# Patient Record
Sex: FEMALE | Race: WHITE | Employment: OTHER | URBAN - NONMETROPOLITAN AREA
[De-identification: names, ages, dates, MRNs, and addresses within clinical notes are randomized per-mention and may not be internally consistent; named-entity substitution may affect disease eponyms.]

---

## 2017-01-01 ENCOUNTER — HOSPITAL ENCOUNTER (OUTPATIENT)
Dept: WOUND CARE | Age: 52
Discharge: HOME | End: 2017-01-31
Attending: INTERNAL MEDICINE | Admitting: SURGERY

## 2017-01-03 ENCOUNTER — HOSPITAL ENCOUNTER (OUTPATIENT)
Dept: WOUND CARE | Age: 52
Discharge: HOME | End: 2017-01-03
Attending: SURGERY | Admitting: INTERNAL MEDICINE

## 2017-01-03 ENCOUNTER — HOSPITAL ENCOUNTER (OUTPATIENT)
Dept: WOUND CARE | Age: 52
Discharge: HOME | End: 2017-01-03
Attending: INTERNAL MEDICINE | Admitting: INTERNAL MEDICINE

## 2017-01-03 VITALS
TEMPERATURE: 98 F | RESPIRATION RATE: 16 BRPM | HEART RATE: 79 BPM | DIASTOLIC BLOOD PRESSURE: 76 MMHG | SYSTOLIC BLOOD PRESSURE: 127 MMHG

## 2017-01-03 DIAGNOSIS — T66.XXXS LATE EFFECT OF RADIATION: Primary | ICD-10-CM

## 2017-01-04 ENCOUNTER — HOSPITAL ENCOUNTER (OUTPATIENT)
Dept: WOUND CARE | Age: 52
Discharge: HOME | End: 2017-01-04
Attending: INTERNAL MEDICINE | Admitting: SURGERY

## 2017-01-04 ENCOUNTER — HOSPITAL ENCOUNTER (OUTPATIENT)
Dept: WOUND CARE | Age: 52
Discharge: HOME | End: 2017-01-04
Attending: INTERNAL MEDICINE | Admitting: INTERNAL MEDICINE

## 2017-01-04 VITALS
RESPIRATION RATE: 16 BRPM | DIASTOLIC BLOOD PRESSURE: 82 MMHG | SYSTOLIC BLOOD PRESSURE: 127 MMHG | HEART RATE: 76 BPM | TEMPERATURE: 98.3 F

## 2017-01-04 DIAGNOSIS — T66.XXXS LATE EFFECT OF RADIATION: Primary | ICD-10-CM

## 2017-01-04 PROCEDURE — 99183 HYPERBARIC OXYGEN THERAPY: CPT | Performed by: INTERNAL MEDICINE

## 2017-01-06 ENCOUNTER — HOSPITAL ENCOUNTER (OUTPATIENT)
Dept: WOUND CARE | Age: 52
Discharge: HOME | End: 2017-01-06
Attending: INTERNAL MEDICINE | Admitting: INTERNAL MEDICINE

## 2017-01-06 ENCOUNTER — HOSPITAL ENCOUNTER (OUTPATIENT)
Dept: WOUND CARE | Age: 52
Discharge: HOME | End: 2017-01-06
Attending: INTERNAL MEDICINE | Admitting: INTERNAL MEDICINE

## 2017-01-06 VITALS
HEART RATE: 77 BPM | RESPIRATION RATE: 16 BRPM | SYSTOLIC BLOOD PRESSURE: 120 MMHG | TEMPERATURE: 98.6 F | DIASTOLIC BLOOD PRESSURE: 80 MMHG

## 2017-01-06 DIAGNOSIS — T66.XXXS LATE EFFECT OF RADIATION: ICD-10-CM

## 2017-01-06 DIAGNOSIS — S21.001A WOUND OF RIGHT BREAST, INITIAL ENCOUNTER: Primary | ICD-10-CM

## 2017-01-09 ENCOUNTER — HOSPITAL ENCOUNTER (OUTPATIENT)
Dept: ULTRASOUND IMAGING | Age: 52
Discharge: HOME | End: 2017-01-09
Attending: SPECIALIST | Admitting: SPECIALIST

## 2017-01-09 DIAGNOSIS — M61.459: ICD-10-CM

## 2017-01-10 ENCOUNTER — HOSPITAL ENCOUNTER (OUTPATIENT)
Dept: WOUND CARE | Age: 52
Discharge: HOME | End: 2017-01-10
Attending: SURGERY | Admitting: SURGERY

## 2017-01-10 ENCOUNTER — HOSPITAL ENCOUNTER (OUTPATIENT)
Dept: WOUND CARE | Age: 52
Discharge: HOME | End: 2017-01-10
Attending: SURGERY | Admitting: INTERNAL MEDICINE

## 2017-01-10 VITALS
SYSTOLIC BLOOD PRESSURE: 122 MMHG | RESPIRATION RATE: 16 BRPM | TEMPERATURE: 98.3 F | DIASTOLIC BLOOD PRESSURE: 79 MMHG | HEART RATE: 82 BPM

## 2017-01-10 DIAGNOSIS — S21.001A WOUND OF RIGHT BREAST, INITIAL ENCOUNTER: Primary | ICD-10-CM

## 2017-01-10 DIAGNOSIS — T66.XXXS LATE EFFECT OF RADIATION: ICD-10-CM

## 2017-01-11 ENCOUNTER — HOSPITAL ENCOUNTER (OUTPATIENT)
Dept: WOUND CARE | Age: 52
Discharge: HOME | End: 2017-01-11
Attending: INTERNAL MEDICINE | Admitting: INTERNAL MEDICINE

## 2017-01-11 ENCOUNTER — HOSPITAL ENCOUNTER (OUTPATIENT)
Dept: WOUND CARE | Age: 52
Discharge: HOME | End: 2017-01-11
Attending: INTERNAL MEDICINE | Admitting: INTERNAL MEDICINE

## 2017-01-11 VITALS
DIASTOLIC BLOOD PRESSURE: 74 MMHG | SYSTOLIC BLOOD PRESSURE: 114 MMHG | RESPIRATION RATE: 16 BRPM | TEMPERATURE: 99 F | HEART RATE: 80 BPM

## 2017-01-11 DIAGNOSIS — T66.XXXS LATE EFFECT OF RADIATION: Primary | ICD-10-CM

## 2017-01-11 PROCEDURE — 99183 HYPERBARIC OXYGEN THERAPY: CPT | Performed by: INTERNAL MEDICINE

## 2017-01-11 ASSESSMENT — PAIN DESCRIPTION - DESCRIPTORS: DESCRIPTORS: ACHING

## 2017-01-11 ASSESSMENT — PAIN DESCRIPTION - PAIN TYPE: TYPE: CHRONIC PAIN

## 2017-01-11 ASSESSMENT — PAIN SCALES - GENERAL: PAINLEVEL_OUTOF10: 5

## 2017-01-11 ASSESSMENT — PAIN DESCRIPTION - FREQUENCY: FREQUENCY: INTERMITTENT

## 2017-01-11 ASSESSMENT — PAIN DESCRIPTION - ORIENTATION: ORIENTATION: RIGHT

## 2017-01-11 ASSESSMENT — PAIN DESCRIPTION - ONSET: ONSET: ON-GOING

## 2017-01-11 ASSESSMENT — PAIN DESCRIPTION - PROGRESSION: CLINICAL_PROGRESSION: NOT CHANGED

## 2017-01-11 ASSESSMENT — PAIN DESCRIPTION - LOCATION: LOCATION: BREAST

## 2017-01-12 ENCOUNTER — HOSPITAL ENCOUNTER (OUTPATIENT)
Dept: WOUND CARE | Age: 52
Discharge: HOME | End: 2017-01-12
Attending: INTERNAL MEDICINE | Admitting: SURGERY

## 2017-01-12 ENCOUNTER — HOSPITAL ENCOUNTER (OUTPATIENT)
Dept: WOUND CARE | Age: 52
Discharge: HOME | End: 2017-01-12
Attending: INTERNAL MEDICINE | Admitting: INTERNAL MEDICINE

## 2017-01-12 VITALS
RESPIRATION RATE: 16 BRPM | TEMPERATURE: 98.5 F | HEART RATE: 86 BPM | SYSTOLIC BLOOD PRESSURE: 111 MMHG | DIASTOLIC BLOOD PRESSURE: 73 MMHG

## 2017-01-12 DIAGNOSIS — S21.001A WOUND OF RIGHT BREAST, INITIAL ENCOUNTER: ICD-10-CM

## 2017-01-12 DIAGNOSIS — T66.XXXS LATE EFFECT OF RADIATION: Primary | ICD-10-CM

## 2017-01-13 ENCOUNTER — HOSPITAL ENCOUNTER (OUTPATIENT)
Dept: WOUND CARE | Age: 52
Discharge: HOME | End: 2017-01-13
Attending: INTERNAL MEDICINE | Admitting: INTERNAL MEDICINE

## 2017-01-13 ENCOUNTER — HOSPITAL ENCOUNTER (OUTPATIENT)
Dept: WOUND CARE | Age: 52
Discharge: HOME | End: 2017-01-13
Attending: INTERNAL MEDICINE | Admitting: INTERNAL MEDICINE

## 2017-01-13 VITALS
DIASTOLIC BLOOD PRESSURE: 77 MMHG | HEART RATE: 80 BPM | SYSTOLIC BLOOD PRESSURE: 121 MMHG | RESPIRATION RATE: 16 BRPM | TEMPERATURE: 98.6 F

## 2017-01-13 DIAGNOSIS — T66.XXXS LATE EFFECT OF RADIATION: Primary | ICD-10-CM

## 2017-01-13 DIAGNOSIS — S21.001A WOUND OF RIGHT BREAST, INITIAL ENCOUNTER: ICD-10-CM

## 2017-01-16 ENCOUNTER — HOSPITAL ENCOUNTER (OUTPATIENT)
Dept: WOUND CARE | Age: 52
Discharge: HOME | End: 2017-01-16
Attending: INTERNAL MEDICINE | Admitting: INTERNAL MEDICINE

## 2017-01-16 ENCOUNTER — HOSPITAL ENCOUNTER (OUTPATIENT)
Dept: WOUND CARE | Age: 52
Discharge: HOME | End: 2017-01-16
Attending: INTERNAL MEDICINE | Admitting: INTERNAL MEDICINE

## 2017-01-16 VITALS
TEMPERATURE: 98.5 F | DIASTOLIC BLOOD PRESSURE: 80 MMHG | SYSTOLIC BLOOD PRESSURE: 123 MMHG | HEART RATE: 79 BPM | RESPIRATION RATE: 16 BRPM

## 2017-01-16 DIAGNOSIS — T66.XXXS LATE EFFECT OF RADIATION: Primary | ICD-10-CM

## 2017-01-16 PROCEDURE — 99183 HYPERBARIC OXYGEN THERAPY: CPT | Performed by: INTERNAL MEDICINE

## 2017-01-17 ENCOUNTER — HOSPITAL ENCOUNTER (OUTPATIENT)
Dept: WOUND CARE | Age: 52
Discharge: HOME | End: 2017-01-17
Attending: SURGERY | Admitting: INTERNAL MEDICINE

## 2017-01-17 ENCOUNTER — HOSPITAL ENCOUNTER (OUTPATIENT)
Dept: WOUND CARE | Age: 52
Discharge: HOME | End: 2017-01-17
Attending: SURGERY | Admitting: SURGERY

## 2017-01-17 VITALS
DIASTOLIC BLOOD PRESSURE: 75 MMHG | RESPIRATION RATE: 16 BRPM | HEART RATE: 84 BPM | TEMPERATURE: 98.9 F | SYSTOLIC BLOOD PRESSURE: 115 MMHG

## 2017-01-17 DIAGNOSIS — S21.001A WOUND OF RIGHT BREAST, INITIAL ENCOUNTER: ICD-10-CM

## 2017-01-17 DIAGNOSIS — T66.XXXS LATE EFFECT OF RADIATION: Primary | ICD-10-CM

## 2017-01-18 ENCOUNTER — HOSPITAL ENCOUNTER (OUTPATIENT)
Dept: WOUND CARE | Age: 52
Discharge: HOME | End: 2017-01-18
Attending: INTERNAL MEDICINE | Admitting: INTERNAL MEDICINE

## 2017-01-18 ENCOUNTER — HOSPITAL ENCOUNTER (OUTPATIENT)
Dept: WOUND CARE | Age: 52
Discharge: HOME | End: 2017-01-18
Attending: INTERNAL MEDICINE | Admitting: INTERNAL MEDICINE

## 2017-01-18 VITALS
TEMPERATURE: 99.2 F | HEART RATE: 78 BPM | SYSTOLIC BLOOD PRESSURE: 120 MMHG | RESPIRATION RATE: 16 BRPM | DIASTOLIC BLOOD PRESSURE: 76 MMHG

## 2017-01-18 DIAGNOSIS — T66.XXXS LATE EFFECT OF RADIATION: Primary | ICD-10-CM

## 2017-01-18 PROCEDURE — 99183 HYPERBARIC OXYGEN THERAPY: CPT | Performed by: INTERNAL MEDICINE

## 2017-01-19 ENCOUNTER — HOSPITAL ENCOUNTER (OUTPATIENT)
Dept: WOUND CARE | Age: 52
Discharge: HOME | End: 2017-01-19
Attending: INTERNAL MEDICINE | Admitting: INTERNAL MEDICINE

## 2017-01-19 ENCOUNTER — HOSPITAL ENCOUNTER (OUTPATIENT)
Dept: WOUND CARE | Age: 52
Discharge: HOME | End: 2017-01-19
Attending: INTERNAL MEDICINE | Admitting: SURGERY

## 2017-01-19 VITALS
RESPIRATION RATE: 16 BRPM | SYSTOLIC BLOOD PRESSURE: 125 MMHG | HEART RATE: 80 BPM | TEMPERATURE: 99.2 F | DIASTOLIC BLOOD PRESSURE: 76 MMHG

## 2017-01-19 DIAGNOSIS — T66.XXXS LATE EFFECT OF RADIATION: Primary | ICD-10-CM

## 2017-01-19 DIAGNOSIS — S21.001A WOUND OF RIGHT BREAST, INITIAL ENCOUNTER: ICD-10-CM

## 2017-01-20 ENCOUNTER — HOSPITAL ENCOUNTER (OUTPATIENT)
Dept: WOUND CARE | Age: 52
Discharge: HOME | End: 2017-01-20
Attending: INTERNAL MEDICINE | Admitting: INTERNAL MEDICINE

## 2017-01-20 ENCOUNTER — HOSPITAL ENCOUNTER (OUTPATIENT)
Dept: WOUND CARE | Age: 52
Discharge: HOME | End: 2017-01-20
Attending: INTERNAL MEDICINE | Admitting: INTERNAL MEDICINE

## 2017-01-20 VITALS
DIASTOLIC BLOOD PRESSURE: 77 MMHG | HEART RATE: 74 BPM | SYSTOLIC BLOOD PRESSURE: 129 MMHG | TEMPERATURE: 99 F | RESPIRATION RATE: 16 BRPM

## 2017-01-20 DIAGNOSIS — S21.001A WOUND OF RIGHT BREAST, INITIAL ENCOUNTER: ICD-10-CM

## 2017-01-20 DIAGNOSIS — T66.XXXS LATE EFFECT OF RADIATION: Primary | ICD-10-CM

## 2017-01-23 ENCOUNTER — HOSPITAL ENCOUNTER (OUTPATIENT)
Dept: WOUND CARE | Age: 52
Discharge: HOME | End: 2017-01-23
Attending: INTERNAL MEDICINE | Admitting: INTERNAL MEDICINE

## 2017-01-23 ENCOUNTER — HOSPITAL ENCOUNTER (OUTPATIENT)
Dept: WOUND CARE | Age: 52
Discharge: HOME | End: 2017-01-23
Attending: PODIATRIST | Admitting: PODIATRIST

## 2017-01-23 VITALS
RESPIRATION RATE: 16 BRPM | TEMPERATURE: 99 F | DIASTOLIC BLOOD PRESSURE: 77 MMHG | HEART RATE: 79 BPM | SYSTOLIC BLOOD PRESSURE: 122 MMHG

## 2017-01-23 DIAGNOSIS — T66.XXXS LATE EFFECT OF RADIATION: Primary | ICD-10-CM

## 2017-01-23 PROCEDURE — 99183 HYPERBARIC OXYGEN THERAPY: CPT | Performed by: INTERNAL MEDICINE

## 2017-01-24 ENCOUNTER — HOSPITAL ENCOUNTER (OUTPATIENT)
Dept: WOUND CARE | Age: 52
Discharge: HOME | End: 2017-01-24
Attending: SURGERY | Admitting: INTERNAL MEDICINE

## 2017-01-24 ENCOUNTER — HOSPITAL ENCOUNTER (OUTPATIENT)
Dept: WOUND CARE | Age: 52
Discharge: HOME | End: 2017-01-24
Attending: INTERNAL MEDICINE | Admitting: INTERNAL MEDICINE

## 2017-01-24 VITALS
TEMPERATURE: 98.6 F | RESPIRATION RATE: 16 BRPM | SYSTOLIC BLOOD PRESSURE: 112 MMHG | HEART RATE: 75 BPM | DIASTOLIC BLOOD PRESSURE: 76 MMHG

## 2017-01-24 DIAGNOSIS — T66.XXXS LATE EFFECT OF RADIATION: Primary | ICD-10-CM

## 2017-01-25 ENCOUNTER — HOSPITAL ENCOUNTER (OUTPATIENT)
Dept: WOUND CARE | Age: 52
Discharge: HOME | End: 2017-01-25
Attending: INTERNAL MEDICINE | Admitting: INTERNAL MEDICINE

## 2017-01-25 ENCOUNTER — HOSPITAL ENCOUNTER (OUTPATIENT)
Dept: WOUND CARE | Age: 52
Discharge: HOME | End: 2017-01-25
Attending: INTERNAL MEDICINE | Admitting: INTERNAL MEDICINE

## 2017-01-25 VITALS
SYSTOLIC BLOOD PRESSURE: 146 MMHG | HEART RATE: 88 BPM | TEMPERATURE: 98.7 F | RESPIRATION RATE: 16 BRPM | DIASTOLIC BLOOD PRESSURE: 88 MMHG

## 2017-01-25 VITALS
SYSTOLIC BLOOD PRESSURE: 146 MMHG | RESPIRATION RATE: 16 BRPM | HEART RATE: 88 BPM | DIASTOLIC BLOOD PRESSURE: 88 MMHG | TEMPERATURE: 98.7 F

## 2017-01-25 DIAGNOSIS — T66.XXXS LATE EFFECT OF RADIATION: Primary | ICD-10-CM

## 2017-01-25 PROCEDURE — 99183 HYPERBARIC OXYGEN THERAPY: CPT | Performed by: INTERNAL MEDICINE

## 2017-01-25 ASSESSMENT — PAIN DESCRIPTION - PROGRESSION: CLINICAL_PROGRESSION: NOT CHANGED

## 2017-01-26 ENCOUNTER — HOSPITAL ENCOUNTER (OUTPATIENT)
Dept: WOUND CARE | Age: 52
Discharge: HOME | End: 2017-01-26
Attending: INTERNAL MEDICINE | Admitting: SURGERY

## 2017-01-26 ENCOUNTER — HOSPITAL ENCOUNTER (OUTPATIENT)
Dept: WOUND CARE | Age: 52
Discharge: HOME | End: 2017-01-26
Attending: INTERNAL MEDICINE | Admitting: INTERNAL MEDICINE

## 2017-01-26 VITALS
DIASTOLIC BLOOD PRESSURE: 75 MMHG | RESPIRATION RATE: 16 BRPM | TEMPERATURE: 98.6 F | HEART RATE: 80 BPM | SYSTOLIC BLOOD PRESSURE: 115 MMHG

## 2017-01-26 DIAGNOSIS — T66.XXXS LATE EFFECT OF RADIATION: Primary | ICD-10-CM

## 2017-01-26 DIAGNOSIS — S21.001A WOUND OF RIGHT BREAST, INITIAL ENCOUNTER: ICD-10-CM

## 2017-01-27 ENCOUNTER — HOSPITAL ENCOUNTER (OUTPATIENT)
Dept: WOUND CARE | Age: 52
Discharge: HOME | End: 2017-01-27
Attending: INTERNAL MEDICINE | Admitting: INTERNAL MEDICINE

## 2017-01-27 ENCOUNTER — HOSPITAL ENCOUNTER (OUTPATIENT)
Dept: WOUND CARE | Age: 52
Discharge: HOME | End: 2017-01-27
Attending: INTERNAL MEDICINE | Admitting: INTERNAL MEDICINE

## 2017-01-27 VITALS
SYSTOLIC BLOOD PRESSURE: 109 MMHG | DIASTOLIC BLOOD PRESSURE: 72 MMHG | TEMPERATURE: 98.2 F | RESPIRATION RATE: 16 BRPM | HEART RATE: 73 BPM

## 2017-01-27 DIAGNOSIS — S21.001A WOUND OF RIGHT BREAST, INITIAL ENCOUNTER: ICD-10-CM

## 2017-01-27 DIAGNOSIS — T66.XXXS LATE EFFECT OF RADIATION: Primary | ICD-10-CM

## 2017-01-30 ENCOUNTER — HOSPITAL ENCOUNTER (OUTPATIENT)
Dept: WOUND CARE | Age: 52
Discharge: HOME | End: 2017-01-30
Attending: PODIATRIST | Admitting: PODIATRIST

## 2017-01-30 ENCOUNTER — HOSPITAL ENCOUNTER (OUTPATIENT)
Dept: WOUND CARE | Age: 52
Discharge: HOME | End: 2017-01-30
Attending: INTERNAL MEDICINE | Admitting: INTERNAL MEDICINE

## 2017-01-30 VITALS
HEART RATE: 73 BPM | RESPIRATION RATE: 16 BRPM | TEMPERATURE: 98.6 F | SYSTOLIC BLOOD PRESSURE: 113 MMHG | DIASTOLIC BLOOD PRESSURE: 77 MMHG

## 2017-01-30 DIAGNOSIS — T66.XXXS LATE EFFECT OF RADIATION: Primary | ICD-10-CM

## 2017-01-30 PROCEDURE — 99183 HYPERBARIC OXYGEN THERAPY: CPT | Performed by: INTERNAL MEDICINE

## 2017-02-01 ENCOUNTER — HOSPITAL ENCOUNTER (OUTPATIENT)
Dept: WOUND CARE | Age: 52
Discharge: HOME | End: 2017-02-01
Attending: INTERNAL MEDICINE | Admitting: INTERNAL MEDICINE

## 2017-02-01 ENCOUNTER — HOSPITAL ENCOUNTER (OUTPATIENT)
Dept: WOUND CARE | Age: 52
Discharge: HOME | End: 2017-02-28
Attending: SURGERY | Admitting: INTERNAL MEDICINE

## 2017-02-01 ENCOUNTER — HOSPITAL ENCOUNTER (OUTPATIENT)
Dept: WOUND CARE | Age: 52
Discharge: HOME | End: 2017-02-01
Attending: INTERNAL MEDICINE | Admitting: SURGERY

## 2017-02-01 VITALS
RESPIRATION RATE: 16 BRPM | TEMPERATURE: 99.1 F | HEART RATE: 79 BPM | SYSTOLIC BLOOD PRESSURE: 102 MMHG | DIASTOLIC BLOOD PRESSURE: 65 MMHG

## 2017-02-01 DIAGNOSIS — T66.XXXS LATE EFFECT OF RADIATION: Primary | ICD-10-CM

## 2017-02-01 PROCEDURE — 99183 HYPERBARIC OXYGEN THERAPY: CPT | Performed by: INTERNAL MEDICINE

## 2017-02-02 ENCOUNTER — TELEPHONE (OUTPATIENT)
Dept: GASTROENTEROLOGY | Age: 52
End: 2017-02-02

## 2017-02-03 ENCOUNTER — HOSPITAL ENCOUNTER (OUTPATIENT)
Dept: WOUND CARE | Age: 52
Discharge: HOME | End: 2017-02-03
Attending: INTERNAL MEDICINE | Admitting: INTERNAL MEDICINE

## 2017-02-03 ENCOUNTER — OFFICE VISIT (OUTPATIENT)
Dept: GASTROENTEROLOGY | Age: 52
End: 2017-02-03

## 2017-02-03 ENCOUNTER — TELEPHONE (OUTPATIENT)
Dept: GASTROENTEROLOGY | Age: 52
End: 2017-02-03

## 2017-02-03 ENCOUNTER — HOSPITAL ENCOUNTER (OUTPATIENT)
Dept: WOUND CARE | Age: 52
Discharge: HOME | End: 2017-02-03
Attending: INTERNAL MEDICINE | Admitting: INTERNAL MEDICINE

## 2017-02-03 VITALS
OXYGEN SATURATION: 98 % | HEIGHT: 67 IN | WEIGHT: 293 LBS | HEART RATE: 98 BPM | BODY MASS INDEX: 45.99 KG/M2 | DIASTOLIC BLOOD PRESSURE: 80 MMHG | SYSTOLIC BLOOD PRESSURE: 110 MMHG

## 2017-02-03 VITALS
DIASTOLIC BLOOD PRESSURE: 79 MMHG | TEMPERATURE: 98.8 F | RESPIRATION RATE: 16 BRPM | SYSTOLIC BLOOD PRESSURE: 139 MMHG | HEART RATE: 78 BPM

## 2017-02-03 DIAGNOSIS — S21.001A WOUND OF RIGHT BREAST, INITIAL ENCOUNTER: ICD-10-CM

## 2017-02-03 DIAGNOSIS — R19.7 DIARRHEA, UNSPECIFIED TYPE: Primary | ICD-10-CM

## 2017-02-03 DIAGNOSIS — T66.XXXS LATE EFFECT OF RADIATION: Primary | ICD-10-CM

## 2017-02-03 DIAGNOSIS — K21.9 GASTROESOPHAGEAL REFLUX DISEASE, ESOPHAGITIS PRESENCE NOT SPECIFIED: ICD-10-CM

## 2017-02-03 PROCEDURE — 99203 OFFICE O/P NEW LOW 30 MIN: CPT | Performed by: NURSE PRACTITIONER

## 2017-02-03 RX ORDER — CALCIUM CARBONATE 500(1250)
500 TABLET ORAL DAILY
COMMUNITY
Start: 2017-01-17 | End: 2018-05-15

## 2017-02-03 ASSESSMENT — ENCOUNTER SYMPTOMS
DOUBLE VISION: 0
SPUTUM PRODUCTION: 0
ABDOMINAL PAIN: 0
PHOTOPHOBIA: 0
EYE PAIN: 0
VOMITING: 0
BACK PAIN: 1
DIARRHEA: 1
BLURRED VISION: 1
NAUSEA: 0
BLOOD IN STOOL: 0
HEARTBURN: 1
ORTHOPNEA: 0
SHORTNESS OF BREATH: 0
HEMOPTYSIS: 0
CONSTIPATION: 0
COUGH: 0

## 2017-02-06 ENCOUNTER — HOSPITAL ENCOUNTER (OUTPATIENT)
Dept: WOUND CARE | Age: 52
Discharge: HOME | End: 2017-02-06
Attending: INTERNAL MEDICINE | Admitting: INTERNAL MEDICINE

## 2017-02-06 ENCOUNTER — HOSPITAL ENCOUNTER (OUTPATIENT)
Dept: WOUND CARE | Age: 52
Discharge: HOME | End: 2017-02-06
Attending: INTERNAL MEDICINE | Admitting: INTERNAL MEDICINE

## 2017-02-06 VITALS
DIASTOLIC BLOOD PRESSURE: 70 MMHG | SYSTOLIC BLOOD PRESSURE: 112 MMHG | TEMPERATURE: 98.3 F | RESPIRATION RATE: 16 BRPM | HEART RATE: 80 BPM

## 2017-02-06 DIAGNOSIS — T66.XXXS LATE EFFECT OF RADIATION: Primary | ICD-10-CM

## 2017-02-06 PROCEDURE — 99183 HYPERBARIC OXYGEN THERAPY: CPT | Performed by: INTERNAL MEDICINE

## 2017-02-07 ENCOUNTER — HOSPITAL ENCOUNTER (OUTPATIENT)
Dept: WOUND CARE | Age: 52
Discharge: HOME | End: 2017-02-07
Attending: SURGERY | Admitting: INTERNAL MEDICINE

## 2017-02-07 VITALS
SYSTOLIC BLOOD PRESSURE: 122 MMHG | TEMPERATURE: 98.6 F | DIASTOLIC BLOOD PRESSURE: 74 MMHG | HEART RATE: 80 BPM | RESPIRATION RATE: 16 BRPM

## 2017-02-07 DIAGNOSIS — T66.XXXS LATE EFFECT OF RADIATION: Primary | ICD-10-CM

## 2017-02-07 ASSESSMENT — PAIN SCALES - GENERAL: PAINLEVEL_OUTOF10: 0

## 2017-02-08 ENCOUNTER — HOSPITAL ENCOUNTER (OUTPATIENT)
Dept: WOUND CARE | Age: 52
Discharge: HOME | End: 2017-02-08
Attending: INTERNAL MEDICINE | Admitting: INTERNAL MEDICINE

## 2017-02-08 ENCOUNTER — HOSPITAL ENCOUNTER (OUTPATIENT)
Dept: WOUND CARE | Age: 52
Discharge: HOME | End: 2017-02-08
Attending: INTERNAL MEDICINE | Admitting: SURGERY

## 2017-02-08 VITALS
SYSTOLIC BLOOD PRESSURE: 120 MMHG | TEMPERATURE: 99.1 F | RESPIRATION RATE: 16 BRPM | DIASTOLIC BLOOD PRESSURE: 78 MMHG | HEART RATE: 68 BPM

## 2017-02-08 DIAGNOSIS — T66.XXXS LATE EFFECT OF RADIATION: Primary | ICD-10-CM

## 2017-02-08 PROCEDURE — 99183 HYPERBARIC OXYGEN THERAPY: CPT | Performed by: INTERNAL MEDICINE

## 2017-02-13 ENCOUNTER — HOSPITAL ENCOUNTER (OUTPATIENT)
Dept: WOUND CARE | Age: 52
Discharge: HOME | End: 2017-02-13
Attending: INTERNAL MEDICINE

## 2017-02-13 ENCOUNTER — HOSPITAL ENCOUNTER (OUTPATIENT)
Dept: WOUND CARE | Age: 52
Discharge: HOME | End: 2017-02-13
Attending: INTERNAL MEDICINE | Admitting: INTERNAL MEDICINE

## 2017-02-13 ENCOUNTER — HOSPITAL ENCOUNTER (OUTPATIENT)
Dept: WOUND CARE | Age: 52
Discharge: HOME | End: 2017-02-13
Attending: INTERNAL MEDICINE | Admitting: INTERNAL MEDICINE

## 2017-02-13 VITALS
SYSTOLIC BLOOD PRESSURE: 142 MMHG | TEMPERATURE: 98.7 F | RESPIRATION RATE: 16 BRPM | DIASTOLIC BLOOD PRESSURE: 83 MMHG | HEART RATE: 76 BPM

## 2017-02-13 DIAGNOSIS — T66.XXXS LATE EFFECT OF RADIATION: Primary | ICD-10-CM

## 2017-02-13 PROCEDURE — 99183 HYPERBARIC OXYGEN THERAPY: CPT | Performed by: INTERNAL MEDICINE

## 2017-02-14 ENCOUNTER — HOSPITAL ENCOUNTER (OUTPATIENT)
Dept: WOUND CARE | Age: 52
Discharge: HOME | End: 2017-02-14
Attending: SURGERY | Admitting: INTERNAL MEDICINE

## 2017-02-14 ENCOUNTER — HOSPITAL ENCOUNTER (OUTPATIENT)
Dept: WOUND CARE | Age: 52
Discharge: HOME | End: 2017-02-14
Attending: INTERNAL MEDICINE | Admitting: INTERNAL MEDICINE

## 2017-02-14 VITALS
RESPIRATION RATE: 16 BRPM | TEMPERATURE: 98.5 F | DIASTOLIC BLOOD PRESSURE: 79 MMHG | HEART RATE: 77 BPM | SYSTOLIC BLOOD PRESSURE: 121 MMHG

## 2017-02-14 DIAGNOSIS — T66.XXXS LATE EFFECT OF RADIATION: Primary | ICD-10-CM

## 2017-02-15 ENCOUNTER — HOSPITAL ENCOUNTER (OUTPATIENT)
Dept: WOUND CARE | Age: 52
Discharge: HOME | End: 2017-02-15
Attending: INTERNAL MEDICINE | Admitting: SURGERY

## 2017-02-15 ENCOUNTER — HOSPITAL ENCOUNTER (OUTPATIENT)
Dept: WOUND CARE | Age: 52
Discharge: HOME | End: 2017-02-15
Attending: INTERNAL MEDICINE | Admitting: INTERNAL MEDICINE

## 2017-02-15 VITALS
HEART RATE: 73 BPM | DIASTOLIC BLOOD PRESSURE: 77 MMHG | TEMPERATURE: 98.9 F | RESPIRATION RATE: 16 BRPM | SYSTOLIC BLOOD PRESSURE: 116 MMHG

## 2017-02-15 DIAGNOSIS — T66.XXXS LATE EFFECT OF RADIATION: Primary | ICD-10-CM

## 2017-02-15 PROCEDURE — 99183 HYPERBARIC OXYGEN THERAPY: CPT | Performed by: INTERNAL MEDICINE

## 2017-02-16 ENCOUNTER — HOSPITAL ENCOUNTER (OUTPATIENT)
Dept: WOUND CARE | Age: 52
Discharge: HOME | End: 2017-02-16
Attending: INTERNAL MEDICINE | Admitting: INTERNAL MEDICINE

## 2017-02-16 ENCOUNTER — HOSPITAL ENCOUNTER (OUTPATIENT)
Dept: WOUND CARE | Age: 52
Discharge: HOME | End: 2017-02-16
Attending: INTERNAL MEDICINE | Admitting: SURGERY

## 2017-02-16 VITALS
DIASTOLIC BLOOD PRESSURE: 79 MMHG | TEMPERATURE: 99 F | HEART RATE: 78 BPM | RESPIRATION RATE: 16 BRPM | SYSTOLIC BLOOD PRESSURE: 116 MMHG

## 2017-02-16 DIAGNOSIS — S21.001A WOUND OF RIGHT BREAST, INITIAL ENCOUNTER: ICD-10-CM

## 2017-02-16 DIAGNOSIS — T66.XXXS LATE EFFECT OF RADIATION: Primary | ICD-10-CM

## 2017-02-17 ENCOUNTER — TELEPHONE (OUTPATIENT)
Dept: GASTROENTEROLOGY | Age: 52
End: 2017-02-17

## 2017-02-20 ENCOUNTER — HOSPITAL ENCOUNTER (OUTPATIENT)
Dept: OTHER | Age: 52
Discharge: HOME | End: 2017-02-20
Attending: INTERNAL MEDICINE | Admitting: INTERNAL MEDICINE

## 2017-02-20 ENCOUNTER — HOSPITAL ENCOUNTER (OUTPATIENT)
Dept: WOUND CARE | Age: 52
Discharge: HOME | End: 2017-02-20
Attending: INTERNAL MEDICINE | Admitting: INTERNAL MEDICINE

## 2017-02-20 ENCOUNTER — OFFICE VISIT (OUTPATIENT)
Dept: GASTROENTEROLOGY | Age: 52
End: 2017-02-20

## 2017-02-20 ENCOUNTER — HOSPITAL ENCOUNTER (OUTPATIENT)
Dept: WOUND CARE | Age: 52
Discharge: HOME | End: 2017-02-20
Attending: INTERNAL MEDICINE | Admitting: INTERNAL MEDICINE

## 2017-02-20 VITALS
RESPIRATION RATE: 16 BRPM | HEART RATE: 73 BPM | TEMPERATURE: 98.6 F | SYSTOLIC BLOOD PRESSURE: 131 MMHG | DIASTOLIC BLOOD PRESSURE: 80 MMHG

## 2017-02-20 VITALS
WEIGHT: 293 LBS | HEART RATE: 92 BPM | SYSTOLIC BLOOD PRESSURE: 136 MMHG | HEIGHT: 67 IN | DIASTOLIC BLOOD PRESSURE: 86 MMHG | OXYGEN SATURATION: 96 % | BODY MASS INDEX: 45.99 KG/M2

## 2017-02-20 DIAGNOSIS — R13.14 PHARYNGOESOPHAGEAL DYSPHAGIA: ICD-10-CM

## 2017-02-20 DIAGNOSIS — Z86.010 HISTORY OF COLON POLYPS: ICD-10-CM

## 2017-02-20 DIAGNOSIS — K21.9 GASTROESOPHAGEAL REFLUX DISEASE, ESOPHAGITIS PRESENCE NOT SPECIFIED: ICD-10-CM

## 2017-02-20 DIAGNOSIS — R10.31 RIGHT LOWER QUADRANT ABDOMINAL PAIN: ICD-10-CM

## 2017-02-20 DIAGNOSIS — R11.0 NAUSEA: ICD-10-CM

## 2017-02-20 DIAGNOSIS — R19.7 DIARRHEA, UNSPECIFIED TYPE: Primary | ICD-10-CM

## 2017-02-20 DIAGNOSIS — T66.XXXS LATE EFFECT OF RADIATION: Primary | ICD-10-CM

## 2017-02-20 LAB
ALBUMIN SERPL-MCNC: 3.8 GM/DL (ref 3.4–5)
ALP BLD-CCNC: 96 IU/L (ref 40–128)
ALT SERPL-CCNC: 29 U/L (ref 10–40)
ANION GAP SERPL CALCULATED.3IONS-SCNC: 14 MMOL/L (ref 4–16)
AST SERPL-CCNC: 25 IU/L (ref 15–37)
BILIRUB SERPL-MCNC: 0.3 MG/DL (ref 0–1)
BUN BLDV-MCNC: 6 MG/DL (ref 6–23)
CALCIUM SERPL-MCNC: 9 MG/DL (ref 8.3–10.6)
CHLORIDE BLD-SCNC: 100 MMOL/L (ref 99–110)
CO2: 26 MMOL/L (ref 21–32)
CREAT SERPL-MCNC: 0.7 MG/DL (ref 0.6–1.1)
GFR AFRICAN AMERICAN: >60 ML/MIN/1.73M2
GFR NON-AFRICAN AMERICAN: >60 ML/MIN/1.73M2
GLUCOSE FASTING: 140 MG/DL (ref 70–99)
POTASSIUM SERPL-SCNC: 3.6 MMOL/L (ref 3.5–5.1)
SODIUM BLD-SCNC: 140 MMOL/L (ref 135–145)
TOTAL PROTEIN: 6.5 GM/DL (ref 6.4–8.2)

## 2017-02-20 PROCEDURE — 99214 OFFICE O/P EST MOD 30 MIN: CPT | Performed by: NURSE PRACTITIONER

## 2017-02-20 PROCEDURE — 99183 HYPERBARIC OXYGEN THERAPY: CPT | Performed by: INTERNAL MEDICINE

## 2017-02-20 RX ORDER — OCTISALATE, AVOBENZONE, HOMOSALATE, AND OCTOCRYLENE 29.4; 29.4; 49; 25.48 MG/ML; MG/ML; MG/ML; MG/ML
1 LOTION TOPICAL DAILY
Qty: 30 CAPSULE | Refills: 3 | Status: SHIPPED | OUTPATIENT
Start: 2017-02-20 | End: 2017-04-26 | Stop reason: SDUPTHER

## 2017-02-20 RX ORDER — OMEPRAZOLE 40 MG/1
40 CAPSULE, DELAYED RELEASE ORAL DAILY
Qty: 30 CAPSULE | Refills: 3 | Status: SHIPPED | OUTPATIENT
Start: 2017-02-20 | End: 2017-04-26 | Stop reason: SDUPTHER

## 2017-02-20 ASSESSMENT — ENCOUNTER SYMPTOMS
BLURRED VISION: 1
ORTHOPNEA: 0
BACK PAIN: 1
DIARRHEA: 1
COUGH: 0
SPUTUM PRODUCTION: 0
CONSTIPATION: 0
ABDOMINAL PAIN: 1
PHOTOPHOBIA: 0
SHORTNESS OF BREATH: 0
VOMITING: 0
HEARTBURN: 1
BLOOD IN STOOL: 0
DOUBLE VISION: 0
EYE PAIN: 0
NAUSEA: 1

## 2017-02-21 ENCOUNTER — HOSPITAL ENCOUNTER (OUTPATIENT)
Dept: WOUND CARE | Age: 52
Discharge: HOME | End: 2017-02-21
Attending: INTERNAL MEDICINE | Admitting: INTERNAL MEDICINE

## 2017-02-21 ENCOUNTER — HOSPITAL ENCOUNTER (OUTPATIENT)
Dept: WOUND CARE | Age: 52
Discharge: HOME | End: 2017-02-21
Attending: SURGERY | Admitting: SURGERY

## 2017-02-21 VITALS
SYSTOLIC BLOOD PRESSURE: 128 MMHG | RESPIRATION RATE: 16 BRPM | DIASTOLIC BLOOD PRESSURE: 83 MMHG | TEMPERATURE: 98.9 F | HEART RATE: 70 BPM

## 2017-02-21 DIAGNOSIS — T66.XXXS LATE EFFECT OF RADIATION: Primary | ICD-10-CM

## 2017-02-22 LAB — CA 27.29: 13.2 U/ML

## 2017-02-23 ENCOUNTER — HOSPITAL ENCOUNTER (OUTPATIENT)
Dept: WOUND CARE | Age: 52
Discharge: HOME | End: 2017-02-23
Attending: INTERNAL MEDICINE | Admitting: INTERNAL MEDICINE

## 2017-02-23 ENCOUNTER — HOSPITAL ENCOUNTER (OUTPATIENT)
Dept: WOUND CARE | Age: 52
Discharge: HOME | End: 2017-02-23
Attending: INTERNAL MEDICINE | Admitting: SURGERY

## 2017-02-23 VITALS
SYSTOLIC BLOOD PRESSURE: 122 MMHG | DIASTOLIC BLOOD PRESSURE: 79 MMHG | RESPIRATION RATE: 16 BRPM | HEART RATE: 73 BPM | TEMPERATURE: 98.6 F

## 2017-02-23 DIAGNOSIS — S21.001A WOUND OF RIGHT BREAST, INITIAL ENCOUNTER: ICD-10-CM

## 2017-02-23 DIAGNOSIS — T66.XXXS LATE EFFECT OF RADIATION: Primary | ICD-10-CM

## 2017-02-27 PROBLEM — T81.89XA: Status: ACTIVE | Noted: 2017-02-27

## 2017-02-27 LAB
CULTURE: NORMAL
ORGANISM: NORMAL
REPORT STATUS: NORMAL
REQUEST PROBLEM: NORMAL
SPECIMEN: NORMAL

## 2017-02-28 ENCOUNTER — HOSPITAL ENCOUNTER (OUTPATIENT)
Dept: WOUND CARE | Age: 52
Discharge: HOME | End: 2017-02-28
Attending: SURGERY | Admitting: SURGERY

## 2017-02-28 VITALS
SYSTOLIC BLOOD PRESSURE: 132 MMHG | RESPIRATION RATE: 16 BRPM | DIASTOLIC BLOOD PRESSURE: 76 MMHG | TEMPERATURE: 98.7 F | HEART RATE: 76 BPM

## 2017-03-01 ENCOUNTER — HOSPITAL ENCOUNTER (OUTPATIENT)
Dept: WOUND CARE | Age: 52
Discharge: HOME | End: 2017-03-31
Attending: INTERNAL MEDICINE | Admitting: INTERNAL MEDICINE

## 2017-03-02 ENCOUNTER — HOSPITAL ENCOUNTER (OUTPATIENT)
Dept: WOUND CARE | Age: 52
Discharge: HOME | End: 2017-03-02
Attending: INTERNAL MEDICINE | Admitting: INTERNAL MEDICINE

## 2017-03-02 VITALS
DIASTOLIC BLOOD PRESSURE: 76 MMHG | HEART RATE: 97 BPM | TEMPERATURE: 97.9 F | RESPIRATION RATE: 18 BRPM | SYSTOLIC BLOOD PRESSURE: 121 MMHG

## 2017-03-02 DIAGNOSIS — S21.001A WOUND OF RIGHT BREAST, INITIAL ENCOUNTER: ICD-10-CM

## 2017-03-02 DIAGNOSIS — T66.XXXS LATE EFFECT OF RADIATION: Primary | ICD-10-CM

## 2017-03-07 ENCOUNTER — HOSPITAL ENCOUNTER (OUTPATIENT)
Dept: WOUND CARE | Age: 52
Discharge: HOME | End: 2017-03-07
Attending: SURGERY | Admitting: SURGERY

## 2017-03-07 VITALS
RESPIRATION RATE: 18 BRPM | TEMPERATURE: 97.4 F | DIASTOLIC BLOOD PRESSURE: 92 MMHG | SYSTOLIC BLOOD PRESSURE: 140 MMHG | HEART RATE: 92 BPM

## 2017-03-09 ENCOUNTER — HOSPITAL ENCOUNTER (OUTPATIENT)
Dept: WOUND CARE | Age: 52
Discharge: HOME | End: 2017-03-09
Attending: INTERNAL MEDICINE | Admitting: INTERNAL MEDICINE

## 2017-03-09 VITALS
DIASTOLIC BLOOD PRESSURE: 80 MMHG | HEART RATE: 89 BPM | RESPIRATION RATE: 16 BRPM | SYSTOLIC BLOOD PRESSURE: 144 MMHG | TEMPERATURE: 97.9 F

## 2017-03-09 DIAGNOSIS — S21.001A WOUND OF RIGHT BREAST, INITIAL ENCOUNTER: ICD-10-CM

## 2017-03-09 DIAGNOSIS — T66.XXXS LATE EFFECT OF RADIATION: Primary | ICD-10-CM

## 2017-03-14 ENCOUNTER — HOSPITAL ENCOUNTER (OUTPATIENT)
Dept: WOUND CARE | Age: 52
Discharge: HOME | End: 2017-03-14
Attending: SURGERY | Admitting: SURGERY

## 2017-03-14 VITALS
DIASTOLIC BLOOD PRESSURE: 83 MMHG | SYSTOLIC BLOOD PRESSURE: 132 MMHG | RESPIRATION RATE: 18 BRPM | HEART RATE: 91 BPM | TEMPERATURE: 97.4 F

## 2017-03-17 ENCOUNTER — HOSPITAL ENCOUNTER (OUTPATIENT)
Dept: WOUND CARE | Age: 52
Discharge: HOME | End: 2017-03-17
Attending: INTERNAL MEDICINE | Admitting: INTERNAL MEDICINE

## 2017-03-17 VITALS
RESPIRATION RATE: 18 BRPM | SYSTOLIC BLOOD PRESSURE: 129 MMHG | TEMPERATURE: 96.9 F | DIASTOLIC BLOOD PRESSURE: 53 MMHG | HEART RATE: 87 BPM

## 2017-03-17 DIAGNOSIS — T66.XXXS LATE EFFECT OF RADIATION: ICD-10-CM

## 2017-03-17 DIAGNOSIS — S21.001A WOUND OF RIGHT BREAST, INITIAL ENCOUNTER: Primary | ICD-10-CM

## 2017-03-23 ENCOUNTER — HOSPITAL ENCOUNTER (OUTPATIENT)
Dept: WOUND CARE | Age: 52
Discharge: HOME | End: 2017-03-23
Attending: INTERNAL MEDICINE | Admitting: INTERNAL MEDICINE

## 2017-03-23 VITALS
DIASTOLIC BLOOD PRESSURE: 84 MMHG | RESPIRATION RATE: 18 BRPM | TEMPERATURE: 97.4 F | SYSTOLIC BLOOD PRESSURE: 134 MMHG | HEART RATE: 86 BPM

## 2017-03-23 DIAGNOSIS — S21.001A WOUND OF RIGHT BREAST, INITIAL ENCOUNTER: ICD-10-CM

## 2017-03-23 DIAGNOSIS — T66.XXXS LATE EFFECT OF RADIATION: Primary | ICD-10-CM

## 2017-03-28 ENCOUNTER — HOSPITAL ENCOUNTER (OUTPATIENT)
Dept: WOUND CARE | Age: 52
Discharge: HOME | End: 2017-03-28
Attending: INTERNAL MEDICINE | Admitting: INTERNAL MEDICINE

## 2017-03-30 ENCOUNTER — HOSPITAL ENCOUNTER (OUTPATIENT)
Dept: WOUND CARE | Age: 52
Discharge: HOME | End: 2017-03-30
Attending: INTERNAL MEDICINE | Admitting: INTERNAL MEDICINE

## 2017-03-30 VITALS
DIASTOLIC BLOOD PRESSURE: 79 MMHG | TEMPERATURE: 97.8 F | HEART RATE: 93 BPM | RESPIRATION RATE: 16 BRPM | SYSTOLIC BLOOD PRESSURE: 126 MMHG

## 2017-03-30 DIAGNOSIS — S21.001A WOUND OF RIGHT BREAST, INITIAL ENCOUNTER: ICD-10-CM

## 2017-03-30 DIAGNOSIS — T81.89XA: ICD-10-CM

## 2017-03-30 DIAGNOSIS — T66.XXXS LATE EFFECT OF RADIATION: Primary | ICD-10-CM

## 2017-04-06 ENCOUNTER — HOSPITAL ENCOUNTER (OUTPATIENT)
Dept: WOUND CARE | Age: 52
Discharge: HOME | End: 2017-04-06
Attending: INTERNAL MEDICINE | Admitting: INTERNAL MEDICINE

## 2017-04-06 VITALS
RESPIRATION RATE: 16 BRPM | DIASTOLIC BLOOD PRESSURE: 82 MMHG | TEMPERATURE: 96.6 F | HEART RATE: 90 BPM | SYSTOLIC BLOOD PRESSURE: 121 MMHG

## 2017-04-06 DIAGNOSIS — T66.XXXS LATE EFFECT OF RADIATION: Primary | ICD-10-CM

## 2017-04-06 DIAGNOSIS — S21.001A WOUND OF RIGHT BREAST, INITIAL ENCOUNTER: ICD-10-CM

## 2017-04-10 ENCOUNTER — TELEPHONE (OUTPATIENT)
Dept: GASTROENTEROLOGY | Age: 52
End: 2017-04-10

## 2017-04-11 ENCOUNTER — TELEPHONE (OUTPATIENT)
Dept: GASTROENTEROLOGY | Age: 52
End: 2017-04-11

## 2017-04-11 RX ORDER — POLYETHYLENE GLYCOL 3350 17 G/17G
POWDER, FOR SOLUTION ORAL
Qty: 119 G | Refills: 0 | Status: SHIPPED | OUTPATIENT
Start: 2017-04-11 | End: 2018-03-26 | Stop reason: ALTCHOICE

## 2017-04-12 ENCOUNTER — HOSPITAL ENCOUNTER (OUTPATIENT)
Dept: SURGERY | Age: 52
Discharge: HOME | End: 2017-04-12
Attending: INTERNAL MEDICINE | Admitting: INTERNAL MEDICINE

## 2017-04-12 ENCOUNTER — TELEPHONE (OUTPATIENT)
Dept: GASTROENTEROLOGY | Age: 52
End: 2017-04-12

## 2017-04-12 VITALS
WEIGHT: 288 LBS | DIASTOLIC BLOOD PRESSURE: 78 MMHG | OXYGEN SATURATION: 100 % | BODY MASS INDEX: 45.2 KG/M2 | SYSTOLIC BLOOD PRESSURE: 129 MMHG | HEART RATE: 81 BPM | RESPIRATION RATE: 16 BRPM | TEMPERATURE: 97.4 F | HEIGHT: 67 IN

## 2017-04-12 LAB — GLUCOSE BLD-MCNC: 109 MG/DL (ref 70–99)

## 2017-04-12 PROCEDURE — 45380 COLONOSCOPY AND BIOPSY: CPT | Performed by: INTERNAL MEDICINE

## 2017-04-12 PROCEDURE — 43249 ESOPH EGD DILATION <30 MM: CPT | Performed by: INTERNAL MEDICINE

## 2017-04-12 PROCEDURE — 43239 EGD BIOPSY SINGLE/MULTIPLE: CPT | Performed by: INTERNAL MEDICINE

## 2017-04-12 RX ORDER — SODIUM CHLORIDE, SODIUM LACTATE, POTASSIUM CHLORIDE, CALCIUM CHLORIDE 600; 310; 30; 20 MG/100ML; MG/100ML; MG/100ML; MG/100ML
INJECTION, SOLUTION INTRAVENOUS CONTINUOUS
Status: DISCONTINUED | OUTPATIENT
Start: 2017-04-12 | End: 2017-04-13 | Stop reason: HOSPADM

## 2017-04-12 RX ADMIN — SODIUM CHLORIDE, SODIUM LACTATE, POTASSIUM CHLORIDE, CALCIUM CHLORIDE: 600; 310; 30; 20 INJECTION, SOLUTION INTRAVENOUS at 13:15

## 2017-04-12 ASSESSMENT — PAIN - FUNCTIONAL ASSESSMENT: PAIN_FUNCTIONAL_ASSESSMENT: 0-10

## 2017-04-12 ASSESSMENT — PAIN SCALES - GENERAL
PAINLEVEL_OUTOF10: 0
PAINLEVEL_OUTOF10: 0

## 2017-04-13 ENCOUNTER — HOSPITAL ENCOUNTER (OUTPATIENT)
Dept: WOUND CARE | Age: 52
Discharge: HOME | End: 2017-04-13
Attending: INTERNAL MEDICINE | Admitting: INTERNAL MEDICINE

## 2017-04-13 VITALS
RESPIRATION RATE: 16 BRPM | SYSTOLIC BLOOD PRESSURE: 122 MMHG | HEART RATE: 93 BPM | TEMPERATURE: 96.5 F | DIASTOLIC BLOOD PRESSURE: 87 MMHG

## 2017-04-13 DIAGNOSIS — S21.001A WOUND OF RIGHT BREAST, INITIAL ENCOUNTER: ICD-10-CM

## 2017-04-13 DIAGNOSIS — T66.XXXS LATE EFFECT OF RADIATION: Primary | ICD-10-CM

## 2017-04-19 ENCOUNTER — HOSPITAL ENCOUNTER (OUTPATIENT)
Dept: MRI IMAGING | Age: 52
Discharge: HOME | End: 2017-04-19
Attending: SURGERY | Admitting: SURGERY

## 2017-04-19 DIAGNOSIS — T81.89XD: ICD-10-CM

## 2017-04-19 DIAGNOSIS — C50.911 MALIGNANT NEOPLASM OF RIGHT FEMALE BREAST, UNSPECIFIED SITE OF BREAST: ICD-10-CM

## 2017-04-19 LAB
GFR AFRICAN AMERICAN: >60 ML/MIN/1.73M2
GFR NON-AFRICAN AMERICAN: >60 ML/MIN/1.73M2
POC CREATININE: 0.8 MG/DL (ref 0.6–1.1)

## 2017-04-20 ENCOUNTER — HOSPITAL ENCOUNTER (OUTPATIENT)
Dept: WOUND CARE | Age: 52
Discharge: HOME | End: 2017-04-20
Attending: INTERNAL MEDICINE | Admitting: INTERNAL MEDICINE

## 2017-04-20 VITALS
DIASTOLIC BLOOD PRESSURE: 91 MMHG | HEART RATE: 90 BPM | TEMPERATURE: 97.4 F | SYSTOLIC BLOOD PRESSURE: 141 MMHG | RESPIRATION RATE: 16 BRPM

## 2017-04-20 DIAGNOSIS — S21.001A WOUND OF RIGHT BREAST, INITIAL ENCOUNTER: ICD-10-CM

## 2017-04-20 DIAGNOSIS — T66.XXXS LATE EFFECT OF RADIATION: Primary | ICD-10-CM

## 2017-04-26 ENCOUNTER — OFFICE VISIT (OUTPATIENT)
Dept: GASTROENTEROLOGY | Age: 52
End: 2017-04-26

## 2017-04-26 VITALS
BODY MASS INDEX: 45.99 KG/M2 | SYSTOLIC BLOOD PRESSURE: 128 MMHG | HEIGHT: 67 IN | OXYGEN SATURATION: 96 % | DIASTOLIC BLOOD PRESSURE: 84 MMHG | HEART RATE: 89 BPM | WEIGHT: 293 LBS

## 2017-04-26 DIAGNOSIS — Z98.890 STATUS POST DILATION OF ESOPHAGEAL NARROWING: ICD-10-CM

## 2017-04-26 DIAGNOSIS — Z87.19 STATUS POST DILATION OF ESOPHAGEAL NARROWING: ICD-10-CM

## 2017-04-26 DIAGNOSIS — K44.9 HIATAL HERNIA: ICD-10-CM

## 2017-04-26 DIAGNOSIS — K21.9 GASTROESOPHAGEAL REFLUX DISEASE WITHOUT ESOPHAGITIS: Primary | ICD-10-CM

## 2017-04-26 DIAGNOSIS — K57.30 DIVERTICULOSIS OF LARGE INTESTINE WITHOUT HEMORRHAGE: ICD-10-CM

## 2017-04-26 DIAGNOSIS — Z80.0 FAMILY HISTORY OF COLON CANCER: ICD-10-CM

## 2017-04-26 PROCEDURE — 99214 OFFICE O/P EST MOD 30 MIN: CPT | Performed by: NURSE PRACTITIONER

## 2017-04-26 RX ORDER — OCTISALATE, AVOBENZONE, HOMOSALATE, AND OCTOCRYLENE 29.4; 29.4; 49; 25.48 MG/ML; MG/ML; MG/ML; MG/ML
1 LOTION TOPICAL DAILY
Qty: 30 CAPSULE | Refills: 3 | Status: SHIPPED | OUTPATIENT
Start: 2017-04-26 | End: 2017-11-09 | Stop reason: SDUPTHER

## 2017-04-26 RX ORDER — OMEPRAZOLE 40 MG/1
40 CAPSULE, DELAYED RELEASE ORAL DAILY
Qty: 30 CAPSULE | Refills: 3 | Status: SHIPPED | OUTPATIENT
Start: 2017-04-26 | End: 2017-06-28 | Stop reason: ALTCHOICE

## 2017-04-26 RX ORDER — RANITIDINE 150 MG/1
300 TABLET ORAL NIGHTLY
Qty: 60 TABLET | Refills: 3 | Status: SHIPPED | OUTPATIENT
Start: 2017-04-26 | End: 2017-08-09 | Stop reason: SDUPTHER

## 2017-04-26 ASSESSMENT — ENCOUNTER SYMPTOMS
BLOOD IN STOOL: 0
SHORTNESS OF BREATH: 0
PHOTOPHOBIA: 0
BACK PAIN: 1
ABDOMINAL PAIN: 0
VOMITING: 0
NAUSEA: 0
CONSTIPATION: 0
BLURRED VISION: 1
DIARRHEA: 0
ORTHOPNEA: 0
SPUTUM PRODUCTION: 0
EYE PAIN: 0
DOUBLE VISION: 0
HEARTBURN: 1
COUGH: 0

## 2017-04-27 ENCOUNTER — HOSPITAL ENCOUNTER (OUTPATIENT)
Dept: WOUND CARE | Age: 52
Discharge: HOME | End: 2017-04-27
Attending: INTERNAL MEDICINE | Admitting: INTERNAL MEDICINE

## 2017-04-27 VITALS — SYSTOLIC BLOOD PRESSURE: 138 MMHG | DIASTOLIC BLOOD PRESSURE: 90 MMHG | HEART RATE: 88 BPM | TEMPERATURE: 97.9 F

## 2017-04-27 DIAGNOSIS — S21.001A WOUND OF RIGHT BREAST, INITIAL ENCOUNTER: ICD-10-CM

## 2017-04-27 DIAGNOSIS — T66.XXXS LATE EFFECT OF RADIATION: Primary | ICD-10-CM

## 2017-05-04 ENCOUNTER — HOSPITAL ENCOUNTER (OUTPATIENT)
Dept: WOUND CARE | Age: 52
Discharge: HOME | End: 2017-05-04
Attending: INTERNAL MEDICINE | Admitting: INTERNAL MEDICINE

## 2017-05-04 VITALS
HEART RATE: 82 BPM | SYSTOLIC BLOOD PRESSURE: 139 MMHG | DIASTOLIC BLOOD PRESSURE: 81 MMHG | TEMPERATURE: 98.1 F | RESPIRATION RATE: 20 BRPM

## 2017-05-04 DIAGNOSIS — T66.XXXS LATE EFFECT OF RADIATION: Primary | ICD-10-CM

## 2017-05-04 DIAGNOSIS — S21.001A WOUND OF RIGHT BREAST, INITIAL ENCOUNTER: ICD-10-CM

## 2017-05-04 ASSESSMENT — PAIN SCALES - GENERAL: PAINLEVEL_OUTOF10: 0

## 2017-05-11 ENCOUNTER — HOSPITAL ENCOUNTER (OUTPATIENT)
Dept: WOUND CARE | Age: 52
Discharge: HOME | End: 2017-05-11
Attending: INTERNAL MEDICINE | Admitting: INTERNAL MEDICINE

## 2017-05-11 VITALS — RESPIRATION RATE: 18 BRPM | HEART RATE: 83 BPM | SYSTOLIC BLOOD PRESSURE: 125 MMHG | DIASTOLIC BLOOD PRESSURE: 85 MMHG

## 2017-05-11 DIAGNOSIS — S21.001A WOUND OF RIGHT BREAST, INITIAL ENCOUNTER: ICD-10-CM

## 2017-05-11 DIAGNOSIS — T66.XXXS LATE EFFECT OF RADIATION: Primary | ICD-10-CM

## 2017-05-15 ENCOUNTER — HOSPITAL ENCOUNTER (OUTPATIENT)
Dept: OTHER | Age: 52
Discharge: HOME | End: 2017-05-15
Attending: INTERNAL MEDICINE | Admitting: INTERNAL MEDICINE

## 2017-05-15 LAB
ALBUMIN SERPL-MCNC: 3.8 GM/DL (ref 3.4–5)
ALP BLD-CCNC: 100 IU/L (ref 40–129)
ALT SERPL-CCNC: 37 U/L (ref 10–40)
ANION GAP SERPL CALCULATED.3IONS-SCNC: 18 MMOL/L (ref 4–16)
AST SERPL-CCNC: 29 IU/L (ref 15–37)
BILIRUB SERPL-MCNC: 0.2 MG/DL (ref 0–1)
BUN BLDV-MCNC: 8 MG/DL (ref 6–23)
CALCIUM SERPL-MCNC: 8.7 MG/DL (ref 8.3–10.6)
CHLORIDE BLD-SCNC: 100 MMOL/L (ref 99–110)
CO2: 23 MMOL/L (ref 21–32)
CREAT SERPL-MCNC: 0.6 MG/DL (ref 0.6–1.1)
GFR AFRICAN AMERICAN: >60 ML/MIN/1.73M2
GFR NON-AFRICAN AMERICAN: >60 ML/MIN/1.73M2
GLUCOSE BLD-MCNC: 158 MG/DL (ref 70–140)
POTASSIUM SERPL-SCNC: 4.2 MMOL/L (ref 3.5–5.1)
SODIUM BLD-SCNC: 141 MMOL/L (ref 135–145)
TOTAL PROTEIN: 6.3 GM/DL (ref 6.4–8.2)

## 2017-05-17 ENCOUNTER — TELEPHONE (OUTPATIENT)
Dept: GASTROENTEROLOGY | Age: 52
End: 2017-05-17

## 2017-05-17 LAB — CA 27.29: 10.5 U/ML

## 2017-05-17 RX ORDER — SUCRALFATE 1 G/1
1 TABLET ORAL 4 TIMES DAILY
Qty: 120 TABLET | Refills: 0 | Status: SHIPPED | OUTPATIENT
Start: 2017-05-17 | End: 2017-06-13 | Stop reason: SDUPTHER

## 2017-06-13 RX ORDER — SUCRALFATE 1 G/1
TABLET ORAL
Qty: 120 TABLET | Refills: 0 | Status: SHIPPED | OUTPATIENT
Start: 2017-06-13 | End: 2017-07-25 | Stop reason: SDUPTHER

## 2017-06-15 ENCOUNTER — TELEPHONE (OUTPATIENT)
Dept: GASTROENTEROLOGY | Age: 52
End: 2017-06-15

## 2017-06-27 ENCOUNTER — TELEPHONE (OUTPATIENT)
Dept: GASTROENTEROLOGY | Age: 52
End: 2017-06-27

## 2017-06-28 ENCOUNTER — OFFICE VISIT (OUTPATIENT)
Dept: GASTROENTEROLOGY | Age: 52
End: 2017-06-28

## 2017-06-28 VITALS
HEIGHT: 67 IN | SYSTOLIC BLOOD PRESSURE: 122 MMHG | BODY MASS INDEX: 45.99 KG/M2 | OXYGEN SATURATION: 96 % | HEART RATE: 87 BPM | DIASTOLIC BLOOD PRESSURE: 90 MMHG | WEIGHT: 293 LBS

## 2017-06-28 DIAGNOSIS — R19.7 DIARRHEA, UNSPECIFIED TYPE: ICD-10-CM

## 2017-06-28 DIAGNOSIS — R14.0 ABDOMINAL BLOATING: ICD-10-CM

## 2017-06-28 DIAGNOSIS — R10.33 PERIUMBILICAL ABDOMINAL PAIN: ICD-10-CM

## 2017-06-28 DIAGNOSIS — K21.9 GASTROESOPHAGEAL REFLUX DISEASE WITHOUT ESOPHAGITIS: ICD-10-CM

## 2017-06-28 DIAGNOSIS — R11.0 NAUSEA: Primary | ICD-10-CM

## 2017-06-28 PROCEDURE — 99214 OFFICE O/P EST MOD 30 MIN: CPT | Performed by: NURSE PRACTITIONER

## 2017-06-28 RX ORDER — ONDANSETRON 4 MG/1
4 TABLET, FILM COATED ORAL EVERY 8 HOURS PRN
Qty: 30 TABLET | Refills: 1 | Status: SHIPPED | OUTPATIENT
Start: 2017-06-28 | End: 2018-05-15

## 2017-06-28 RX ORDER — PANTOPRAZOLE SODIUM 40 MG/1
40 TABLET, DELAYED RELEASE ORAL DAILY
Qty: 30 TABLET | Refills: 3 | Status: SHIPPED | OUTPATIENT
Start: 2017-06-28 | End: 2017-09-25 | Stop reason: SDUPTHER

## 2017-06-28 ASSESSMENT — ENCOUNTER SYMPTOMS
DIARRHEA: 1
ABDOMINAL PAIN: 1
HEARTBURN: 1
EYE PAIN: 0
ORTHOPNEA: 0
VOMITING: 1
SPUTUM PRODUCTION: 0
BLOOD IN STOOL: 0
CONSTIPATION: 0
DOUBLE VISION: 0
SHORTNESS OF BREATH: 0
BACK PAIN: 1
BLURRED VISION: 1
NAUSEA: 1
PHOTOPHOBIA: 0
COUGH: 0

## 2017-07-05 ENCOUNTER — TELEPHONE (OUTPATIENT)
Dept: GASTROENTEROLOGY | Age: 52
End: 2017-07-05

## 2017-07-14 ENCOUNTER — HOSPITAL ENCOUNTER (OUTPATIENT)
Dept: NUCLEAR MEDICINE | Age: 52
Discharge: HOME | End: 2017-07-14
Attending: NURSE PRACTITIONER | Admitting: NURSE PRACTITIONER

## 2017-07-14 DIAGNOSIS — R11.0 NAUSEA: ICD-10-CM

## 2017-07-14 RX ADMIN — Medication 1 MILLICURIE: at 09:05

## 2017-07-25 RX ORDER — SUCRALFATE 1 G/1
TABLET ORAL
Qty: 120 TABLET | Refills: 0 | Status: SHIPPED | OUTPATIENT
Start: 2017-07-25 | End: 2017-08-22 | Stop reason: SDUPTHER

## 2017-08-09 ENCOUNTER — OFFICE VISIT (OUTPATIENT)
Dept: GASTROENTEROLOGY | Age: 52
End: 2017-08-09

## 2017-08-09 VITALS
HEART RATE: 82 BPM | SYSTOLIC BLOOD PRESSURE: 124 MMHG | HEIGHT: 67 IN | DIASTOLIC BLOOD PRESSURE: 86 MMHG | BODY MASS INDEX: 45.99 KG/M2 | OXYGEN SATURATION: 98 % | WEIGHT: 293 LBS

## 2017-08-09 DIAGNOSIS — R11.0 NAUSEA: ICD-10-CM

## 2017-08-09 DIAGNOSIS — R19.7 DIARRHEA, UNSPECIFIED TYPE: Primary | ICD-10-CM

## 2017-08-09 DIAGNOSIS — R14.0 ABDOMINAL BLOATING: ICD-10-CM

## 2017-08-09 DIAGNOSIS — K21.9 GASTROESOPHAGEAL REFLUX DISEASE WITHOUT ESOPHAGITIS: ICD-10-CM

## 2017-08-09 DIAGNOSIS — K31.84 GASTROPARESIS: ICD-10-CM

## 2017-08-09 DIAGNOSIS — R13.14 PHARYNGOESOPHAGEAL DYSPHAGIA: ICD-10-CM

## 2017-08-09 PROCEDURE — 99214 OFFICE O/P EST MOD 30 MIN: CPT | Performed by: NURSE PRACTITIONER

## 2017-08-09 RX ORDER — RANITIDINE 150 MG/1
300 TABLET ORAL NIGHTLY
Qty: 60 TABLET | Refills: 3 | Status: SHIPPED | OUTPATIENT
Start: 2017-08-09 | End: 2017-11-09 | Stop reason: SDUPTHER

## 2017-08-09 RX ORDER — CHOLESTYRAMINE 4 G/9G
1 POWDER, FOR SUSPENSION ORAL 2 TIMES DAILY
Qty: 90 PACKET | Refills: 0 | Status: SHIPPED | OUTPATIENT
Start: 2017-08-09 | End: 2018-12-03

## 2017-08-09 ASSESSMENT — ENCOUNTER SYMPTOMS
SPUTUM PRODUCTION: 0
SHORTNESS OF BREATH: 0
BACK PAIN: 1
CONSTIPATION: 0
COUGH: 0
ABDOMINAL PAIN: 0
ORTHOPNEA: 0
HEARTBURN: 1
EYE PAIN: 0
DIARRHEA: 1
VOMITING: 0
BLOOD IN STOOL: 0
DOUBLE VISION: 0
NAUSEA: 1
PHOTOPHOBIA: 0

## 2017-08-15 ENCOUNTER — HOSPITAL ENCOUNTER (OUTPATIENT)
Dept: ULTRASOUND IMAGING | Age: 52
Discharge: HOME | End: 2017-08-15
Attending: INTERNAL MEDICINE | Admitting: INTERNAL MEDICINE

## 2017-08-15 DIAGNOSIS — C50.111 MALIGNANT NEOPLASM OF CENTRAL PORTION OF RIGHT FEMALE BREAST (HCC): ICD-10-CM

## 2017-08-15 DIAGNOSIS — R93.89 ABNORMAL FINDINGS ON DIAGNOSTIC IMAGING OF OTHER SPECIFIED BODY STRUCTURES: ICD-10-CM

## 2017-08-15 DIAGNOSIS — R93.89 ABNORMAL MRI: ICD-10-CM

## 2017-08-21 ENCOUNTER — HOSPITAL ENCOUNTER (OUTPATIENT)
Dept: ULTRASOUND IMAGING | Age: 52
Discharge: HOME | End: 2017-08-21
Attending: INTERNAL MEDICINE | Admitting: INTERNAL MEDICINE

## 2017-08-21 DIAGNOSIS — R93.89 ABNORMAL FINDINGS ON DIAGNOSTIC IMAGING OF OTHER SPECIFIED BODY STRUCTURES: ICD-10-CM

## 2017-08-22 ENCOUNTER — HOSPITAL ENCOUNTER (OUTPATIENT)
Dept: OTHER | Age: 52
Discharge: HOME | End: 2017-08-22
Attending: INTERNAL MEDICINE | Admitting: INTERNAL MEDICINE

## 2017-08-22 LAB
ALBUMIN SERPL-MCNC: 3.8 GM/DL (ref 3.4–5)
ALP BLD-CCNC: 91 IU/L (ref 40–129)
ALT SERPL-CCNC: 38 U/L (ref 10–40)
ANION GAP SERPL CALCULATED.3IONS-SCNC: 14 MMOL/L (ref 4–16)
AST SERPL-CCNC: 37 IU/L (ref 15–37)
BILIRUB SERPL-MCNC: 0.2 MG/DL (ref 0–1)
BUN BLDV-MCNC: 9 MG/DL (ref 6–23)
CALCIUM SERPL-MCNC: 8.9 MG/DL (ref 8.3–10.6)
CHLORIDE BLD-SCNC: 98 MMOL/L (ref 99–110)
CO2: 27 MMOL/L (ref 21–32)
CREAT SERPL-MCNC: 0.6 MG/DL (ref 0.6–1.1)
GFR AFRICAN AMERICAN: >60 ML/MIN/1.73M2
GFR NON-AFRICAN AMERICAN: >60 ML/MIN/1.73M2
GLUCOSE BLD-MCNC: 187 MG/DL (ref 70–140)
POTASSIUM SERPL-SCNC: 3.8 MMOL/L (ref 3.5–5.1)
SODIUM BLD-SCNC: 139 MMOL/L (ref 135–145)
TOTAL PROTEIN: 6.8 GM/DL (ref 6.4–8.2)

## 2017-08-22 RX ORDER — SUCRALFATE 1 G/1
TABLET ORAL
Qty: 120 TABLET | Refills: 0 | Status: SHIPPED | OUTPATIENT
Start: 2017-08-22 | End: 2017-09-30 | Stop reason: SDUPTHER

## 2017-08-24 LAB — CA 27.29: 11.9 U/ML

## 2017-09-20 ENCOUNTER — HOSPITAL ENCOUNTER (OUTPATIENT)
Dept: GENERAL RADIOLOGY | Age: 52
Discharge: HOME | End: 2017-09-20
Attending: FAMILY MEDICINE | Admitting: FAMILY MEDICINE

## 2017-09-20 DIAGNOSIS — M25.562 ACUTE PAIN OF LEFT KNEE: ICD-10-CM

## 2017-09-25 ENCOUNTER — TELEPHONE (OUTPATIENT)
Dept: GASTROENTEROLOGY | Age: 52
End: 2017-09-25

## 2017-09-25 RX ORDER — PANTOPRAZOLE SODIUM 40 MG/1
40 TABLET, DELAYED RELEASE ORAL DAILY
Qty: 30 TABLET | Refills: 3 | Status: SHIPPED | OUTPATIENT
Start: 2017-09-25 | End: 2017-11-09 | Stop reason: SDUPTHER

## 2017-10-02 RX ORDER — SUCRALFATE 1 G/1
TABLET ORAL
Qty: 120 TABLET | Refills: 0 | Status: SHIPPED | OUTPATIENT
Start: 2017-10-02 | End: 2017-11-25 | Stop reason: SDUPTHER

## 2017-10-03 ENCOUNTER — TELEPHONE (OUTPATIENT)
Dept: GASTROENTEROLOGY | Age: 52
End: 2017-10-03

## 2017-10-03 NOTE — TELEPHONE ENCOUNTER
Patient advised of this information. Patient denies and fever or chills. She states she will try OTC Imodium. I advised her to call the office if the Imodium is not helping. Patient states that diarrhea does not get better or worse with her diet. Patient expressed understanding and had no further questions.

## 2017-10-03 NOTE — TELEPHONE ENCOUNTER
Patient called stating that she is still having constant diarrhea. She states it is not bloody, but it is brown, with a foul odor to it. Patient states she was up all night with diarrhea last night. Patient states she has been taking Benefiber but it does not seem to help. Patient would like to know what else she can do or take?  Please advise

## 2017-10-20 RX ORDER — OMEPRAZOLE 40 MG/1
CAPSULE, DELAYED RELEASE ORAL
Qty: 30 CAPSULE | Refills: 3 | Status: SHIPPED | OUTPATIENT
Start: 2017-10-20 | End: 2017-11-09 | Stop reason: ALTCHOICE

## 2017-11-09 ENCOUNTER — OFFICE VISIT (OUTPATIENT)
Dept: GASTROENTEROLOGY | Age: 52
End: 2017-11-09

## 2017-11-09 VITALS
HEART RATE: 105 BPM | DIASTOLIC BLOOD PRESSURE: 80 MMHG | HEIGHT: 67 IN | BODY MASS INDEX: 45.99 KG/M2 | WEIGHT: 293 LBS | SYSTOLIC BLOOD PRESSURE: 124 MMHG | OXYGEN SATURATION: 97 %

## 2017-11-09 DIAGNOSIS — R14.0 ABDOMINAL BLOATING: ICD-10-CM

## 2017-11-09 DIAGNOSIS — K21.9 GASTROESOPHAGEAL REFLUX DISEASE WITHOUT ESOPHAGITIS: ICD-10-CM

## 2017-11-09 DIAGNOSIS — K31.84 GASTROPARESIS: ICD-10-CM

## 2017-11-09 DIAGNOSIS — R19.7 DIARRHEA, UNSPECIFIED TYPE: Primary | ICD-10-CM

## 2017-11-09 PROCEDURE — 3014F SCREEN MAMMO DOC REV: CPT | Performed by: NURSE PRACTITIONER

## 2017-11-09 PROCEDURE — G8427 DOCREV CUR MEDS BY ELIG CLIN: HCPCS | Performed by: NURSE PRACTITIONER

## 2017-11-09 PROCEDURE — G8484 FLU IMMUNIZE NO ADMIN: HCPCS | Performed by: NURSE PRACTITIONER

## 2017-11-09 PROCEDURE — G8417 CALC BMI ABV UP PARAM F/U: HCPCS | Performed by: NURSE PRACTITIONER

## 2017-11-09 PROCEDURE — 99214 OFFICE O/P EST MOD 30 MIN: CPT | Performed by: NURSE PRACTITIONER

## 2017-11-09 PROCEDURE — 3017F COLORECTAL CA SCREEN DOC REV: CPT | Performed by: NURSE PRACTITIONER

## 2017-11-09 PROCEDURE — 1036F TOBACCO NON-USER: CPT | Performed by: NURSE PRACTITIONER

## 2017-11-09 RX ORDER — OCTISALATE, AVOBENZONE, HOMOSALATE, AND OCTOCRYLENE 29.4; 29.4; 49; 25.48 MG/ML; MG/ML; MG/ML; MG/ML
1 LOTION TOPICAL DAILY
Qty: 30 CAPSULE | Refills: 3 | Status: SHIPPED | OUTPATIENT
Start: 2017-11-09 | End: 2018-11-07 | Stop reason: SDUPTHER

## 2017-11-09 RX ORDER — RANITIDINE 150 MG/1
300 TABLET ORAL NIGHTLY
Qty: 60 TABLET | Refills: 3 | Status: SHIPPED | OUTPATIENT
Start: 2017-11-09 | End: 2018-12-03

## 2017-11-09 RX ORDER — PANTOPRAZOLE SODIUM 40 MG/1
40 TABLET, DELAYED RELEASE ORAL DAILY
Qty: 30 TABLET | Refills: 3 | Status: SHIPPED | OUTPATIENT
Start: 2017-11-09 | End: 2019-01-28 | Stop reason: ALTCHOICE

## 2017-11-09 NOTE — PROGRESS NOTES
Francisco Paz 46 y.o. female was seen by YOVANA Garcia on 11/10/17     Wt Readings from Last 3 Encounters:   11/09/17 299 lb (135.6 kg)   09/05/17 299 lb 9.6 oz (135.9 kg)   08/09/17 (!) 303 lb (137.4 kg)       HPI    46year old female for follow-up on diarrhea, gastroparesis and GERD. She used the Cholestyramine twice but did not like the thickness in her drink so quit using. Her diarrhea has improved although in last two days had two episodes. Loperamide helps. She reported at beginning of October stopping all her medications because overwhelmed with the amount of medications she was taking. Since that time she has restarted her medication and has a Home nurse come in once a week to fill her medication container. No constipation. In the last three weeks she has been having regular soft brown formed stools. No blood in her stools or black tarry stools. No abdominal pain. Intermittent bloating. Align helps. Her appetite is good and weight is stable. No nausea or vomiting. Her heartburn and acid reflux is controlled with Protonix and Zantac. No nocturnal awakenings with acid reflux. No worsening dysphagia. No choking or pain with swallowing. Her last colonoscopy/EGD were completed on 5/4/2017. 5/4/2017  1:22 PM - Shankar, Lab Incoming [facility]     Narrative     (NOTE)  Specimen #[id number]          Final Pathologic Diagnosis:  A.  Duodenum, biopsy:  No significant histopathologic  change. B.  Gastric antrum, biopsy:  Mild chronic gastritis.     [place] stain:  Negative.      Control tissue stained adequately. C.  Ascending colon, biopsy:  No significant histopathologic  change. ROS    Review of Systems   Constitutional: Negative for chills, fever, malaise/fatigue and weight loss. HENT: Negative for hearing loss and tinnitus. Eyes: Positive for blurred vision. Negative for double vision and photophobia.         Wears corrective lenses   Respiratory: Negative for cough, tablet 0    calcium elemental (OSCAL) 500 MG TABS tablet 500 mg daily       oxybutynin (DITROPAN) 5 MG tablet Take 5 mg by mouth daily       atorvastatin (LIPITOR) 10 MG tablet Take 10 mg by mouth daily      colesevelam (WELCHOL) 625 MG tablet Take 650 mg by mouth 2 times daily (with meals)       ibuprofen (ADVIL;MOTRIN) 600 MG tablet Take 1 tablet by mouth every 6 hours as needed for Pain 20 tablet 0    ARIPiprazole (ABILIFY) 15 MG tablet Take 15 mg by mouth daily       DULoxetine (CYMBALTA) 30 MG capsule Take 30 mg by mouth daily       vitamin D (ERGOCALCIFEROL) 28859 UNITS CAPS capsule Take 50,000 Units by mouth once a week       Multiple Vitamin (DAILY GARO) TABS       glipiZIDE (GLUCOTROL) 5 MG tablet Take 5 mg by mouth 2 times daily (before meals).  metformin (GLUCOPHAGE) 1000 MG tablet Take 1,000 mg by mouth 2 times daily.  busPIRone (BUSPAR) 15 MG tablet Take 15 mg by mouth daily       lisinopril-hydrochlorothiazide (PRINZIDE;ZESTORETIC) 10-12.5 MG per tablet Take 1 tablet by mouth every morning. Half Tablet       traZODone (DESYREL) 50 MG tablet Take 50 mg by mouth nightly. Half Tablet At Night       MELATONIN Take  by mouth nightly. Over The Counter        No current facility-administered medications for this visit. Past medical history:    has a past medical history of Acid reflux; Anesthesia; Anxiety; Back pain; Back problem; Bipolar disorder ([address] Cancer ([address] Curvature of spine; Depression; Diabetes mellitus ([address] Diverticulitis; Full dentures; Headache(784.0); Heart murmur; HX OTHER MEDICAL; Hyperlipidemia; Hypertension; Macular degeneration; MRSA (methicillin resistant Staphylococcus aureus); Nausea & vomiting; Panic attacks; PONV (postoperative nausea and vomiting); Sleep apnea;  Thyroid disease; Urinary incontinence; WD-Soft tissue radionecrosis; and WD-Wound of right breast.    Past surgical history:   has a past surgical history that includes Dental surgery; Tonsillectomy (1970's); Hysterectomy, total abdominal (2006); laparoscopy (2005); hernia repair (2007); Cholecystectomy, laparoscopic (2007); other surgical history (12 11 2012); lymph node dissection; Breast surgery (2015); Colonoscopy (2016); Colonoscopy (04/12/2017); and Endoscopy, colon, diagnostic (04/12/2017). Social History:   reports that she quit smoking about 23 years ago. Her smoking use included Cigarettes. She quit after 5.00 years of use. She has never used smokeless tobacco. She reports that she does not drink alcohol or use drugs. Family history:  family history includes Cancer in her father and sister; Diabetes in her mother and sister; Heart Disease in her father and mother; High Blood Pressure in her father and sister; High Cholesterol in her sister; [place] in her mother; Stroke in her mother; Vision Loss in her father, mother, and sister. Objective    Vitals:    11/09/17 1349   BP: 124/80   Pulse: 105   SpO2: 97%        Physical exam    Physical Exam   Constitutional: She is oriented to person, place, and time and well-developed, well-nourished, and in no distress. HENT:   Head: Normocephalic and atraumatic. Eyes: Conjunctivae and EOM are normal. Pupils are equal, round, and reactive to light. Neck: Normal range of motion. Neck supple. No JVD present. No tracheal deviation present. No thyromegaly present. Cardiovascular: Normal rate, regular rhythm, normal heart sounds and intact distal pulses. Exam reveals no gallop. No murmur heard. Pulmonary/Chest: Effort normal and breath sounds normal. No respiratory distress. She has no wheezes. She has no rales. She exhibits no tenderness. Abdominal: Soft. Bowel sounds are normal. She exhibits no distension and no mass. There is no tenderness. There is no rebound and no guarding. Obese; large right inguinal hernia; reducible. No pain   Musculoskeletal: Normal range of motion.    Neurological: She is alert and

## 2017-11-10 ASSESSMENT — ENCOUNTER SYMPTOMS
COUGH: 0
SPUTUM PRODUCTION: 0
BLURRED VISION: 1
DIARRHEA: 1
VOMITING: 0
CONSTIPATION: 0
NAUSEA: 0
BACK PAIN: 1
DOUBLE VISION: 0
PHOTOPHOBIA: 0
ABDOMINAL PAIN: 0
HEARTBURN: 1
SHORTNESS OF BREATH: 0
BLOOD IN STOOL: 0

## 2017-11-27 RX ORDER — SUCRALFATE 1 G/1
TABLET ORAL
Qty: 120 TABLET | Refills: 0 | Status: SHIPPED | OUTPATIENT
Start: 2017-11-27 | End: 2018-05-15

## 2017-11-28 ENCOUNTER — HOSPITAL ENCOUNTER (OUTPATIENT)
Dept: OTHER | Age: 52
Discharge: HOME | End: 2017-11-28
Attending: INTERNAL MEDICINE | Admitting: INTERNAL MEDICINE

## 2017-11-28 LAB
ALBUMIN SERPL-MCNC: 4 GM/DL (ref 3.4–5)
ALP BLD-CCNC: 90 IU/L (ref 40–128)
ALT SERPL-CCNC: 29 U/L (ref 10–40)
ANION GAP SERPL CALCULATED.3IONS-SCNC: 14 MMOL/L (ref 4–16)
AST SERPL-CCNC: 29 IU/L (ref 15–37)
BILIRUB SERPL-MCNC: 0.2 MG/DL (ref 0–1)
BUN BLDV-MCNC: 13 MG/DL (ref 6–23)
CALCIUM SERPL-MCNC: 9.1 MG/DL (ref 8.3–10.6)
CHLORIDE BLD-SCNC: 98 MMOL/L (ref 99–110)
CO2: 24 MMOL/L (ref 21–32)
CREAT SERPL-MCNC: 0.7 MG/DL (ref 0.6–1.1)
GFR AFRICAN AMERICAN: >60 ML/MIN/1.73M2
GFR NON-AFRICAN AMERICAN: >60 ML/MIN/1.73M2
GLUCOSE BLD-MCNC: 194 MG/DL (ref 70–99)
POTASSIUM SERPL-SCNC: 5.1 MMOL/L (ref 3.5–5.1)
SODIUM BLD-SCNC: 136 MMOL/L (ref 135–145)
TOTAL PROTEIN: 7.2 GM/DL (ref 6.4–8.2)

## 2017-12-01 LAB — CA 27.29: 18.5 U/ML

## 2018-02-06 ENCOUNTER — HOSPITAL ENCOUNTER (OUTPATIENT)
Dept: LAB | Age: 53
Discharge: HOME | End: 2018-02-06
Attending: FAMILY MEDICINE | Admitting: FAMILY MEDICINE

## 2018-02-06 DIAGNOSIS — R05.9 COUGH: ICD-10-CM

## 2018-02-23 ENCOUNTER — HOSPITAL ENCOUNTER (OUTPATIENT)
Dept: OTHER | Age: 53
Discharge: HOME | End: 2018-02-23
Attending: INTERNAL MEDICINE | Admitting: INTERNAL MEDICINE

## 2018-02-23 LAB
ALBUMIN SERPL-MCNC: 4 GM/DL (ref 3.4–5)
ALP BLD-CCNC: 73 IU/L (ref 40–128)
ALT SERPL-CCNC: 26 U/L (ref 10–40)
ANION GAP SERPL CALCULATED.3IONS-SCNC: 14 MMOL/L (ref 4–16)
AST SERPL-CCNC: 25 IU/L (ref 15–37)
BILIRUB SERPL-MCNC: 0.3 MG/DL (ref 0–1)
BUN BLDV-MCNC: 11 MG/DL (ref 6–23)
CALCIUM SERPL-MCNC: 9 MG/DL (ref 8.3–10.6)
CHLORIDE BLD-SCNC: 97 MMOL/L (ref 99–110)
CO2: 28 MMOL/L (ref 21–32)
CREAT SERPL-MCNC: 0.7 MG/DL (ref 0.6–1.1)
GFR AFRICAN AMERICAN: >60 ML/MIN/1.73M2
GFR NON-AFRICAN AMERICAN: >60 ML/MIN/1.73M2
GLUCOSE BLD-MCNC: 92 MG/DL (ref 70–99)
POTASSIUM SERPL-SCNC: 4.3 MMOL/L (ref 3.5–5.1)
SODIUM BLD-SCNC: 139 MMOL/L (ref 135–145)
TOTAL PROTEIN: 7.1 GM/DL (ref 6.4–8.2)

## 2018-02-26 LAB — CA 27.29: 20 U/ML

## 2018-03-26 ENCOUNTER — TELEPHONE (OUTPATIENT)
Dept: GASTROENTEROLOGY | Age: 53
End: 2018-03-26

## 2018-03-26 RX ORDER — POLYETHYLENE GLYCOL 3350 17 G/17G
17 POWDER, FOR SOLUTION ORAL 2 TIMES DAILY
Qty: 1020 G | Refills: 0 | Status: SHIPPED | OUTPATIENT
Start: 2018-03-26 | End: 2018-04-25

## 2018-03-26 NOTE — TELEPHONE ENCOUNTER
Have patient take Miralax one capful twice daily. Encourage increase in water intake and fiber. Avoid foods that are constipating and follow-up as needed.

## 2018-03-28 ENCOUNTER — TELEPHONE (OUTPATIENT)
Dept: GASTROENTEROLOGY | Age: 53
End: 2018-03-28

## 2018-04-03 ENCOUNTER — OFFICE VISIT (OUTPATIENT)
Dept: GASTROENTEROLOGY | Age: 53
End: 2018-04-03

## 2018-04-03 VITALS
HEIGHT: 67 IN | BODY MASS INDEX: 45.99 KG/M2 | HEART RATE: 81 BPM | WEIGHT: 293 LBS | SYSTOLIC BLOOD PRESSURE: 92 MMHG | DIASTOLIC BLOOD PRESSURE: 56 MMHG

## 2018-04-03 DIAGNOSIS — K59.01 SLOW TRANSIT CONSTIPATION: Primary | ICD-10-CM

## 2018-04-03 DIAGNOSIS — K31.84 GASTROPARESIS: ICD-10-CM

## 2018-04-03 DIAGNOSIS — K21.9 GASTROESOPHAGEAL REFLUX DISEASE WITHOUT ESOPHAGITIS: ICD-10-CM

## 2018-04-03 DIAGNOSIS — K45.0 INCARCERATED HERNIA OF ABDOMINAL CAVITY: ICD-10-CM

## 2018-04-03 DIAGNOSIS — E66.01 MORBID OBESITY WITH BMI OF 40.0-44.9, ADULT (HCC): ICD-10-CM

## 2018-04-03 PROCEDURE — 99214 OFFICE O/P EST MOD 30 MIN: CPT | Performed by: NURSE PRACTITIONER

## 2018-04-03 PROCEDURE — 3017F COLORECTAL CA SCREEN DOC REV: CPT | Performed by: NURSE PRACTITIONER

## 2018-04-03 PROCEDURE — G8427 DOCREV CUR MEDS BY ELIG CLIN: HCPCS | Performed by: NURSE PRACTITIONER

## 2018-04-03 PROCEDURE — 1036F TOBACCO NON-USER: CPT | Performed by: NURSE PRACTITIONER

## 2018-04-03 PROCEDURE — G8417 CALC BMI ABV UP PARAM F/U: HCPCS | Performed by: NURSE PRACTITIONER

## 2018-04-03 PROCEDURE — 3014F SCREEN MAMMO DOC REV: CPT | Performed by: NURSE PRACTITIONER

## 2018-04-03 RX ORDER — DULOXETIN HYDROCHLORIDE 60 MG/1
1 CAPSULE, DELAYED RELEASE ORAL DAILY
COMMUNITY
Start: 2018-03-09 | End: 2019-05-06

## 2018-04-03 RX ORDER — CETIRIZINE HYDROCHLORIDE 10 MG/1
1 TABLET ORAL EVERY MORNING
COMMUNITY
Start: 2018-03-28 | End: 2019-01-29

## 2018-04-03 RX ORDER — DOCUSATE SODIUM 100 MG/1
100 CAPSULE, LIQUID FILLED ORAL DAILY PRN
Qty: 30 CAPSULE | Refills: 3 | Status: SHIPPED | OUTPATIENT
Start: 2018-04-03 | End: 2019-01-29

## 2018-04-03 RX ORDER — FLUTICASONE PROPIONATE 50 MCG
SPRAY, SUSPENSION (ML) NASAL PRN
COMMUNITY
Start: 2018-03-30 | End: 2019-01-29

## 2018-04-03 ASSESSMENT — ENCOUNTER SYMPTOMS
BLOOD IN STOOL: 0
BLURRED VISION: 1
NAUSEA: 0
SPUTUM PRODUCTION: 0
VOMITING: 0
DIARRHEA: 0
DOUBLE VISION: 0
HEARTBURN: 1
ABDOMINAL PAIN: 0
CONSTIPATION: 1
BACK PAIN: 1
COUGH: 0
EYE PAIN: 0
SHORTNESS OF BREATH: 0

## 2018-04-13 ENCOUNTER — TELEPHONE (OUTPATIENT)
Dept: GASTROENTEROLOGY | Age: 53
End: 2018-04-13

## 2018-04-13 ENCOUNTER — OFFICE VISIT (OUTPATIENT)
Dept: BARIATRICS/WEIGHT MGMT | Age: 53
End: 2018-04-13

## 2018-04-13 VITALS
DIASTOLIC BLOOD PRESSURE: 64 MMHG | BODY MASS INDEX: 45.99 KG/M2 | WEIGHT: 293 LBS | HEART RATE: 82 BPM | HEIGHT: 67 IN | SYSTOLIC BLOOD PRESSURE: 124 MMHG | RESPIRATION RATE: 16 BRPM

## 2018-04-13 DIAGNOSIS — R10.13 EPIGASTRIC PAIN: ICD-10-CM

## 2018-04-13 DIAGNOSIS — R10.31 RLQ ABDOMINAL PAIN: ICD-10-CM

## 2018-04-13 DIAGNOSIS — K21.9 CHRONIC GERD: ICD-10-CM

## 2018-04-13 DIAGNOSIS — K40.91 UNILATERAL RECURRENT INGUINAL HERNIA WITHOUT OBSTRUCTION OR GANGRENE: Primary | ICD-10-CM

## 2018-04-13 PROCEDURE — 3014F SCREEN MAMMO DOC REV: CPT | Performed by: SURGERY

## 2018-04-13 PROCEDURE — 1036F TOBACCO NON-USER: CPT | Performed by: SURGERY

## 2018-04-13 PROCEDURE — 99204 OFFICE O/P NEW MOD 45 MIN: CPT | Performed by: SURGERY

## 2018-04-13 PROCEDURE — 3017F COLORECTAL CA SCREEN DOC REV: CPT | Performed by: SURGERY

## 2018-04-13 PROCEDURE — G8417 CALC BMI ABV UP PARAM F/U: HCPCS | Performed by: SURGERY

## 2018-04-13 PROCEDURE — G8427 DOCREV CUR MEDS BY ELIG CLIN: HCPCS | Performed by: SURGERY

## 2018-04-13 RX ORDER — LETROZOLE 2.5 MG/1
2.5 TABLET, FILM COATED ORAL EVERY MORNING
COMMUNITY
End: 2019-01-28 | Stop reason: ALTCHOICE

## 2018-04-13 ASSESSMENT — ENCOUNTER SYMPTOMS
ABDOMINAL DISTENTION: 1
DIARRHEA: 0
BLOOD IN STOOL: 0
ANAL BLEEDING: 0
COLOR CHANGE: 0
NAUSEA: 0
COUGH: 0
WHEEZING: 0
SHORTNESS OF BREATH: 1
VOMITING: 0
VOICE CHANGE: 0
PHOTOPHOBIA: 0
TROUBLE SWALLOWING: 0
CONSTIPATION: 0
BACK PAIN: 1
SORE THROAT: 0
ABDOMINAL PAIN: 1

## 2018-04-24 ENCOUNTER — HOSPITAL ENCOUNTER (OUTPATIENT)
Dept: CT IMAGING | Age: 53
Discharge: HOME | End: 2018-04-24
Attending: SURGERY | Admitting: SURGERY

## 2018-04-24 DIAGNOSIS — K40.91 UNILATERAL INGUINAL HERNIA WITHOUT OBSTRUCTION OR GANGRENE, RECURRENT: ICD-10-CM

## 2018-04-24 DIAGNOSIS — K40.91 UNILATERAL RECURRENT INGUINAL HERNIA WITHOUT OBSTRUCTION OR GANGRENE: ICD-10-CM

## 2018-05-02 ENCOUNTER — OFFICE VISIT (OUTPATIENT)
Dept: BARIATRICS/WEIGHT MGMT | Age: 53
End: 2018-05-02

## 2018-05-02 VITALS
BODY MASS INDEX: 45.99 KG/M2 | WEIGHT: 293 LBS | HEIGHT: 67 IN | SYSTOLIC BLOOD PRESSURE: 118 MMHG | DIASTOLIC BLOOD PRESSURE: 82 MMHG | HEART RATE: 80 BPM

## 2018-05-02 DIAGNOSIS — E66.01 MORBID OBESITY WITH BMI OF 45.0-49.9, ADULT (HCC): Primary | ICD-10-CM

## 2018-05-02 PROCEDURE — G8417 CALC BMI ABV UP PARAM F/U: HCPCS | Performed by: SURGERY

## 2018-05-02 PROCEDURE — G8427 DOCREV CUR MEDS BY ELIG CLIN: HCPCS | Performed by: SURGERY

## 2018-05-02 PROCEDURE — 3017F COLORECTAL CA SCREEN DOC REV: CPT | Performed by: SURGERY

## 2018-05-02 PROCEDURE — 1036F TOBACCO NON-USER: CPT | Performed by: SURGERY

## 2018-05-02 PROCEDURE — 99214 OFFICE O/P EST MOD 30 MIN: CPT | Performed by: SURGERY

## 2018-05-02 RX ORDER — HYDROXYZINE HYDROCHLORIDE 25 MG/1
25 TABLET, FILM COATED ORAL 2 TIMES DAILY
COMMUNITY
Start: 2018-04-18 | End: 2018-12-03

## 2018-05-02 RX ORDER — BLOOD-GLUCOSE METER
KIT MISCELLANEOUS
COMMUNITY
Start: 2018-04-23 | End: 2018-05-15

## 2018-05-02 RX ORDER — ISOPROPYL ALCOHOL 70 ML/100ML
SWAB TOPICAL
COMMUNITY
Start: 2018-04-30 | End: 2018-05-15

## 2018-05-02 ASSESSMENT — ENCOUNTER SYMPTOMS
TROUBLE SWALLOWING: 0
ABDOMINAL DISTENTION: 1
CONSTIPATION: 0
SORE THROAT: 0
ABDOMINAL PAIN: 1
VOICE CHANGE: 0
PHOTOPHOBIA: 0
ANAL BLEEDING: 0
BLOOD IN STOOL: 0
NAUSEA: 0
VOMITING: 0
WHEEZING: 0
SHORTNESS OF BREATH: 1
BACK PAIN: 1
COUGH: 0
DIARRHEA: 0
COLOR CHANGE: 0

## 2018-05-03 ENCOUNTER — TELEPHONE (OUTPATIENT)
Dept: BARIATRICS/WEIGHT MGMT | Age: 53
End: 2018-05-03

## 2018-05-03 DIAGNOSIS — E66.01 MORBID OBESITY WITH BMI OF 45.0-49.9, ADULT (HCC): Primary | ICD-10-CM

## 2018-05-03 RX ORDER — FEEDER CONTAINER WITH PUMP SET
1 EACH MISCELLANEOUS 4 TIMES DAILY
Qty: 120 CAN | Refills: 0 | Status: ON HOLD | OUTPATIENT
Start: 2018-05-03 | End: 2018-07-03 | Stop reason: ALTCHOICE

## 2018-05-09 ENCOUNTER — TELEPHONE (OUTPATIENT)
Dept: BARIATRICS/WEIGHT MGMT | Age: 53
End: 2018-05-09

## 2018-05-11 ENCOUNTER — HOSPITAL ENCOUNTER (OUTPATIENT)
Dept: OTHER | Age: 53
Discharge: HOME | End: 2018-05-11
Attending: INTERNAL MEDICINE | Admitting: INTERNAL MEDICINE

## 2018-05-11 LAB
ALBUMIN SERPL-MCNC: 4.4 GM/DL (ref 3.4–5)
ALP BLD-CCNC: 63 IU/L (ref 40–128)
ALT SERPL-CCNC: 35 U/L (ref 10–40)
ANION GAP SERPL CALCULATED.3IONS-SCNC: 16 MMOL/L (ref 4–16)
AST SERPL-CCNC: 32 IU/L (ref 15–37)
BILIRUB SERPL-MCNC: 0.3 MG/DL (ref 0–1)
BUN BLDV-MCNC: 12 MG/DL (ref 6–23)
CALCIUM SERPL-MCNC: 9.5 MG/DL (ref 8.3–10.6)
CHLORIDE BLD-SCNC: 97 MMOL/L (ref 99–110)
CO2: 23 MMOL/L (ref 21–32)
CREAT SERPL-MCNC: 0.6 MG/DL (ref 0.6–1.1)
GFR AFRICAN AMERICAN: >60 ML/MIN/1.73M2
GFR NON-AFRICAN AMERICAN: >60 ML/MIN/1.73M2
GLUCOSE BLD-MCNC: 83 MG/DL (ref 70–99)
POTASSIUM SERPL-SCNC: 4.6 MMOL/L (ref 3.5–5.1)
SODIUM BLD-SCNC: 136 MMOL/L (ref 135–145)
TOTAL PROTEIN: 6.9 GM/DL (ref 6.4–8.2)

## 2018-05-13 LAB — CA 27.29: 16.3 U/ML

## 2018-05-15 ENCOUNTER — HOSPITAL ENCOUNTER (OUTPATIENT)
Dept: PREADMISSION TESTING | Age: 53
Discharge: HOME | End: 2018-05-15
Attending: SURGERY | Admitting: SURGERY

## 2018-05-15 VITALS
DIASTOLIC BLOOD PRESSURE: 67 MMHG | RESPIRATION RATE: 16 BRPM | TEMPERATURE: 96.6 F | WEIGHT: 293 LBS | SYSTOLIC BLOOD PRESSURE: 122 MMHG | BODY MASS INDEX: 47.09 KG/M2 | HEIGHT: 66 IN | OXYGEN SATURATION: 95 % | HEART RATE: 82 BPM

## 2018-05-15 LAB
HCT VFR BLD CALC: 40.5 % (ref 37–47)
HEMOGLOBIN: 12.9 GM/DL (ref 12.5–16)
MCH RBC QN AUTO: 29.6 PG (ref 27–31)
MCHC RBC AUTO-ENTMCNC: 31.9 % (ref 32–36)
MCV RBC AUTO: 92.9 FL (ref 78–100)
PDW BLD-RTO: 15.3 % (ref 11.7–14.9)
PLATELET # BLD: 244 K/CU MM (ref 140–440)
PMV BLD AUTO: 10.4 FL (ref 7.5–11.1)
RBC # BLD: 4.36 M/CU MM (ref 4.2–5.4)
WBC # BLD: 5 K/CU MM (ref 4–10.5)

## 2018-05-15 ASSESSMENT — PAIN SCALES - GENERAL: PAINLEVEL_OUTOF10: 0

## 2018-05-18 ENCOUNTER — HOSPITAL ENCOUNTER (OUTPATIENT)
Dept: WOMENS IMAGING | Age: 53
Discharge: HOME | End: 2018-05-18
Attending: SURGERY | Admitting: SURGERY

## 2018-05-18 DIAGNOSIS — R92.8 ABNORMAL MAMMOGRAM: ICD-10-CM

## 2018-05-18 DIAGNOSIS — C50.911 MALIGNANT NEOPLASM OF RIGHT FEMALE BREAST, UNSPECIFIED ESTROGEN RECEPTOR STATUS, UNSPECIFIED SITE OF BREAST (HCC): ICD-10-CM

## 2018-05-21 ENCOUNTER — OFFICE VISIT (OUTPATIENT)
Dept: BARIATRICS/WEIGHT MGMT | Age: 53
End: 2018-05-21

## 2018-05-21 VITALS
RESPIRATION RATE: 16 BRPM | DIASTOLIC BLOOD PRESSURE: 72 MMHG | SYSTOLIC BLOOD PRESSURE: 125 MMHG | HEART RATE: 97 BPM | WEIGHT: 293 LBS | BODY MASS INDEX: 47.09 KG/M2 | HEIGHT: 66 IN

## 2018-05-21 DIAGNOSIS — K40.91 UNILATERAL RECURRENT INGUINAL HERNIA WITHOUT OBSTRUCTION OR GANGRENE: ICD-10-CM

## 2018-05-21 DIAGNOSIS — E66.01 MORBID OBESITY WITH BMI OF 45.0-49.9, ADULT (HCC): Primary | ICD-10-CM

## 2018-05-21 PROCEDURE — G8427 DOCREV CUR MEDS BY ELIG CLIN: HCPCS | Performed by: NURSE PRACTITIONER

## 2018-05-21 PROCEDURE — 1036F TOBACCO NON-USER: CPT | Performed by: NURSE PRACTITIONER

## 2018-05-21 PROCEDURE — G8417 CALC BMI ABV UP PARAM F/U: HCPCS | Performed by: NURSE PRACTITIONER

## 2018-05-21 PROCEDURE — 3017F COLORECTAL CA SCREEN DOC REV: CPT | Performed by: NURSE PRACTITIONER

## 2018-05-21 PROCEDURE — 99212 OFFICE O/P EST SF 10 MIN: CPT | Performed by: NURSE PRACTITIONER

## 2018-05-21 RX ORDER — FEEDER CONTAINER WITH PUMP SET
1 EACH MISCELLANEOUS 4 TIMES DAILY
Qty: 120 CAN | Refills: 0 | Status: SHIPPED | OUTPATIENT
Start: 2018-05-21 | End: 2018-12-03

## 2018-05-21 ASSESSMENT — ENCOUNTER SYMPTOMS
EYE PAIN: 0
VOMITING: 0
ABDOMINAL PAIN: 1
DIARRHEA: 0
SHORTNESS OF BREATH: 0
CHEST TIGHTNESS: 0
BACK PAIN: 1
ABDOMINAL DISTENTION: 0
NAUSEA: 0
CONSTIPATION: 0
RHINORRHEA: 0
WHEEZING: 0
RECTAL PAIN: 0
TROUBLE SWALLOWING: 0

## 2018-05-22 PROBLEM — K43.0 RECURRENT VENTRAL HERNIA WITH INCARCERATION: Status: ACTIVE | Noted: 2018-05-22

## 2018-06-01 ENCOUNTER — HOSPITAL ENCOUNTER (OUTPATIENT)
Dept: OTHER | Age: 53
Discharge: HOME | End: 2018-06-01
Attending: INTERNAL MEDICINE | Admitting: INTERNAL MEDICINE

## 2018-06-06 ENCOUNTER — OFFICE VISIT (OUTPATIENT)
Dept: BARIATRICS/WEIGHT MGMT | Age: 53
End: 2018-06-06

## 2018-06-06 VITALS
DIASTOLIC BLOOD PRESSURE: 86 MMHG | HEART RATE: 80 BPM | BODY MASS INDEX: 47.09 KG/M2 | SYSTOLIC BLOOD PRESSURE: 122 MMHG | WEIGHT: 293 LBS | HEIGHT: 66 IN

## 2018-06-06 DIAGNOSIS — Z87.19 H/O VENTRAL HERNIA: Primary | ICD-10-CM

## 2018-06-06 PROCEDURE — 99024 POSTOP FOLLOW-UP VISIT: CPT | Performed by: SURGERY

## 2018-06-06 ASSESSMENT — ENCOUNTER SYMPTOMS
BLOOD IN STOOL: 0
ANAL BLEEDING: 0
VOMITING: 0
ABDOMINAL PAIN: 1
PHOTOPHOBIA: 0
COLOR CHANGE: 0
SORE THROAT: 0
VOICE CHANGE: 0
CONSTIPATION: 0
ABDOMINAL DISTENTION: 1
DIARRHEA: 0
TROUBLE SWALLOWING: 0
SHORTNESS OF BREATH: 1
COUGH: 0
WHEEZING: 0
BACK PAIN: 1
NAUSEA: 0

## 2018-06-07 ENCOUNTER — OFFICE VISIT (OUTPATIENT)
Dept: BARIATRICS/WEIGHT MGMT | Age: 53
End: 2018-06-07

## 2018-06-07 VITALS
WEIGHT: 293 LBS | SYSTOLIC BLOOD PRESSURE: 114 MMHG | BODY MASS INDEX: 46.03 KG/M2 | DIASTOLIC BLOOD PRESSURE: 70 MMHG | HEART RATE: 90 BPM

## 2018-06-07 DIAGNOSIS — E66.01 MORBID OBESITY WITH BMI OF 45.0-49.9, ADULT (HCC): Primary | ICD-10-CM

## 2018-06-07 PROCEDURE — 99999 PR OFFICE/OUTPT VISIT,PROCEDURE ONLY: CPT

## 2018-06-10 PROBLEM — A41.9 SEPSIS (HCC): Status: ACTIVE | Noted: 2018-06-10

## 2018-06-22 LAB
ALBUMIN SERPL-MCNC: 2.9 GM/DL (ref 3.4–5)
ALP BLD-CCNC: 57 IU/L (ref 40–129)
ALT SERPL-CCNC: 15 U/L (ref 10–40)
ANION GAP SERPL CALCULATED.3IONS-SCNC: 16 MMOL/L (ref 4–16)
AST SERPL-CCNC: 11 IU/L (ref 15–37)
BILIRUB SERPL-MCNC: 0.3 MG/DL (ref 0–1)
BUN BLDV-MCNC: 7 MG/DL (ref 6–23)
CALCIUM SERPL-MCNC: 8.4 MG/DL (ref 8.3–10.6)
CHLORIDE BLD-SCNC: 101 MMOL/L (ref 99–110)
CO2: 25 MMOL/L (ref 21–32)
CREAT SERPL-MCNC: 0.5 MG/DL (ref 0.6–1.1)
GFR AFRICAN AMERICAN: >60 ML/MIN/1.73M2
GFR NON-AFRICAN AMERICAN: >60 ML/MIN/1.73M2
GLUCOSE BLD-MCNC: 59 MG/DL (ref 70–99)
HCT VFR BLD CALC: 27.2 % (ref 37–47)
HEMOGLOBIN: 8.3 GM/DL (ref 12.5–16)
MCH RBC QN AUTO: 28.2 PG (ref 27–31)
MCHC RBC AUTO-ENTMCNC: 30.5 % (ref 32–36)
MCV RBC AUTO: 92.5 FL (ref 78–100)
PDW BLD-RTO: 15.3 % (ref 11.7–14.9)
PLATELET # BLD: 270 K/CU MM (ref 140–440)
PMV BLD AUTO: 11.3 FL (ref 7.5–11.1)
POTASSIUM SERPL-SCNC: 3.7 MMOL/L (ref 3.5–5.1)
RBC # BLD: 2.94 M/CU MM (ref 4.2–5.4)
SODIUM BLD-SCNC: 142 MMOL/L (ref 135–145)
TOTAL PROTEIN: 6.1 GM/DL (ref 6.4–8.2)
WBC # BLD: 7.1 K/CU MM (ref 4–10.5)

## 2018-06-27 ENCOUNTER — OFFICE VISIT (OUTPATIENT)
Dept: BARIATRICS/WEIGHT MGMT | Age: 53
End: 2018-06-27

## 2018-06-27 VITALS
BODY MASS INDEX: 45.52 KG/M2 | WEIGHT: 290 LBS | SYSTOLIC BLOOD PRESSURE: 128 MMHG | DIASTOLIC BLOOD PRESSURE: 71 MMHG | RESPIRATION RATE: 20 BRPM | HEART RATE: 99 BPM

## 2018-06-27 DIAGNOSIS — Z87.19 S/P RECURRENT VENTRAL HERNIORRHAPHY: Primary | ICD-10-CM

## 2018-06-27 DIAGNOSIS — Z98.890 S/P RECURRENT VENTRAL HERNIORRHAPHY: Primary | ICD-10-CM

## 2018-06-27 PROCEDURE — 99024 POSTOP FOLLOW-UP VISIT: CPT | Performed by: SURGERY

## 2018-07-02 ENCOUNTER — CARE COORDINATION (OUTPATIENT)
Dept: CASE MANAGEMENT | Age: 53
End: 2018-07-02

## 2018-07-02 NOTE — CARE COORDINATION
Received a Patient Ping alert patient discharged from [facility] on 6/29/18. Patient no longer population health. Care Transition signing off.  CAROL BarakatN RN  Care Transition Coordinator  [phone]

## 2018-07-03 PROBLEM — N20.1 RIGHT URETERAL STONE: Status: ACTIVE | Noted: 2018-07-03

## 2018-07-13 ENCOUNTER — OFFICE VISIT (OUTPATIENT)
Dept: BARIATRICS/WEIGHT MGMT | Age: 53
End: 2018-07-13

## 2018-07-13 VITALS
SYSTOLIC BLOOD PRESSURE: 176 MMHG | DIASTOLIC BLOOD PRESSURE: 98 MMHG | HEART RATE: 86 BPM | HEIGHT: 67 IN | BODY MASS INDEX: 45.99 KG/M2 | RESPIRATION RATE: 18 BRPM | WEIGHT: 293 LBS

## 2018-07-13 DIAGNOSIS — Z98.890 S/P RECURRENT VENTRAL HERNIORRHAPHY: Primary | ICD-10-CM

## 2018-07-13 DIAGNOSIS — Z87.19 S/P RECURRENT VENTRAL HERNIORRHAPHY: Primary | ICD-10-CM

## 2018-07-13 PROCEDURE — 99024 POSTOP FOLLOW-UP VISIT: CPT | Performed by: SURGERY

## 2018-07-13 RX ORDER — ACETAMINOPHEN 160 MG
TABLET,DISINTEGRATING ORAL
Qty: 1 BOTTLE | Refills: 5 | Status: SHIPPED | OUTPATIENT
Start: 2018-07-13 | End: 2018-12-03

## 2018-07-13 RX ORDER — ACETAMINOPHEN 650 MG
TABLET, EXTENDED RELEASE ORAL
Qty: 1 BOTTLE | Refills: 5 | Status: SHIPPED | OUTPATIENT
Start: 2018-07-13 | End: 2018-07-20

## 2018-07-13 RX ORDER — PROPYLENE GLYCOL/PEG 400 0.3 %-0.4%
1 DROPS OPHTHALMIC (EYE) DAILY
Qty: 1 EACH | Refills: 3 | Status: SHIPPED | OUTPATIENT
Start: 2018-07-13 | End: 2018-08-03 | Stop reason: SDUPTHER

## 2018-08-03 ENCOUNTER — OFFICE VISIT (OUTPATIENT)
Dept: BARIATRICS/WEIGHT MGMT | Age: 53
End: 2018-08-03

## 2018-08-03 VITALS
WEIGHT: 288.1 LBS | HEART RATE: 83 BPM | BODY MASS INDEX: 45.12 KG/M2 | SYSTOLIC BLOOD PRESSURE: 126 MMHG | DIASTOLIC BLOOD PRESSURE: 61 MMHG | RESPIRATION RATE: 16 BRPM

## 2018-08-03 DIAGNOSIS — Z87.19 S/P VENTRAL HERNIORRHAPHY: Primary | ICD-10-CM

## 2018-08-03 DIAGNOSIS — Z98.890 S/P VENTRAL HERNIORRHAPHY: Primary | ICD-10-CM

## 2018-08-03 DIAGNOSIS — L92.9 SKIN GRANULOMA: ICD-10-CM

## 2018-08-03 PROCEDURE — 99024 POSTOP FOLLOW-UP VISIT: CPT | Performed by: SURGERY

## 2018-08-03 RX ORDER — PROPYLENE GLYCOL/PEG 400 0.3 %-0.4%
1 DROPS OPHTHALMIC (EYE) DAILY
Qty: 1 EACH | Refills: 3 | Status: SHIPPED | OUTPATIENT
Start: 2018-08-03 | End: 2019-01-28 | Stop reason: ALTCHOICE

## 2018-08-03 ASSESSMENT — ENCOUNTER SYMPTOMS
TROUBLE SWALLOWING: 0
ANAL BLEEDING: 0
BLOOD IN STOOL: 0
DIARRHEA: 0
WHEEZING: 0
NAUSEA: 0
CONSTIPATION: 0
COUGH: 0
PHOTOPHOBIA: 0
COLOR CHANGE: 0
SHORTNESS OF BREATH: 0
SORE THROAT: 0
VOICE CHANGE: 0
VOMITING: 0
ABDOMINAL PAIN: 0

## 2018-08-03 NOTE — PROGRESS NOTES
ENDOSCOPY, COLON, DIAGNOSTIC  04/12/2017    EGD: Dilatation, Hiatal hernia, mild gastritis    HERNIA REPAIR  2007    Abdominal Hernia Repair With Mesh    HERNIA REPAIR  2012    Ventral Hernia Repair With Mesh    HYSTERECTOMY, TOTAL ABDOMINAL  2006    LAPAROSCOPY  2005    Laparoscopy, D & C, Hysteroscopy    LYMPH NODE DISSECTION      2015    OTHER SURGICAL HISTORY  05/22/2018    Laparoscopic-converted to open ventral hernia repair with mesh/ XU    OTHER SURGICAL HISTORY      per old chart with admission 6/20180- in IR 6/11/2018- had right perc. nephrostomy with cath placement\"took the tube out before I left the hospital\"    TONSILLECTOMY  1970's     Current Outpatient Prescriptions   Medication Sig Dispense Refill    hydrogen peroxide 3 % external solution Apply topically as needed. 1 Bottle 5    Adhesive Tape (PAPER TAPE 1\"[id number]) TAPE 1 mL by Does not apply route daily 1 each 3    Gauze Bandages (STERIPAK GAUZE STERILE) MISC For daily wound care 10 each 5    ondansetron (ZOFRAN ODT) 4 MG disintegrating tablet Take 1 tablet by mouth every 4 hours as needed for Nausea or Vomiting 30 tablet 3    Nutritional Supplements (ENSURE HIGH PROTEIN) LIQD Take 1 Can by mouth 4 times daily Meal replacement.  120 Can 0    Multiple Vitamins-Minerals (MULTIVITAMIN PO) Take by mouth every morning      OYSTER SHELL CALCIUM PO Take 500 mg by mouth 2 times daily      hydrOXYzine (ATARAX) 25 MG tablet Take 25 mg by mouth 2 times daily \"I Take One Tablet In The Morning And Two Tablets At Night\"      letrozole (FEMARA) 2.5 MG tablet Take 2.5 mg by mouth every morning       cetirizine (ZYRTEC) 10 MG tablet Take 1 tablet by mouth every morning       fluticasone (FLONASE) 50 MCG/ACT nasal spray by Nasal route as needed       DULoxetine (CYMBALTA) 60 MG extended release capsule 1 capsule 2 times daily       docusate sodium (COLACE) 100 MG capsule Take 1 capsule by mouth daily as needed for Constipation 30 capsule 3    pantoprazole (PROTONIX) 40 MG tablet Take 1 tablet by mouth daily 30 tablet 3    ranitidine (ZANTAC) 150 MG tablet Take 2 tablets by mouth nightly (Patient taking differently: Take 300 mg by mouth 2 times daily \"I Take Half Tablet Twice A Day\") 60 tablet 3    Probiotic Product (ALIGN) 4 MG CAPS Take 1 capsule by mouth daily 30 capsule 3    cholestyramine (QUESTRAN) 4 g packet Take 1 packet by mouth 2 times daily 90 packet 0    oxybutynin (DITROPAN) 5 MG tablet Take 5 mg by mouth as needed       atorvastatin (LIPITOR) 10 MG tablet Take 10 mg by mouth every morning       ARIPiprazole (ABILIFY) 15 MG tablet Take 15 mg by mouth every morning       glipiZIDE (GLUCOTROL) 5 MG tablet Take 5 mg by mouth 2 times daily       metformin (GLUCOPHAGE) 1000 MG tablet Take 500 mg by mouth 2 times daily \"I Take Half Tablet Twice A Day\"      busPIRone (BUSPAR) 15 MG tablet Take 15 mg by mouth 2 times daily       traZODone (DESYREL) 50 MG tablet Take 50 mg by mouth nightly \"I Take Two Tablets At Night\"      MELATONIN Take 3 mg by mouth nightly        No current facility-administered medications for this visit. Allergies   Allergen Reactions    Latex      \"Redness\"    Codeine      \"I Don't Remember The Reaction , I Was A Child\"    Darvon [Propoxyphene]      \"I Don't Remember The Reaction, I Was A Child\"    Pcn [Penicillins]      \"I Don't Remember The Reaction, I Was A Child\"           Review of Systems   Constitutional: Negative for activity change, chills, diaphoresis and fever. HENT: Negative for sore throat, trouble swallowing and voice change. Eyes: Negative for photophobia and visual disturbance. Respiratory: Negative for cough, shortness of breath and wheezing. Cardiovascular: Negative for chest pain, palpitations and leg swelling. Gastrointestinal: Negative for abdominal pain, anal bleeding, blood in stool, constipation, diarrhea, nausea and vomiting.    Endocrine: Negative for cold intolerance, heat intolerance, polydipsia and polyuria. Genitourinary: Negative for dysuria, frequency and hematuria. Musculoskeletal: Negative for joint swelling, myalgias and neck stiffness. Skin: Negative for color change and rash. Neurological: Negative for seizures, speech difficulty, light-headedness and numbness. Hematological: Negative for adenopathy. Does not bruise/bleed easily. OBJECTIVE:    /61 (Site: Left Arm, Position: Sitting, Cuff Size: Large Adult)   Pulse 83   Resp 16   Wt 288 lb 1.6 oz (130.7 kg)   BMI 45.12 kg/m²      Physical Exam   Constitutional: She is oriented to person, place, and time. She appears well-developed and well-nourished. No distress. HENT:   Head: Normocephalic and atraumatic. Eyes: Conjunctivae and EOM are normal. Pupils are equal, round, and reactive to light. No scleral icterus. Neck: Normal range of motion. No JVD present. No tracheal deviation present. No thyromegaly present. Cardiovascular: Normal rate, regular rhythm, normal heart sounds and intact distal pulses. Exam reveals no gallop and no friction rub. No murmur heard. Pulmonary/Chest: Effort normal. No stridor. No respiratory distress. She has no wheezes. She has no rales. She exhibits no tenderness. Abdominal: Soft. Bowel sounds are normal. She exhibits mass. She exhibits no distension. There is no tenderness. There is no rebound and no guarding. Musculoskeletal: Normal range of motion. She exhibits no edema or tenderness. Lymphadenopathy:     She has no cervical adenopathy. Neurological: She is alert and oriented to person, place, and time. No cranial nerve deficit. Coordination normal.   Skin: Skin is warm and dry. No rash noted. She is not diaphoretic. No erythema. No pallor. Psychiatric: Her behavior is normal. Judgment and thought content normal.   Vitals reviewed. ASSESSMENT & PLAN:    1. S/P ventral herniorrhaphy    2.  Skin granuloma           1.5 cm x 1.5 cm

## 2018-08-10 ENCOUNTER — OFFICE VISIT (OUTPATIENT)
Dept: BARIATRICS/WEIGHT MGMT | Age: 53
End: 2018-08-10

## 2018-08-10 VITALS
SYSTOLIC BLOOD PRESSURE: 131 MMHG | BODY MASS INDEX: 44.98 KG/M2 | DIASTOLIC BLOOD PRESSURE: 73 MMHG | WEIGHT: 286.6 LBS | RESPIRATION RATE: 15 BRPM | HEIGHT: 67 IN | HEART RATE: 86 BPM

## 2018-08-10 DIAGNOSIS — L92.9 SKIN GRANULOMA: Primary | ICD-10-CM

## 2018-08-10 PROCEDURE — 99024 POSTOP FOLLOW-UP VISIT: CPT | Performed by: SURGERY

## 2018-08-10 RX ORDER — NYSTATIN 100000 [USP'U]/G
POWDER TOPICAL
Qty: 1 BOTTLE | Refills: 5 | Status: SHIPPED | OUTPATIENT
Start: 2018-08-10 | End: 2019-01-29

## 2018-08-10 NOTE — PROGRESS NOTES
GENERAL SURGERY OFFICE NOTE    SUBJECTIVE:    Patient presenting today referred from Ranjan Myers MD and No ref. provider found, for   Chief Complaint   Patient presents with    Follow-up     OR 5/22/18 1. Laparoscopic DaVinci robotic assisted converted to open ventral incisional herniorrhaphy       HPI: Paula Hugo is a 48 y.o. female presenting in fifth, follow up 11 weeks post op 5/22/18. Procedure:  1. Laparoscopic DaVinci robotic assisted converted to open ventral incisional herniorrhaphy  with primary repair followed by an underlay polypropylene mesh underlay 18 cm x 12 cm. 2. Open small bowel resection / anastomosis. Pathology:    Segment of small intestine:      Fibrous adhesions with synthetic mesh.      Defect in the wall with features of cautery effect. Separate fragment of fibroadipose tissue with synthetic mesh  attached:      Focal fibrosis with foreign body inflammatory reaction.         No histopathological diagnosis, lymph node. Separate fragment of synthetic mesh material.    \"1.5 cm x 1.5 cm granuloma, touched with silver nitrate, and then cleansed with betadine and peroxide, and will do this q wk, till it heals 100%\"    TODAY the granuloma is gone, the wound is very nicely healing, BUT fungal pannus, cleansed with peroxide and betadine, and Rx -ed her Nystatin powder. F/u 2 wks. Wounds very nicely healing, no erythema, no induration, no purulent discharge. No constipation, BMs back to normal.    Thoroughly reviewed the patient's medical history, family history, social history and review of systems with the patient today in the office. Please see medical record for pertinent positives.       Past Medical History:   Diagnosis Date    Acid reflux     Anxiety     Arthritis     \"Back, Legs, Knees And Feet\"    Back problem     \"Three Discs Smashed Together Lower Back\"    Bipolar disorder (HCC)     Chronic back pain     Curvature of spine     \"Wore A Brace For Three Years\"    Depression     Diabetes mellitus ([facility] Dx 2010    Diverticulitis     Esophageal dilatation 04/12/2017    Full dentures     Headache(784.0)     \"Occ\"    Heart murmur     No Cardiologist At This Time    [place] (hard of hearing)     Bilateral Hearing Aides    Hydronephrosis     dx with hydronephrosis with admission 6/2018- right ureteral stone/\"had kidney stone stone about 2-3 yrs ago and passed it\"    Hyperlipidemia     Hypertension     Macular degeneration     Right Eye    MRSA (methicillin resistant Staphylococcus aureus)     After PRICILLA In 2006 (Abdomen)    Panic attacks     PONV (postoperative nausea and vomiting)     denies any motion sickness    Right Breast Cancer Dx 8-15    Right Breast Lumpectomy, Had Chemotherapy And Radiation Treatments    Seizure ([address]     \"when I was a young kid had seizure and was on medication- not sure how old when last seizure but as a kid\"    Sepsis ([facility]     per old chart - admitted 6/2018 with sepsis secondary to wound infection    Sleep apnea     Has CPAP- last sleep study 2-3 yrs ago\"    Thyroid disease     \"Thyroid Problems As A Child\"    Urinary incontinence     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    WD-Wound of right breast 10/28/2016    Wears glasses       Past Surgical History:   Procedure Laterality Date    ABDOMEN SURGERY  06/2018    per old chart pt had exploratory abd wound with drainage of infected seroma with wound vac    BREAST BIOPSY Right 2015    Right Breast Cancer , \"I Have A Chip In The Breast\"    BREAST BIOPSY Right 2017    Benign    BREAST LUMPECTOMY Right 08/2015    Right Breast Cancer    CHOLECYSTECTOMY, LAPAROSCOPIC  2007    COLONOSCOPY  2016    dr green 1 polyp    COLONOSCOPY  04/12/2017    Internal hemorroids, diverticulosis    CYSTOSCOPY  2016    Kidney Stones    CYSTOSCOPY      per old chart with admission 6/2018 pt had cysto, right RGPG and attempted to insert right ureteral stent    CYSTOSCOPY  07/03/2018

## 2018-08-24 ENCOUNTER — OFFICE VISIT (OUTPATIENT)
Dept: BARIATRICS/WEIGHT MGMT | Age: 53
End: 2018-08-24

## 2018-08-24 VITALS
BODY MASS INDEX: 45.12 KG/M2 | RESPIRATION RATE: 16 BRPM | DIASTOLIC BLOOD PRESSURE: 78 MMHG | SYSTOLIC BLOOD PRESSURE: 132 MMHG | WEIGHT: 288.1 LBS | HEART RATE: 80 BPM

## 2018-08-24 DIAGNOSIS — Z87.19 S/P VENTRAL HERNIORRHAPHY: Primary | ICD-10-CM

## 2018-08-24 DIAGNOSIS — Z98.890 S/P VENTRAL HERNIORRHAPHY: Primary | ICD-10-CM

## 2018-08-24 PROCEDURE — G8417 CALC BMI ABV UP PARAM F/U: HCPCS | Performed by: SURGERY

## 2018-08-24 PROCEDURE — 1036F TOBACCO NON-USER: CPT | Performed by: SURGERY

## 2018-08-24 PROCEDURE — G8427 DOCREV CUR MEDS BY ELIG CLIN: HCPCS | Performed by: SURGERY

## 2018-08-24 PROCEDURE — 3017F COLORECTAL CA SCREEN DOC REV: CPT | Performed by: SURGERY

## 2018-08-24 PROCEDURE — 99213 OFFICE O/P EST LOW 20 MIN: CPT | Performed by: SURGERY

## 2018-08-24 ASSESSMENT — ENCOUNTER SYMPTOMS
PHOTOPHOBIA: 0
WHEEZING: 0
ABDOMINAL PAIN: 0
BLOOD IN STOOL: 0
VOICE CHANGE: 0
VOMITING: 0
TROUBLE SWALLOWING: 0
DIARRHEA: 0
SHORTNESS OF BREATH: 0
SORE THROAT: 0
CONSTIPATION: 0
COLOR CHANGE: 0
NAUSEA: 0
COUGH: 0
ANAL BLEEDING: 0

## 2018-08-24 NOTE — PROGRESS NOTES
(LIPITOR) 10 MG tablet Take 10 mg by mouth every morning       ARIPiprazole (ABILIFY) 15 MG tablet Take 15 mg by mouth every morning       glipiZIDE (GLUCOTROL) 5 MG tablet Take 5 mg by mouth 2 times daily       metformin (GLUCOPHAGE) 1000 MG tablet Take 500 mg by mouth 2 times daily \"I Take Half Tablet Twice A Day\"      busPIRone (BUSPAR) 15 MG tablet Take 15 mg by mouth 2 times daily       traZODone (DESYREL) 50 MG tablet Take 50 mg by mouth nightly \"I Take Two Tablets At Night\"      MELATONIN Take 3 mg by mouth nightly        No current facility-administered medications for this visit. Allergies   Allergen Reactions    Latex      \"Redness\"    Codeine      \"I Don't Remember The Reaction , I Was A Child\"    Darvon [Propoxyphene]      \"I Don't Remember The Reaction, I Was A Child\"    Pcn [Penicillins]      \"I Don't Remember The Reaction, I Was A Child\"           Review of Systems   Constitutional: Negative for activity change, chills, diaphoresis and fever. HENT: Negative for sore throat, trouble swallowing and voice change. Eyes: Negative for photophobia and visual disturbance. Respiratory: Negative for cough, shortness of breath and wheezing. Cardiovascular: Negative for chest pain, palpitations and leg swelling. Gastrointestinal: Negative for abdominal pain, anal bleeding, blood in stool, constipation, diarrhea, nausea and vomiting. Endocrine: Negative for cold intolerance, heat intolerance, polydipsia and polyuria. Genitourinary: Negative for dysuria, frequency and hematuria. Musculoskeletal: Negative for joint swelling, myalgias and neck stiffness. Skin: Negative for color change and rash. Neurological: Negative for seizures, speech difficulty, light-headedness and numbness. Hematological: Negative for adenopathy. Does not bruise/bleed easily.          OBJECTIVE:    /78 (Site: Left Arm, Position: Sitting, Cuff Size: Large Adult)   Pulse 80   Resp 16   Wt 288 lb 1.6 oz (130.7 kg)   BMI 45.12 kg/m²      Physical Exam   Constitutional: She is oriented to person, place, and time. She appears well-developed and well-nourished. No distress. HENT:   Head: Normocephalic and atraumatic. Eyes: Pupils are equal, round, and reactive to light. Conjunctivae and EOM are normal. No scleral icterus. Neck: Normal range of motion. No JVD present. No tracheal deviation present. No thyromegaly present. Cardiovascular: Normal rate, regular rhythm, normal heart sounds and intact distal pulses. Exam reveals no gallop and no friction rub. No murmur heard. Pulmonary/Chest: Effort normal. No stridor. No respiratory distress. She has no wheezes. She has no rales. She exhibits no tenderness. Abdominal: Soft. Bowel sounds are normal. She exhibits no distension and no mass. There is no tenderness. There is no rebound and no guarding. Musculoskeletal: Normal range of motion. She exhibits no edema or tenderness. Lymphadenopathy:     She has no cervical adenopathy. Neurological: She is alert and oriented to person, place, and time. No cranial nerve deficit. Coordination normal.   Skin: Skin is warm and dry. No rash noted. She is not diaphoretic. No erythema. No pallor. Psychiatric: Her behavior is normal. Judgment and thought content normal.   Vitals reviewed. ASSESSMENT & PLAN:    1. S/P ventral herniorrhaphy           Wound 100% healed! .  Needs to continue to use the antifungal powder. Patient counseled on the risks, benefits, and alternatives of treatment plan at length while in the office today. Patient states an understanding and willingness to proceed with the plan. Follow Up:  Return for PRN - As needed for any new symptom. Taylor Alford MD, FACS, FICS. 8/24/18       Patient was seen with total face to face time of 25 minutes. More than 50% of this visit was counseling and education as above in my assessment and plan.

## 2018-09-07 ENCOUNTER — HOSPITAL ENCOUNTER (OUTPATIENT)
Dept: OTHER | Age: 53
Discharge: HOME | End: 2018-09-07
Attending: INTERNAL MEDICINE | Admitting: INTERNAL MEDICINE

## 2018-09-07 LAB
ALBUMIN SERPL-MCNC: 3.9 GM/DL (ref 3.4–5)
ALP BLD-CCNC: 81 IU/L (ref 40–129)
ALT SERPL-CCNC: 24 U/L (ref 10–40)
ANION GAP SERPL CALCULATED.3IONS-SCNC: 13 MMOL/L (ref 4–16)
AST SERPL-CCNC: 24 IU/L (ref 15–37)
BILIRUB SERPL-MCNC: 0.3 MG/DL (ref 0–1)
BUN BLDV-MCNC: 13 MG/DL (ref 6–23)
CALCIUM SERPL-MCNC: 9.6 MG/DL (ref 8.3–10.6)
CHLORIDE BLD-SCNC: 99 MMOL/L (ref 99–110)
CO2: 27 MMOL/L (ref 21–32)
CREAT SERPL-MCNC: 0.9 MG/DL (ref 0.6–1.1)
GFR AFRICAN AMERICAN: >60 ML/MIN/1.73M2
GFR NON-AFRICAN AMERICAN: >60 ML/MIN/1.73M2
GLUCOSE BLD-MCNC: 100 MG/DL (ref 70–99)
POTASSIUM SERPL-SCNC: 3.4 MMOL/L (ref 3.5–5.1)
SODIUM BLD-SCNC: 139 MMOL/L (ref 135–145)
TOTAL PROTEIN: 6.9 GM/DL (ref 6.4–8.2)

## 2018-09-10 LAB — CA 27.29: 15.4 U/ML

## 2018-10-10 NOTE — PATIENT INSTRUCTIONS
Patient Education        Gastroesophageal Reflux Disease (GERD): Care Instructions  Your Care Instructions    Gastroesophageal reflux disease (GERD) is the backward flow of stomach acid into the esophagus. The esophagus is the tube that leads from your throat to your stomach. A one-way valve prevents the stomach acid from moving up into this tube. When you have GERD, this valve does not close tightly enough. If you have mild GERD symptoms including heartburn, you may be able to control the problem with antacids or over-the-counter medicine. Changing your diet, losing weight, and making other lifestyle changes can also help reduce symptoms. Follow-up care is a key part of your treatment and safety. Be sure to make and go to all appointments, and call your doctor if you are having problems. Its also a good idea to know your test results and keep a list of the medicines you take. How can you care for yourself at home? · Take your medicines exactly as prescribed. Call your doctor if you think you are having a problem with your medicine. · Your doctor may recommend over-the-counter medicine. For mild or occasional indigestion, antacids, such as Tums, Gaviscon, Mylanta, or Maalox, may help. Your doctor also may recommend over-the-counter acid reducers, such as Pepcid AC, Tagamet HB, Zantac 75, or Prilosec. Read and follow all instructions on the label. If you use these medicines often, talk with your doctor. · Change your eating habits. ¨ Its best to eat several small meals instead of two or three large meals. ¨ After you eat, wait 2 to 3 hours before you lie down. ¨ Chocolate, mint, and alcohol can make GERD worse. ¨ Spicy foods, foods that have a lot of acid (like tomatoes and oranges), and coffee can make GERD symptoms worse in some people. If your symptoms are worse after you eat a certain food, you may want to stop eating that food to see if your symptoms get better.   · Do not smoke or chew tobacco. foods. Almost everyone has diarrhea now and then. It usually isn't serious, and your stools will return to normal soon. The important thing to do is replace the fluids you have lost, so you can prevent dehydration. The doctor has checked you carefully, but problems can develop later. If you notice any problems or new symptoms, get medical treatment right away. Follow-up care is a key part of your treatment and safety. Be sure to make and go to all appointments, and call your doctor if you are having problems. It's also a good idea to know your test results and keep a list of the medicines you take. How can you care for yourself at home? · Watch for signs of dehydration, which means your body has lost too much water. Dehydration is a serious condition and should be treated right away. Signs of dehydration are:  ¨ Increasing thirst and dry eyes and mouth. ¨ Feeling faint or lightheaded. ¨ Darker urine, and a smaller amount of urine than normal.  · To prevent dehydration, drink plenty of fluids, enough so that your urine is light yellow or clear like water. Choose water and other caffeine-free clear liquids until you feel better. If you have kidney, heart, or liver disease and have to limit fluids, talk with your doctor before you increase the amount of fluids you drink. · Begin eating small amounts of mild foods the next day, if you feel like it. ¨ Try yogurt that has live cultures of Lactobacillus. (Check the label.)  ¨ Avoid spicy foods, fruits, alcohol, and caffeine until 48 hours after all symptoms are gone. ¨ Avoid chewing gum that contains sorbitol. ¨ Avoid dairy products (except for yogurt with Lactobacillus) while you have diarrhea and for 3 days after symptoms are gone. · The doctor may recommend that you take over-the-counter medicine, such as loperamide (Imodium), if you still have diarrhea after 6 hours. Read and follow all instructions on the label.  Do not use this medicine if you have bloody much saturated fat (such as in cheese and meats) and trans fat (a type of fat found in many packaged snack foods and other baked goods). You do not need to cut all fat from your diet. But you can make healthier choices about the types and amount of fat you eat. Even though it is a good idea to choose healthier fats, it is still important to be careful of how much fat you eat, because all fats are high in calories. What are the different types of fats? Unhealthy fats  · Saturated fat. Saturated fats are mostly in animal foods, such as meat and dairy foods. Tropical oils, such as coconut oil, palm oil, and cocoa butter, are also saturated fats. · Trans fat. Trans fats include shortening, partially hydrogenated vegetable oils, and hydrogenated vegetable oils. Trans fats are made when a liquid fat is turned into a solid fat (for example, when corn oil is made into stick margarine). They are in many processed foods, such as cookies, crackers, and snack foods. Healthy fats  · Monounsaturated fat. Monounsaturated fats are liquid at room temperature but get solid when refrigerated. Eating foods that are high in this fat may help lower your \"bad\" (LDL) cholesterol, keep your \"good\" (HDL) cholesterol level up, and lower your chances of getting heart disease. This fat is found in canola oil, olive oil, peanut oil, olives, avocados, nuts, and nut butters. · Polyunsaturated fat. Polyunsaturated fats are liquid at room temperature. They are in safflower, sunflower, and corn oils. They are also the main fat in seafood. Omega-3 fatty acids are types of polyunsaturated fat. Omega-3 fatty acids may lower your chances of getting heart disease. Fatty fish such as salmon and mackerel contain these healthy fatty acids. So do ground flaxseeds and flaxseed oil, soybeans, walnuts, and seeds. Why cut down on unhealthy fats? Eating foods that contain saturated fats can raise the LDL (\"bad\") cholesterol in your blood.  Having a high level of LDL cholesterol increases your chance of hardening of the arteries (atherosclerosis), which can lead to heart disease, heart attack, and stroke. Trans fat raises the level of \"bad\" LDL cholesterol in your blood and may lower the \"good\" HDL cholesterol in your blood. Trans fat can raise your risk of heart disease, heart attack, and stroke. In general:  · No more than 10% of your daily calories should come from saturated fat. This is about 20 grams in a 2,000-calorie diet. · No more than 10% of your daily calories should come from polyunsaturated fat. This is about 20 grams in a 2,000-calorie diet. · Monounsaturated fats can be up to 15% of your daily calories. This is about 25 to 30 grams in a 2,000-calorie diet. If you're not sure how much fat you should be eating or how many calories you need each day to stay at a healthy weight, talk to a registered dietitian. He or she can help you create a plan that's right for you. What can you do to cut down on fat? Foods like cheese, butter, sausage, and desserts can have a lot of unhealthy fats. Try these tips for healthier meals at home and when you eat out. At home  · Fill up on fruits, vegetables, and whole grains. · Think of meat as a side dish instead of as the main part of your meal.  · When you do eat meat, make it extra-lean ground beef (97% lean), ground turkey breast (without skin added), meats with fat trimmed off before cooking, or skinless chicken. · Try main dishes that use whole wheat pasta, brown rice, dried beans, or vegetables. · Use cooking methods that use little or no fat, such as broiling, steaming, or grilling. Use cooking spray instead of oil. If you use oil, use a monounsaturated oil, such as canola or olive oil. · Read food labels on canned, bottled, or packaged foods. Choose those with little saturated fat and no trans fat.   When eating out at a restaurant  · Order foods that are broiled or poached instead of fried or breaded. · Cut back on the amount of butter or margarine that you use on bread. Use small amounts of olive oil instead. · Order sauces, gravies, and salad dressings on the side, and use only a little. · When you order pasta, choose tomato sauce instead of cream sauce. · Ask for salsa with your baked potato instead of sour cream, butter, cheese, or cronin. Where can you learn more? Go to https://[employer]peSpazioDati.RunTitle. org and sign in to your [email or web address] account. Enter M821 in the [facility] box to learn more about \"Learning About Low-Fat Eating. \"     If you do not have an account, please click on the \"Sign Up Now\" link. Current as of: July 26, 2016  Content Version: 11.3  © 6927-4565 [employer]. Care instructions adapted under license by [facility] ([facility]). If you have questions about a medical condition or this instruction, always ask your healthcare professional. [place] [address] any warranty or liability for your use of this information. Patient Education          cholestyramine  Pronunciation:  koe le STYE ra meen  Brand:  Cholestyramine Light, Prevalite, Prevalite Packets, Questran, Questran Light, Questran Light Packets, Questran Packets  What is the most important information I should know about cholestyramine? You should not take this medication if you are allergic to cholestyramine, or if you have a blockage in your stomach or intestines. Before taking cholestyramine, tell your doctor if you have a thyroid disorder, diabetes, kidney or liver disease, or chronic constipation. Avoid taking other medications at the same time you take cholestyramine. Wait at least 4 to 6 hours after taking cholestyramine before you take any other medications. What is cholestyramine? Cholestyramine helps reduce cholesterol (fatty acids) in the blood.  High cholesterol is associated with an increased risk of heart disease and atherosclerosis (clogged arteries). Cholestyramine is used to lower high levels of cholesterol in the blood, especially low-density lipoprotein (LDL) (\"bad\" cholesterol). Cholestyramine powder is also used to treat itching caused by a blockage in the bile ducts of the gallbladder. Cholestyramine may also be used for purposes not listed in this medication guide. What should I discuss with my healthcare provider before taking cholestyramine? You should not take this medication if you are allergic to cholestyramine, or if you have a blockage in your stomach or intestines. To make sure you can safely take cholestyramine, tell your doctor if you have any of these other conditions:  · a thyroid disorder;  · diabetes,  · kidney disease,  · liver disease, or  · chronic constipation. FDA pregnancy category C. It is not known whether cholestyramine will harm an unborn baby. Tell your doctor if you are pregnant or plan to become pregnant while using this medication. Taking cholestyramine can make it harder for your body to absorb certain vitamins your body needs while you are nursing a baby. Do not take cholestyramine without telling your doctor if you are breast-feeding. This medicine may contain phenylalanine. Talk to your doctor before using cholestyramine if you have phenylketonuria (PKU). How should I take cholestyramine? Take exactly as prescribed by your doctor. Do not take in larger or smaller amounts or for longer than recommended. Follow the directions on your prescription label. Cholestyramine is sometimes taken up to 6 times per day. Follow your doctor's instructions. Take cholestyramine with meals unless your doctor tells you otherwise. Mix the cholestyramine powder with 2 to 6 ounces of water or other non-carbonated beverage. You may also mix the powder with a brothy soup, crushed pineapple, or applesauce. Measure the powder using the scoop provided with your medication.  Do not use any other scoop or measuring cup to [place] are appropriate, unless specifically indicated otherwise. [facility]'s drug information does not endorse drugs, diagnose patients or recommend therapy. [facility] drug information is an informational resource designed to assist licensed healthcare practitioners in caring for their patients and/or to serve consumers viewing this service as a supplement to, and not a substitute for, the expertise, skill, knowledge and judgment of healthcare practitioners. The absence of a warning for a given drug or drug combination in no way should be construed to indicate that the drug or drug combination is safe, effective or appropriate for any given patient. [facility] does not assume any responsibility for any aspect of healthcare administered with the aid of information [facility] provides. The information contained herein is not intended to cover all possible uses, directions, precautions, warnings, drug interactions, allergic reactions, or adverse effects. If you have questions about the drugs you are taking, check with your doctor, nurse or pharmacist.  Copyright 4929-6295 [address]: 4.. Revision date: 3/20/2012. Care instructions adapted under license by [facility] ([facility]). If you have questions about a medical condition or this instruction, always ask your healthcare professional. [place] [address] any warranty or liability for your use of this information. <<-----Click here for Discharge Medication Review

## 2018-11-08 RX ORDER — OCTISALATE, AVOBENZONE, HOMOSALATE, AND OCTOCRYLENE 29.4; 29.4; 49; 25.48 MG/ML; MG/ML; MG/ML; MG/ML
1 LOTION TOPICAL DAILY
Qty: 30 CAPSULE | Refills: 3 | Status: SHIPPED | OUTPATIENT
Start: 2018-11-08 | End: 2020-12-17

## 2018-11-28 ENCOUNTER — HOSPITAL ENCOUNTER (OUTPATIENT)
Dept: ULTRASOUND IMAGING | Age: 53
Discharge: HOME | End: 2018-11-28
Payer: COMMERCIAL

## 2018-11-28 DIAGNOSIS — Z09 FOLLOW-UP EXAM: ICD-10-CM

## 2018-11-28 DIAGNOSIS — N63.20 LEFT BREAST MASS: ICD-10-CM

## 2018-11-28 PROCEDURE — 76882 US LMTD JT/FCL EVL NVASC XTR: CPT

## 2018-11-30 ENCOUNTER — HOSPITAL ENCOUNTER (OUTPATIENT)
Dept: CT IMAGING | Age: 53
Discharge: HOME | End: 2018-11-30
Payer: COMMERCIAL

## 2018-11-30 DIAGNOSIS — R06.02 BREATH SHORTNESS: ICD-10-CM

## 2018-11-30 LAB — POC CREATININE: <0.3 MG/DL (ref 0.6–1.1)

## 2018-11-30 PROCEDURE — 71275 CT ANGIOGRAPHY CHEST: CPT

## 2018-11-30 PROCEDURE — 6360000004 HC RX CONTRAST MEDICATION: Performed by: PHYSICIAN ASSISTANT

## 2018-11-30 RX ADMIN — IOPAMIDOL 75 ML: 755 INJECTION, SOLUTION INTRAVENOUS at 17:07

## 2018-12-18 ENCOUNTER — HOSPITAL ENCOUNTER (OUTPATIENT)
Dept: MRI IMAGING | Age: 53
Discharge: HOME | End: 2018-12-18
Payer: COMMERCIAL

## 2018-12-18 DIAGNOSIS — C50.911 MALIGNANT NEOPLASM OF RIGHT BREAST IN FEMALE, ESTROGEN RECEPTOR NEGATIVE, UNSPECIFIED SITE OF BREAST (HCC): ICD-10-CM

## 2018-12-18 DIAGNOSIS — Z17.1 MALIGNANT NEOPLASM OF RIGHT BREAST IN FEMALE, ESTROGEN RECEPTOR NEGATIVE, UNSPECIFIED SITE OF BREAST (HCC): ICD-10-CM

## 2018-12-18 PROCEDURE — A9577 INJ MULTIHANCE: HCPCS | Performed by: SURGERY

## 2018-12-18 PROCEDURE — 6360000004 HC RX CONTRAST MEDICATION: Performed by: SURGERY

## 2018-12-18 PROCEDURE — C8908 MRI W/O FOL W/CONT, BREAST,: HCPCS

## 2018-12-18 RX ADMIN — GADOBENATE DIMEGLUMINE 20 ML: 529 INJECTION, SOLUTION INTRAVENOUS at 10:59

## 2019-01-03 RX ORDER — NYSTATIN 100000 U/G
CREAM TOPICAL
Qty: 15 G | Refills: 4 | Status: SHIPPED | OUTPATIENT
Start: 2019-01-03 | End: 2019-01-29

## 2019-01-11 ENCOUNTER — HOSPITAL ENCOUNTER (OUTPATIENT)
Age: 54
Setting detail: SPECIMEN
Discharge: HOME | End: 2019-01-11
Payer: COMMERCIAL

## 2019-01-11 LAB
ALBUMIN SERPL-MCNC: 4.4 GM/DL (ref 3.4–5)
ALP BLD-CCNC: 86 IU/L (ref 40–128)
ALT SERPL-CCNC: 41 U/L (ref 10–40)
ANION GAP SERPL CALCULATED.3IONS-SCNC: 14 MMOL/L (ref 4–16)
AST SERPL-CCNC: 33 IU/L (ref 15–37)
BILIRUB SERPL-MCNC: 0.5 MG/DL (ref 0–1)
BUN BLDV-MCNC: 9 MG/DL (ref 6–23)
CALCIUM SERPL-MCNC: 9.7 MG/DL (ref 8.3–10.6)
CHLORIDE BLD-SCNC: 91 MMOL/L (ref 99–110)
CO2: 27 MMOL/L (ref 21–32)
CREAT SERPL-MCNC: 0.7 MG/DL (ref 0.6–1.1)
GFR AFRICAN AMERICAN: >60 ML/MIN/1.73M2
GFR NON-AFRICAN AMERICAN: >60 ML/MIN/1.73M2
GLUCOSE BLD-MCNC: 119 MG/DL (ref 70–99)
POTASSIUM SERPL-SCNC: 4.4 MMOL/L (ref 3.5–5.1)
SODIUM BLD-SCNC: 132 MMOL/L (ref 135–145)
TOTAL PROTEIN: 7.2 GM/DL (ref 6.4–8.2)

## 2019-01-11 PROCEDURE — 80053 COMPREHEN METABOLIC PANEL: CPT

## 2019-01-11 PROCEDURE — 86300 IMMUNOASSAY TUMOR CA 15-3: CPT

## 2019-01-15 LAB — CA 27.29: 17.8 U/ML

## 2019-01-16 PROBLEM — Z85.3 HISTORY OF RIGHT BREAST CANCER: Status: ACTIVE | Noted: 2019-01-16

## 2019-01-16 PROBLEM — R92.8 ABNORMALITY OF RIGHT BREAST ON SCREENING MAMMOGRAM: Status: ACTIVE | Noted: 2019-01-16

## 2019-01-28 ENCOUNTER — APPOINTMENT (OUTPATIENT)
Dept: GENERAL RADIOLOGY | Age: 54
End: 2019-01-28
Payer: COMMERCIAL

## 2019-01-28 ENCOUNTER — HOSPITAL ENCOUNTER (EMERGENCY)
Age: 54
Discharge: HOME | End: 2019-01-28
Payer: COMMERCIAL

## 2019-01-28 VITALS
WEIGHT: 277 LBS | OXYGEN SATURATION: 97 % | BODY MASS INDEX: 43.47 KG/M2 | DIASTOLIC BLOOD PRESSURE: 81 MMHG | RESPIRATION RATE: 18 BRPM | HEIGHT: 67 IN | SYSTOLIC BLOOD PRESSURE: 141 MMHG | TEMPERATURE: 100.1 F | HEART RATE: 90 BPM

## 2019-01-28 DIAGNOSIS — E03.9 HYPOTHYROIDISM, UNSPECIFIED TYPE: ICD-10-CM

## 2019-01-28 DIAGNOSIS — R50.9 FEVER, UNSPECIFIED FEVER CAUSE: ICD-10-CM

## 2019-01-28 DIAGNOSIS — R55 NEAR SYNCOPE: Primary | ICD-10-CM

## 2019-01-28 LAB
ALBUMIN SERPL-MCNC: 4 GM/DL (ref 3.4–5)
ALP BLD-CCNC: 83 IU/L (ref 40–129)
ALT SERPL-CCNC: 17 U/L (ref 10–40)
ANION GAP SERPL CALCULATED.3IONS-SCNC: 20 MMOL/L (ref 4–16)
AST SERPL-CCNC: 16 IU/L (ref 15–37)
BACTERIA: NEGATIVE /HPF
BASOPHILS ABSOLUTE: 0 K/CU MM
BASOPHILS RELATIVE PERCENT: 0.3 % (ref 0–1)
BILIRUB SERPL-MCNC: 0.4 MG/DL (ref 0–1)
BILIRUBIN URINE: NEGATIVE MG/DL
BLOOD, URINE: NEGATIVE
BUN BLDV-MCNC: 5 MG/DL (ref 6–23)
CALCIUM SERPL-MCNC: 9.4 MG/DL (ref 8.3–10.6)
CAST TYPE: NORMAL /HPF
CHLORIDE BLD-SCNC: 95 MMOL/L (ref 99–110)
CLARITY: CLEAR
CO2: 20 MMOL/L (ref 21–32)
COLOR: YELLOW
CREAT SERPL-MCNC: 0.6 MG/DL (ref 0.6–1.1)
CRYSTAL TYPE: NORMAL /HPF
DIFFERENTIAL TYPE: ABNORMAL
EOSINOPHILS ABSOLUTE: 0.3 K/CU MM
EOSINOPHILS RELATIVE PERCENT: 3.5 % (ref 0–3)
EPITHELIAL CELLS, UA: NORMAL /HPF
GFR AFRICAN AMERICAN: >60 ML/MIN/1.73M2
GFR NON-AFRICAN AMERICAN: >60 ML/MIN/1.73M2
GLUCOSE BLD-MCNC: 121 MG/DL (ref 70–99)
GLUCOSE BLD-MCNC: 137 MG/DL (ref 70–99)
GLUCOSE, URINE: NEGATIVE MG/DL
HCT VFR BLD CALC: 31.1 % (ref 37–47)
HEMOGLOBIN: 9.9 GM/DL (ref 12.5–16)
IMMATURE NEUTROPHIL %: 0.5 % (ref 0–0.43)
KETONES, URINE: NEGATIVE MG/DL
LEUKOCYTE ESTERASE, URINE: NEGATIVE
LYMPHOCYTES ABSOLUTE: 1 K/CU MM
LYMPHOCYTES RELATIVE PERCENT: 10.8 % (ref 24–44)
MCH RBC QN AUTO: 27.3 PG (ref 27–31)
MCHC RBC AUTO-ENTMCNC: 31.8 % (ref 32–36)
MCV RBC AUTO: 85.7 FL (ref 78–100)
MONOCYTES ABSOLUTE: 0.7 K/CU MM
MONOCYTES RELATIVE PERCENT: 6.8 % (ref 0–4)
MUCUS: NEGATIVE HPF
NITRITE URINE, QUANTITATIVE: NEGATIVE
PDW BLD-RTO: 14.6 % (ref 11.7–14.9)
PH, URINE: 5.5 (ref 5–8)
PLATELET # BLD: 216 K/CU MM (ref 140–440)
PMV BLD AUTO: 10.6 FL (ref 7.5–11.1)
POTASSIUM SERPL-SCNC: 3.7 MMOL/L (ref 3.5–5.1)
PROTEIN UA: NEGATIVE MG/DL
RBC # BLD: 3.63 M/CU MM (ref 4.2–5.4)
RBC URINE: NORMAL /HPF (ref 0–6)
SEGMENTED NEUTROPHILS ABSOLUTE COUNT: 7.4 K/CU MM
SEGMENTED NEUTROPHILS RELATIVE PERCENT: 78.1 % (ref 36–66)
SODIUM BLD-SCNC: 135 MMOL/L (ref 135–145)
SPECIFIC GRAVITY UA: <1.005 (ref 1–1.03)
TOTAL IMMATURE NEUTOROPHIL: 0.05 K/CU MM
TOTAL PROTEIN: 6.4 GM/DL (ref 6.4–8.2)
TROPONIN T: <0.01 NG/ML
TSH HIGH SENSITIVITY: 4.91 UIU/ML (ref 0.27–4.2)
UROBILINOGEN, URINE: 0.2 MG/DL (ref 0.2–1)
VOLUME, (UVOL): 12 ML (ref 10–12)
WBC # BLD: 9.5 K/CU MM (ref 4–10.5)
WBC UA: NORMAL /HPF (ref 0–5)

## 2019-01-28 PROCEDURE — 80053 COMPREHEN METABOLIC PANEL: CPT

## 2019-01-28 PROCEDURE — 93010 ELECTROCARDIOGRAM REPORT: CPT | Performed by: INTERNAL MEDICINE

## 2019-01-28 PROCEDURE — 84484 ASSAY OF TROPONIN QUANT: CPT

## 2019-01-28 PROCEDURE — 82962 GLUCOSE BLOOD TEST: CPT

## 2019-01-28 PROCEDURE — 71045 X-RAY EXAM CHEST 1 VIEW: CPT

## 2019-01-28 PROCEDURE — 81001 URINALYSIS AUTO W/SCOPE: CPT

## 2019-01-28 PROCEDURE — 85025 COMPLETE CBC W/AUTO DIFF WBC: CPT

## 2019-01-28 PROCEDURE — 93005 ELECTROCARDIOGRAM TRACING: CPT | Performed by: PHYSICIAN ASSISTANT

## 2019-01-28 PROCEDURE — 84443 ASSAY THYROID STIM HORMONE: CPT

## 2019-01-28 PROCEDURE — 99285 EMERGENCY DEPT VISIT HI MDM: CPT

## 2019-01-28 RX ORDER — LISINOPRIL AND HYDROCHLOROTHIAZIDE 12.5; 1 MG/1; MG/1
TABLET ORAL
Refills: 2 | COMMUNITY
Start: 2019-01-18 | End: 2019-01-29

## 2019-01-28 RX ORDER — ERGOCALCIFEROL 1.25 MG/1
CAPSULE ORAL
Refills: 2 | COMMUNITY
Start: 2019-01-21 | End: 2019-09-30

## 2019-01-29 ENCOUNTER — NURSE ONLY (OUTPATIENT)
Dept: CARDIOLOGY CLINIC | Age: 54
End: 2019-01-29
Payer: COMMERCIAL

## 2019-01-29 ENCOUNTER — OFFICE VISIT (OUTPATIENT)
Dept: CARDIOLOGY CLINIC | Age: 54
End: 2019-01-29
Payer: COMMERCIAL

## 2019-01-29 VITALS
DIASTOLIC BLOOD PRESSURE: 72 MMHG | RESPIRATION RATE: 16 BRPM | BODY MASS INDEX: 44.1 KG/M2 | SYSTOLIC BLOOD PRESSURE: 104 MMHG | WEIGHT: 281 LBS | HEART RATE: 96 BPM | HEIGHT: 67 IN

## 2019-01-29 DIAGNOSIS — R55 NEAR SYNCOPE: Primary | ICD-10-CM

## 2019-01-29 DIAGNOSIS — E66.01 MORBID OBESITY WITH BMI OF 45.0-49.9, ADULT (HCC): ICD-10-CM

## 2019-01-29 DIAGNOSIS — R06.02 SOB (SHORTNESS OF BREATH) ON EXERTION: ICD-10-CM

## 2019-01-29 DIAGNOSIS — R94.31 ABNORMAL ECG: ICD-10-CM

## 2019-01-29 DIAGNOSIS — Z84.89 FAMILY HISTORY OF SUDDEN DEATH: ICD-10-CM

## 2019-01-29 DIAGNOSIS — I10 ESSENTIAL HYPERTENSION: Primary | ICD-10-CM

## 2019-01-29 DIAGNOSIS — R55 NEAR SYNCOPE: ICD-10-CM

## 2019-01-29 DIAGNOSIS — E78.2 MIXED HYPERLIPIDEMIA: ICD-10-CM

## 2019-01-29 LAB
EKG ATRIAL RATE: 89 BPM
EKG DIAGNOSIS: NORMAL
EKG P AXIS: 42 DEGREES
EKG P-R INTERVAL: 166 MS
EKG Q-T INTERVAL: 368 MS
EKG QRS DURATION: 102 MS
EKG QTC CALCULATION (BAZETT): 447 MS
EKG R AXIS: 34 DEGREES
EKG T AXIS: 40 DEGREES
EKG VENTRICULAR RATE: 89 BPM

## 2019-01-29 PROCEDURE — 93225 XTRNL ECG REC<48 HRS REC: CPT | Performed by: INTERNAL MEDICINE

## 2019-01-29 PROCEDURE — G8417 CALC BMI ABV UP PARAM F/U: HCPCS | Performed by: INTERNAL MEDICINE

## 2019-01-29 PROCEDURE — 3017F COLORECTAL CA SCREEN DOC REV: CPT | Performed by: INTERNAL MEDICINE

## 2019-01-29 PROCEDURE — G8427 DOCREV CUR MEDS BY ELIG CLIN: HCPCS | Performed by: INTERNAL MEDICINE

## 2019-01-29 PROCEDURE — 99243 OFF/OP CNSLTJ NEW/EST LOW 30: CPT | Performed by: INTERNAL MEDICINE

## 2019-01-29 PROCEDURE — G8484 FLU IMMUNIZE NO ADMIN: HCPCS | Performed by: INTERNAL MEDICINE

## 2019-01-29 RX ORDER — PANTOPRAZOLE SODIUM 40 MG/1
40 TABLET, DELAYED RELEASE ORAL DAILY
COMMUNITY
End: 2019-05-06

## 2019-01-29 RX ORDER — LETROZOLE 2.5 MG/1
2.5 TABLET, FILM COATED ORAL DAILY
COMMUNITY
End: 2019-11-19

## 2019-02-04 ENCOUNTER — TELEPHONE (OUTPATIENT)
Dept: CARDIOLOGY CLINIC | Age: 54
End: 2019-02-04

## 2019-02-04 PROCEDURE — 93227 XTRNL ECG REC<48 HR R&I: CPT | Performed by: INTERNAL MEDICINE

## 2019-02-12 ENCOUNTER — PROCEDURE VISIT (OUTPATIENT)
Dept: CARDIOLOGY CLINIC | Age: 54
End: 2019-02-12
Payer: COMMERCIAL

## 2019-02-12 DIAGNOSIS — R94.31 ABNORMAL ECG: ICD-10-CM

## 2019-02-12 DIAGNOSIS — R06.02 SOB (SHORTNESS OF BREATH) ON EXERTION: ICD-10-CM

## 2019-02-12 LAB
LV EF: 65 %
LVEF MODALITY: NORMAL

## 2019-02-12 PROCEDURE — 93015 CV STRESS TEST SUPVJ I&R: CPT | Performed by: INTERNAL MEDICINE

## 2019-02-12 PROCEDURE — 78452 HT MUSCLE IMAGE SPECT MULT: CPT | Performed by: INTERNAL MEDICINE

## 2019-02-12 PROCEDURE — A9500 TC99M SESTAMIBI: HCPCS | Performed by: INTERNAL MEDICINE

## 2019-02-14 ENCOUNTER — TELEPHONE (OUTPATIENT)
Dept: CARDIOLOGY CLINIC | Age: 54
End: 2019-02-14

## 2019-02-20 ENCOUNTER — HOSPITAL ENCOUNTER (OUTPATIENT)
Dept: DIABETES SERVICES | Age: 54
Setting detail: THERAPIES SERIES
Discharge: HOME | End: 2019-02-20
Payer: COMMERCIAL

## 2019-02-20 PROCEDURE — 97802 MEDICAL NUTRITION INDIV IN: CPT

## 2019-02-26 ENCOUNTER — OFFICE VISIT (OUTPATIENT)
Dept: CARDIOLOGY CLINIC | Age: 54
End: 2019-02-26
Payer: COMMERCIAL

## 2019-02-26 VITALS
WEIGHT: 272 LBS | RESPIRATION RATE: 16 BRPM | BODY MASS INDEX: 42.69 KG/M2 | SYSTOLIC BLOOD PRESSURE: 112 MMHG | DIASTOLIC BLOOD PRESSURE: 70 MMHG | HEIGHT: 67 IN | HEART RATE: 84 BPM

## 2019-02-26 DIAGNOSIS — R06.02 SOB (SHORTNESS OF BREATH) ON EXERTION: ICD-10-CM

## 2019-02-26 DIAGNOSIS — E78.2 MIXED HYPERLIPIDEMIA: ICD-10-CM

## 2019-02-26 DIAGNOSIS — R55 NEAR SYNCOPE: Primary | ICD-10-CM

## 2019-02-26 DIAGNOSIS — I10 ESSENTIAL HYPERTENSION: ICD-10-CM

## 2019-02-26 PROCEDURE — G8484 FLU IMMUNIZE NO ADMIN: HCPCS | Performed by: INTERNAL MEDICINE

## 2019-02-26 PROCEDURE — 1036F TOBACCO NON-USER: CPT | Performed by: INTERNAL MEDICINE

## 2019-02-26 PROCEDURE — G8417 CALC BMI ABV UP PARAM F/U: HCPCS | Performed by: INTERNAL MEDICINE

## 2019-02-26 PROCEDURE — 3017F COLORECTAL CA SCREEN DOC REV: CPT | Performed by: INTERNAL MEDICINE

## 2019-02-26 PROCEDURE — G8427 DOCREV CUR MEDS BY ELIG CLIN: HCPCS | Performed by: INTERNAL MEDICINE

## 2019-02-26 PROCEDURE — 99213 OFFICE O/P EST LOW 20 MIN: CPT | Performed by: INTERNAL MEDICINE

## 2019-02-26 RX ORDER — LANOLIN ALCOHOL/MO/W.PET/CERES
325 CREAM (GRAM) TOPICAL 2 TIMES DAILY
COMMUNITY
End: 2019-05-06

## 2019-05-07 ENCOUNTER — HOSPITAL ENCOUNTER (EMERGENCY)
Age: 54
Discharge: HOME | End: 2019-05-07
Attending: EMERGENCY MEDICINE
Payer: COMMERCIAL

## 2019-05-07 VITALS
HEIGHT: 67 IN | WEIGHT: 270 LBS | RESPIRATION RATE: 16 BRPM | HEART RATE: 77 BPM | OXYGEN SATURATION: 98 % | DIASTOLIC BLOOD PRESSURE: 88 MMHG | TEMPERATURE: 99 F | BODY MASS INDEX: 42.38 KG/M2 | SYSTOLIC BLOOD PRESSURE: 142 MMHG

## 2019-05-07 DIAGNOSIS — R19.7 DIARRHEA, UNSPECIFIED TYPE: Primary | ICD-10-CM

## 2019-05-07 LAB
ANION GAP SERPL CALCULATED.3IONS-SCNC: 9 MMOL/L (ref 4–16)
BUN BLDV-MCNC: 9 MG/DL (ref 6–23)
CALCIUM SERPL-MCNC: 9.1 MG/DL (ref 8.3–10.6)
CHLORIDE BLD-SCNC: 102 MMOL/L (ref 99–110)
CO2: 28 MMOL/L (ref 21–32)
CREAT SERPL-MCNC: 0.5 MG/DL (ref 0.6–1.1)
GFR AFRICAN AMERICAN: >60 ML/MIN/1.73M2
GFR NON-AFRICAN AMERICAN: >60 ML/MIN/1.73M2
GLUCOSE BLD-MCNC: 140 MG/DL (ref 70–99)
POTASSIUM SERPL-SCNC: 4 MMOL/L (ref 3.5–5.1)
SODIUM BLD-SCNC: 139 MMOL/L (ref 135–145)

## 2019-05-07 PROCEDURE — 6370000000 HC RX 637 (ALT 250 FOR IP): Performed by: EMERGENCY MEDICINE

## 2019-05-07 PROCEDURE — 80048 BASIC METABOLIC PNL TOTAL CA: CPT

## 2019-05-07 PROCEDURE — 99284 EMERGENCY DEPT VISIT MOD MDM: CPT

## 2019-05-07 RX ORDER — CHOLESTYRAMINE 4 G/9G
1 POWDER, FOR SUSPENSION ORAL
Qty: 21 PACKET | Refills: 3 | Status: SHIPPED | OUTPATIENT
Start: 2019-05-07 | End: 2019-09-30

## 2019-05-07 RX ORDER — CHOLESTYRAMINE LIGHT 4 G/5.7G
4 POWDER, FOR SUSPENSION ORAL ONCE
Status: COMPLETED | OUTPATIENT
Start: 2019-05-07 | End: 2019-05-07

## 2019-05-07 RX ADMIN — CHOLESTYRAMINE 4 G: 4 POWDER, FOR SUSPENSION ORAL at 10:12

## 2019-05-07 ASSESSMENT — ENCOUNTER SYMPTOMS
NAUSEA: 0
BLOOD IN STOOL: 0
RECTAL PAIN: 0
ABDOMINAL PAIN: 1
DIARRHEA: 1
ANAL BLEEDING: 0
VOMITING: 0

## 2019-05-07 NOTE — ED PROVIDER NOTES
Triage Chief Complaint:    Diarrhea (Pt states has had diarrhea for last 47 days since prep for colonoscopy, GI aware, PCP aware, has had stool samples done, but afraid electrolytes off, states has had no bloodwork since this has occurred)    [place]:  Valerie Crain is a 48 y.o. female that presents  To the ED complaining of diarrhea. States she's had it for 47 days. On March 20 she missed to have an colonoscopy she is having diarrhea 2-3 hours a day in the morning also in the afternoons and at night now for 2 weeks she started activity of Imodium without success. She has a reported history that a stool cultures are normal.  She did give a C. Diff toxin yesterday that has not yet resulted no blood no fever no mucus no history inflammatory bowel disease. The patient missed having reflux she's had some polyps is unsure the pathological tests. Past Medical History:   Diagnosis Date    Abnormal ECG 1/29/2019    Acid reflux     Anxiety     Arthritis     \"Back, Legs, Knees And Feet\"    Back problem     \"Three Discs Smashed Together Lower Back\"    Bipolar disorder (HCC)     Chronic back pain     Curvature of spine     \"Wore A Brace For Three Years\"    Depression     Diabetes mellitus ([facility] [address] Dx 2010    Diverticulitis     Esophageal dilatation 04/12/2017    Family history of sudden death 1/29/2019    Mother at age 58    Full dentures     H/O 24 hour EKG monitoring 01/29/2019    Conclusion: Normal study suggesting normal sinus rhythm with no clinically significant arrhythmias.  H/O cardiovascular stress test 02/12/2019    EF65% Normal    Headache(784.0)     \"Occ\"    Heart murmur     No Cardiologist At This Time    History of nuclear stress test 02/12/2019    EF 65%. Normal study.     [place] (hard of hearing)     Bilateral Hearing Aides    Hydronephrosis     dx with hydronephrosis with admission 6/2018- right ureteral stone/\"had kidney stone stone about 2-3 yrs ago and passed it\"    Hyperlipidemia     Hypertension     Macular degeneration     Right Eye    MRSA (methicillin resistant Staphylococcus aureus)     After PRICILLA In 2006 (Abdomen)    Near syncope 1/29/2019 1/28/19 in ED [place]    Panic attacks     PONV (postoperative nausea and vomiting)     denies any motion sickness    Right Breast Cancer Dx 8-15    Right Breast Lumpectomy, Had Chemotherapy And Radiation Treatments    Seizure ([facility] [address]     \"when I was a young kid had seizure and was on medication- not sure how old when last seizure but as a kid\"    Sepsis ([address]     per old chart - admitted 6/2018 with sepsis secondary to wound infection    Sleep apnea     Has CPAP- last sleep study 2-3 yrs ago\"    SOB (shortness of breath) on exertion 1/29/2019    Thyroid disease     \"Thyroid Problems As A Child\"    Urinary incontinence     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    WD-Wound of right breast 10/28/2016    Wears glasses      Past Surgical History:   Procedure Laterality Date    ABDOMEN SURGERY  06/2018    per old chart pt had exploratory abd wound with drainage of infected seroma with wound vac    BREAST BIOPSY Right 2015    Right Breast Cancer , \"I Have A Chip In The Breast\"    BREAST BIOPSY Right 2017    Benign    BREAST LUMPECTOMY Right 08/2015    Right Breast Cancer    CHOLECYSTECTOMY, LAPAROSCOPIC  2007    COLONOSCOPY  2016    dr green 1 polyp    COLONOSCOPY  04/12/2017    Internal hemorroids, diverticulosis    CYSTOSCOPY  2016    Kidney Stones    CYSTOSCOPY      per old chart with admission 6/2018 pt had cysto, right RGPG and attempted to insert right ureteral stent    CYSTOSCOPY  07/03/2018    cysto, right stent removal, right ureteroscopy, laser litho    DENTAL SURGERY      All Teeth Extracted In Past    ENDOSCOPY, COLON, DIAGNOSTIC  04/12/2017    EGD: Dilatation, Hiatal hernia, mild gastritis    HERNIA REPAIR  2007    Abdominal Hernia Repair With Mesh    HERNIA REPAIR  2012    Ventral Hernia Repair With Mesh    HYSTERECTOMY, TOTAL ABDOMINAL  2006    LAPAROSCOPY      Laparoscopy, D & C, Hysteroscopy    LYMPH NODE DISSECTION          OTHER SURGICAL HISTORY  2018    Laparoscopic-converted to open ventral hernia repair with mesh/ XU    OTHER SURGICAL HISTORY      per old chart with admission - in IR 2018- had right perc. nephrostomy with cath placement\"took the tube out before I left the hospital\"    TONSILLECTOMY  1970's     Family History   Problem Relation Age of Onset    Heart Disease Mother         \"Open Heart\"    Diabetes Mother    [de-identified] / Stillbirths Mother     Stroke Mother     Vision Loss Mother         \"Wore Glasses\"    High Blood Pressure Father     Vision Loss Father         \"Had Cataract Surgery\"    Cancer Father         \"Kidney Cancer\"    Heart Disease Father         \"Atrial Fib\"    Diabetes Sister     High Blood Pressure Sister     High Cholesterol Sister     Cancer Sister         \"Ovarian Cancer\"    Vision Loss Sister         \"Glaucoma\"     Social History     Socioeconomic History    Marital status: Single     Spouse name: Not on file    Number of children: Not on file    Years of education: Not on file    Highest education level: Not on file   Occupational History    Not on file   Social Needs    Financial resource strain: Not on file    Food insecurity:     Worry: Not on file     Inability: Not on file    Transportation needs:     Medical: Not on file     Non-medical: Not on file   Tobacco Use    Smoking status: Former Smoker     Packs/day: 0.25     Years: 5.00     Pack years: 1.25     Types: Cigarettes     Start date:      Last attempt to quit:      Years since quittin.3    Smokeless tobacco: Never Used    Tobacco comment: \"I Only Smoked Socially When I Quit Smoking\"   Substance and Sexual Activity    Alcohol use: No     Alcohol/week: 0.0 oz    Drug use: No    Sexual activity: Never   Lifestyle    Physical activity:     Days per week: Not on file     Minutes per session: Not on file    Stress: Not on file   Relationships    Social connections:     Talks on phone: Not on file     Gets together: Not on file     Attends Hindu service: Not on file     Active member of club or organization: Not on file     Attends meetings of clubs or organizations: Not on file     Relationship status: Not on file    Intimate partner violence:     Fear of current or ex partner: Not on file     Emotionally abused: Not on file     Physically abused: Not on file     Forced sexual activity: Not on file   Other Topics Concern    Not on file   Social History Narrative    Not on file       Allergies   Allergen Reactions    Latex      \"Redness\"    Codeine      \"I Don't Remember The Reaction , I Was A Child\"    Darvon [Propoxyphene]      \"I Don't Remember The Reaction, I Was A Child\"    Pcn [Penicillins]      \"I Don't Remember The Reaction, I Was A Child\"         ROS:    Review of Systems   Gastrointestinal: Positive for abdominal pain and diarrhea. Negative for anal bleeding, blood in stool, nausea, rectal pain and vomiting. All other systems reviewed and are negative. Nursing Notes Reviewed    Physical Exam:  ED Triage Vitals   Enc Vitals Group      BP       Pulse       Resp       Temp       Temp src       SpO2       Weight       Height       Head Circumference       Peak Flow       Pain Score       Pain Loc       Pain Edu? Excl. in [address]? Physical Exam   Constitutional: She is oriented to person, place, and time. She appears well-developed and well-nourished. HENT:   Head: Normocephalic and atraumatic. Eyes: Pupils are equal, round, and reactive to light. Neck: Normal range of motion. Neck supple. Abdominal: Soft. Bowel sounds are increased. There is no tenderness. There is no rebound, no guarding and no CVA tenderness. Musculoskeletal: Normal range of motion.    Neurological: She is alert and oriented to person, place, and time.   Skin: Skin is warm and dry. Psychiatric: She has a normal mood and affect. Nursing note and vitals reviewed. I have reviewed and interpreted all of the currently available lab results from this visit (ifapplicable):  Results for orders placed or performed during the hospital encounter of 18/83/41   Basic Metabolic Panel w/ Reflex to MG   Result Value Ref Range    Sodium 139 135 - 145 MMOL/L    Potassium 4.0 3.5 - 5.1 MMOL/L    Chloride 102 99 - 110 mMol/L    CO2 28 21 - 32 MMOL/L    Anion Gap 9 4 - 16    BUN 9 6 - 23 MG/DL    CREATININE 0.5 (L) 0.6 - 1.1 MG/DL    Glucose 140 (H) 70 - 99 MG/DL    Calcium 9.1 8.3 - 10.6 MG/DL    GFR Non-African American >60 >60 mL/min/1.73m2    GFR African American >60 >60 mL/min/1.73m2      Radiographs (if obtained):  [] The following radiograph wasinterpreted by myself in the absence of a radiologist:   [] Radiologist's Report Reviewed:  No orders to display         EKG (if obtained): (All EKG's are interpreted by myself in the absence of a cardiologist)    Chart review shows recent radiographs:  No results found. MDM:     patient presented with chronic diarrhea. She is well-appearing euvolemic [place] stable no renal impairment I will add cholestyramine 1 packet stat and then 3 times a day to 4 times a day I wrote her now for a week with 3 refills she did give a stool specimen through different laboratory system which I do not have access to hopefully she will check the results within 24 hours for further guidance in treatment. Please note that portions of this note may have been completed with a voice recognition/dictation program. Efforts were made to edit the dictations but occasionally words are mis-transcribed.      All pertinent Lab data and radiographic results reviewed with patient at bedside. The patient and/or the family were informed of the results of any tests/labs/imaging, the treatment plan, and time was allotted to answer questions.  See chart for details of medications given during the ED stay.     The likelihood of other entities in the differential is insufficient to justify any further testing for them. This was explained to the patient. The patient was advised that persistent or worsening symptoms would require further evaluation.           Clinical Impression:  1. Diarrhea, unspecified type      Disposition referral (if applicable):  Alek Larry, Howard Spangler [address] [facility]  [phone]    In 1 week  If symptoms worsen    Disposition medications (if applicable):     Discharge Medication List as of 5/7/2019  9:57 AM      START taking these medications    Details   cholestyramine (QUESTRAN) 4 g packet Take 1 packet by mouth 3 times daily (with meals), Disp-21 packet, R-3Print                 Surjit Singh DO, FACEP      Comment: Please note this report has been produced using speech recognition software and maycontain errors related to that system including errors in grammar, punctuation, and spelling, as well as words and phrases that may be inappropriate. If there are any questions or concerns please feel free to contact thedictating provider for clarification.         Qasim Christianson DO  05/13/19 [id number]

## 2019-05-10 ENCOUNTER — HOSPITAL ENCOUNTER (OUTPATIENT)
Age: 54
Discharge: HOME | End: 2019-05-10
Payer: COMMERCIAL

## 2019-05-10 LAB
BASOPHILS ABSOLUTE: 0 K/CU MM
BASOPHILS RELATIVE PERCENT: 0.5 % (ref 0–1)
DIFFERENTIAL TYPE: ABNORMAL
EOSINOPHILS ABSOLUTE: 0.2 K/CU MM
EOSINOPHILS RELATIVE PERCENT: 3.3 % (ref 0–3)
ESTIMATED AVERAGE GLUCOSE: 143 MG/DL
HBA1C MFR BLD: 6.6 % (ref 4.2–6.3)
HCT VFR BLD CALC: 38.8 % (ref 37–47)
HEMOGLOBIN: 12.2 GM/DL (ref 12.5–16)
IMMATURE NEUTROPHIL %: 0.5 % (ref 0–0.43)
LYMPHOCYTES ABSOLUTE: 1.2 K/CU MM
LYMPHOCYTES RELATIVE PERCENT: 21.5 % (ref 24–44)
MAGNESIUM: 1.9 MG/DL (ref 1.8–2.4)
MCH RBC QN AUTO: 29 PG (ref 27–31)
MCHC RBC AUTO-ENTMCNC: 31.4 % (ref 32–36)
MCV RBC AUTO: 92.4 FL (ref 78–100)
MONOCYTES ABSOLUTE: 0.4 K/CU MM
MONOCYTES RELATIVE PERCENT: 7.2 % (ref 0–4)
PDW BLD-RTO: 14.8 % (ref 11.7–14.9)
PLATELET # BLD: 215 K/CU MM (ref 140–440)
PMV BLD AUTO: 9.8 FL (ref 7.5–11.1)
RBC # BLD: 4.2 M/CU MM (ref 4.2–5.4)
SEGMENTED NEUTROPHILS ABSOLUTE COUNT: 3.8 K/CU MM
SEGMENTED NEUTROPHILS RELATIVE PERCENT: 67 % (ref 36–66)
TOTAL IMMATURE NEUTOROPHIL: 0.03 K/CU MM
TSH HIGH SENSITIVITY: 3.07 UIU/ML (ref 0.27–4.2)
WBC # BLD: 5.7 K/CU MM (ref 4–10.5)

## 2019-05-10 PROCEDURE — 83036 HEMOGLOBIN GLYCOSYLATED A1C: CPT

## 2019-05-10 PROCEDURE — 83735 ASSAY OF MAGNESIUM: CPT

## 2019-05-10 PROCEDURE — 36415 COLL VENOUS BLD VENIPUNCTURE: CPT

## 2019-05-10 PROCEDURE — 85025 COMPLETE CBC W/AUTO DIFF WBC: CPT

## 2019-05-10 PROCEDURE — 84443 ASSAY THYROID STIM HORMONE: CPT

## 2019-06-27 RX ORDER — NYSTATIN 100000 U/G
CREAM TOPICAL
Qty: 15 G | Refills: 11 | Status: SHIPPED | OUTPATIENT
Start: 2019-06-27 | End: 2019-07-12 | Stop reason: SDUPTHER

## 2019-07-12 RX ORDER — NYSTATIN 100000 U/G
CREAM TOPICAL
Qty: 15 G | Refills: 10 | Status: SHIPPED | OUTPATIENT
Start: 2019-07-12 | End: 2020-12-17

## 2019-08-21 ENCOUNTER — HOSPITAL ENCOUNTER (OUTPATIENT)
Dept: MAMMOGRAPHY | Age: 54
Discharge: HOME | End: 2019-08-21
Payer: COMMERCIAL

## 2019-08-21 ENCOUNTER — HOSPITAL ENCOUNTER (OUTPATIENT)
Age: 54
Discharge: HOME | End: 2019-08-21
Payer: COMMERCIAL

## 2019-08-21 DIAGNOSIS — Z78.0 MENOPAUSE: ICD-10-CM

## 2019-08-21 LAB
ALBUMIN SERPL-MCNC: 4.4 GM/DL (ref 3.4–5)
ALP BLD-CCNC: 80 IU/L (ref 40–129)
ALT SERPL-CCNC: 19 U/L (ref 10–40)
ANION GAP SERPL CALCULATED.3IONS-SCNC: 18 MMOL/L (ref 4–16)
AST SERPL-CCNC: 12 IU/L (ref 15–37)
BASOPHILS ABSOLUTE: 0 K/CU MM
BASOPHILS RELATIVE PERCENT: 0.5 % (ref 0–1)
BILIRUB SERPL-MCNC: 0.3 MG/DL (ref 0–1)
BUN BLDV-MCNC: 7 MG/DL (ref 6–23)
CALCIUM SERPL-MCNC: 9.6 MG/DL (ref 8.3–10.6)
CHLORIDE BLD-SCNC: 104 MMOL/L (ref 99–110)
CO2: 20 MMOL/L (ref 21–32)
CREAT SERPL-MCNC: 0.7 MG/DL (ref 0.6–1.1)
DIFFERENTIAL TYPE: ABNORMAL
EOSINOPHILS ABSOLUTE: 0.2 K/CU MM
EOSINOPHILS RELATIVE PERCENT: 2.8 % (ref 0–3)
GFR AFRICAN AMERICAN: >60 ML/MIN/1.73M2
GFR NON-AFRICAN AMERICAN: >60 ML/MIN/1.73M2
GLUCOSE BLD-MCNC: 112 MG/DL (ref 70–99)
HCT VFR BLD CALC: 41.6 % (ref 37–47)
HEMOGLOBIN: 13.3 GM/DL (ref 12.5–16)
IMMATURE NEUTROPHIL %: 0.3 % (ref 0–0.43)
LYMPHOCYTES ABSOLUTE: 1.4 K/CU MM
LYMPHOCYTES RELATIVE PERCENT: 21.9 % (ref 24–44)
MCH RBC QN AUTO: 29.3 PG (ref 27–31)
MCHC RBC AUTO-ENTMCNC: 32 % (ref 32–36)
MCV RBC AUTO: 91.6 FL (ref 78–100)
MONOCYTES ABSOLUTE: 0.4 K/CU MM
MONOCYTES RELATIVE PERCENT: 5.8 % (ref 0–4)
PDW BLD-RTO: 14.6 % (ref 11.7–14.9)
PLATELET # BLD: 241 K/CU MM (ref 140–440)
PMV BLD AUTO: 10.5 FL (ref 7.5–11.1)
POTASSIUM SERPL-SCNC: 4.1 MMOL/L (ref 3.5–5.1)
RBC # BLD: 4.54 M/CU MM (ref 4.2–5.4)
SEGMENTED NEUTROPHILS ABSOLUTE COUNT: 4.2 K/CU MM
SEGMENTED NEUTROPHILS RELATIVE PERCENT: 68.7 % (ref 36–66)
SODIUM BLD-SCNC: 142 MMOL/L (ref 135–145)
TOTAL IMMATURE NEUTOROPHIL: 0.02 K/CU MM
TOTAL PROTEIN: 7.8 GM/DL (ref 6.4–8.2)
WBC # BLD: 6.2 K/CU MM (ref 4–10.5)

## 2019-08-21 PROCEDURE — 86300 IMMUNOASSAY TUMOR CA 15-3: CPT

## 2019-08-21 PROCEDURE — 85025 COMPLETE CBC W/AUTO DIFF WBC: CPT

## 2019-08-21 PROCEDURE — 80053 COMPREHEN METABOLIC PANEL: CPT

## 2019-08-21 PROCEDURE — 77080 DXA BONE DENSITY AXIAL: CPT

## 2019-08-21 PROCEDURE — 36415 COLL VENOUS BLD VENIPUNCTURE: CPT

## 2019-08-23 LAB
CA 27.29: 20.9 U/ML (ref 0–40)
CA 27.29: NORMAL U/ML (ref 0–40)

## 2019-09-30 ENCOUNTER — OFFICE VISIT (OUTPATIENT)
Dept: CARDIOLOGY CLINIC | Age: 54
End: 2019-09-30
Payer: COMMERCIAL

## 2019-09-30 VITALS
SYSTOLIC BLOOD PRESSURE: 106 MMHG | HEART RATE: 79 BPM | BODY MASS INDEX: 42.85 KG/M2 | WEIGHT: 273 LBS | HEIGHT: 67 IN | DIASTOLIC BLOOD PRESSURE: 64 MMHG | RESPIRATION RATE: 16 BRPM

## 2019-09-30 DIAGNOSIS — R94.31 ABNORMAL ECG: ICD-10-CM

## 2019-09-30 DIAGNOSIS — Z01.810 PREOP CARDIOVASCULAR EXAM: Primary | ICD-10-CM

## 2019-09-30 DIAGNOSIS — Z84.89 FAMILY HISTORY OF SUDDEN DEATH: ICD-10-CM

## 2019-09-30 DIAGNOSIS — R06.02 SOB (SHORTNESS OF BREATH) ON EXERTION: ICD-10-CM

## 2019-09-30 DIAGNOSIS — Z01.818 PRE-OP EVALUATION: ICD-10-CM

## 2019-09-30 PROCEDURE — 99213 OFFICE O/P EST LOW 20 MIN: CPT | Performed by: INTERNAL MEDICINE

## 2019-09-30 PROCEDURE — 93000 ELECTROCARDIOGRAM COMPLETE: CPT | Performed by: INTERNAL MEDICINE

## 2019-09-30 PROCEDURE — G8417 CALC BMI ABV UP PARAM F/U: HCPCS | Performed by: INTERNAL MEDICINE

## 2019-09-30 PROCEDURE — 3017F COLORECTAL CA SCREEN DOC REV: CPT | Performed by: INTERNAL MEDICINE

## 2019-09-30 PROCEDURE — G8427 DOCREV CUR MEDS BY ELIG CLIN: HCPCS | Performed by: INTERNAL MEDICINE

## 2019-09-30 PROCEDURE — 1036F TOBACCO NON-USER: CPT | Performed by: INTERNAL MEDICINE

## 2019-09-30 RX ORDER — CETIRIZINE HYDROCHLORIDE 10 MG/1
10 TABLET ORAL DAILY
COMMUNITY
End: 2020-08-28

## 2019-09-30 RX ORDER — LISINOPRIL 5 MG/1
5 TABLET ORAL DAILY
COMMUNITY
End: 2020-12-17

## 2019-09-30 RX ORDER — BUSPIRONE HYDROCHLORIDE 7.5 MG/1
10 TABLET ORAL 3 TIMES DAILY
COMMUNITY
End: 2020-12-17

## 2019-09-30 NOTE — PATIENT INSTRUCTIONS
Considered low cardiac risk from knee procedure. Counseled to lose weight. Appropriate prescriptions if needed on this visit are addressed. After visit summery is provided. Questions answered and patient verbalizes understanding. Follow up with PCP and see me as if needed.

## 2019-09-30 NOTE — PROGRESS NOTES
Sanjana Rushing  1965  [place]    Chief complaint and HPI:  Sanjana Rushing  is a 80-year-old female seeing me for cardiac clearance as she is in need of arthroscopic knee surgery as an outpatient. She has seen Dr. Oralia Bain for this. Patient has no known cardiac history. She denies any chest pain or shortness of breath. She had noninvasive cardiac evaluation earlier this year which was negative for any ischemic heart disease. She doesn't smoke. She has chronic obesity. Apparently she had near syncope earlier this year and she saw a neurologist and the neuro workup was negative. Had not had any syncope since then. Rest of the Cardiovascular system review is otherwise unchanged from prior encounter. Past medical history:  has a past medical history of Abnormal ECG, Acid reflux, Anxiety, Arthritis, Back problem, Bipolar disorder (HCC), Chronic back pain, Curvature of spine, Depression, Diabetes mellitus ([address] Diverticulitis, Esophageal dilatation, Family history of sudden death, Full dentures, H/O 24 hour EKG monitoring, H/O cardiovascular stress test, Headache(784.0), Heart murmur, History of nuclear stress test, [place] (hard of hearing), Hydronephrosis, Hyperlipidemia, Hypertension, Macular degeneration, MRSA (methicillin resistant Staphylococcus aureus), Near syncope, Panic attacks, PONV (postoperative nausea and vomiting), Right Breast Cancer, Seizure ([address] Sepsis ([address] Sleep apnea, SOB (shortness of breath) on exertion, Thyroid disease, Urinary incontinence, WD-Soft tissue radionecrosis, WD-Wound of right breast, and Wears glasses. Past surgical history:  has a past surgical history that includes Dental surgery; Tonsillectomy (1970's); laparoscopy (2005); Cholecystectomy, laparoscopic (2007); lymph node dissection; hernia repair (2007); hernia repair (2012); Cystoscopy (2016); Breast lumpectomy (Right, 08/2015); Breast biopsy (Right, 2015); Breast biopsy (Right, 2017);  Hysterectomy,

## 2019-10-01 ENCOUNTER — TELEPHONE (OUTPATIENT)
Dept: CARDIOLOGY CLINIC | Age: 54
End: 2019-10-01

## 2019-10-30 PROBLEM — Z01.818 PRE-OP EVALUATION: Status: RESOLVED | Noted: 2019-09-30 | Resolved: 2019-10-30

## 2019-11-19 ENCOUNTER — APPOINTMENT (OUTPATIENT)
Dept: GENERAL RADIOLOGY | Age: 54
End: 2019-11-19
Payer: COMMERCIAL

## 2019-11-19 ENCOUNTER — HOSPITAL ENCOUNTER (EMERGENCY)
Age: 54
Discharge: HOME | End: 2019-11-19
Attending: EMERGENCY MEDICINE
Payer: COMMERCIAL

## 2019-11-19 VITALS
OXYGEN SATURATION: 98 % | WEIGHT: 279 LBS | DIASTOLIC BLOOD PRESSURE: 88 MMHG | HEART RATE: 97 BPM | RESPIRATION RATE: 18 BRPM | BODY MASS INDEX: 43.79 KG/M2 | SYSTOLIC BLOOD PRESSURE: 187 MMHG | TEMPERATURE: 98 F | HEIGHT: 67 IN

## 2019-11-19 DIAGNOSIS — J01.00 ACUTE NON-RECURRENT MAXILLARY SINUSITIS: Primary | ICD-10-CM

## 2019-11-19 PROCEDURE — 94640 AIRWAY INHALATION TREATMENT: CPT

## 2019-11-19 PROCEDURE — 71046 X-RAY EXAM CHEST 2 VIEWS: CPT

## 2019-11-19 PROCEDURE — 6360000002 HC RX W HCPCS: Performed by: EMERGENCY MEDICINE

## 2019-11-19 PROCEDURE — 99283 EMERGENCY DEPT VISIT LOW MDM: CPT

## 2019-11-19 RX ORDER — ALBUTEROL SULFATE 2.5 MG/3ML
2.5 SOLUTION RESPIRATORY (INHALATION) ONCE
Status: COMPLETED | OUTPATIENT
Start: 2019-11-19 | End: 2019-11-19

## 2019-11-19 RX ADMIN — ALBUTEROL SULFATE 2.5 MG: 2.5 SOLUTION RESPIRATORY (INHALATION) at 07:43

## 2019-11-19 ASSESSMENT — ENCOUNTER SYMPTOMS
SHORTNESS OF BREATH: 0
COUGH: 1
SINUS PAIN: 1
SORE THROAT: 0

## 2019-12-16 ENCOUNTER — HOSPITAL ENCOUNTER (OUTPATIENT)
Dept: CARDIAC REHAB | Age: 54
Discharge: HOME | End: 2019-12-16
Payer: COMMERCIAL

## 2019-12-16 PROCEDURE — 94060 EVALUATION OF WHEEZING: CPT

## 2019-12-16 PROCEDURE — 94640 AIRWAY INHALATION TREATMENT: CPT

## 2019-12-29 ENCOUNTER — HOSPITAL ENCOUNTER (EMERGENCY)
Age: 54
Discharge: HOME | End: 2019-12-29
Attending: EMERGENCY MEDICINE
Payer: COMMERCIAL

## 2019-12-29 ENCOUNTER — APPOINTMENT (OUTPATIENT)
Dept: GENERAL RADIOLOGY | Age: 54
End: 2019-12-29
Payer: COMMERCIAL

## 2019-12-29 VITALS
HEART RATE: 102 BPM | WEIGHT: 276 LBS | DIASTOLIC BLOOD PRESSURE: 99 MMHG | RESPIRATION RATE: 27 BRPM | SYSTOLIC BLOOD PRESSURE: 120 MMHG | BODY MASS INDEX: 43.32 KG/M2 | HEIGHT: 67 IN | TEMPERATURE: 98.9 F | OXYGEN SATURATION: 94 %

## 2019-12-29 DIAGNOSIS — J44.1 COPD EXACERBATION (HCC): Primary | ICD-10-CM

## 2019-12-29 DIAGNOSIS — R06.89 DYSPNEA AND RESPIRATORY ABNORMALITIES: ICD-10-CM

## 2019-12-29 DIAGNOSIS — R06.00 DYSPNEA AND RESPIRATORY ABNORMALITIES: ICD-10-CM

## 2019-12-29 PROCEDURE — 93005 ELECTROCARDIOGRAM TRACING: CPT | Performed by: EMERGENCY MEDICINE

## 2019-12-29 PROCEDURE — 93010 ELECTROCARDIOGRAM REPORT: CPT | Performed by: INTERNAL MEDICINE

## 2019-12-29 PROCEDURE — 6370000000 HC RX 637 (ALT 250 FOR IP): Performed by: EMERGENCY MEDICINE

## 2019-12-29 PROCEDURE — 94640 AIRWAY INHALATION TREATMENT: CPT

## 2019-12-29 PROCEDURE — 99285 EMERGENCY DEPT VISIT HI MDM: CPT

## 2019-12-29 PROCEDURE — 71046 X-RAY EXAM CHEST 2 VIEWS: CPT

## 2019-12-29 RX ORDER — PREDNISONE 20 MG/1
60 TABLET ORAL ONCE
Status: COMPLETED | OUTPATIENT
Start: 2019-12-29 | End: 2019-12-29

## 2019-12-29 RX ORDER — LETROZOLE 2.5 MG/1
2.5 TABLET, FILM COATED ORAL DAILY
COMMUNITY
End: 2020-08-27

## 2019-12-29 RX ORDER — IPRATROPIUM BROMIDE AND ALBUTEROL SULFATE 2.5; .5 MG/3ML; MG/3ML
1 SOLUTION RESPIRATORY (INHALATION) ONCE
Status: COMPLETED | OUTPATIENT
Start: 2019-12-29 | End: 2019-12-29

## 2019-12-29 RX ORDER — METHYLPREDNISOLONE 4 MG/1
TABLET ORAL
Qty: 1 KIT | Refills: 0 | Status: SHIPPED | OUTPATIENT
Start: 2019-12-29 | End: 2020-01-12

## 2019-12-29 RX ORDER — AZITHROMYCIN 250 MG/1
TABLET, FILM COATED ORAL
Qty: 1 PACKET | Refills: 0 | Status: SHIPPED | OUTPATIENT
Start: 2019-12-29 | End: 2020-01-08

## 2019-12-29 RX ADMIN — IPRATROPIUM BROMIDE AND ALBUTEROL SULFATE 1 AMPULE: .5; 3 SOLUTION RESPIRATORY (INHALATION) at 02:43

## 2019-12-29 RX ADMIN — IPRATROPIUM BROMIDE AND ALBUTEROL SULFATE 1 AMPULE: .5; 3 SOLUTION RESPIRATORY (INHALATION) at 02:44

## 2019-12-29 RX ADMIN — PREDNISONE 60 MG: 20 TABLET ORAL at 03:00

## 2019-12-29 ASSESSMENT — ENCOUNTER SYMPTOMS
SHORTNESS OF BREATH: 1
GASTROINTESTINAL NEGATIVE: 1
WHEEZING: 1
SPUTUM PRODUCTION: 1
HEMOPTYSIS: 0
SORE THROAT: 1
EYES NEGATIVE: 1
COUGH: 1

## 2020-01-03 LAB
EKG ATRIAL RATE: 95 BPM
EKG DIAGNOSIS: NORMAL
EKG P AXIS: 25 DEGREES
EKG P-R INTERVAL: 174 MS
EKG Q-T INTERVAL: 342 MS
EKG QRS DURATION: 90 MS
EKG QTC CALCULATION (BAZETT): 429 MS
EKG R AXIS: 25 DEGREES
EKG T AXIS: 36 DEGREES
EKG VENTRICULAR RATE: 95 BPM

## 2020-01-12 ENCOUNTER — APPOINTMENT (OUTPATIENT)
Dept: GENERAL RADIOLOGY | Age: 55
End: 2020-01-12
Payer: COMMERCIAL

## 2020-01-12 ENCOUNTER — HOSPITAL ENCOUNTER (EMERGENCY)
Age: 55
Discharge: HOME | End: 2020-01-12
Attending: EMERGENCY MEDICINE
Payer: COMMERCIAL

## 2020-01-12 VITALS
RESPIRATION RATE: 18 BRPM | BODY MASS INDEX: 44.2 KG/M2 | HEART RATE: 87 BPM | SYSTOLIC BLOOD PRESSURE: 143 MMHG | HEIGHT: 66 IN | WEIGHT: 275 LBS | DIASTOLIC BLOOD PRESSURE: 96 MMHG | OXYGEN SATURATION: 92 % | TEMPERATURE: 98.9 F

## 2020-01-12 LAB
ALBUMIN SERPL-MCNC: 3.7 GM/DL (ref 3.4–5)
ALP BLD-CCNC: 84 IU/L (ref 40–129)
ALT SERPL-CCNC: 12 U/L (ref 10–40)
ANION GAP SERPL CALCULATED.3IONS-SCNC: 8 MMOL/L (ref 4–16)
AST SERPL-CCNC: 11 IU/L (ref 15–37)
BASOPHILS ABSOLUTE: 0 K/CU MM
BASOPHILS RELATIVE PERCENT: 0.2 % (ref 0–1)
BILIRUB SERPL-MCNC: 0.3 MG/DL (ref 0–1)
BUN BLDV-MCNC: 4 MG/DL (ref 6–23)
CALCIUM SERPL-MCNC: 9.3 MG/DL (ref 8.3–10.6)
CHLORIDE BLD-SCNC: 102 MMOL/L (ref 99–110)
CO2: 31 MMOL/L (ref 21–32)
CREAT SERPL-MCNC: 0.6 MG/DL (ref 0.6–1.1)
DIFFERENTIAL TYPE: ABNORMAL
EOSINOPHILS ABSOLUTE: 0.2 K/CU MM
EOSINOPHILS RELATIVE PERCENT: 1.9 % (ref 0–3)
GFR AFRICAN AMERICAN: >60 ML/MIN/1.73M2
GFR NON-AFRICAN AMERICAN: >60 ML/MIN/1.73M2
GLUCOSE BLD-MCNC: 154 MG/DL (ref 70–99)
HCT VFR BLD CALC: 37.2 % (ref 37–47)
HEMOGLOBIN: 11.7 GM/DL (ref 12.5–16)
IMMATURE NEUTROPHIL %: 0.3 % (ref 0–0.43)
LACTATE: 1.9 MMOL/L (ref 0.4–2)
LYMPHOCYTES ABSOLUTE: 0.9 K/CU MM
LYMPHOCYTES RELATIVE PERCENT: 7.9 % (ref 24–44)
MCH RBC QN AUTO: 29.7 PG (ref 27–31)
MCHC RBC AUTO-ENTMCNC: 31.5 % (ref 32–36)
MCV RBC AUTO: 94.4 FL (ref 78–100)
MONOCYTES ABSOLUTE: 0.6 K/CU MM
MONOCYTES RELATIVE PERCENT: 5.4 % (ref 0–4)
PDW BLD-RTO: 13.8 % (ref 11.7–14.9)
PLATELET # BLD: 176 K/CU MM (ref 140–440)
PMV BLD AUTO: 11 FL (ref 7.5–11.1)
POTASSIUM SERPL-SCNC: 4.5 MMOL/L (ref 3.5–5.1)
RAPID INFLUENZA  B AGN: NORMAL
RAPID INFLUENZA A AGN: NORMAL
RBC # BLD: 3.94 M/CU MM (ref 4.2–5.4)
SEGMENTED NEUTROPHILS ABSOLUTE COUNT: 9.7 K/CU MM
SEGMENTED NEUTROPHILS RELATIVE PERCENT: 84.3 % (ref 36–66)
SODIUM BLD-SCNC: 141 MMOL/L (ref 135–145)
TOTAL IMMATURE NEUTOROPHIL: 0.04 K/CU MM
TOTAL PROTEIN: 6.9 GM/DL (ref 6.4–8.2)
TROPONIN T: <0.01 NG/ML
WBC # BLD: 11.5 K/CU MM (ref 4–10.5)

## 2020-01-12 PROCEDURE — 85025 COMPLETE CBC W/AUTO DIFF WBC: CPT

## 2020-01-12 PROCEDURE — 83605 ASSAY OF LACTIC ACID: CPT

## 2020-01-12 PROCEDURE — 87804 INFLUENZA ASSAY W/OPTIC: CPT

## 2020-01-12 PROCEDURE — 6360000002 HC RX W HCPCS: Performed by: EMERGENCY MEDICINE

## 2020-01-12 PROCEDURE — 71045 X-RAY EXAM CHEST 1 VIEW: CPT

## 2020-01-12 PROCEDURE — 96374 THER/PROPH/DIAG INJ IV PUSH: CPT

## 2020-01-12 PROCEDURE — 87040 BLOOD CULTURE FOR BACTERIA: CPT

## 2020-01-12 PROCEDURE — 99285 EMERGENCY DEPT VISIT HI MDM: CPT

## 2020-01-12 PROCEDURE — 84484 ASSAY OF TROPONIN QUANT: CPT

## 2020-01-12 PROCEDURE — 80053 COMPREHEN METABOLIC PANEL: CPT

## 2020-01-12 PROCEDURE — 93005 ELECTROCARDIOGRAM TRACING: CPT | Performed by: EMERGENCY MEDICINE

## 2020-01-12 PROCEDURE — 93010 ELECTROCARDIOGRAM REPORT: CPT | Performed by: INTERNAL MEDICINE

## 2020-01-12 PROCEDURE — 94640 AIRWAY INHALATION TREATMENT: CPT

## 2020-01-12 RX ORDER — FLUTICASONE PROPIONATE 110 UG/1
1 AEROSOL, METERED RESPIRATORY (INHALATION) 2 TIMES DAILY
COMMUNITY
End: 2020-06-29

## 2020-01-12 RX ORDER — ALBUTEROL SULFATE 2.5 MG/3ML
2.5 SOLUTION RESPIRATORY (INHALATION) ONCE
Status: COMPLETED | OUTPATIENT
Start: 2020-01-12 | End: 2020-01-12

## 2020-01-12 RX ORDER — METHYLPREDNISOLONE SODIUM SUCCINATE 125 MG/2ML
125 INJECTION, POWDER, LYOPHILIZED, FOR SOLUTION INTRAMUSCULAR; INTRAVENOUS ONCE
Status: COMPLETED | OUTPATIENT
Start: 2020-01-12 | End: 2020-01-12

## 2020-01-12 RX ADMIN — ALBUTEROL SULFATE 2.5 MG: 2.5 SOLUTION RESPIRATORY (INHALATION) at 06:08

## 2020-01-12 RX ADMIN — METHYLPREDNISOLONE SODIUM SUCCINATE 125 MG: 125 INJECTION, POWDER, FOR SOLUTION INTRAMUSCULAR; INTRAVENOUS at 06:19

## 2020-01-12 NOTE — ED PROVIDER NOTES
Triage Chief Complaint:   Shortness of Breath (Patient states that she became SOB after using the Flovent inhaler. Patient states everytime she uses it her SOB worsens. States her PCP knows this)    DUKE Blanton is a 47 y.o. female that presents with shortness of breath. She had an episode of bronchitis 3 weeks ago. She was treated and seemed to improve. She saw her family doctor and was told she had COPD. She is gone back to her family doctor and is again been told that she may have asthma. She has been put on Flovent. Tonight when she was using Flovent she became short of breath and developed coughing after using the medication. She thinks she has an adverse reaction to the medicine. No fever reported. No productive sputum. No hemoptysis. Is increasing cough and congestion. No flu shot this year. She does not currently smoke. ROS:  General:  No fevers, no chills, no weakness  Eyes:  No recent vison changes, no discharge  ENT:  No sore throat, no nasal congestion, no hearing changes  Cardiovascular:  No chest pain, no palpitations  Respiratory: Shortness of breath with cough and wheezing.   Gastrointestinal:  No pain, no nausea, no vomiting, no diarrhea  Musculoskeletal:  No muscle pain, no joint pain  Skin:  No rash, no pruritis, no easy bruising  Neurologic:  No speech problems, no headache, no extremity numbness, no extremity tingling, no extremity weakness  Psychiatric:  No anxiety  Genitourinary:  No dysuria, no hematuria  Endocrine:  No unexpected weight gain, no unexpected weight loss  Extremities:  no edema, no pain    Past Medical History:   Diagnosis Date    Abnormal ECG 1/29/2019    Acid reflux     Anxiety     Arthritis     \"Back, Legs, Knees And Feet\"    Back problem     \"Three Discs Smashed Together Lower Back\"    Bipolar disorder (HCC)     Chronic back pain     COPD (chronic obstructive pulmonary disease) (HCC)     Curvature of spine     \"Wore A Brace For Three Years\" Years of education: Not on file    Highest education level: Not on file   Occupational History    Not on file   Social Needs    Financial resource strain: Not on file    Food insecurity:     Worry: Not on file     Inability: Not on file    Transportation needs:     Medical: Not on file     Non-medical: Not on file   Tobacco Use    Smoking status: Former Smoker     Packs/day: 0.25     Years: 5.00     Pack years: 1.25     Types: Cigarettes     Start date:      Last attempt to quit:      Years since quittin.0    Smokeless tobacco: Never Used    Tobacco comment: \"I Only Smoked Socially When I Quit Smoking\"   Substance and Sexual Activity    Alcohol use: No     Alcohol/week: 0.0 standard drinks    Drug use: No    Sexual activity: Never   Lifestyle    Physical activity:     Days per week: Not on file     Minutes per session: Not on file    Stress: Not on file   Relationships    Social connections:     Talks on phone: Not on file     Gets together: Not on file     Attends Yarsanism service: Not on file     Active member of club or organization: Not on file     Attends meetings of clubs or organizations: Not on file     Relationship status: Not on file    Intimate partner violence:     Fear of current or ex partner: Not on file     Emotionally abused: Not on file     Physically abused: Not on file     Forced sexual activity: Not on file   Other Topics Concern    Not on file   Social History Narrative    Not on file     No current facility-administered medications for this encounter.       Current Outpatient Medications   Medication Sig Dispense Refill    fluticasone (FLOVENT HFA) 110 MCG/ACT inhaler Inhale 1 puff into the lungs 2 times daily      letrozole (FEMARA) 2.5 MG tablet Take 2.5 mg by mouth daily      ALBUTEROL SULFATE HFA IN Inhale into the lungs      Phenylephrine-DM-GG-APAP 10--650 MG PACK Take 1 tablet by mouth 4 times daily 20 each 0    cetirizine (ZYRTEC) 10 MG tablet results from this visit (if applicable):  Results for orders placed or performed during the hospital encounter of 01/12/20   Rapid Flu Swab   Result Value Ref Range    Rapid Influenza A Ag  NEGATIVE     NEGATIVE  (PERFORMANCE INDICATORS WERE SATISFACTORY)      Rapid Influenza B Ag  NEGATIVE     NEGATIVE  (PERFORMANCE INDICATORS WERE SATISFACTORY)     CBC Auto Differential   Result Value Ref Range    WBC 11.5 (H) 4.0 - 10.5 K/CU MM    RBC 3.94 (L) 4.2 - 5.4 M/CU MM    Hemoglobin 11.7 (L) 12.5 - 16.0 GM/DL    Hematocrit 37.2 37 - 47 %    MCV 94.4 78 - 100 FL    MCH 29.7 27 - 31 PG    MCHC 31.5 (L) 32.0 - 36.0 %    RDW 13.8 11.7 - 14.9 %    Platelets 845 209 - 133 K/CU MM    MPV 11.0 7.5 - 11.1 FL    Differential Type AUTOMATED DIFFERENTIAL     Segs Relative 84.3 (H) 36 - 66 %    Lymphocytes % 7.9 (L) 24 - 44 %    Monocytes % 5.4 (H) 0 - 4 %    Eosinophils % 1.9 0 - 3 %    Basophils % 0.2 0 - 1 %    Segs Absolute 9.7 K/CU MM    Lymphocytes Absolute 0.9 K/CU MM    Monocytes Absolute 0.6 K/CU MM    Eosinophils Absolute 0.2 K/CU MM    Basophils Absolute 0.0 K/CU MM    Immature Neutrophil % 0.3 0 - 0.43 %    Total Immature Neutrophil 0.04 K/CU MM   Comprehensive Metabolic Panel w/ Reflex to MG   Result Value Ref Range    Sodium 141 135 - 145 MMOL/L    Potassium 4.5 3.5 - 5.1 MMOL/L    Chloride 102 99 - 110 mMol/L    CO2 31 21 - 32 MMOL/L    BUN 4 (L) 6 - 23 MG/DL    CREATININE 0.6 0.6 - 1.1 MG/DL    Glucose 154 (H) 70 - 99 MG/DL    Calcium 9.3 8.3 - 10.6 MG/DL    Alb 3.7 3.4 - 5.0 GM/DL    Total Protein 6.9 6.4 - 8.2 GM/DL    Total Bilirubin 0.3 0.0 - 1.0 MG/DL    ALT 12 10 - 40 U/L    AST 11 (L) 15 - 37 IU/L    Alkaline Phosphatase 84 40 - 129 IU/L    GFR Non-African American >60 >60 mL/min/1.73m2    GFR African American >60 >60 mL/min/1.73m2    Anion Gap 8 4 - 16   Troponin   Result Value Ref Range    Troponin T <0.010 <0.01 NG/ML   Lactic Acid, Plasma   Result Value Ref Range    Lactate 1.9 0.4 - 2.0 mMOL/L   EKG 12

## 2020-01-12 NOTE — ED NOTES
Discharge instructions reviewed; patient verbalized understanding; patient transferred from ED via private vehicle.       Raciel Ram RN  01/12/20 0949

## 2020-01-14 LAB
EKG ATRIAL RATE: 93 BPM
EKG DIAGNOSIS: NORMAL
EKG P AXIS: 7 DEGREES
EKG P-R INTERVAL: 174 MS
EKG Q-T INTERVAL: 346 MS
EKG QRS DURATION: 88 MS
EKG QTC CALCULATION (BAZETT): 430 MS
EKG R AXIS: 17 DEGREES
EKG T AXIS: 28 DEGREES
EKG VENTRICULAR RATE: 93 BPM

## 2020-01-16 ENCOUNTER — HOSPITAL ENCOUNTER (OUTPATIENT)
Dept: SLEEP CENTER | Age: 55
Discharge: HOME | End: 2020-01-16
Payer: COMMERCIAL

## 2020-01-16 VITALS
WEIGHT: 275 LBS | BODY MASS INDEX: 43.16 KG/M2 | OXYGEN SATURATION: 98 % | SYSTOLIC BLOOD PRESSURE: 141 MMHG | HEIGHT: 67 IN | HEART RATE: 85 BPM | DIASTOLIC BLOOD PRESSURE: 88 MMHG

## 2020-01-16 PROBLEM — G47.19 EXCESSIVE DAYTIME SLEEPINESS: Status: ACTIVE | Noted: 2020-01-16

## 2020-01-16 PROBLEM — Z87.891 EX-SMOKER: Status: ACTIVE | Noted: 2020-01-16

## 2020-01-16 PROBLEM — G47.33 OSA (OBSTRUCTIVE SLEEP APNEA): Status: ACTIVE | Noted: 2020-01-16

## 2020-01-16 PROCEDURE — 99204 OFFICE O/P NEW MOD 45 MIN: CPT | Performed by: INTERNAL MEDICINE

## 2020-01-16 PROCEDURE — 99211 OFF/OP EST MAY X REQ PHY/QHP: CPT

## 2020-01-16 ASSESSMENT — SLEEP AND FATIGUE QUESTIONNAIRES
HOW LIKELY ARE YOU TO NOD OFF OR FALL ASLEEP WHILE SITTING INACTIVE IN A PUBLIC PLACE: 1
NECK CIRCUMFERENCE (INCHES): 20
HOW LIKELY ARE YOU TO NOD OFF OR FALL ASLEEP WHILE WATCHING TV: 3
HOW LIKELY ARE YOU TO NOD OFF OR FALL ASLEEP WHILE LYING DOWN TO REST IN THE AFTERNOON WHEN CIRCUMSTANCES PERMIT: 2
ESS TOTAL SCORE: 10
HOW LIKELY ARE YOU TO NOD OFF OR FALL ASLEEP WHILE SITTING AND TALKING TO SOMEONE: 1
HOW LIKELY ARE YOU TO NOD OFF OR FALL ASLEEP WHILE SITTING AND READING: 0
HOW LIKELY ARE YOU TO NOD OFF OR FALL ASLEEP WHEN YOU ARE A PASSENGER IN A CAR FOR AN HOUR WITHOUT A BREAK: 1
HOW LIKELY ARE YOU TO NOD OFF OR FALL ASLEEP IN A CAR, WHILE STOPPED FOR A FEW MINUTES IN TRAFFIC: 1
HOW LIKELY ARE YOU TO NOD OFF OR FALL ASLEEP WHILE SITTING QUIETLY AFTER LUNCH WITHOUT ALCOHOL: 1

## 2020-01-16 NOTE — CONSULTS
Smith Sanchez MD, Jordon Gonzalez MD, Jessica Deleon MD, Augusta Georges MD, [facility]      [address], [address] [facility]   PH: [phone]  F: [phone]     Subjective:     Patient ID: Doc Willis is a 47 y.o. female, referred to the sleep center for   Chief Complaint   Patient presents with    Sleep Apnea    Snoring   . Referring physician:  Ramone Atkins    History:   has been referred for DILLON. She has been diagnosed with DILLON about 4 years and she is not on the CPAP for the last 31/2 years. She has not gained weight in the last 4 years. She goes to bed at 9 PM and it takes about few mins to fall asleep. She is not sure if she snores or stops breathing. She wakes up many times in the night and 4 times to the bathroom. It takes about few mins to an hour to fall asleep. She never woke up choking or gasping for air woke up with dry mouth, no palpitations. She gets up at 6 am and she is tired. She has morning headaches. She is sleepy and tired during the day time. She has no RLS. She has no sleep paralysis, no cataplexy, no hypnogogic or hypnopompic hallucinations. She has been diagnosed with depression and anxiety. She is being treated for it. She has h/o smoking socially. She is not a shift worker.       Social History     Socioeconomic History    Marital status: Single     Spouse name: Not on file    Number of children: Not on file    Years of education: Not on file    Highest education level: Not on file   Occupational History    Not on file   Social Needs    Financial resource strain: Not on file    Food insecurity:     Worry: Not on file     Inability: Not on file    Transportation needs:     Medical: Not on file     Non-medical: Not on file   Tobacco Use    Smoking status: Former Smoker     Packs/day: 0.25     Years: 5.00     Pack years: 1.25     Types: Cigarettes     Start date:      Last attempt to quit:      Years since quittin.0    Smokeless tobacco: Never Used    Tobacco comment: \"I Only Smoked Socially When I Quit Smoking\"   Substance and Sexual Activity    Alcohol use: No     Alcohol/week: 0.0 standard drinks    Drug use: No    Sexual activity: Never   Lifestyle    Physical activity:     Days per week: Not on file     Minutes per session: Not on file    Stress: Not on file   Relationships    Social connections:     Talks on phone: Not on file     Gets together: Not on file     Attends [employer] service: Not on file     Active member of club or organization: Not on file     Attends meetings of clubs or organizations: Not on file     Relationship status: Not on file    Intimate partner violence:     Fear of current or ex partner: Not on file     Emotionally abused: Not on file     Physically abused: Not on file     Forced sexual activity: Not on file   Other Topics Concern    Not on file   Social History Narrative    Not on file       Prior to Admission medications    Medication Sig Start Date End Date Taking?  Authorizing Provider   fluticasone (FLOVENT HFA) 110 MCG/ACT inhaler Inhale 1 puff into the lungs 2 times daily    Historical Provider, MD   letrozole (FEMARA) 2.5 MG tablet Take 2.5 mg by mouth daily    Historical Provider, MD   ALBUTEROL SULFATE HFA IN Inhale into the lungs    Historical Provider, MD   Phenylephrine-DM-GG-APAP 10--650 MG PACK Take 1 tablet by mouth 4 times daily 19   Elizabeth Daly,    cetirizine (ZYRTEC) 10 MG tablet Take 10 mg by mouth daily    Historical Provider, MD   lisinopril (PRINIVIL;ZESTRIL) 5 MG tablet Take 5 mg by mouth daily    Historical Provider, MD   Fluticasone Propionate (FLONASE NA) by Nasal route    Historical Provider, MD   busPIRone (BUSPAR) 7.5 MG tablet Take 10 mg by mouth 3 times daily     Historical Provider, MD   nystatin (MYCOSTATIN) 341809 UNIT/GM cream APPLY AS DIRECTED THREE TIMES A DAY 19 Prudencio Jose MD   Probiotic Product (ALIGN) 4 MG CAPS Take 1 capsule by mouth daily 11/8/18   JULIA Cabello - CNP       Allergies as of 01/16/2020 - Review Complete 01/16/2020   Allergen Reaction Noted    Latex      Codeine      Darvon [propoxyphene]  10/28/2016    Pcn [penicillins]         Patient Active Problem List   Diagnosis    Incisional hernia, incarcerated    Depression    Full dentures    Curvature of spine    Acid reflux    Breast cancer ([address]    Breast cancer, female ([address]    Malignant neoplasm of right female breast ([address]    Malignant neoplasm of upper-outer quadrant of right female breast ([address]    Seroma    Mastitis, right, acute    Seroma, postoperative    Breast abscess    WD-Late effect of radiation    WD-Wound of right breast    Non-healing lumpectomy wound    RLQ abdominal pain    Diarrhea    Nausea    Gastroesophageal reflux disease without esophagitis    Pharyngoesophageal dysphagia    Unilateral recurrent inguinal hernia without obstruction or gangrene    Morbid obesity with BMI of 45.0-49.9, adult ([address]    Recurrent ventral hernia with incarceration    H/O ventral hernia    Sepsis ([address]    Septic shock ([address]    Infected hernioplasty mesh ([address]    Kidney stone    S/P recurrent ventral herniorrhaphy    Right ureteral stone    S/P ventral herniorrhaphy    Skin granuloma    Abnormality of right breast on screening mammogram    History of right breast cancer    Near syncope    Abnormal ECG    SOB (shortness of breath) on exertion    Family history of sudden death    Hyperlipidemia    DILLON (obstructive sleep apnea)    Excessive daytime sleepiness    Ex-smoker       Past Medical History:   Diagnosis Date    Abnormal ECG 1/29/2019    Acid reflux     Anxiety     Arthritis     \"Back, Legs, Knees And Feet\"    Back problem     \"Three Discs Smashed Together Lower Back\"    Bipolar disorder (HCC)     Chronic back pain     COPD (chronic obstructive pulmonary disease) (HCC)     Curvature of spine     \"Wore A Brace For Three Years\"    Depression     Diabetes mellitus ([address] Dx 2010    Diverticulitis     Esophageal dilatation 04/12/2017    Family history of sudden death 1/29/2019    Mother at age 58    Full dentures     H/O 24 hour EKG monitoring 01/29/2019    Conclusion: Normal study suggesting normal sinus rhythm with no clinically significant arrhythmias.  H/O cardiovascular stress test 02/12/2019    EF65% Normal    Headache(784.0)     \"Occ\"    Heart murmur     No Cardiologist At This Time    History of nuclear stress test 02/12/2019    EF 65%. Normal study.     [place] (hard of hearing)     Bilateral Hearing Aides    Hydronephrosis     dx with hydronephrosis with admission 6/2018- right ureteral stone/\"had kidney stone stone about 2-3 yrs ago and passed it\"    Hyperlipidemia     Hypertension     Macular degeneration     Right Eye    MRSA (methicillin resistant Staphylococcus aureus)     After PRICILLA In 2006 (Abdomen)    Near syncope 1/29/2019 1/28/19 in ED [place]    Panic attacks     PONV (postoperative nausea and vomiting)     denies any motion sickness    Right Breast Cancer Dx 8-15    Right Breast Lumpectomy, Had Chemotherapy And Radiation Treatments    Seizure ([address]     \"when I was a young kid had seizure and was on medication- not sure how old when last seizure but as a kid\"    Sepsis ([address]     per old chart - admitted 6/2018 with sepsis secondary to wound infection    Sleep apnea     Has CPAP- last sleep study 2-3 yrs ago\"    SOB (shortness of breath) on exertion 1/29/2019    Thyroid disease     \"Thyroid Problems As A Child\"    Urinary incontinence     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    WD-Wound of right breast 10/28/2016    Wears glasses        Past Surgical History:   Procedure Laterality Date    ABDOMEN SURGERY  06/2018    per old chart pt had exploratory abd wound with drainage of infected seroma with wound vac    BREAST BIOPSY Right 2015    Right Breast Cancer , \"I Have A Chip In The Breast\"    BREAST BIOPSY Right 2017    Benign    BREAST LUMPECTOMY Right 08/2015    Right Breast Cancer    CHOLECYSTECTOMY, LAPAROSCOPIC  2007    COLONOSCOPY  2016    dr green 1 polyp    COLONOSCOPY  3/2019; 04/12/2017    3/2019 WNL; 4/2017 Internal hemorroids, diverticulosis    CYSTOSCOPY  2016    Kidney Stones    CYSTOSCOPY      per old chart with admission 6/2018 pt had cysto, right RGPG and attempted to insert right ureteral stent    CYSTOSCOPY  07/03/2018    cysto, right stent removal, right ureteroscopy, laser litho    DENTAL SURGERY      All Teeth Extracted In Past    ENDOSCOPY, COLON, DIAGNOSTIC  04/12/2017    EGD: Dilatation, Hiatal hernia, mild gastritis    HERNIA REPAIR  2007    Abdominal Hernia Repair With Mesh    HERNIA REPAIR  2012    Ventral Hernia Repair With Mesh    HYSTERECTOMY, TOTAL ABDOMINAL  2006    LAPAROSCOPY  2005    Laparoscopy, D & C, Hysteroscopy    LYMPH NODE DISSECTION      2015    OTHER SURGICAL HISTORY  05/22/2018    Laparoscopic-converted to open ventral hernia repair with mesh/ XU    OTHER SURGICAL HISTORY      per old chart with admission 6/20180- in IR 6/11/2018- had right perc. nephrostomy with cath placement\"took the tube out before I left the hospital\"    TONSILLECTOMY  1970's       Family History   Problem Relation Age of Onset    Heart Disease Mother         \"Open Heart\"    Diabetes Mother    Jacquelyn Orozco / [place] Mother     Stroke Mother     Vision Loss Mother         \"Wore Glasses\"    High Blood Pressure Father     Vision Loss Father         \"Had Cataract Surgery\"    Cancer Father         \"Kidney Cancer\"    Heart Disease Father         \"Atrial Fib\"    Diabetes Sister     High Blood Pressure Sister     High Cholesterol Sister     Cancer Sister         \"Ovarian Cancer\"    Vision Loss Sister         \"Glaucoma\"         Objective:   BP (!) 141/88   Pulse

## 2020-01-17 LAB
CULTURE: NORMAL
CULTURE: NORMAL
Lab: NORMAL
Lab: NORMAL
SPECIMEN: NORMAL
SPECIMEN: NORMAL

## 2020-01-20 ENCOUNTER — HOSPITAL ENCOUNTER (OUTPATIENT)
Dept: SLEEP CENTER | Age: 55
Discharge: HOME | End: 2020-01-20
Payer: COMMERCIAL

## 2020-01-20 PROCEDURE — 95810 POLYSOM 6/> YRS 4/> PARAM: CPT | Performed by: INTERNAL MEDICINE

## 2020-01-22 LAB — STATUS: NORMAL

## 2020-01-23 ENCOUNTER — TELEPHONE (OUTPATIENT)
Dept: PULMONOLOGY | Age: 55
End: 2020-01-23

## 2020-01-28 ENCOUNTER — HOSPITAL ENCOUNTER (OUTPATIENT)
Dept: SLEEP CENTER | Age: 55
Discharge: HOME | End: 2020-01-28
Payer: COMMERCIAL

## 2020-01-28 PROCEDURE — 95811 POLYSOM 6/>YRS CPAP 4/> PARM: CPT | Performed by: INTERNAL MEDICINE

## 2020-01-28 PROCEDURE — 95811 POLYSOM 6/>YRS CPAP 4/> PARM: CPT

## 2020-01-29 LAB — STATUS: NORMAL

## 2020-01-31 ENCOUNTER — INITIAL CONSULT (OUTPATIENT)
Dept: PULMONOLOGY | Age: 55
End: 2020-01-31
Payer: COMMERCIAL

## 2020-01-31 VITALS
HEIGHT: 67 IN | WEIGHT: 272.4 LBS | BODY MASS INDEX: 42.75 KG/M2 | OXYGEN SATURATION: 96 % | DIASTOLIC BLOOD PRESSURE: 70 MMHG | SYSTOLIC BLOOD PRESSURE: 118 MMHG | HEART RATE: 96 BPM

## 2020-01-31 PROBLEM — J45.30 MILD PERSISTENT ASTHMA WITHOUT COMPLICATION: Status: ACTIVE | Noted: 2020-01-31

## 2020-01-31 PROBLEM — R05.3 CHRONIC COUGH: Status: ACTIVE | Noted: 2020-01-31

## 2020-01-31 PROBLEM — J44.9 COPD, MODERATE (HCC): Status: ACTIVE | Noted: 2020-01-31

## 2020-01-31 PROBLEM — R06.09 DYSPNEA ON EXERTION: Status: ACTIVE | Noted: 2019-01-29

## 2020-01-31 LAB
EXPIRATORY TIME-POST: NORMAL
EXPIRATORY TIME: NORMAL
FEF 25-75% %CHNG: NORMAL
FEF 25-75% %PRED-POST: NORMAL
FEF 25-75% %PRED-PRE: NORMAL
FEF 25-75% PRED: NORMAL
FEF 25-75%-POST: NORMAL
FEF 25-75%-PRE: NORMAL
FEV1 %PRED-POST: 55.4 %
FEV1 %PRED-PRE: 64.8 %
FEV1 PRED: 3 L
FEV1-POST: 1.66 L
FEV1-PRE: 1.94 L
FEV1/FVC %PRED-POST: 48.3 %
FEV1/FVC %PRED-PRE: 75.2 %
FEV1/FVC PRED: 79.3 %
FEV1/FVC-POST: 38.3 %
FEV1/FVC-PRE: 59.6 %
FVC %PRED-POST: 113.3 L
FVC %PRED-PRE: 85.1 %
FVC PRED: 3.83 L
FVC-POST: 4.34 L
FVC-PRE: 3.26 L
PEF %PRED-POST: NORMAL
PEF %PRED-PRE: NORMAL
PEF PRED: NORMAL
PEF%CHNG: NORMAL
PEF-POST: NORMAL
PEF-PRE: NORMAL

## 2020-01-31 PROCEDURE — G8417 CALC BMI ABV UP PARAM F/U: HCPCS | Performed by: INTERNAL MEDICINE

## 2020-01-31 PROCEDURE — 3017F COLORECTAL CA SCREEN DOC REV: CPT | Performed by: INTERNAL MEDICINE

## 2020-01-31 PROCEDURE — 99204 OFFICE O/P NEW MOD 45 MIN: CPT | Performed by: INTERNAL MEDICINE

## 2020-01-31 PROCEDURE — 3023F SPIROM DOC REV: CPT | Performed by: INTERNAL MEDICINE

## 2020-01-31 PROCEDURE — G9899 SCRN MAM PERF RSLTS DOC: HCPCS | Performed by: INTERNAL MEDICINE

## 2020-01-31 PROCEDURE — G8427 DOCREV CUR MEDS BY ELIG CLIN: HCPCS | Performed by: INTERNAL MEDICINE

## 2020-01-31 PROCEDURE — G8484 FLU IMMUNIZE NO ADMIN: HCPCS | Performed by: INTERNAL MEDICINE

## 2020-01-31 PROCEDURE — G8926 SPIRO NO PERF OR DOC: HCPCS | Performed by: INTERNAL MEDICINE

## 2020-01-31 PROCEDURE — 1036F TOBACCO NON-USER: CPT | Performed by: INTERNAL MEDICINE

## 2020-01-31 RX ORDER — LOSARTAN POTASSIUM 100 MG/1
75 TABLET ORAL DAILY
COMMUNITY
Start: 2020-01-22

## 2020-01-31 ASSESSMENT — PULMONARY FUNCTION TESTS
FEV1_PERCENT_PREDICTED_POST: 55.4
FVC_PERCENT_PREDICTED_POST: 113.3
FEV1_PERCENT_PREDICTED_PRE: 64.8
FEV1_POST: 1.66
FEV1/FVC_PERCENT_PREDICTED_POST: 48.3
FEV1/FVC_PRE: 59.6
FVC_PERCENT_PREDICTED_PRE: 85.1
FEV1/FVC_PREDICTED: 79.3
FEV1/FVC_PERCENT_PREDICTED_PRE: 75.2
FVC_PRE: 3.26
FEV1/FVC_POST: 38.3
FEV1_PRE: 1.94
FVC_PREDICTED: 3.83
FVC_POST: 4.34
FEV1_PREDICTED: 3.00

## 2020-01-31 NOTE — PROGRESS NOTES
partner: None     Emotionally abused: None     Physically abused: None     Forced sexual activity: None   Other Topics Concern    None   Social History Narrative    None       Family History:    Family History   Problem Relation Age of Onset    Heart Disease Mother         \"Open Heart\"    Diabetes Mother    Ellender Held / [place] Mother     Stroke Mother     Vision Loss Mother         \"Wore Glasses\"    High Blood Pressure Father     Vision Loss Father         \"Had Cataract Surgery\"    Cancer Father         \"Kidney Cancer\"    Heart Disease Father         \"Atrial Fib\"    Diabetes Sister     High Blood Pressure Sister     High Cholesterol Sister     Cancer Sister         \"Ovarian Cancer\"   Victory Clapper Vision Loss Sister         \"Glaucoma\"         REVIEW OF SYSTEMS:    CONSTITUTIONAL:  negative for fevers, chills, diaphoresis,  appetite change, night sweats and unexpected weight change.    HEENT:  negative for hearing loss,  sinus pressure, nasal congestion, epistaxis   RESPIRATORY:  See HPI  CARDIOVASCULAR:  negative for chest pain, palpitations, exertional chest discomfort, edema, syncope  GASTROINTESTINAL: negative for nausea, vomiting, diarrhea, constipation, blood in stool and abdominal pain, positive for ventral hernia repair and small bowel resection  GENITOURINARY:  negative for frequency, dysuria and hematuria, positive for kidney stone  HEMATOLOGIC/LYMPHATIC:  negative for easy bruising, bleeding and lymphadenopathy  ALLERGIC/IMMUNOLOGIC:  negative for recurrent infections, angioedema, anaphylaxis and drug reaction  MUSCULOSKELETAL:  negative for  pain, joint swelling, decreased range of motion and muscle weakness  NEURO: negative for chronic headaches,seizures,TIA,CVA, history of bipolar disease and depression  SKIN: negative for rash,pruritis    Objective:   PHYSICAL EXAM:      VITALS:    Vitals:    01/31/20 0948   BP: 118/70   Site: Left Upper Arm   Position: Sitting   Cuff Size: Large Adult Pulse: 96   SpO2: 96%   Weight: 272 lb 6.4 oz (123.6 kg)   Height: 5' 7\" (1.702 m)         CONSTITUTIONAL:  awake, alert, cooperative, no apparent distress, and appears stated age, moderately obese  HEENT:  supple and nontender,  trachea midline, no adenopathy, thyroid nl, no JVD, no wheezing or stridor over neck, increased neck girth  CHEST: chest expansion equal and symmetrical, no intercostal retraction, no increased work of breathing  LUNGS: Mildly diminished but otherwise clear breath sounds throughout all areas without wheezing or rhonchi  CARDIOVASCULAR: normal S1 and S2 , no murmurs or gallops ,no pericardial rubs  ABDOMEN: Examined and no palpable tenderness. Obese and hard to feel for organomegaly. LYMPHADENOPATHY:  no axillary or supraclavicular adenopathy. No cervical adenopathy  EXTREMITIES: no edema, no inflammation, no tenderness. NEURO: oriented X 3, No focal deficits  SKIN: no rashes, No lesions    RADIOLOGY: Chest x-ray on 1/12/2020 showed no acute cardiopulmonary disease  Chest x-ray on 12/29/2019 showed no acute cardiopulmonary disease  CTA chest 11/30/2018 for shortness of breath  Impression   1. No evidence for pulmonary embolus within the right or left main pulmonary   artery or segmental branches.  Evaluation of the subsegmental branches is   limited due to bolus timing. 2. No acute pulmonary process identified. PFT: Spirometry at [place] on 12/16/2019 demonstrated an FEV1 of 1.43 L with an FVC of 2.63 L consistent with a moderate obstructive defect and a significant response to bronchodilators  Office spirometry 1/31/2020 demonstrates an FEV1 of 1.94 L with an FVC of 3.26 L suggesting a moderate obstructive defect. Following bronchodilator she had a significant response suggesting an asthmatic tendency  There has been some improvement in her lung function over the past 2 months since being on bronchodilators  Assessment:     1.  Mild persistent asthma without

## 2020-02-03 PROCEDURE — 94060 EVALUATION OF WHEEZING: CPT | Performed by: INTERNAL MEDICINE

## 2020-02-04 ENCOUNTER — TELEPHONE (OUTPATIENT)
Dept: PULMONOLOGY | Age: 55
End: 2020-02-04

## 2020-02-04 ENCOUNTER — OFFICE VISIT (OUTPATIENT)
Dept: PULMONOLOGY | Age: 55
End: 2020-02-04
Payer: COMMERCIAL

## 2020-02-04 VITALS
WEIGHT: 272 LBS | OXYGEN SATURATION: 94 % | BODY MASS INDEX: 42.69 KG/M2 | HEIGHT: 67 IN | HEART RATE: 87 BPM | SYSTOLIC BLOOD PRESSURE: 142 MMHG | DIASTOLIC BLOOD PRESSURE: 80 MMHG

## 2020-02-04 PROBLEM — R06.02 SHORTNESS OF BREATH: Status: ACTIVE | Noted: 2020-02-04

## 2020-02-04 PROCEDURE — 3017F COLORECTAL CA SCREEN DOC REV: CPT | Performed by: INTERNAL MEDICINE

## 2020-02-04 PROCEDURE — G8484 FLU IMMUNIZE NO ADMIN: HCPCS | Performed by: INTERNAL MEDICINE

## 2020-02-04 PROCEDURE — G8926 SPIRO NO PERF OR DOC: HCPCS | Performed by: INTERNAL MEDICINE

## 2020-02-04 PROCEDURE — 1036F TOBACCO NON-USER: CPT | Performed by: INTERNAL MEDICINE

## 2020-02-04 PROCEDURE — G8427 DOCREV CUR MEDS BY ELIG CLIN: HCPCS | Performed by: INTERNAL MEDICINE

## 2020-02-04 PROCEDURE — G9899 SCRN MAM PERF RSLTS DOC: HCPCS | Performed by: INTERNAL MEDICINE

## 2020-02-04 PROCEDURE — 3023F SPIROM DOC REV: CPT | Performed by: INTERNAL MEDICINE

## 2020-02-04 PROCEDURE — 99213 OFFICE O/P EST LOW 20 MIN: CPT | Performed by: INTERNAL MEDICINE

## 2020-02-04 PROCEDURE — G8417 CALC BMI ABV UP PARAM F/U: HCPCS | Performed by: INTERNAL MEDICINE

## 2020-02-04 NOTE — PROGRESS NOTES
checked for errors. It is possible that there are still dictated errors within this office note. Any errors should be brought immediately to my attention for correction. All efforts were made to ensure that this office note is accurate.

## 2020-02-04 NOTE — TELEPHONE ENCOUNTER
Patient called stating that this morning between 630 and 7am she was having difficulty breathing and states that the right side of her nose was clogged up and she had to breathe through her mouth. States that when she is blowing her nose it is bloody. Also states she is having a non-productive cough that causes her to almost vomit. States that she took her morning meds and her rescue inhaler as well as her daily inhaler to be able to breathe. States she finally was able to breathe. Says it has never been that bad and was asking what she should do to help prevent this? Please advise.

## 2020-02-18 ENCOUNTER — TELEPHONE (OUTPATIENT)
Dept: PULMONOLOGY | Age: 55
End: 2020-02-18

## 2020-02-24 ENCOUNTER — TELEPHONE (OUTPATIENT)
Dept: PULMONOLOGY | Age: 55
End: 2020-02-24

## 2020-03-25 PROBLEM — K21.9 ACID REFLUX: Status: RESOLVED | Noted: 2020-03-25 | Resolved: 2020-03-24

## 2020-03-27 ENCOUNTER — TELEPHONE (OUTPATIENT)
Dept: PULMONOLOGY | Age: 55
End: 2020-03-27

## 2020-03-27 NOTE — TELEPHONE ENCOUNTER
Could you please send in some kind of cough medicine for this pt. She is almost out of the robitussin and her vehicle is broke. She called her PCP and they just send her to us. Her pharmacy will deliver it to her. Thank you.

## 2020-03-27 NOTE — TELEPHONE ENCOUNTER
Called pts PCP office and relayed the message, the nurse stated she would put a message in and try to get something called in.

## 2020-04-02 PROBLEM — R53.83 OTHER FATIGUE: Status: ACTIVE | Noted: 2020-04-02

## 2020-05-08 ENCOUNTER — HOSPITAL ENCOUNTER (OUTPATIENT)
Dept: INFUSION THERAPY | Age: 55
Discharge: HOME | End: 2020-05-08
Payer: COMMERCIAL

## 2020-05-08 LAB
ALBUMIN SERPL-MCNC: 4.2 GM/DL (ref 3.4–5)
ALP BLD-CCNC: 90 IU/L (ref 40–129)
ALT SERPL-CCNC: 20 U/L (ref 10–40)
ANION GAP SERPL CALCULATED.3IONS-SCNC: 11 MMOL/L (ref 4–16)
AST SERPL-CCNC: 15 IU/L (ref 15–37)
BASOPHILS ABSOLUTE: 0 K/CU MM
BASOPHILS RELATIVE PERCENT: 0.3 % (ref 0–1)
BILIRUB SERPL-MCNC: 0.3 MG/DL (ref 0–1)
BUN BLDV-MCNC: 15 MG/DL (ref 6–23)
CALCIUM SERPL-MCNC: 9.2 MG/DL (ref 8.3–10.6)
CHLORIDE BLD-SCNC: 99 MMOL/L (ref 99–110)
CO2: 26 MMOL/L (ref 21–32)
CREAT SERPL-MCNC: 0.7 MG/DL (ref 0.6–1.1)
DIFFERENTIAL TYPE: ABNORMAL
EOSINOPHILS ABSOLUTE: 0.1 K/CU MM
EOSINOPHILS RELATIVE PERCENT: 2.2 % (ref 0–3)
GFR AFRICAN AMERICAN: >60 ML/MIN/1.73M2
GFR NON-AFRICAN AMERICAN: >60 ML/MIN/1.73M2
GLUCOSE BLD-MCNC: 155 MG/DL (ref 70–99)
HCT VFR BLD CALC: 41.6 % (ref 37–47)
HEMOGLOBIN: 13.7 GM/DL (ref 12.5–16)
LYMPHOCYTES ABSOLUTE: 1.3 K/CU MM
LYMPHOCYTES RELATIVE PERCENT: 22.6 % (ref 24–44)
MCH RBC QN AUTO: 29.9 PG (ref 27–31)
MCHC RBC AUTO-ENTMCNC: 32.9 % (ref 32–36)
MCV RBC AUTO: 90.8 FL (ref 78–100)
MONOCYTES ABSOLUTE: 0.4 K/CU MM
MONOCYTES RELATIVE PERCENT: 7.5 % (ref 0–4)
PDW BLD-RTO: 13.4 % (ref 11.7–14.9)
PLATELET # BLD: 250 K/CU MM (ref 140–440)
PMV BLD AUTO: 10.2 FL (ref 7.5–11.1)
POTASSIUM SERPL-SCNC: 4.5 MMOL/L (ref 3.5–5.1)
RBC # BLD: 4.58 M/CU MM (ref 4.2–5.4)
SEGMENTED NEUTROPHILS ABSOLUTE COUNT: 3.9 K/CU MM
SEGMENTED NEUTROPHILS RELATIVE PERCENT: 67.4 % (ref 36–66)
SODIUM BLD-SCNC: 136 MMOL/L (ref 135–145)
TOTAL PROTEIN: 6.8 GM/DL (ref 6.4–8.2)
WBC # BLD: 5.8 K/CU MM (ref 4–10.5)

## 2020-05-08 PROCEDURE — 36415 COLL VENOUS BLD VENIPUNCTURE: CPT

## 2020-05-08 PROCEDURE — 85025 COMPLETE CBC W/AUTO DIFF WBC: CPT

## 2020-05-08 PROCEDURE — 86300 IMMUNOASSAY TUMOR CA 15-3: CPT

## 2020-05-08 PROCEDURE — 99211 OFF/OP EST MAY X REQ PHY/QHP: CPT

## 2020-05-08 PROCEDURE — 80053 COMPREHEN METABOLIC PANEL: CPT

## 2020-05-09 LAB — CA 27.29: 18 U/ML (ref 0–40)

## 2020-05-20 ENCOUNTER — TELEPHONE (OUTPATIENT)
Dept: PULMONOLOGY | Age: 55
End: 2020-05-20

## 2020-05-28 ENCOUNTER — OFFICE VISIT (OUTPATIENT)
Dept: PULMONOLOGY | Age: 55
End: 2020-05-28
Payer: COMMERCIAL

## 2020-05-28 VITALS
SYSTOLIC BLOOD PRESSURE: 104 MMHG | HEART RATE: 87 BPM | DIASTOLIC BLOOD PRESSURE: 70 MMHG | HEIGHT: 67 IN | TEMPERATURE: 98.4 F | WEIGHT: 275 LBS | OXYGEN SATURATION: 97 % | BODY MASS INDEX: 43.16 KG/M2

## 2020-05-28 PROCEDURE — 1036F TOBACCO NON-USER: CPT | Performed by: INTERNAL MEDICINE

## 2020-05-28 PROCEDURE — G8417 CALC BMI ABV UP PARAM F/U: HCPCS | Performed by: INTERNAL MEDICINE

## 2020-05-28 PROCEDURE — 99213 OFFICE O/P EST LOW 20 MIN: CPT | Performed by: INTERNAL MEDICINE

## 2020-05-28 PROCEDURE — G9899 SCRN MAM PERF RSLTS DOC: HCPCS | Performed by: INTERNAL MEDICINE

## 2020-05-28 PROCEDURE — 3017F COLORECTAL CA SCREEN DOC REV: CPT | Performed by: INTERNAL MEDICINE

## 2020-05-28 PROCEDURE — G8427 DOCREV CUR MEDS BY ELIG CLIN: HCPCS | Performed by: INTERNAL MEDICINE

## 2020-05-28 RX ORDER — HYDROXYZINE PAMOATE 25 MG/1
50 CAPSULE ORAL 3 TIMES DAILY PRN
COMMUNITY
Start: 2020-04-23

## 2020-05-28 RX ORDER — FAMOTIDINE 20 MG/1
1 TABLET, FILM COATED ORAL DAILY
COMMUNITY
Start: 2020-05-04 | End: 2020-12-17

## 2020-05-28 RX ORDER — FLUOXETINE 10 MG/1
1 CAPSULE ORAL DAILY
COMMUNITY
Start: 2020-04-22

## 2020-05-28 RX ORDER — MONTELUKAST SODIUM 10 MG/1
1 TABLET ORAL DAILY
COMMUNITY
Start: 2020-05-01 | End: 2020-12-17

## 2020-05-28 RX ORDER — HYDROCHLOROTHIAZIDE 12.5 MG/1
1 CAPSULE, GELATIN COATED ORAL DAILY
COMMUNITY
Start: 2020-05-01 | End: 2022-03-04

## 2020-05-28 NOTE — PROGRESS NOTES
SUBJECTIVE:  Chief Complaint: Shortness of breath, mild persistent asthma, chronic laryngospasm, obstructive sleep apnea  Mrs. Sheran Osgood states that she is very short of breath even at rest.  She has fullness in her neck and states that her allergist told her that she probably needs to see ENT. She has not been seen by ENT at this time. She mentions that she is wheezing of her neck and chest all the time. She stopped using [place] because it made her symptoms worse and she is on no long-acting inhaled steroid or inhaled bronchodilator besides her rescue. She continues to wear CPAP at nighttime. ROS:  Constitution:  HEENT: Negative for ear, throat pain  Cardiovascular: Negative for chest pain, syncope, edema  Pulmonary: See HPI  Musculoskeletal: Negative for DVT, myalgias, arthralgias    OBJECTIVE:  /70 (Site: Left Upper Arm, Position: Sitting, Cuff Size: Large Adult)   Pulse 87   Temp 98.4 °F (36.9 °C) (Oral)   Ht 5' 7\" (1.702 m)   Wt 275 lb (124.7 kg)   SpO2 97%   BMI 43.07 kg/m²      Physical Exam:  Constitutional:  She appears well developed and well-nourished. Morbidly obese  Neck:  Supple, No palpable lymphadenopathy, No JVD, increased neck girth but I do not hear any wheezing over the neck  Cardiovascular:  S1, S2 Normal, Regular rhythm, no murmurs or gallops, No pericardial  rubs. Pulmonary: Breath sounds are completely clear over the entire posterior and anterior chest area  Abdomen: Not examined  Extremities: no edema, No DVT  Neurologic: Oriented x3, No focal deficits    Radiology: Portable chest x-ray 1/12/2020 showed no acute cardiopulmonary process  PFT: Office spirometry 1/31/2020 demonstrated a questionable moderate obstructive defect with a significant and asthmatic-like response to bronchodilators          ASSESSMENT:    1. Shortness of breath    2. Mild persistent asthma without complication    3. DILLON (obstructive sleep apnea)    4.  Chronic cough          PLAN:   I do think she needs ENT consultation to make sure that she does not have any obstructive phenomena in her upper airways. I will speak to MsSuzanne Macias [facility] concerning that. I did give her a sample of Anoro Ellipta 1 inhalation daily. If she can tolerate that then we can continue as an outpatient. I also will make sure she gets back to see Dr. Leopoldo Haste who originally was her consulting pulmonary physician and was involved in her sleep apnea work-up. We have discussed the need to maintain yearly flu immunization, pneumococcal vaccination. We have discussed Coronavirus precaution including handwashing practice, wiping items touched in public such as gas pumps, door handles, shopping carts, etc. Self monitoring for infection - fever, chills, cough, SOB. Should they develop symptoms they should call office for further instructions. Return in about 3 months (around 8/28/2020) for Recheck for Asthma, Recheck for Shortness of Breath, Recheck for Obstructive Sleep Apnea. This dictation was performed with a verbal recognition program and it was checked for errors. It is possible that there are still dictated errors within this office note. Any errors should be brought immediately to my attention for correction. All efforts were made to ensure that this office note is accurate.

## 2020-05-29 RX ORDER — UMECLIDINIUM BROMIDE AND VILANTEROL TRIFENATATE 62.5; 25 UG/1; UG/1
1 POWDER RESPIRATORY (INHALATION) DAILY
Qty: 1 EACH | Refills: 0 | COMMUNITY
Start: 2020-05-29 | End: 2020-06-03 | Stop reason: SDUPTHER

## 2020-06-03 RX ORDER — UMECLIDINIUM BROMIDE AND VILANTEROL TRIFENATATE 62.5; 25 UG/1; UG/1
1 POWDER RESPIRATORY (INHALATION) DAILY
Qty: 1 EACH | Refills: 5 | Status: SHIPPED | OUTPATIENT
Start: 2020-06-03 | End: 2020-08-28

## 2020-06-04 RX ORDER — UMECLIDINIUM BROMIDE AND VILANTEROL TRIFENATATE 62.5; 25 UG/1; UG/1
1 POWDER RESPIRATORY (INHALATION) DAILY
Qty: 2 EACH | Refills: 0 | COMMUNITY
Start: 2020-06-04 | End: 2020-08-28

## 2020-06-04 NOTE — TELEPHONE ENCOUNTER
I spoke with patient she will be going to [place] at 10 am. She will stop by giving her two samples of ANORO until insurance gives us a decision about PA.  Lot [id number] Expires 5/2021

## 2020-06-22 ENCOUNTER — HOSPITAL ENCOUNTER (OUTPATIENT)
Dept: CT IMAGING | Age: 55
Discharge: HOME | End: 2020-06-22
Payer: COMMERCIAL

## 2020-06-22 LAB
GFR AFRICAN AMERICAN: >60 ML/MIN/1.73M2
GFR NON-AFRICAN AMERICAN: >60 ML/MIN/1.73M2
POC CREATININE: 0.7 MG/DL (ref 0.6–1.1)

## 2020-06-22 PROCEDURE — 70491 CT SOFT TISSUE NECK W/DYE: CPT

## 2020-06-22 PROCEDURE — 6360000004 HC RX CONTRAST MEDICATION: Performed by: OTOLARYNGOLOGY

## 2020-06-22 RX ADMIN — IOPAMIDOL 75 ML: 755 INJECTION, SOLUTION INTRAVENOUS at 09:32

## 2020-06-23 ENCOUNTER — HOSPITAL ENCOUNTER (OUTPATIENT)
Dept: GENERAL RADIOLOGY | Age: 55
Discharge: HOME | End: 2020-06-23
Payer: COMMERCIAL

## 2020-06-23 PROCEDURE — 74246 X-RAY XM UPR GI TRC 2CNTRST: CPT

## 2020-07-28 ENCOUNTER — TELEPHONE (OUTPATIENT)
Dept: PULMONOLOGY | Age: 55
End: 2020-07-28

## 2020-07-28 NOTE — TELEPHONE ENCOUNTER
Pt is having increased dyspnea. Anoro works great, however insurance won't pay for it. Currently on Bevespi, lack of efficacy. Please advise.

## 2020-07-30 RX ORDER — GLYCOPYRROLATE AND FORMOTEROL FUMARATE 9; 4.8 UG/1; UG/1
AEROSOL, METERED RESPIRATORY (INHALATION)
Qty: 10.7 G | Refills: 1 | Status: SHIPPED | OUTPATIENT
Start: 2020-07-30 | End: 2020-08-28

## 2020-07-30 RX ORDER — TIOTROPIUM BROMIDE AND OLODATEROL 3.124; 2.736 UG/1; UG/1
2 SPRAY, METERED RESPIRATORY (INHALATION) 2 TIMES DAILY
Qty: 1 INHALER | Refills: 11 | Status: SHIPPED | OUTPATIENT
Start: 2020-07-30 | End: 2020-08-29

## 2020-07-30 NOTE — TELEPHONE ENCOUNTER
Spoke with patient and she will let us know if the [place]un[employer] New is covered by her insurance in place of the Kidd[employer]il[employer].

## 2020-07-30 NOTE — TELEPHONE ENCOUNTER
Per Morales Choi does cover the [place], it just needs a PA. Please send Stiolto script to the [facility] in [facility].

## 2020-08-06 ENCOUNTER — TELEPHONE (OUTPATIENT)
Dept: ONCOLOGY | Age: 55
End: 2020-08-06

## 2020-08-06 RX ORDER — DIPHENOXYLATE HYDROCHLORIDE AND ATROPINE SULFATE 2.5; .025 MG/1; MG/1
TABLET ORAL
Qty: 120 TABLET | Refills: 2 | Status: SHIPPED | OUTPATIENT
Start: 2020-08-06 | End: 2020-09-05

## 2020-08-17 ENCOUNTER — OFFICE VISIT (OUTPATIENT)
Dept: ONCOLOGY | Age: 55
End: 2020-08-17
Payer: COMMERCIAL

## 2020-08-17 ENCOUNTER — HOSPITAL ENCOUNTER (OUTPATIENT)
Dept: INFUSION THERAPY | Age: 55
Discharge: HOME | End: 2020-08-17
Payer: COMMERCIAL

## 2020-08-17 VITALS
WEIGHT: 272 LBS | HEART RATE: 86 BPM | RESPIRATION RATE: 18 BRPM | HEIGHT: 67 IN | SYSTOLIC BLOOD PRESSURE: 137 MMHG | BODY MASS INDEX: 42.69 KG/M2 | OXYGEN SATURATION: 97 % | DIASTOLIC BLOOD PRESSURE: 85 MMHG | TEMPERATURE: 97.4 F

## 2020-08-17 PROCEDURE — G0463 HOSPITAL OUTPT CLINIC VISIT: HCPCS

## 2020-08-17 PROCEDURE — G8427 DOCREV CUR MEDS BY ELIG CLIN: HCPCS | Performed by: INTERNAL MEDICINE

## 2020-08-17 PROCEDURE — G8417 CALC BMI ABV UP PARAM F/U: HCPCS | Performed by: INTERNAL MEDICINE

## 2020-08-17 PROCEDURE — G9899 SCRN MAM PERF RSLTS DOC: HCPCS | Performed by: INTERNAL MEDICINE

## 2020-08-17 PROCEDURE — 3017F COLORECTAL CA SCREEN DOC REV: CPT | Performed by: INTERNAL MEDICINE

## 2020-08-17 PROCEDURE — 99211 OFF/OP EST MAY X REQ PHY/QHP: CPT

## 2020-08-17 PROCEDURE — 99214 OFFICE O/P EST MOD 30 MIN: CPT | Performed by: INTERNAL MEDICINE

## 2020-08-17 PROCEDURE — 1036F TOBACCO NON-USER: CPT | Performed by: INTERNAL MEDICINE

## 2020-08-17 ASSESSMENT — PATIENT HEALTH QUESTIONNAIRE - PHQ9
1. LITTLE INTEREST OR PLEASURE IN DOING THINGS: 0
2. FEELING DOWN, DEPRESSED OR HOPELESS: 0
SUM OF ALL RESPONSES TO PHQ9 QUESTIONS 1 & 2: 0
SUM OF ALL RESPONSES TO PHQ QUESTIONS 1-9: 0
SUM OF ALL RESPONSES TO PHQ QUESTIONS 1-9: 0

## 2020-08-17 NOTE — PROGRESS NOTES
MA Rooming Questions  Patient: Kris Deleon  MRN: [id number]    Date: 8/17/2020        1. Do you have any new issues? yes - right breast area         2. Do you need any refills on medications?    no    3. Have you had any imaging done since your last visit? yes    4. Have you been hospitalized or seen in the emergency room since your last visit here?   no    5. Did the patient have a depression screening completed today?  Yes    PHQ-9 Total Score: 0 (8/17/2020 11:49 AM)       PHQ-9 Given to (if applicable):               PHQ-9 Score (if applicable):                     [] Positive     [x]  Negative              Does question #9 need addressed (if applicable)                     [] Yes    [x]  No               Olvin Hernández MA

## 2020-08-17 NOTE — PROGRESS NOTES
Patient Name: Ryan Petersen  Patient : 1965  Patient MRN: [id number]     Primary Oncologist: Deni Bonilla MD  Referring Provider: REGINALD Dobson     Date of Service: 2020      Chief Complaint:   Chief Complaint   Patient presents with    Other     having some issues w/ right breast area     Patient Active Problem List:     Malignant neoplasm of upper-outer quadrant of right female breast       HPI:   Ms. Marry Bansal is a very pleasant 27-year-old patient with medical history significant for hypertension, diabetes mellitus, initially referred to me on 2015, for evaluation of right breast infiltrating ductal carcinoma. She stated that she felt a knot in her right breast and she went to the primary care physician for that. She had mammogram followed by sonogram on 2015, and it showed spiculated mass in the anterior 12 o'clock position in the right breast, most likely representing malignancy. She underwent sonoguided biopsy of the lesion on 2015, and the final pathology was consistent with infiltrating ductal carcinoma. Estrogen receptors positive (95 percent strong), progesterone receptor positive (75 percent), and HER-2/jossy by immunohistochemical stain is equivocal and HER-2/jossy by FISH study was not amplified. She was evaluated by Dr. Isha Rebollar and he requested core biopsy of the right breast axillary lymph node and it was done on 2015. Final pathology showed metastatic carcinoma consistent with breast primary involving all cores (10 mm in the largest dimension). Since she wanted to preserve her breast if that is possible, Dr. Isha Rebollar discussed her case in our Tumor Board. Consensus was to give neoadjuvant chemotherapy followed by surgery. Neoadjuvant chemotherapy with doxorubicin, cyclophosphamide followed by Paclitaxel was started on 2015 and she has completed it on 2015. Ms. Paz underwent right breast lumpectomy on from the prior surgery and the radiation treatment. We will recommend to continue with close observation. MRI of the breast done on 12/18/18 showed 1.7 cm ill-defined area of abnormal enhancement along the ventral margin of the patient's scar upper-outer quadrant posterior third right breast. Targeted right breast ultrasound and diagnostic right mammogram is warranted for complete assessment. She had incisional biopsy of the right breast suspicious site on 1/25/19 and final pathology only revealed changes related to surgery and radiation. She had EGD on 3/14 and colonoscopy on 3/20/19 by Dr. Juliane Perez. Colonoscopy showed 8 mm polyp in cecum, 4 mm polyp in descending colon, and internal hemorrhoids. DEXA scan done on August 21, 2019 showed normal bone mineral density by WHO criteria. On August 17, 2020, she presented to me since she felt her right breast surgical site looks different after shower on Saturday. After that, it has back to normal base line according to her. On exam, I don't feel any palpable changes. I recommend her to continue with observation. I have been following Ms. Paz for right breast infiltrating ductal carcinoma and she is status post neoadjuvant chemotherapy followed by right breast lumpectomy and sentinel lymph node biopsy. She is also status post adjuvant radiation therapy and she is currently on adjuvant endocrine therapy with letrozole. She is tolerating letrozole very well and she does not encounter any major side effects from it. She did have hot flashes due to letrozole. However it is getting better lately and that she does not get hot flashes that often. She is not on any medication for hot flashes at this moment. I recommend her to continue with letrozole on a regular basis. I will continue to follow her closely during adjuvant endocrine therapy with letrozole.     She does not have any signs or symptoms suggestive of recurrent or metastatic breast Exam:  CONSTITUTIONAL: awake, alert, cooperative, no apparent distress   EYES: pupils equal, round and reactive to light, sclera clear and conjunctiva normal  ENT: Normocephalic, without obvious abnormality, atraumatic  NECK: supple, symmetrical, no jugular venous distension and no carotid bruits   HEMATOLOGIC/LYMPHATIC: no cervical, supraclavicular or axillary lymphadenopathy   LUNGS: no increased work of breathing and clear to auscultation   CARDIOVASCULAR: regular rate and rhythm, normal S1 and S2, no murmur noted  ABDOMEN: normal bowel sounds x 4, soft, non-distended, non-tender, no masses palpated, no hepatosplenomegaly   MUSCULOSKELETAL: full range of motion noted, tone is normal  NEUROLOGIC: awake, alert, oriented to name, place and time. Motor skills grossly intact. SKIN: Normal skin color, texture, turgor and no jaundice.  appears intact   EXTREMITIES: no LE edema     Labs:  Hematology:  Lab Results   Component Value Date    WBC 5.8 05/08/2020    RBC 4.58 05/08/2020    HGB 13.7 05/08/2020    HCT 41.6 05/08/2020    MCV 90.8 05/08/2020    MCH 29.9 05/08/2020    MCHC 32.9 05/08/2020    RDW 13.4 05/08/2020     05/08/2020    MPV 10.2 05/08/2020    BANDSPCT 8 06/17/2018    SEGSPCT 67.4 (H) 05/08/2020    EOSRELPCT 2.2 05/08/2020    BASOPCT 0.3 05/08/2020    LYMPHOPCT 22.6 (L) 05/08/2020    MONOPCT 7.5 (H) 05/08/2020    BANDABS 0.66 06/17/2018    SEGSABS 3.9 05/08/2020    EOSABS 0.1 05/08/2020    BASOSABS 0.0 05/08/2020    LYMPHSABS 1.3 05/08/2020    MONOSABS 0.4 05/08/2020    DIFFTYPE AUTOMATED DIFFERENTIAL 05/08/2020    POLYCHROM 1+ 06/17/2018    PLTM SEVERAL LARGE PLATELETS 21/76/7452     No results found for: ESR  Chemistry:  Lab Results   Component Value Date     05/08/2020    K 4.5 05/08/2020    CL 99 05/08/2020    CO2 26 05/08/2020    BUN 15 05/08/2020    CREATININE 0.7 06/22/2020    GLUCOSE 155 (H) 05/08/2020    CALCIUM 9.2 05/08/2020    PROT 6.8 05/08/2020    LABALBU 4.2 05/08/2020    BILITOT is not on any medication for hot flashes at this moment. I recommend her to continue with letrozole on a regular basis. I will continue to follow her closely during adjuvant endocrine therapy with letrozole. She does not have any signs or symptoms suggestive of recurrent or metastatic breast cancer at today's visit. I believe she is in complete clinical remission. She is more than 4 years on letrozole and we will continue with that for now. Since she has stage IIB disease, we will consider 5 to 7 years of adjuvant endocrine therapy with letrozole. She stated that repeat mammogram on 1/2020 were within normal range. Will get the report from [facility]. I have reviewed all these plans with Ms. Paz and she is in agreement with the plans. I answered all her questions and concerns for today. I recommend that she contact me if she has any medical issues before I see her again next time. I encouraged her to stay active, do regular exercise, and eat healthy balanced diet. I asked her to followup with her primary care physician, Dr. Gael Wasserman and Dr. Xochitl Luis on regular basis. Thank you for allowing me to participate in the care of this very pleasant patient. Recent imaging and labs were reviewed and discussed with the patient.       Electronically signed by Melba Ruiz MD on 8/17/20 at 8:34 AM EDT

## 2020-08-27 RX ORDER — LETROZOLE 2.5 MG/1
TABLET, FILM COATED ORAL
Qty: 30 TABLET | Refills: 6 | Status: SHIPPED | OUTPATIENT
Start: 2020-08-27 | End: 2021-03-18

## 2020-08-28 ENCOUNTER — OFFICE VISIT (OUTPATIENT)
Dept: PULMONOLOGY | Age: 55
End: 2020-08-28
Payer: COMMERCIAL

## 2020-08-28 VITALS
BODY MASS INDEX: 42.69 KG/M2 | HEART RATE: 81 BPM | SYSTOLIC BLOOD PRESSURE: 120 MMHG | DIASTOLIC BLOOD PRESSURE: 80 MMHG | TEMPERATURE: 98.4 F | WEIGHT: 272 LBS | HEIGHT: 67 IN | OXYGEN SATURATION: 98 %

## 2020-08-28 PROCEDURE — 99214 OFFICE O/P EST MOD 30 MIN: CPT | Performed by: INTERNAL MEDICINE

## 2020-08-28 PROCEDURE — G8417 CALC BMI ABV UP PARAM F/U: HCPCS | Performed by: INTERNAL MEDICINE

## 2020-08-28 PROCEDURE — G9899 SCRN MAM PERF RSLTS DOC: HCPCS | Performed by: INTERNAL MEDICINE

## 2020-08-28 PROCEDURE — G8427 DOCREV CUR MEDS BY ELIG CLIN: HCPCS | Performed by: INTERNAL MEDICINE

## 2020-08-28 RX ORDER — LORATADINE 10 MG/1
10 TABLET ORAL DAILY
COMMUNITY
End: 2020-12-17

## 2020-08-28 ASSESSMENT — ENCOUNTER SYMPTOMS
ABDOMINAL DISTENTION: 0
EYE DISCHARGE: 0
BACK PAIN: 0
ABDOMINAL PAIN: 0
COUGH: 0
SHORTNESS OF BREATH: 0
EYE ITCHING: 0

## 2020-08-28 NOTE — ASSESSMENT & PLAN NOTE
Advised to be compliant with the CPAP  Loose weight  Mask fitting trial  I need a 2 week download data

## 2020-08-28 NOTE — PROGRESS NOTES
Tayla Fleming  1965  Referring Provider: Berkley Foster    Subjective:     Chief Complaint   Patient presents with    Asthma    COPD    Sleep Apnea    Cough       HPI  Austen Cheng is a 54 y.o. female has come back as a follow up. She had a PSG done on 01/20/2020 which showed that she has an AHI of 21.6 and desat to 78%. She required a CPAP of 11 cm h20. She has been using it about 2 hours per night. She has nasal mask. She is not able to tolerate it. She has 6 lb loss of weight. Her 2 week download data showed that her residual AHI is 6.3. She is still sleepy during the day time.      Current Outpatient Medications   Medication Sig Dispense Refill    loratadine (CLARITIN) 10 MG tablet Take 10 mg by mouth daily      PANTOPRAZOLE SODIUM PO Take 1 tablet by mouth daily      letrozole (FEMARA) 2.5 MG tablet TAKE ONE TABLET BY MOUTH EVERY DAY 30 tablet 6    diphenoxylate-atropine (LOMOTIL) 2.5-0.025 MG per tablet TAKE ONE TABLET FOUR TIMES DAILY AS NEEDED 120 tablet 2    Tiotropium Bromide-Olodaterol (STIOLTO RESPIMAT) 2.5-2.5 MCG/ACT AERS Inhale 2 Inhalers into the lungs 2 times daily for 60 doses 1 Inhaler 11    famotidine (PEPCID) 20 MG tablet Take 1 tablet by mouth daily      FLUoxetine (PROZAC) 10 MG capsule Take 1 capsule by mouth daily      hydroCHLOROthiazide (MICROZIDE) 12.5 MG capsule Take 1 capsule by mouth daily      hydrOXYzine (VISTARIL) 25 MG capsule Take 1 capsule by mouth daily      montelukast (SINGULAIR) 10 MG tablet Take 1 tablet by mouth daily      losartan (COZAAR) 100 MG tablet Take 1 tablet by mouth daily      ALBUTEROL SULFATE HFA IN Inhale into the lungs      Phenylephrine-DM-GG-APAP 10--650 MG PACK Take 1 tablet by mouth 4 times daily 20 each 0    lisinopril (PRINIVIL;ZESTRIL) 5 MG tablet Take 5 mg by mouth daily      Fluticasone Propionate (FLONASE NA) by Nasal route      busPIRone (BUSPAR) 7.5 MG tablet Take 10 mg by mouth 3 times daily       nystatin (MYCOSTATIN) 420539  Panic attacks     PONV (postoperative nausea and vomiting)     denies any motion sickness    Right Breast Cancer Dx 8-15    Right Breast Lumpectomy, Had Chemotherapy And Radiation Treatments    Seizure ([facility] [address]     \"when I was a young kid had seizure and was on medication- not sure how old when last seizure but as a kid\"    Sepsis ([facility] [address]     per old chart - admitted 6/2018 with sepsis secondary to wound infection    Shortness of breath 2/4/2020    Sleep apnea     Has CPAP- last sleep study 2-3 yrs ago\"    SOB (shortness of breath) on exertion 1/29/2019    Thyroid disease     \"Thyroid Problems As A Child\"    Urinary incontinence     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    WD-Wound of right breast 10/28/2016    Wears glasses        Past Surgical History:   Procedure Laterality Date    ABDOMEN SURGERY  06/2018    per old chart pt had exploratory abd wound with drainage of infected seroma with wound vac    BREAST BIOPSY Right 2015    Right Breast Cancer , \"I Have A Chip In The Breast\"    BREAST BIOPSY Right 2017    Benign    BREAST LUMPECTOMY Right 08/2015    Right Breast Cancer    CHOLECYSTECTOMY, LAPAROSCOPIC  2007    COLONOSCOPY  2016    dr green 1 polyp    COLONOSCOPY  3/2019; 04/12/2017    3/2019 WNL; 4/2017 Internal hemorroids, diverticulosis    CYSTOSCOPY  2016    Kidney Stones    CYSTOSCOPY      per old chart with admission 6/2018 pt had cysto, right RGPG and attempted to insert right ureteral stent    CYSTOSCOPY  07/03/2018    cysto, right stent removal, right ureteroscopy, laser litho    DENTAL SURGERY      All Teeth Extracted In Past    ENDOSCOPY, COLON, DIAGNOSTIC  04/12/2017    EGD: Dilatation, Hiatal hernia, mild gastritis    HERNIA REPAIR  2007    Abdominal Hernia Repair With Mesh    HERNIA REPAIR  2012    Ventral Hernia Repair With Mesh    HYSTERECTOMY, TOTAL ABDOMINAL  2006    LAPAROSCOPY  2005    Laparoscopy, D & C, Hysteroscopy    LYMPH NODE DISSECTION      2015    OTHER SURGICAL HISTORY  2018    Laparoscopic-converted to open ventral hernia repair with mesh/ XU    OTHER SURGICAL HISTORY      per old chart with admission - in IR 2018- had right perc. nephrostomy with cath placement\"took the tube out before I left the hospital\"    TONSILLECTOMY         Social History     Socioeconomic History    Marital status: Single     Spouse name: None    Number of children: None    Years of education: None    Highest education level: None   Occupational History    None   Social Needs    Financial resource strain: None    Food insecurity     Worry: None     Inability: None    Transportation needs     Medical: None     Non-medical: None   Tobacco Use    Smoking status: Former Smoker     Packs/day: 0.25     Years: 5.00     Pack years: 1.25     Types: Cigarettes     Start date:      Last attempt to quit:      Years since quittin.6    Smokeless tobacco: Never Used    Tobacco comment: \"I Only Smoked Socially When I Quit Smoking\"   Substance and Sexual Activity    Alcohol use: No     Alcohol/week: 0.0 standard drinks    Drug use: No    Sexual activity: Never   Lifestyle    Physical activity     Days per week: None     Minutes per session: None    Stress: None   Relationships    Social connections     Talks on phone: None     Gets together: None     Attends [employer] service: None     Active member of club or organization: None     Attends meetings of clubs or organizations: None     Relationship status: None    Intimate partner violence     Fear of current or ex partner: None     Emotionally abused: None     Physically abused: None     Forced sexual activity: None   Other Topics Concern    None   Social History Narrative    None       Review of Systems   Constitutional: Positive for fatigue. HENT: Negative for congestion and postnasal drip. Eyes: Negative for discharge and itching.    Respiratory: Negative for cough and shortness of General: Abdomen is flat. Palpations: Abdomen is soft. Musculoskeletal: Normal range of motion. Skin:     General: Skin is warm and dry. Neurological:      General: No focal deficit present. Mental Status: She is alert and oriented to person, place, and time. Psychiatric:         Mood and Affect: Mood normal.         Behavior: Behavior normal.         Radiology: None    Assessment and Plan     Problem List        Pulmonary Problems    DILLON (obstructive sleep apnea)     Advised to be compliant with the CPAP  I'll send her for a mask fitting trial  Loose weight            Other    Morbid obesity with BMI of 45.0-49.9, adult (HCC)     Advised to loose weight with diet and exercise           Excessive daytime sleepiness     Advised to be compliant with the CPAP  Loose weight  Mask fitting trial  I need a 2 week download data           Ex-smoker     Advised to c/w quitting smoking                    Return in about 3 months (around 11/28/2020) for 2 week download data.      Progress notes sent to the referring Provider    Brent Jewell MD  8/28/2020  10:09 AM

## 2020-09-15 ENCOUNTER — TELEPHONE (OUTPATIENT)
Dept: PULMONOLOGY | Age: 55
End: 2020-09-15

## 2020-09-15 NOTE — TELEPHONE ENCOUNTER
Pt called to let me know that [facility] had taken her cpap but not all the supplies. I let her know that she owns the supplies but not the cpap yet. She would have to complete another sleep study in order to qualify for another cpap. She stated she would try to get along for now but if she needed to move up her appt up shes more than welcome.

## 2020-10-18 ENCOUNTER — APPOINTMENT (OUTPATIENT)
Dept: CT IMAGING | Age: 55
DRG: 230 | End: 2020-10-18
Payer: COMMERCIAL

## 2020-10-18 ENCOUNTER — ANESTHESIA EVENT (OUTPATIENT)
Dept: OPERATING ROOM | Age: 55
DRG: 230 | End: 2020-10-18
Payer: COMMERCIAL

## 2020-10-18 ENCOUNTER — ANESTHESIA (OUTPATIENT)
Dept: OPERATING ROOM | Age: 55
DRG: 230 | End: 2020-10-18
Payer: COMMERCIAL

## 2020-10-18 ENCOUNTER — APPOINTMENT (OUTPATIENT)
Dept: GENERAL RADIOLOGY | Age: 55
End: 2020-10-18
Payer: COMMERCIAL

## 2020-10-18 ENCOUNTER — HOSPITAL ENCOUNTER (EMERGENCY)
Age: 55
Discharge: ANOTHER HOSPITAL | End: 2020-10-18
Attending: EMERGENCY MEDICINE
Payer: COMMERCIAL

## 2020-10-18 ENCOUNTER — HOSPITAL ENCOUNTER (INPATIENT)
Age: 55
LOS: 9 days | Discharge: HOME | DRG: 230 | End: 2020-10-27
Attending: EMERGENCY MEDICINE | Admitting: SURGERY
Payer: COMMERCIAL

## 2020-10-18 VITALS
OXYGEN SATURATION: 100 % | BODY MASS INDEX: 42.69 KG/M2 | RESPIRATION RATE: 17 BRPM | HEART RATE: 73 BPM | WEIGHT: 272 LBS | SYSTOLIC BLOOD PRESSURE: 130 MMHG | HEIGHT: 67 IN | TEMPERATURE: 98.7 F | DIASTOLIC BLOOD PRESSURE: 95 MMHG

## 2020-10-18 VITALS
DIASTOLIC BLOOD PRESSURE: 73 MMHG | SYSTOLIC BLOOD PRESSURE: 135 MMHG | TEMPERATURE: 97.3 F | OXYGEN SATURATION: 99 % | RESPIRATION RATE: 9 BRPM

## 2020-10-18 PROBLEM — K63.1 PERFORATED BOWEL (HCC): Status: ACTIVE | Noted: 2020-10-18

## 2020-10-18 LAB
ALBUMIN SERPL-MCNC: 4.3 GM/DL (ref 3.4–5)
ALP BLD-CCNC: 75 IU/L (ref 40–129)
ALT SERPL-CCNC: 26 U/L (ref 10–40)
ANION GAP SERPL CALCULATED.3IONS-SCNC: 13 MMOL/L (ref 4–16)
ANION GAP SERPL CALCULATED.3IONS-SCNC: 15 MMOL/L (ref 4–16)
AST SERPL-CCNC: 21 IU/L (ref 15–37)
BACTERIA: NEGATIVE /HPF
BASE EXCESS MIXED: 2.3 (ref 0–2.3)
BASOPHILS ABSOLUTE: 0 K/CU MM
BASOPHILS RELATIVE PERCENT: 0.1 % (ref 0–1)
BILIRUB SERPL-MCNC: 0.7 MG/DL (ref 0–1)
BILIRUBIN URINE: NEGATIVE MG/DL
BLOOD, URINE: NEGATIVE
BUN BLDV-MCNC: 10 MG/DL (ref 6–23)
BUN BLDV-MCNC: 8 MG/DL (ref 6–23)
CALCIUM SERPL-MCNC: 8.6 MG/DL (ref 8.3–10.6)
CALCIUM SERPL-MCNC: 9.5 MG/DL (ref 8.3–10.6)
CHLORIDE BLD-SCNC: 100 MMOL/L (ref 99–110)
CHLORIDE BLD-SCNC: 99 MMOL/L (ref 99–110)
CLARITY: CLEAR
CO2: 22 MMOL/L (ref 21–32)
CO2: 27 MMOL/L (ref 21–32)
COLOR: YELLOW
COMMENT: ABNORMAL
CREAT SERPL-MCNC: 0.6 MG/DL (ref 0.6–1.1)
CREAT SERPL-MCNC: 0.7 MG/DL (ref 0.6–1.1)
DIFFERENTIAL TYPE: ABNORMAL
EOSINOPHILS ABSOLUTE: 0 K/CU MM
EOSINOPHILS RELATIVE PERCENT: 0.1 % (ref 0–3)
GFR AFRICAN AMERICAN: >60 ML/MIN/1.73M2
GFR AFRICAN AMERICAN: >60 ML/MIN/1.73M2
GFR NON-AFRICAN AMERICAN: >60 ML/MIN/1.73M2
GFR NON-AFRICAN AMERICAN: >60 ML/MIN/1.73M2
GLUCOSE BLD-MCNC: 126 MG/DL (ref 70–99)
GLUCOSE BLD-MCNC: 150 MG/DL (ref 70–99)
GLUCOSE BLD-MCNC: 151 MG/DL (ref 70–99)
GLUCOSE BLD-MCNC: 185 MG/DL (ref 70–99)
GLUCOSE, URINE: NEGATIVE MG/DL
HCO3 VENOUS: 23.9 MMOL/L (ref 19–25)
HCT VFR BLD CALC: 40.1 % (ref 37–47)
HEMOGLOBIN: 13.1 GM/DL (ref 12.5–16)
IMMATURE NEUTROPHIL %: 0.4 % (ref 0–0.43)
KETONES, URINE: ABNORMAL MG/DL
LACTATE: 1.1 MMOL/L (ref 0.4–2)
LACTATE: 2.8 MMOL/L (ref 0.4–2)
LACTIC ACID, SEPSIS: 1.3 MMOL/L (ref 0.5–1.9)
LACTIC ACID, SEPSIS: 1.5 MMOL/L (ref 0.5–1.9)
LEUKOCYTE ESTERASE, URINE: NEGATIVE
LIPASE: 21 IU/L (ref 13–60)
LYMPHOCYTES ABSOLUTE: 1.2 K/CU MM
LYMPHOCYTES RELATIVE PERCENT: 10.1 % (ref 24–44)
MAGNESIUM: 1.9 MG/DL (ref 1.8–2.4)
MCH RBC QN AUTO: 29.3 PG (ref 27–31)
MCHC RBC AUTO-ENTMCNC: 32.7 % (ref 32–36)
MCV RBC AUTO: 89.7 FL (ref 78–100)
MONOCYTES ABSOLUTE: 0.6 K/CU MM
MONOCYTES RELATIVE PERCENT: 5 % (ref 0–4)
MUCUS: ABNORMAL HPF
NITRITE URINE, QUANTITATIVE: NEGATIVE
O2 SAT, VEN: 95.5 % (ref 50–70)
PCO2, VEN: 28 MMHG (ref 38–52)
PDW BLD-RTO: 14 % (ref 11.7–14.9)
PH VENOUS: 7.54 (ref 7.32–7.42)
PH, URINE: 7 (ref 5–8)
PLATELET # BLD: 256 K/CU MM (ref 140–440)
PMV BLD AUTO: 10.9 FL (ref 7.5–11.1)
PO2, VEN: 224 MMHG (ref 28–48)
POTASSIUM SERPL-SCNC: 3.5 MMOL/L (ref 3.5–5.1)
POTASSIUM SERPL-SCNC: 3.6 MMOL/L (ref 3.5–5.1)
PROTEIN UA: NEGATIVE MG/DL
RBC # BLD: 4.47 M/CU MM (ref 4.2–5.4)
RBC URINE: 1 /HPF (ref 0–6)
SEGMENTED NEUTROPHILS ABSOLUTE COUNT: 9.6 K/CU MM
SEGMENTED NEUTROPHILS RELATIVE PERCENT: 84.3 % (ref 36–66)
SODIUM BLD-SCNC: 137 MMOL/L (ref 135–145)
SODIUM BLD-SCNC: 139 MMOL/L (ref 135–145)
SPECIFIC GRAVITY UA: >1.06 (ref 1–1.03)
SQUAMOUS EPITHELIAL: 1 /HPF
TOTAL IMMATURE NEUTOROPHIL: 0.04 K/CU MM
TOTAL PROTEIN: 7.2 GM/DL (ref 6.4–8.2)
TRICHOMONAS: ABNORMAL /HPF
TROPONIN T: <0.01 NG/ML
UROBILINOGEN, URINE: NORMAL MG/DL (ref 0.2–1)
WBC # BLD: 11.4 K/CU MM (ref 4–10.5)
WBC UA: 1 /HPF (ref 0–5)

## 2020-10-18 PROCEDURE — 96368 THER/DIAG CONCURRENT INF: CPT

## 2020-10-18 PROCEDURE — 7100000000 HC PACU RECOVERY - FIRST 15 MIN

## 2020-10-18 PROCEDURE — 80053 COMPREHEN METABOLIC PANEL: CPT

## 2020-10-18 PROCEDURE — 2000000000 HC ICU R&B

## 2020-10-18 PROCEDURE — 0DT80ZZ RESECTION OF SMALL INTESTINE, OPEN APPROACH: ICD-10-PCS | Performed by: SURGERY

## 2020-10-18 PROCEDURE — 6360000002 HC RX W HCPCS: Performed by: SURGERY

## 2020-10-18 PROCEDURE — 82805 BLOOD GASES W/O2 SATURATION: CPT

## 2020-10-18 PROCEDURE — 81001 URINALYSIS AUTO W/SCOPE: CPT

## 2020-10-18 PROCEDURE — 83605 ASSAY OF LACTIC ACID: CPT

## 2020-10-18 PROCEDURE — 2709999900 HC NON-CHARGEABLE SUPPLY: Performed by: SURGERY

## 2020-10-18 PROCEDURE — 83735 ASSAY OF MAGNESIUM: CPT

## 2020-10-18 PROCEDURE — 6370000000 HC RX 637 (ALT 250 FOR IP): Performed by: PHYSICIAN ASSISTANT

## 2020-10-18 PROCEDURE — 82962 GLUCOSE BLOOD TEST: CPT

## 2020-10-18 PROCEDURE — 96375 TX/PRO/DX INJ NEW DRUG ADDON: CPT

## 2020-10-18 PROCEDURE — 96365 THER/PROPH/DIAG IV INF INIT: CPT

## 2020-10-18 PROCEDURE — 3700000000 HC ANESTHESIA ATTENDED CARE: Performed by: SURGERY

## 2020-10-18 PROCEDURE — 80048 BASIC METABOLIC PNL TOTAL CA: CPT

## 2020-10-18 PROCEDURE — 2500000003 HC RX 250 WO HCPCS: Performed by: NURSE ANESTHETIST, CERTIFIED REGISTERED

## 2020-10-18 PROCEDURE — 87086 URINE CULTURE/COLONY COUNT: CPT

## 2020-10-18 PROCEDURE — 6360000002 HC RX W HCPCS: Performed by: NURSE ANESTHETIST, CERTIFIED REGISTERED

## 2020-10-18 PROCEDURE — 88307 TISSUE EXAM BY PATHOLOGIST: CPT | Performed by: PATHOLOGY

## 2020-10-18 PROCEDURE — 74177 CT ABD & PELVIS W/CONTRAST: CPT

## 2020-10-18 PROCEDURE — 2580000003 HC RX 258: Performed by: NURSE ANESTHETIST, CERTIFIED REGISTERED

## 2020-10-18 PROCEDURE — 02HV33Z INSERTION OF INFUSION DEVICE INTO SUPERIOR VENA CAVA, PERCUTANEOUS APPROACH: ICD-10-PCS | Performed by: SURGERY

## 2020-10-18 PROCEDURE — C1781 MESH (IMPLANTABLE): HCPCS | Performed by: SURGERY

## 2020-10-18 PROCEDURE — 94761 N-INVAS EAR/PLS OXIMETRY MLT: CPT

## 2020-10-18 PROCEDURE — 2500000003 HC RX 250 WO HCPCS: Performed by: EMERGENCY MEDICINE

## 2020-10-18 PROCEDURE — 0DN80ZZ RELEASE SMALL INTESTINE, OPEN APPROACH: ICD-10-PCS | Performed by: SURGERY

## 2020-10-18 PROCEDURE — 99285 EMERGENCY DEPT VISIT HI MDM: CPT

## 2020-10-18 PROCEDURE — 96367 TX/PROPH/DG ADDL SEQ IV INF: CPT

## 2020-10-18 PROCEDURE — 6360000004 HC RX CONTRAST MEDICATION: Performed by: EMERGENCY MEDICINE

## 2020-10-18 PROCEDURE — 3600000014 HC SURGERY LEVEL 4 ADDTL 15MIN: Performed by: SURGERY

## 2020-10-18 PROCEDURE — 93005 ELECTROCARDIOGRAM TRACING: CPT | Performed by: EMERGENCY MEDICINE

## 2020-10-18 PROCEDURE — 87186 SC STD MICRODIL/AGAR DIL: CPT

## 2020-10-18 PROCEDURE — 2500000003 HC RX 250 WO HCPCS: Performed by: SURGERY

## 2020-10-18 PROCEDURE — 3600000004 HC SURGERY LEVEL 4 BASE: Performed by: SURGERY

## 2020-10-18 PROCEDURE — 2580000003 HC RX 258: Performed by: SURGERY

## 2020-10-18 PROCEDURE — 2580000003 HC RX 258: Performed by: EMERGENCY MEDICINE

## 2020-10-18 PROCEDURE — 87077 CULTURE AEROBIC IDENTIFY: CPT

## 2020-10-18 PROCEDURE — 74022 RADEX COMPL AQT ABD SERIES: CPT

## 2020-10-18 PROCEDURE — 83690 ASSAY OF LIPASE: CPT

## 2020-10-18 PROCEDURE — 94640 AIRWAY INHALATION TREATMENT: CPT

## 2020-10-18 PROCEDURE — 6360000002 HC RX W HCPCS: Performed by: EMERGENCY MEDICINE

## 2020-10-18 PROCEDURE — C9113 INJ PANTOPRAZOLE SODIUM, VIA: HCPCS | Performed by: SURGERY

## 2020-10-18 PROCEDURE — 3700000001 HC ADD 15 MINUTES (ANESTHESIA): Performed by: SURGERY

## 2020-10-18 PROCEDURE — 6370000000 HC RX 637 (ALT 250 FOR IP): Performed by: SURGERY

## 2020-10-18 PROCEDURE — 2720000010 HC SURG SUPPLY STERILE: Performed by: SURGERY

## 2020-10-18 PROCEDURE — 93010 ELECTROCARDIOGRAM REPORT: CPT | Performed by: INTERNAL MEDICINE

## 2020-10-18 PROCEDURE — 96366 THER/PROPH/DIAG IV INF ADDON: CPT

## 2020-10-18 PROCEDURE — 85025 COMPLETE CBC W/AUTO DIFF WBC: CPT

## 2020-10-18 PROCEDURE — 84484 ASSAY OF TROPONIN QUANT: CPT

## 2020-10-18 PROCEDURE — 0WPF0JZ REMOVAL OF SYNTHETIC SUBSTITUTE FROM ABDOMINAL WALL, OPEN APPROACH: ICD-10-PCS | Performed by: SURGERY

## 2020-10-18 DEVICE — MESH HERN W30XL30CM POLYGLACTIN 910 WVN KNIT VCRL: Type: IMPLANTABLE DEVICE | Site: ABDOMEN | Status: FUNCTIONAL

## 2020-10-18 RX ORDER — DEXTROSE MONOHYDRATE 25 G/50ML
12.5 INJECTION, SOLUTION INTRAVENOUS PRN
Status: DISCONTINUED | OUTPATIENT
Start: 2020-10-18 | End: 2020-10-27 | Stop reason: HOSPADM

## 2020-10-18 RX ORDER — PANTOPRAZOLE SODIUM 40 MG/10ML
40 INJECTION, POWDER, LYOPHILIZED, FOR SOLUTION INTRAVENOUS DAILY
Status: DISCONTINUED | OUTPATIENT
Start: 2020-10-18 | End: 2020-10-22

## 2020-10-18 RX ORDER — 0.9 % SODIUM CHLORIDE 0.9 %
1000 INTRAVENOUS SOLUTION INTRAVENOUS ONCE
Status: DISCONTINUED | OUTPATIENT
Start: 2020-10-18 | End: 2020-10-18 | Stop reason: HOSPADM

## 2020-10-18 RX ORDER — MONTELUKAST SODIUM 10 MG/1
10 TABLET ORAL DAILY
Status: DISCONTINUED | OUTPATIENT
Start: 2020-10-18 | End: 2020-10-27 | Stop reason: HOSPADM

## 2020-10-18 RX ORDER — ONDANSETRON 2 MG/ML
4 INJECTION INTRAMUSCULAR; INTRAVENOUS EVERY 6 HOURS PRN
Status: DISCONTINUED | OUTPATIENT
Start: 2020-10-18 | End: 2020-10-27 | Stop reason: HOSPADM

## 2020-10-18 RX ORDER — ROCURONIUM BROMIDE 10 MG/ML
INJECTION, SOLUTION INTRAVENOUS PRN
Status: DISCONTINUED | OUTPATIENT
Start: 2020-10-18 | End: 2020-10-18 | Stop reason: SDUPTHER

## 2020-10-18 RX ORDER — SODIUM CHLORIDE, SODIUM LACTATE, POTASSIUM CHLORIDE, CALCIUM CHLORIDE 600; 310; 30; 20 MG/100ML; MG/100ML; MG/100ML; MG/100ML
INJECTION, SOLUTION INTRAVENOUS CONTINUOUS PRN
Status: DISCONTINUED | OUTPATIENT
Start: 2020-10-18 | End: 2020-10-18 | Stop reason: SDUPTHER

## 2020-10-18 RX ORDER — LORAZEPAM 2 MG/ML
0.5 INJECTION INTRAMUSCULAR EVERY 6 HOURS PRN
Status: DISCONTINUED | OUTPATIENT
Start: 2020-10-18 | End: 2020-10-27 | Stop reason: HOSPADM

## 2020-10-18 RX ORDER — HYDROMORPHONE HCL 110MG/55ML
PATIENT CONTROLLED ANALGESIA SYRINGE INTRAVENOUS PRN
Status: DISCONTINUED | OUTPATIENT
Start: 2020-10-18 | End: 2020-10-18 | Stop reason: SDUPTHER

## 2020-10-18 RX ORDER — FENTANYL CITRATE 50 UG/ML
INJECTION, SOLUTION INTRAMUSCULAR; INTRAVENOUS PRN
Status: DISCONTINUED | OUTPATIENT
Start: 2020-10-18 | End: 2020-10-18 | Stop reason: SDUPTHER

## 2020-10-18 RX ORDER — ALBUTEROL SULFATE 90 UG/1
2 AEROSOL, METERED RESPIRATORY (INHALATION) EVERY 6 HOURS PRN
Status: DISCONTINUED | OUTPATIENT
Start: 2020-10-18 | End: 2020-10-27 | Stop reason: HOSPADM

## 2020-10-18 RX ORDER — LIDOCAINE HYDROCHLORIDE 20 MG/ML
INJECTION, SOLUTION INTRAVENOUS PRN
Status: DISCONTINUED | OUTPATIENT
Start: 2020-10-18 | End: 2020-10-18 | Stop reason: SDUPTHER

## 2020-10-18 RX ORDER — LOSARTAN POTASSIUM 50 MG/1
100 TABLET ORAL DAILY
Status: DISCONTINUED | OUTPATIENT
Start: 2020-10-18 | End: 2020-10-18

## 2020-10-18 RX ORDER — ONDANSETRON 2 MG/ML
INJECTION INTRAMUSCULAR; INTRAVENOUS PRN
Status: DISCONTINUED | OUTPATIENT
Start: 2020-10-18 | End: 2020-10-18 | Stop reason: SDUPTHER

## 2020-10-18 RX ORDER — LISINOPRIL 10 MG/1
5 TABLET ORAL DAILY
Status: DISCONTINUED | OUTPATIENT
Start: 2020-10-18 | End: 2020-10-18

## 2020-10-18 RX ORDER — PROPOFOL 10 MG/ML
INJECTION, EMULSION INTRAVENOUS PRN
Status: DISCONTINUED | OUTPATIENT
Start: 2020-10-18 | End: 2020-10-18 | Stop reason: SDUPTHER

## 2020-10-18 RX ORDER — HYDROMORPHONE HCL 110MG/55ML
1 PATIENT CONTROLLED ANALGESIA SYRINGE INTRAVENOUS
Status: DISCONTINUED | OUTPATIENT
Start: 2020-10-18 | End: 2020-10-18

## 2020-10-18 RX ORDER — DEXTROSE MONOHYDRATE 50 MG/ML
100 INJECTION, SOLUTION INTRAVENOUS PRN
Status: DISCONTINUED | OUTPATIENT
Start: 2020-10-18 | End: 2020-10-27 | Stop reason: HOSPADM

## 2020-10-18 RX ORDER — ONDANSETRON 2 MG/ML
4 INJECTION INTRAMUSCULAR; INTRAVENOUS EVERY 30 MIN PRN
Status: DISCONTINUED | OUTPATIENT
Start: 2020-10-18 | End: 2020-10-18 | Stop reason: HOSPADM

## 2020-10-18 RX ORDER — 0.9 % SODIUM CHLORIDE 0.9 %
500 INTRAVENOUS SOLUTION INTRAVENOUS ONCE
Status: COMPLETED | OUTPATIENT
Start: 2020-10-18 | End: 2020-10-18

## 2020-10-18 RX ORDER — SODIUM CHLORIDE, SODIUM LACTATE, POTASSIUM CHLORIDE, CALCIUM CHLORIDE 600; 310; 30; 20 MG/100ML; MG/100ML; MG/100ML; MG/100ML
INJECTION, SOLUTION INTRAVENOUS CONTINUOUS
Status: DISCONTINUED | OUTPATIENT
Start: 2020-10-18 | End: 2020-10-21

## 2020-10-18 RX ORDER — KETOROLAC TROMETHAMINE 30 MG/ML
30 INJECTION, SOLUTION INTRAMUSCULAR; INTRAVENOUS EVERY 6 HOURS PRN
Status: DISPENSED | OUTPATIENT
Start: 2020-10-18 | End: 2020-10-23

## 2020-10-18 RX ORDER — SODIUM CHLORIDE 0.9 % (FLUSH) 0.9 %
10 SYRINGE (ML) INJECTION 2 TIMES DAILY
Status: DISCONTINUED | OUTPATIENT
Start: 2020-10-18 | End: 2020-10-18

## 2020-10-18 RX ORDER — NICOTINE POLACRILEX 4 MG
15 LOZENGE BUCCAL PRN
Status: DISCONTINUED | OUTPATIENT
Start: 2020-10-18 | End: 2020-10-27 | Stop reason: HOSPADM

## 2020-10-18 RX ORDER — DIAPER,BRIEF,INFANT-TODD,DISP
EACH MISCELLANEOUS 2 TIMES DAILY
Status: DISCONTINUED | OUTPATIENT
Start: 2020-10-19 | End: 2020-10-27 | Stop reason: HOSPADM

## 2020-10-18 RX ORDER — FEXOFENADINE HYDROCHLORIDE 60 MG/1
60 TABLET, FILM COATED ORAL EVERY 12 HOURS PRN
COMMUNITY
End: 2022-03-04

## 2020-10-18 RX ADMIN — INSULIN LISPRO 1 UNITS: 100 INJECTION, SOLUTION INTRAVENOUS; SUBCUTANEOUS at 20:34

## 2020-10-18 RX ADMIN — SODIUM CHLORIDE 500 ML: 9 INJECTION, SOLUTION INTRAVENOUS at 05:53

## 2020-10-18 RX ADMIN — LORAZEPAM 0.5 MG: 2 INJECTION INTRAMUSCULAR; INTRAVENOUS at 20:21

## 2020-10-18 RX ADMIN — HYDROMORPHONE HYDROCHLORIDE 1 MG: 1 INJECTION, SOLUTION INTRAMUSCULAR; INTRAVENOUS; SUBCUTANEOUS at 20:21

## 2020-10-18 RX ADMIN — METRONIDAZOLE 500 MG: 500 INJECTION, SOLUTION INTRAVENOUS at 23:19

## 2020-10-18 RX ADMIN — LIDOCAINE HYDROCHLORIDE 40 MG: 20 INJECTION, SOLUTION INTRAVENOUS at 15:15

## 2020-10-18 RX ADMIN — SODIUM CHLORIDE, POTASSIUM CHLORIDE, SODIUM LACTATE AND CALCIUM CHLORIDE: 600; 310; 30; 20 INJECTION, SOLUTION INTRAVENOUS at 15:07

## 2020-10-18 RX ADMIN — SODIUM CHLORIDE, PRESERVATIVE FREE 10 ML: 5 INJECTION INTRAVENOUS at 05:48

## 2020-10-18 RX ADMIN — LIDOCAINE HYDROCHLORIDE 60 MG: 20 INJECTION, SOLUTION INTRAVENOUS at 15:10

## 2020-10-18 RX ADMIN — HYDROMORPHONE HYDROCHLORIDE 0.5 MG: 2 INJECTION INTRAMUSCULAR; INTRAVENOUS; SUBCUTANEOUS at 17:33

## 2020-10-18 RX ADMIN — PROPOFOL 20 MG: 10 INJECTION, EMULSION INTRAVENOUS at 15:10

## 2020-10-18 RX ADMIN — METRONIDAZOLE 500 MG: 500 INJECTION, SOLUTION INTRAVENOUS at 05:53

## 2020-10-18 RX ADMIN — ONDANSETRON 4 MG: 2 INJECTION INTRAMUSCULAR; INTRAVENOUS at 03:05

## 2020-10-18 RX ADMIN — ROCURONIUM BROMIDE 100 MG: 10 INJECTION INTRAVENOUS at 15:15

## 2020-10-18 RX ADMIN — METRONIDAZOLE 500 MG: 500 INJECTION, SOLUTION INTRAVENOUS at 15:20

## 2020-10-18 RX ADMIN — HYDROMORPHONE HYDROCHLORIDE 1 MG: 1 INJECTION, SOLUTION INTRAMUSCULAR; INTRAVENOUS; SUBCUTANEOUS at 23:19

## 2020-10-18 RX ADMIN — ONDANSETRON 4 MG: 2 INJECTION INTRAMUSCULAR; INTRAVENOUS at 02:17

## 2020-10-18 RX ADMIN — HYDROMORPHONE HYDROCHLORIDE 0.5 MG: 2 INJECTION INTRAMUSCULAR; INTRAVENOUS; SUBCUTANEOUS at 18:06

## 2020-10-18 RX ADMIN — TIOTROPIUM BROMIDE INHALATION SPRAY 2 PUFF: 3.12 SPRAY, METERED RESPIRATORY (INHALATION) at 21:55

## 2020-10-18 RX ADMIN — CEFOXITIN SODIUM 1 G: 1 POWDER, FOR SOLUTION INTRAVENOUS at 03:05

## 2020-10-18 RX ADMIN — ONDANSETRON 4 MG: 2 INJECTION INTRAMUSCULAR; INTRAVENOUS at 04:34

## 2020-10-18 RX ADMIN — FENTANYL CITRATE 100 MCG: 50 INJECTION INTRAMUSCULAR; INTRAVENOUS at 15:34

## 2020-10-18 RX ADMIN — HYDROMORPHONE HYDROCHLORIDE 1 MG: 2 INJECTION, SOLUTION INTRAMUSCULAR; INTRAVENOUS; SUBCUTANEOUS at 07:54

## 2020-10-18 RX ADMIN — SODIUM CHLORIDE, POTASSIUM CHLORIDE, SODIUM LACTATE AND CALCIUM CHLORIDE: 600; 310; 30; 20 INJECTION, SOLUTION INTRAVENOUS at 16:46

## 2020-10-18 RX ADMIN — ONDANSETRON 4 MG: 2 INJECTION INTRAMUSCULAR; INTRAVENOUS at 17:38

## 2020-10-18 RX ADMIN — CEFEPIME 2 G: 2 INJECTION, POWDER, FOR SOLUTION INTRAVENOUS at 20:21

## 2020-10-18 RX ADMIN — PROPOFOL 150 MG: 10 INJECTION, EMULSION INTRAVENOUS at 15:15

## 2020-10-18 RX ADMIN — ALBUTEROL SULFATE 2 PUFF: 90 AEROSOL, METERED RESPIRATORY (INHALATION) at 21:55

## 2020-10-18 RX ADMIN — ROCURONIUM BROMIDE 10 MG: 10 INJECTION INTRAVENOUS at 16:34

## 2020-10-18 RX ADMIN — PANTOPRAZOLE SODIUM 40 MG: 40 INJECTION, POWDER, FOR SOLUTION INTRAVENOUS at 10:56

## 2020-10-18 RX ADMIN — CEFEPIME 2 G: 2 INJECTION, POWDER, FOR SOLUTION INTRAVENOUS at 07:54

## 2020-10-18 RX ADMIN — SUGAMMADEX 200 MG: 100 INJECTION, SOLUTION INTRAVENOUS at 18:06

## 2020-10-18 RX ADMIN — SODIUM CHLORIDE, POTASSIUM CHLORIDE, SODIUM LACTATE AND CALCIUM CHLORIDE: 600; 310; 30; 20 INJECTION, SOLUTION INTRAVENOUS at 07:54

## 2020-10-18 RX ADMIN — IOPAMIDOL 80 ML: 755 INJECTION, SOLUTION INTRAVENOUS at 05:47

## 2020-10-18 ASSESSMENT — PULMONARY FUNCTION TESTS
PIF_VALUE: 20
PIF_VALUE: 1
PIF_VALUE: 20
PIF_VALUE: 20
PIF_VALUE: 21
PIF_VALUE: 20
PIF_VALUE: 20
PIF_VALUE: 22
PIF_VALUE: 0
PIF_VALUE: 23
PIF_VALUE: 1
PIF_VALUE: 22
PIF_VALUE: 21
PIF_VALUE: 22
PIF_VALUE: 4
PIF_VALUE: 22
PIF_VALUE: 22
PIF_VALUE: 20
PIF_VALUE: 20
PIF_VALUE: 21
PIF_VALUE: 23
PIF_VALUE: 20
PIF_VALUE: 20
PIF_VALUE: 21
PIF_VALUE: 20
PIF_VALUE: 20
PIF_VALUE: 22
PIF_VALUE: 21
PIF_VALUE: 20
PIF_VALUE: 18
PIF_VALUE: 22
PIF_VALUE: 20
PIF_VALUE: 20
PIF_VALUE: 22
PIF_VALUE: 20
PIF_VALUE: 1
PIF_VALUE: 19
PIF_VALUE: 20
PIF_VALUE: 20
PIF_VALUE: 21
PIF_VALUE: 20
PIF_VALUE: 21
PIF_VALUE: 20
PIF_VALUE: 22
PIF_VALUE: 21
PIF_VALUE: 20
PIF_VALUE: 1
PIF_VALUE: 20
PIF_VALUE: 22
PIF_VALUE: 20
PIF_VALUE: 21
PIF_VALUE: 20
PIF_VALUE: 22
PIF_VALUE: 22
PIF_VALUE: 20
PIF_VALUE: 22
PIF_VALUE: 21
PIF_VALUE: 20
PIF_VALUE: 21
PIF_VALUE: 21
PIF_VALUE: 1
PIF_VALUE: 22
PIF_VALUE: 0
PIF_VALUE: 22
PIF_VALUE: 21
PIF_VALUE: 19
PIF_VALUE: 1
PIF_VALUE: 20
PIF_VALUE: 21
PIF_VALUE: 21
PIF_VALUE: 20
PIF_VALUE: 20
PIF_VALUE: 22
PIF_VALUE: 20
PIF_VALUE: 20
PIF_VALUE: 21
PIF_VALUE: 20
PIF_VALUE: 0
PIF_VALUE: 21
PIF_VALUE: 20
PIF_VALUE: 20
PIF_VALUE: 21
PIF_VALUE: 22
PIF_VALUE: 21
PIF_VALUE: 21
PIF_VALUE: 22
PIF_VALUE: 20
PIF_VALUE: 20
PIF_VALUE: 21
PIF_VALUE: 20
PIF_VALUE: 21
PIF_VALUE: 20
PIF_VALUE: 20
PIF_VALUE: 23
PIF_VALUE: 20
PIF_VALUE: 21
PIF_VALUE: 22
PIF_VALUE: 23
PIF_VALUE: 18
PIF_VALUE: 0
PIF_VALUE: 22
PIF_VALUE: 20
PIF_VALUE: 20
PIF_VALUE: 22
PIF_VALUE: 21
PIF_VALUE: 21
PIF_VALUE: 20
PIF_VALUE: 20
PIF_VALUE: 23
PIF_VALUE: 22
PIF_VALUE: 21
PIF_VALUE: 20
PIF_VALUE: 22
PIF_VALUE: 20
PIF_VALUE: 22
PIF_VALUE: 21
PIF_VALUE: 20
PIF_VALUE: 24
PIF_VALUE: 20
PIF_VALUE: 22
PIF_VALUE: 21
PIF_VALUE: 20
PIF_VALUE: 21
PIF_VALUE: 17
PIF_VALUE: 22
PIF_VALUE: 21
PIF_VALUE: 20
PIF_VALUE: 20
PIF_VALUE: 21
PIF_VALUE: 20
PIF_VALUE: 22
PIF_VALUE: 20
PIF_VALUE: 20
PIF_VALUE: 22
PIF_VALUE: 21
PIF_VALUE: 20
PIF_VALUE: 1
PIF_VALUE: 20
PIF_VALUE: 20
PIF_VALUE: 19
PIF_VALUE: 20
PIF_VALUE: 20
PIF_VALUE: 21
PIF_VALUE: 20
PIF_VALUE: 21
PIF_VALUE: 1
PIF_VALUE: 20
PIF_VALUE: 20
PIF_VALUE: 22
PIF_VALUE: 20
PIF_VALUE: 21
PIF_VALUE: 22

## 2020-10-18 ASSESSMENT — PAIN SCALES - GENERAL
PAINLEVEL_OUTOF10: 8
PAINLEVEL_OUTOF10: 8
PAINLEVEL_OUTOF10: 7
PAINLEVEL_OUTOF10: 7
PAINLEVEL_OUTOF10: 8

## 2020-10-18 ASSESSMENT — ENCOUNTER SYMPTOMS
SHORTNESS OF BREATH: 0
NAUSEA: 1
CHEST TIGHTNESS: 0
BELCHING: 1
EYE ITCHING: 0
APNEA: 0
CONSTIPATION: 1
EYES NEGATIVE: 1
RECTAL PAIN: 0
FACIAL SWELLING: 0
EYE PAIN: 0
DIARRHEA: 0
EYE DISCHARGE: 0
HEMATEMESIS: 0
CHOKING: 0
HEMATOCHEZIA: 0
SINUS PAIN: 0
BACK PAIN: 0
EYE REDNESS: 0
RESPIRATORY NEGATIVE: 1
WHEEZING: 0
ABDOMINAL PAIN: 1
ABDOMINAL DISTENTION: 1
SINUS PRESSURE: 0
TROUBLE SWALLOWING: 0
PHOTOPHOBIA: 0
STRIDOR: 0
COUGH: 0
VOMITING: 1
BLOOD IN STOOL: 0
RHINORRHEA: 0
VOICE CHANGE: 0
COLOR CHANGE: 0
SHORTNESS OF BREATH: 1

## 2020-10-18 ASSESSMENT — PAIN DESCRIPTION - ONSET
ONSET: ON-GOING
ONSET: ON-GOING
ONSET: PROGRESSIVE
ONSET: ON-GOING

## 2020-10-18 ASSESSMENT — PAIN DESCRIPTION - PROGRESSION
CLINICAL_PROGRESSION: NOT CHANGED
CLINICAL_PROGRESSION: GRADUALLY WORSENING
CLINICAL_PROGRESSION: NOT CHANGED
CLINICAL_PROGRESSION: NOT CHANGED

## 2020-10-18 ASSESSMENT — PAIN DESCRIPTION - PAIN TYPE
TYPE: ACUTE PAIN
TYPE: SURGICAL PAIN;ACUTE PAIN
TYPE: SURGICAL PAIN;ACUTE PAIN
TYPE: ACUTE PAIN

## 2020-10-18 ASSESSMENT — PAIN DESCRIPTION - ORIENTATION
ORIENTATION: MID
ORIENTATION: MID
ORIENTATION: UPPER
ORIENTATION: MID

## 2020-10-18 ASSESSMENT — PAIN DESCRIPTION - DESCRIPTORS
DESCRIPTORS: ACHING
DESCRIPTORS: CONSTANT;SORE
DESCRIPTORS: CONSTANT;SORE
DESCRIPTORS: SHARP

## 2020-10-18 ASSESSMENT — PAIN DESCRIPTION - FREQUENCY
FREQUENCY: CONTINUOUS

## 2020-10-18 ASSESSMENT — PAIN - FUNCTIONAL ASSESSMENT
PAIN_FUNCTIONAL_ASSESSMENT: PREVENTS OR INTERFERES SOME ACTIVE ACTIVITIES AND ADLS
PAIN_FUNCTIONAL_ASSESSMENT: PREVENTS OR INTERFERES SOME ACTIVE ACTIVITIES AND ADLS

## 2020-10-18 ASSESSMENT — PAIN DESCRIPTION - LOCATION
LOCATION: ABDOMEN

## 2020-10-18 NOTE — ED NOTES
Patient in bed. No signs of distress.       Nando Garsia, RN  10/18/20 [address], RN  10/18/20 0051

## 2020-10-18 NOTE — ANESTHESIA PROCEDURE NOTES
Central Venous Line:    A central venous line was placed using ultrasound guidance, in the OR for the following indication(s): central venous access and Requested by Dr. Sylvester Spurling. 10/18/2020 5:45 PM10/18/2020 6:00 PM    Sterility preparation included the following: hand hygiene performed prior to procedure, maximum sterile barriers used and sterile technique used to drape from head to toe. The patient was placed in Trendelenburg position. The right internal jugular vein was prepped. The site was prepped with Chloraprep.20 (length), triple lumen was placed. During the procedure, the following specific steps were taken: target vein identified, needle advanced into vein and blood aspirated and guidewire advanced into vein. Intravenous verification was obtained by ultrasound, venous blood return and CXR ordered in ICU. Post insertion care included: all ports aspirated, all ports flushed easily, guidewire removed intact, Biopatch applied, line sutured in place and dressing applied. During the procedure the patient experienced: patient tolerated procedure well with no complications and EBL < 5mL.       Insertion site scrubbed per usage guidelines?: Yes  Skin prep agent dried for 3 minutes prior to procedure?:yes  Anesthesia type: general..No  Staffing  Anesthesiologist: Rhonda Kent MD  Resident/CRNA: JULIA Holden - CRNA  Performed: Resident/CRNA   Preanesthetic Checklist  Completed: patient identified, site marked, surgical consent, pre-op evaluation, timeout performed, IV checked, risks and benefits discussed, monitors and equipment checked, anesthesia consent given, oxygen available and patient being monitored

## 2020-10-18 NOTE — ANESTHESIA PRE PROCEDURE
LORazepam (ATIVAN) injection 0.5 mg  0.5 mg Intravenous Q6H PRN Matthew Beckham MD        insulin lispro (HUMALOG) injection vial 0-6 Units  0-6 Units Subcutaneous TID WC Matthew Beckham MD        insulin lispro (HUMALOG) injection vial 0-3 Units  0-3 Units Subcutaneous Nightly Matthew Beckham MD           Allergies: Allergies   Allergen Reactions    Latex      \"Redness\"    Codeine      \"I Don't Remember The Reaction , I Was A Child\"    Darvon [Propoxyphene]      \"I Don't Remember The Reaction, I Was A Child\"    Pcn [Penicillins]      \"I Don't Remember The Reaction, I Was A Child\"       Problem List:    Patient Active Problem List   Diagnosis Code    Incisional hernia, incarcerated K43.0    Depression F32.9    Full dentures Z97.2, K08.109    Curvature of spine M43.9    Malignant neoplasm of upper-outer quadrant of right female breast ([facility] [address] C50.411    Seroma, postoperative [id number]    WD-Late effect of radiation T66. XXXS    RLQ abdominal pain R10.31    Diarrhea R19.7    Nausea R11.0    Gastroesophageal reflux disease without esophagitis K21.9    Pharyngoesophageal dysphagia R13.14    Unilateral recurrent inguinal hernia without obstruction or gangrene K40.91    Morbid obesity with BMI of 45.0-49.9, adult (HCC) E66.01, Z68.42    Recurrent ventral hernia with incarceration K43.0    H/O ventral hernia Z87.19    Sepsis ([facility]) A41.9    Septic shock ([facility]) A41.9, R65.21    Infected hernioplasty mesh ([facility] [address] T85.79XA    Kidney stone N20.0    S/P recurrent ventral herniorrhaphy Z98.890, Z87.19    Right ureteral stone N20.1    S/P ventral herniorrhaphy Z98.890, Z87.19    Skin granuloma L92.9    Abnormality of right breast on screening mammogram R92.8    Near syncope R55    Abnormal ECG R94.31    Dyspnea on exertion R06.00    Family history of sudden death Z80.80    Hyperlipidemia E78.5    DILLON (obstructive sleep apnea) G47.33    Excessive daytime sleepiness G47.19    Ex-smoker Z87.891    when last seizure but as a kid\"    Sepsis [facility])     per old chart - admitted 6/2018 with sepsis secondary to wound infection    Shortness of breath 2/4/2020    Sleep apnea     Has CPAP- last sleep study 2-3 yrs ago\"    SOB (shortness of breath) on exertion 1/29/2019    Thyroid disease     \"Thyroid Problems As A Child\"    Urinary incontinence     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    WD-Wound of right breast 10/28/2016    Wears glasses        Past Surgical History:        Procedure Laterality Date    ABDOMEN SURGERY  06/2018    per old chart pt had exploratory abd wound with drainage of infected seroma with wound vac    BREAST BIOPSY Right 2015    Right Breast Cancer , \"I Have A Chip In The Breast\"    BREAST BIOPSY Right 2017    Benign    BREAST LUMPECTOMY Right 08/2015    Right Breast Cancer    CHOLECYSTECTOMY, LAPAROSCOPIC  2007    COLONOSCOPY  2016    dr green 1 polyp    COLONOSCOPY  3/2019; 04/12/2017    3/2019 WNL; 4/2017 Internal hemorroids, diverticulosis    CYSTOSCOPY  2016    Kidney Stones    CYSTOSCOPY      per old chart with admission 6/2018 pt had cysto, right RGPG and attempted to insert right ureteral stent    CYSTOSCOPY  07/03/2018    cysto, right stent removal, right ureteroscopy, laser litho    DENTAL SURGERY      All Teeth Extracted In Past    ENDOSCOPY, COLON, DIAGNOSTIC  04/12/2017    EGD: Dilatation, Hiatal hernia, mild gastritis    HERNIA REPAIR  2007    Abdominal Hernia Repair With Mesh    HERNIA REPAIR  2012    Ventral Hernia Repair With Mesh    HYSTERECTOMY, TOTAL ABDOMINAL  2006    LAPAROSCOPY  2005    Laparoscopy, D & C, Hysteroscopy    LYMPH NODE DISSECTION      2015    OTHER SURGICAL HISTORY  05/22/2018    Laparoscopic-converted to open ventral hernia repair with mesh/ XU    OTHER SURGICAL HISTORY      per old chart with admission 6/20180- in IR 6/11/2018- had right perc. nephrostomy with cath placement\"took the tube out before I left the hospital\"    TONSILLECTOMY  's       Social History:    Social History     Tobacco Use    Smoking status: Former Smoker     Packs/day: 0.25     Years: 5.00     Pack years: 1.25     Types: Cigarettes     Start date:      Last attempt to quit:      Years since quittin.8    Smokeless tobacco: Never Used    Tobacco comment: \"I Only Smoked Socially When I Quit Smoking\"   Substance Use Topics    Alcohol use: No     Alcohol/week: 0.0 standard drinks                                Counseling given: Not Answered  Comment: \"I Only Smoked Socially When I Quit Smoking\"      Vital Signs (Current):   Vitals:    10/18/20 1000 10/18/20 1100 10/18/20 1200 10/18/20 1300   BP: 132/75 127/60 137/73 133/72   Pulse: 82 79 65 73   Resp: 17 17 17 16   Temp:       TempSrc:       SpO2: 93% 95% 94% 96%   Weight:                                                  BP Readings from Last 3 Encounters:   10/18/20 133/72   10/18/20 (!) 130/95   20 120/80       NPO Status:                                                                                 BMI:   Wt Readings from Last 3 Encounters:   10/18/20 257 lb 11.5 oz (116.9 kg)   10/18/20 272 lb (123.4 kg)   20 272 lb (123.4 kg)     Body mass index is 40.36 kg/m².     CBC:   Lab Results   Component Value Date    WBC 11.4 10/18/2020    RBC 4.47 10/18/2020    RBC 4.08 2017    HGB 13.1 10/18/2020    HCT 40.1 10/18/2020    MCV 89.7 10/18/2020    RDW 14.0 10/18/2020     10/18/2020       CMP:   Lab Results   Component Value Date     10/18/2020    K 3.6 10/18/2020     10/18/2020    CO2 22 10/18/2020    BUN 8 10/18/2020    CREATININE 0.6 10/18/2020    GFRAA >60 10/18/2020    LABGLOM >60 10/18/2020    GLUCOSE 150 10/18/2020    PROT 7.2 10/18/2020    PROT 7.2 10/01/2012    CALCIUM 8.6 10/18/2020    BILITOT 0.7 10/18/2020    ALKPHOS 75 10/18/2020    AST 21 10/18/2020    ALT 26 10/18/2020       POC Tests:   Recent Labs     10/18/20  1230   POCGLU 126*       Coags: Lab Results   Component Value Date    PROTIME 14.5 06/11/2018    INR 1.27 06/11/2018    APTT 31.1 06/11/2018       HCG (If Applicable): No results found for: PREGTESTUR, PREGSERUM, HCG, HCGQUANT     ABGs: No results found for: PHART, PO2ART, NKD9TOQ, HJT0WYW, BEART, U4KOLYRE     Type & Screen (If Applicable):  No results found for: LABABO, LABRH    Drug/Infectious Status (If Applicable):  No results found for: HIV, HEPCAB    COVID-19 Screening (If Applicable): No results found for: COVID19      Anesthesia Evaluation    Airway: Mallampati: II  TM distance: >3 FB   Neck ROM: full  Mouth opening: > = 3 FB Dental:          Pulmonary:   (+) COPD:  shortness of breath:  sleep apnea:  decreased breath sounds,  wheezes,  asthma:                            Cardiovascular:    (+) hypertension:, PALAFOX:,         Rhythm: regular  Rate: normal                    Neuro/Psych:   (+) headaches:, psychiatric history:depression/anxiety             GI/Hepatic/Renal:   (+) GERD:,           Endo/Other:    (+) DiabetesType II DM, using insulin, malignancy/cancer. Abdominal:           Vascular:                                        Anesthesia Plan      general     ASA 3     (Central line placement requested by surgeon. Possible postop vent.)  Induction: rapid sequence and intravenous. central line  MIPS: Postoperative opioids intended and Prophylactic antiemetics administered. Anesthetic plan and risks discussed with patient. Plan discussed with CRNA.                   Amanda Lam MD   10/18/2020

## 2020-10-18 NOTE — ED NOTES
Pt's family notified of pt being transferred to [facility].      Pam Beckett RN  10/18/20 0656

## 2020-10-18 NOTE — ED TRIAGE NOTES
Patient to the ED via [facility] from [facility] for CT scan for possible bowel obstruction. Patient complaining of abdominal pain, 8/10. Patient has 20g IV to right AC with NS bolus running on arrival. Denies any N/V/D.

## 2020-10-18 NOTE — ANESTHESIA POSTPROCEDURE EVALUATION
Department of Anesthesiology  Postprocedure Note    Patient: Raman Santa  MRN: [id number]  YOB: 1965  Date of evaluation: 10/18/2020  Time:  6:29 PM     Procedure Summary     Date:  10/18/20 Room / Location:  [place] [address] / [place]    Anesthesia Start:  1507 Anesthesia Stop:  1822    Procedure:  LAPAROTOMY EXPLORATORY WITH SMALL BOWEL RESECTION, REMOVAL OF PROSTHETIC MESH, AND REPAIR OF ABDOMINAL WALL HERNIA WITH VICRYL MESH (N/A Abdomen) Diagnosis:  (perforated bowel)    Surgeon:  David Quintana MD Responsible Provider:  JULIA Mon CRNA    Anesthesia Type:  general ASA Status:  3          Anesthesia Type: general    Maxwell Phase I:      Maxwell Phase II:      Last vitals: Reviewed and per EMR flowsheets.        Anesthesia Post Evaluation    Patient location during evaluation: ICU  Patient participation: complete - patient participated  Level of consciousness: awake and alert  Airway patency: patent  Nausea & Vomiting: no vomiting and no nausea  Complications: no  Cardiovascular status: blood pressure returned to baseline and hemodynamically stable  Respiratory status: acceptable, nonlabored ventilation, spontaneous ventilation and nasal cannula  Hydration status: stable

## 2020-10-18 NOTE — ED NOTES
Spoke with [facility] center, requested to speak with surgeon at [facility] for Dr Wan Garces.       Sai Villeda, RN  10/18/20 2517

## 2020-10-18 NOTE — ED TRIAGE NOTES
Started with Abd pain and vomiting about 2AM Saturday morning. About an hour ago started vomiting again and having dry heaves.

## 2020-10-18 NOTE — ED NOTES
Dr Cris Jara talks with surgery consult at [facility].      Juliocesar Real RN  10/18/20 5065

## 2020-10-18 NOTE — ED NOTES
Report given to Yennifer Miguel RN in ICU. Will transfer patient momentarily.       Heloise Peabody, RN  10/18/20 4425

## 2020-10-18 NOTE — ED NOTES
Report given to [facility] at [address] [place] [address].      Allison Flowers RN  10/18/20 9225

## 2020-10-18 NOTE — ED NOTES
[facility] notified of need to transport pt to [facility].      Armando Gregorio RN  10/18/20 9804

## 2020-10-18 NOTE — BRIEF OP NOTE
Brief Postoperative Note      Patient: Laura Mathew  YOB: 1965  MRN: [id number]    Date of Procedure: 10/18/2020    Pre-Op Diagnosis: perforated bowel    Post-Op Diagnosis: incarcerated hernia bpwel obstruction recurrent hernia       Procedure(s):  LAPAROTOMY EXPLORATORY    Surgeon(s):  Ramone Lew MD    Assistant:  First Assistant: Ac Willis RN    Anesthesia: General    Estimated Blood Loss (mL): 700     Complications: None    Specimens:   ID Type Source Tests Collected by Time Destination   A : SMALL BOWEL AND MESH Tissue Tissue SURGICAL PATHOLOGY Ramone Lew MD 10/18/2020 1616        Implants:  Implant Name Type Inv. Item Serial No.  Lot No. LRB No. Used Action   MESH SURG MINA KNT SHT 12.0X12. 0IN Mesh MESH SURG MINA KNT SHT 12.0X12. 0IN  [employer]: [employer] [id number] N/A 1 Implanted         Drains:   Closed/Suction Drain Left LLQ Bulb 15 [place] (Active)       NG/OG/NJ/NE Tube Nasogastric 18 fr Left nostril (Active)   Surrounding Skin Dry; Intact 10/18/20 1100   Securement device Yes 10/18/20 1100   Status Suction-low intermittent 10/18/20 1100   Placement Verified by Respiratory Status;by External Catheter Length;by Gastric Contents 10/18/20 1100   NG/OG/NJ/NE External Measurement (cm) 63 cm 10/18/20 1100   Drainage Appearance Ray Burger; Thick 10/18/20 1100   Output (mL) 100 ml 10/18/20 1100       Urethral Catheter Non-latex 16 fr (Active)       Findings: see dictated note    Electronically signed by Ramone Lew MD on 10/18/2020 at 5:48 PM

## 2020-10-18 NOTE — ED PROVIDER NOTES
drooling, facial swelling, nosebleeds, postnasal drip, rhinorrhea, sinus pressure, sinus pain, tinnitus, trouble swallowing and voice change. Eyes: Negative. Negative for photophobia, pain, discharge, redness, itching and visual disturbance. Respiratory: Negative. Negative for apnea, cough, choking, chest tightness, shortness of breath, wheezing and stridor. Cardiovascular: Negative. Negative for chest pain, palpitations and leg swelling. Gastrointestinal: Positive for abdominal distention, abdominal pain, anorexia, constipation, nausea and vomiting. Negative for blood in stool, diarrhea, hematemesis, hematochezia, melena and rectal pain. Endocrine: Negative for polyuria. Genitourinary: Negative. Negative for dysuria, flank pain, frequency, hematuria and urgency. Musculoskeletal: Negative. Negative for arthralgias, back pain, gait problem, myalgias, neck pain and neck stiffness. Skin: Negative. Negative for color change, pallor, rash and wound. Neurological: Negative. Negative for dizziness, speech difficulty, light-headedness, numbness and headaches. Psychiatric/Behavioral: Negative. Negative for agitation, confusion, self-injury, sleep disturbance and suicidal ideas. The patient is not nervous/anxious. All other systems reviewed and are negative. Except as noted above the remainder of the review of systems was reviewed and negative.        PAST MEDICAL HISTORY     Past Medical History:   Diagnosis Date    Abnormal ECG 1/29/2019    Acid reflux     Anxiety     Arthritis     \"Back, Legs, Knees And Feet\"    Back problem     \"Three Discs Smashed Together Lower Back\"    Bipolar disorder ([address]     Chronic back pain     Chronic cough 1/31/2020    [facility] disease[facility] ([facility]     COPD, moderate ([address] 1/31/2020    Curvature of spine     \"Wore A Brace For Three Years\"    Depression     Diabetes mellitus ([address] Dx 2010    Diverticulitis     Esophageal dilatation 04/12/2017    Family history of sudden death 1/29/2019    Mother at age 58    Full dentures     H/O 24 hour EKG monitoring 01/29/2019    Conclusion: Normal study suggesting normal sinus rhythm with no clinically significant arrhythmias.  H/O cardiovascular stress test 02/12/2019    EF65% Normal    Headache(784.0)     \"Occ\"    Heart murmur     No Cardiologist At This Time    History of nuclear stress test 02/12/2019    EF 65%. Normal study.  [place] (hard of hearing)     Bilateral Hearing Aides    Hydronephrosis     dx with hydronephrosis with admission 6/2018- right ureteral stone/\"had kidney stone stone about 2-3 yrs ago and passed it\"    Hyperlipidemia     Hypertension     Macular degeneration     Right Eye    Mild persistent asthma without complication 1/05/7078    MRSA (methicillin resistant Staphylococcus aureus)     After PRICILLA In 2006 (Abdomen)    Near syncope 1/29/2019 1/28/19 in ED [place]    Panic attacks     PONV (postoperative nausea and vomiting)     denies any motion sickness    Right Breast Cancer Dx 8-15    Right Breast Lumpectomy, Had Chemotherapy And Radiation Treatments    Seizure ([address]     \"when I was a young kid had seizure and was on medication- not sure how old when last seizure but as a kid\"    Sepsis ([address]     per old chart - admitted 6/2018 with sepsis secondary to wound infection    Shortness of breath 2/4/2020    Sleep apnea     Has CPAP- last sleep study 2-3 yrs ago\"    SOB (shortness of breath) on exertion 1/29/2019    Thyroid disease     \"Thyroid Problems As A Child\"    Urinary incontinence     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    WD-Wound of right breast 10/28/2016    Wears glasses        Prior to Admission medications    Medication Sig Start Date End Date Taking?  Authorizing Provider   fexofenadine (ALLEGRA ALLERGY) 60 MG tablet Take 60 mg by mouth daily   Yes Historical Provider, MD   PANTOPRAZOLE SODIUM PO Take 1 tablet by mouth daily   Yes Historical Provider, MD   letrozole ([address] [facility]) 2.5 MG tablet TAKE ONE TABLET BY MOUTH EVERY DAY  Patient taking differently: 2.5 mg  8/27/20  Yes Johanne Bowling MD   FLUoxetine (PROZAC) 10 MG capsule Take 1 capsule by mouth daily 4/22/20  Yes Historical Provider, MD   hydroCHLOROthiazide (MICROZIDE) 12.5 MG capsule Take 1 capsule by mouth daily 5/1/20  Yes Historical Provider, MD   hydrOXYzine (VISTARIL) 25 MG capsule Take 1 capsule by mouth daily 4/23/20  Yes Historical Provider, MD   montelukast (SINGULAIR) 10 MG tablet Take 1 tablet by mouth daily 5/1/20  Yes Historical Provider, MD   losartan (COZAAR) 100 MG tablet Take 1 tablet by mouth daily 1/22/20  Yes Historical Provider, MD   ALBUTEROL SULFATE HFA IN Inhale into the lungs   Yes Historical Provider, MD   Phenylephrine-DM-GG-APAP 10--650 MG PACK Take 1 tablet by mouth 4 times daily 11/19/19  Yes Jose F Gonzalez DO   busPIRone (BUSPAR) 7.5 MG tablet Take 10 mg by mouth 3 times daily    Yes Historical Provider, MD   Probiotic Product (ALIGN) 4 MG CAPS Take 1 capsule by mouth daily 11/8/18  Yes JULIA Samuel - CNP   loratadine (CLARITIN) 10 MG tablet Take 10 mg by mouth daily    Historical Provider, MD   famotidine (PEPCID) 20 MG tablet Take 1 tablet by mouth daily 5/4/20   Historical Provider, MD   lisinopril (PRINIVIL;ZESTRIL) 5 MG tablet Take 5 mg by mouth daily    Historical Provider, MD   Fluticasone Propionate (FLONASE NA) by Nasal route    Historical Provider, MD   nystatin (MYCOSTATIN) 647583 UNIT/GM cream APPLY AS DIRECTED THREE TIMES A DAY 7/12/19   Kevin Yoon MD        Patient Active Problem List   Diagnosis    Incisional hernia, incarcerated    Depression    Full dentures    Curvature of spine    Malignant neoplasm of upper-outer quadrant of right female breast ([address]    Seroma, postoperative    WD-Late effect of radiation    RLQ abdominal pain    Diarrhea    Nausea    Gastroesophageal reflux disease without esophagitis  Pharyngoesophageal dysphagia    Unilateral recurrent inguinal hernia without obstruction or gangrene    Morbid obesity with BMI of 45.0-49.9, adult (HCC)    Recurrent ventral hernia with incarceration    H/O ventral hernia    Sepsis ([address]    Septic shock ([address]    Infected hernioplasty mesh ([address]    Kidney stone    S/P recurrent ventral herniorrhaphy    Right ureteral stone    S/P ventral herniorrhaphy    Skin granuloma    Abnormality of right breast on screening mammogram    Near syncope    Abnormal ECG    Dyspnea on exertion    Family history of sudden death    Hyperlipidemia    DILLON (obstructive sleep apnea)    Excessive daytime sleepiness    Ex-smoker    Mild persistent asthma without complication    Chronic cough    COPD, moderate (HCC)    Shortness of breath    Fatigue         SURGICAL HISTORY       Past Surgical History:   Procedure Laterality Date    ABDOMEN SURGERY  06/2018    per old chart pt had exploratory abd wound with drainage of infected seroma with wound vac    BREAST BIOPSY Right 2015    Right Breast Cancer , \"I Have A Chip In The Breast\"    BREAST BIOPSY Right 2017    Benign    BREAST LUMPECTOMY Right 08/2015    Right Breast Cancer    CHOLECYSTECTOMY, LAPAROSCOPIC  2007    COLONOSCOPY  2016    dr green 1 polyp    COLONOSCOPY  3/2019; 04/12/2017    3/2019 WNL; 4/2017 Internal hemorroids, diverticulosis    CYSTOSCOPY  2016    Kidney Stones    CYSTOSCOPY      per old chart with admission 6/2018 pt had cysto, right RGPG and attempted to insert right ureteral stent    CYSTOSCOPY  07/03/2018    cysto, right stent removal, right ureteroscopy, laser litho    DENTAL SURGERY      All Teeth Extracted In Past    ENDOSCOPY, COLON, DIAGNOSTIC  04/12/2017    EGD: Dilatation, Hiatal hernia, mild gastritis    HERNIA REPAIR  2007    Abdominal Hernia Repair With Mesh    HERNIA REPAIR  2012    Ventral Hernia Repair With Mesh    HYSTERECTOMY, TOTAL ABDOMINAL  2006    LAPAROSCOPY  2005    Laparoscopy, D & C, Hysteroscopy    LYMPH NODE DISSECTION      2015    OTHER SURGICAL HISTORY  05/22/2018    Laparoscopic-converted to open ventral hernia repair with mesh/ XU    OTHER SURGICAL HISTORY      per old chart with admission 6/20180- in IR 6/11/2018- had right perc. nephrostomy with cath placement\"took the tube out before I left the hospital\"    TONSILLECTOMY  1970's         CURRENT MEDICATIONS       Discharge Medication List as of 10/18/2020  3:15 AM      CONTINUE these medications which have NOT CHANGED    Details   fexofenadine (ALLEGRA ALLERGY) 60 MG tablet Take 60 mg by mouth dailyHistorical Med      PANTOPRAZOLE SODIUM PO Take 1 tablet by mouth dailyHistorical Med      letrozole (FEMARA) 2.5 MG tablet TAKE ONE TABLET BY MOUTH EVERY DAY, Disp-30 tablet,R-6Normal      FLUoxetine (PROZAC) 10 MG capsule Take 1 capsule by mouth dailyHistorical Med      hydroCHLOROthiazide (MICROZIDE) 12.5 MG capsule Take 1 capsule by mouth dailyHistorical Med      hydrOXYzine (VISTARIL) 25 MG capsule Take 1 capsule by mouth dailyHistorical Med      montelukast (SINGULAIR) 10 MG tablet Take 1 tablet by mouth dailyHistorical Med      losartan (COZAAR) 100 MG tablet Take 1 tablet by mouth dailyHistorical Med      ALBUTEROL SULFATE HFA IN Inhale into the lungsHistorical Med      Phenylephrine-DM-GG-APAP 10--650 MG PACK Take 1 tablet by mouth 4 times daily, Disp-20 each, R-0Print      busPIRone (BUSPAR) 7.5 MG tablet Take 10 mg by mouth 3 times daily Historical Med      Probiotic Product (ALIGN) 4 MG CAPS Take 1 capsule by mouth daily, Disp-30 capsule, R-3Normal      loratadine (CLARITIN) 10 MG tablet Take 10 mg by mouth dailyHistorical Med      famotidine (PEPCID) 20 MG tablet Take 1 tablet by mouth dailyHistorical Med      lisinopril (PRINIVIL;ZESTRIL) 5 MG tablet Take 5 mg by mouth dailyHistorical Med      Fluticasone Propionate (FLONASE NA) by Nasal routeHistorical Med      nystatin (MYCOSTATIN) 607287 UNIT/GM cream APPLY AS DIRECTED THREE TIMES A DAY, Disp-15 g, R-10, Normal             ALLERGIES     Latex;  Codeine; Darvon [propoxyphene]; and Pcn [penicillins]    FAMILY HISTORY       Family History   Problem Relation Age of Onset    Heart Disease Mother         \"Open Heart\"    Diabetes Mother    [de-identified] / Lianna Shankar Mother     Stroke Mother     Vision Loss Mother         \"Wore Glasses\"    High Blood Pressure Father     Vision Loss Father         \"Had Cataract Surgery\"    Cancer Father         \"Kidney Cancer\"    Heart Disease Father         \"Atrial Fib\"    Diabetes Sister     High Blood Pressure Sister     High Cholesterol Sister     Cancer Sister         \"Ovarian Cancer\"   Bertell Halim Vision Loss Sister         \"Glaucoma\"          SOCIAL HISTORY       Social History     Socioeconomic History    Marital status: Single     Spouse name: None    Number of children: None    Years of education: None    Highest education level: None   Occupational History    None   Social Needs    Financial resource strain: None    Food insecurity     Worry: None     Inability: None    Transportation needs     Medical: None     Non-medical: None   Tobacco Use    Smoking status: Former Smoker     Packs/day: 0.25     Years: 5.00     Pack years: 1.25     Types: Cigarettes     Start date:      Last attempt to quit:      Years since quittin.8    Smokeless tobacco: Never Used    Tobacco comment: \"I Only Smoked Socially When I Quit Smoking\"   Substance and Sexual Activity    Alcohol use: No     Alcohol/week: 0.0 standard drinks    Drug use: No    Sexual activity: Never   Lifestyle    Physical activity     Days per week: None     Minutes per session: None    Stress: None   Relationships    Social connections     Talks on phone: None     Gets together: None     Attends Buddhist service: None     Active member of club or organization: None     Attends meetings of clubs or organizations: None     Relationship status: None    Intimate partner violence     Fear of current or ex partner: None     Emotionally abused: None     Physically abused: None     Forced sexual activity: None   Other Topics Concern    None   Social History Narrative    None       SCREENINGS    [place] Coma Scale  Eye Opening: Spontaneous  Best Verbal Response: Oriented  Best Motor Response: Obeys commands  Adams Coma Scale Score: 15          PHYSICAL EXAM    (up to 7 for level 4, 8 or more for level 5)     ED Triage Vitals   BP Temp Temp Source Pulse Resp SpO2 Height Weight   10/18/20 0115 10/18/20 0109 10/18/20 0109 10/18/20 0109 10/18/20 0109 10/18/20 0109 10/18/20 0109 10/18/20 0109   (!) 123/97 98.7 °F (37.1 °C) Oral 66 24 98 % 5' 7\" (1.702 m) 272 lb (123.4 kg)       Physical Exam  Vitals signs and nursing note reviewed. Constitutional:       General: She is in acute distress. Appearance: She is well-developed. She is ill-appearing. She is not toxic-appearing or diaphoretic. HENT:      Head: Normocephalic and atraumatic. Right Ear: External ear normal.      Left Ear: External ear normal.      Nose: Nose normal.      Mouth/Throat:      Pharynx: No oropharyngeal exudate. Eyes:      General: No scleral icterus. Right eye: No discharge. Left eye: No discharge. Pupils: Pupils are equal, round, and reactive to light. Neck:      Musculoskeletal: Normal range of motion. Thyroid: No thyromegaly. Vascular: No JVD. Trachea: No tracheal deviation. Cardiovascular:      Rate and Rhythm: Normal rate and regular rhythm. Heart sounds: Normal heart sounds. No murmur. No friction rub. No gallop. Pulmonary:      Effort: Pulmonary effort is normal. No respiratory distress. Breath sounds: Normal breath sounds. No stridor. No wheezing or rales. Chest:      Chest wall: No tenderness. Abdominal:      General: Bowel sounds are normal. There is distension.       Palpations: Abdomen is soft. There is no mass. Tenderness: There is abdominal tenderness. There is guarding and rebound. Musculoskeletal: Normal range of motion. General: No tenderness or deformity. Lymphadenopathy:      Cervical: No cervical adenopathy. Skin:     General: Skin is warm. Capillary Refill: Capillary refill takes less than 2 seconds. Coloration: Skin is not pale. Findings: No erythema or rash. Neurological:      Mental Status: She is alert and oriented to person, place, and time. Cranial Nerves: No cranial nerve deficit. Sensory: No sensory deficit. Motor: No weakness or abnormal muscle tone. Coordination: Coordination normal.      Gait: Gait normal.   Psychiatric:         Behavior: Behavior normal.         Thought Content:  Thought content normal.         Judgment: Judgment normal.         DIAGNOSTIC RESULTS     Labs Reviewed   CBC WITH AUTO DIFFERENTIAL - Abnormal; Notable for the following components:       Result Value    WBC 11.4 (*)     Segs Relative 84.3 (*)     Lymphocytes % 10.1 (*)     Monocytes % 5.0 (*)     All other components within normal limits   COMPREHENSIVE METABOLIC PANEL W/ REFLEX TO MG FOR LOW K - Abnormal; Notable for the following components:    Glucose 185 (*)     All other components within normal limits   LACTIC ACID, PLASMA - Abnormal; Notable for the following components:    Lactate 2.8 (*)     All other components within normal limits   CULTURE, URINE   LIPASE   TROPONIN   MAGNESIUM   URINALYSIS          EKG: All EKG's are interpreted by the Emergency Department Physician who either signs or Co-signs this chart in the absence of a cardiologist.       EKG Interpretation    Interpreted by emergency department physician    Rhythm: normal sinus   Rate: normal  Axis: normal  Ectopy: none  Conduction: normal  ST Segments: no acute change  T Waves: no acute change  Q Waves: none    Clinical Impression: no acute changes    Hawa Bills RADIOLOGY:     Non-plain film images such as CT, Ultrasound and MRI are read by the radiologist. Plain radiographic images are visualized and preliminarily interpreted by the emergency physician. Interpretation per the Radiologist below, if available at the time of this note:    XR ACUTE ABD SERIES CHEST 1 VW   Preliminary Result   Free intraperitoneal air is noted. Findings are concerning for perforated   bowel. Dilated loops of small bowel are noted with air-fluid levels. Findings are   concerning for small bowel obstruction. No acute findings within the chest.               ED BEDSIDE ULTRASOUND:   Performed by ED Physician Dorie Rivera DO       LABS:  Labs Reviewed   CBC WITH AUTO DIFFERENTIAL - Abnormal; Notable for the following components:       Result Value    WBC 11.4 (*)     Segs Relative 84.3 (*)     Lymphocytes % 10.1 (*)     Monocytes % 5.0 (*)     All other components within normal limits   COMPREHENSIVE METABOLIC PANEL W/ REFLEX TO MG FOR LOW K - Abnormal; Notable for the following components:    Glucose 185 (*)     All other components within normal limits   LACTIC ACID, PLASMA - Abnormal; Notable for the following components:    Lactate 2.8 (*)     All other components within normal limits   CULTURE, URINE   LIPASE   TROPONIN   MAGNESIUM   URINALYSIS       All other labs were within normal range or not returned as of this dictation.     EMERGENCY DEPARTMENT COURSE and DIFFERENTIAL DIAGNOSIS/MDM:   Vitals:    Vitals:    10/18/20 0109 10/18/20 0115 10/18/20 0230 10/18/20 0245   BP:  (!) 123/97 (!) 144/64 (!) 130/95   Pulse: 66  67 73   Resp: 24  19 17   Temp: 98.7 °F (37.1 °C)      TempSrc: Oral      SpO2: 98% 93% 100% 100%   Weight: 272 lb (123.4 kg)      Height: 5' 7\" (1.702 m)              MDM  Number of Diagnoses or Management Options  Constipation, unspecified constipation type:   Generalized abdominal pain:   Nausea and vomiting, intractability of vomiting not specified, unspecified vomiting type:   Diagnosis management comments: 75-year-old female presents emergency department with chief complaint of nausea, vomiting, abdominal pain that started approximately 10 hours ago. Patient reports last meal occurred approximately at that time. On examination, patient has mild abdominal distention but is morbidly obese. CT scanner is down and nonfunctional at this time. For this reason, acute abdominal series was obtained and revealed free air under the diaphragm with small bowel obstruction. Possible volvulus. Patient was placed on cefotetan and gentamicin due to penicillin allergy. It was also Zofran. Discussed the case with Dr. Kelley with general surgery. Due to inability to obtain CT scan, patient was transferred to the emergency department at [facility]. Patient's vital signs are normal and stable at this time. Protecting her airway. Amount and/or Complexity of Data Reviewed  Clinical lab tests: ordered and reviewed  Tests in the radiology section of CPT®: ordered and reviewed  Tests in the medicine section of CPT®: reviewed and ordered    Risk of Complications, Morbidity, and/or Mortality  Presenting problems: high  Diagnostic procedures: high  Management options: high    Critical Care  Total time providing critical care: 30-74 minutes    Patient Progress  Patient progress: stable        REASSESSMENT          CRITICAL CARE TIME     This excludes seperately billable procedures and family discussion time. Critical care time provided for obtaining history, conducting a physical exam, performing and monitoring interventions, ordering, collecting and interpreting tests, and establishing medical decision-making. There was a potential for life/limb threatening pathology requiring close evaluation and intervention with concern for patient decompensation.     CONSULTS:  None    PROCEDURES:  None performed unless otherwise noted below Procedures        FINAL IMPRESSION      1. Nausea and vomiting, intractability of vomiting not specified, unspecified vomiting type    2. Generalized abdominal pain    3. Constipation, unspecified constipation type          DISPOSITION/PLAN   DISPOSITION Decision To Transfer 10/18/2020 02:08:48 AM      PATIENT REFERRED TO:  No follow-up provider specified. DISCHARGE MEDICATIONS:  Discharge Medication List as of 10/18/2020  3:15 AM          ED Provider Disposition Time  DISPOSITION Decision To Transfer 10/18/2020 02:08:48 AM      Appropriate personal protective equipment was worn during the patient's evaluation. These included surgical, eye protection, surgical mask or in 95 respirator and gloves. The patient was also placed in a surgical mask for the prevention of possible spread of respiratory viral illnesses. The Patient was instructed to read the package inserts with any medication that was prescribed. Major potential reactions and medication interactions were discussed. The Patient understands that there are numerous possible adverse reactions not covered. The patient was also instructed to arrange follow-up with his or her primary care provider for review of any pending labwork or incidental findings on any radiology results that were obtained. All efforts were made to discuss any incidental findings that require further monitoring. Controlled Substances Monitoring:     No flowsheet data found.     (Please note that portions of this note were completed with a voice recognition program.  Efforts were made to edit the dictations but occasionally words are mis-transcribed.)    Jennifer Jones DO (electronically signed)  Attending Emergency Physician            Jennifer Jones DO  10/18/20 [id number]

## 2020-10-18 NOTE — CONSULTS
History and Physical      Name:  Onesimo Stephenson /Age/Sex: 1965  (54 y.o. female)   MRN & CSN:  [id number] & [id number] Admission Date/Time: 10/18/2020  3:41 AM   Location:  -A PCP: Dillon Macario, [address] Day: 1    Assessment and Plan:   Onesimo Stephenson is a 54 y.o.  female presented to the hospital with a complaint of abdominal pain. CT abdomen pelvis showed free air as a result of a perforated small bowel on small bowel obstruction. Perforated small bowel/small bowel obstruction--- NG tube in place. General surgery on board.  -Continue NG tube  -N.p.o.  -Continue cefepime and Flagyl  - OR around 2 PM today  -Pain control with morphine    Hypertension--- BP controlled. She takes hydrochlorothiazide 12.5 and losartan 100 mg daily.  -Hold antihypertensives since she is n.p.o.  -Hydralazine PRN    Depression/anxiety-- on Prozac and BuSpar as needed  -Resume Prozac and BuSpar when diet is advanced  -Ativan PRN    Right breast cancer---diagnosed about 5 years ago. S/p lumpectomy with lymph node dissection. S/p radiation and chemotherapy. She is currently on letrozole 2.5 mg daily  -Continue letrozole 2.5 mg daily    Morbid obesity---weight 257 pounds, BMI 40.36      MEDICAL DECISION MAKING:  Labs reviewed  Imaging reviewed  Personally reviewed EKG---NSR, no ST segment elevation  Level of risk high  Diet  n.p.o. for now   DVT Prophylaxis [x] Lovenox, []  Heparin, [] SCDs, [] Ambulation   GI Prophylaxis [x] PPI,  [] H2 Blocker,   [] Diet/Tube Feeds   Code Status  full code   Disposition  Home   MDM [] Low, [] Moderate,[x]  High     History of Present Illness:     Chief Complaint: Abdominal pain    Onesimo Stephenson is a 54 y.o.  female with past medical history of right-sided breast cancer, hypertension, hyperlipidemia, abdominal hernia, s/p abdominal hernia repair x3, hysterectomy who presented to the hospital with a complaint of abdominal pain that started on Friday.   She described as 8-10 out of 10 in severity. She started having diarrhea. She also reported nausea and vomiting. CT abdomen pelvis done in the ED showed free air under diaphragm as well as a small bowel obstruction. Patient subsequently admitted for perforated viscus repair. Hospitalist team consulted for medical management. She currently has an NG tube in place. She is nontoxic-appearing. Her abdominal pain is much better. Surgery is planned at around 2:00 p.m. today    Ten point ROS reviewed negative, unless as noted above    Past medical history  Hypertension  Morbid obesity  Right-sided breast cancer  Hyperlipidemia  GERD    Past surgical history  Hysterectomy  Right breast lumpectomy and lymph node dissection  Hernia repair x3    Social history   denies tobacco use  Denies alcohol abuse    Family history  Reports a paternal aunt with breast cancer    Objective:     Vitals:   Vitals:    10/18/20 0915   BP: 129/77   Pulse: 82   Resp: 15   Temp: 99 °F (37.2 °C)   SpO2: 98%     Physical Exam:   GEN: Awake female, sitting upright in bed in no apparent distress. Appears stated age  EYES: Pupils are equally round. No scleral erythema, discharge, or conjunctivitis. HENT: Mucous membranes are moist. Oral pharynx without exudates, no evidence of thrush. External ear appears normal anatomy   NECK: Supple, no neck mass  RESP: Clear to auscultation, no wheezes, rales or rhonchi. Symmetric chest movement. CV: Regular rate without appreciable murmurs. No JVD. Peripheral pulses equal bilaterally and palpable. No peripheral edema. GI: Abdomen is soft, non-tender, non-distended, obese abdomen, normo-active bowel sounds. Rectal exam deferred   : No costovertebral angle tenderness. No supra-pubic tenderness. Armenta catheter is not present. HEME/LYMPH: No palpable cervical lymphadenopathy. No petechiae or ecchymoses. MSK: No gross deformities. No clubbing. No cyanosis.  No large/small joint effusion  SKIN: warm and dry to touch. No rashes. No pressure ulcers  NEURO: Cranial nerves appear grossly intact, normal speech, no lateralizing weakness. Sensation to touch intact on upper/lower EXT bilaterally  PSYCH: Awake, alert, oriented x 4. Affect appropriate. Past Medical History:      Past Medical History:   Diagnosis Date    Abnormal ECG 1/29/2019    Acid reflux     Anxiety     Arthritis     \"Back, Legs, Knees And Feet\"    Back problem     \"Three Discs Smashed Together Lower Back\"    Bipolar disorder ([address]     Chronic back pain     Chronic cough 1/31/2020    COPD (chronic obstructive pulmonary disease) ([address]     COPD, moderate ([address] 1/31/2020    Curvature of spine     \"Wore A Brace For Three Years\"    Depression     Diabetes mellitus ([address] Dx 2010    Diverticulitis     Esophageal dilatation 04/12/2017    Family history of sudden death 1/29/2019    Mother at age 58    Full dentures     H/O 24 hour EKG monitoring 01/29/2019    Conclusion: Normal study suggesting normal sinus rhythm with no clinically significant arrhythmias.  H/O cardiovascular stress test 02/12/2019    EF65% Normal    Headache(784.0)     \"Occ\"    Heart murmur     No Cardiologist At This Time    History of nuclear stress test 02/12/2019    EF 65%. Normal study.     [place] (hard of hearing)     Bilateral Hearing Aides    Hydronephrosis     dx with hydronephrosis with admission 6/2018- right ureteral stone/\"had kidney stone stone about 2-3 yrs ago and passed it\"    Hyperlipidemia     Hypertension     Macular degeneration     Right Eye    Mild persistent asthma without complication 4/17/2390    MRSA (methicillin resistant Staphylococcus aureus)     After PRICILLA In 2006 (Abdomen)    Near syncope 1/29/2019 1/28/19 in ED [place]    Panic attacks     PONV (postoperative nausea and vomiting)     denies any motion sickness    Right Breast Cancer Dx 8-15    Right Breast Lumpectomy, Had Chemotherapy And Radiation Treatments    Seizure ([address] \"when I was a young kid had seizure and was on medication- not sure how old when last seizure but as a kid\"    Sepsis ([address]     per old chart - admitted 6/2018 with sepsis secondary to wound infection    Shortness of breath 2/4/2020    Sleep apnea     Has CPAP- last sleep study 2-3 yrs ago\"    SOB (shortness of breath) on exertion 1/29/2019    Thyroid disease     \"Thyroid Problems As A Child\"    Urinary incontinence     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    WD-Wound of right breast 10/28/2016    Wears glasses      PSHX:  has a past surgical history that includes Dental surgery; Tonsillectomy (1970's); laparoscopy (2005); Cholecystectomy, laparoscopic (2007); lymph node dissection; hernia repair (2007); hernia repair (2012); Cystoscopy (2016); Breast lumpectomy (Right, 08/2015); Breast biopsy (Right, 2015); Breast biopsy (Right, 2017); Hysterectomy, total abdominal (2006); Colonoscopy (2016); Colonoscopy (3/2019; 04/12/2017); Endoscopy, colon, diagnostic (04/12/2017); other surgical history (05/22/2018); Cystoscopy; Abdomen surgery (06/2018); other surgical history; and Cystocopy (07/03/2018). Allergies: Allergies   Allergen Reactions    Latex      \"Redness\"    Codeine      \"I Don't Remember The Reaction , I Was A Child\"    Darvon [Propoxyphene]      \"I Don't Remember The Reaction, I Was A Child\"    Pcn [Penicillins]      \"I Don't Remember The Reaction, I Was A Child\"       FAM HX: family history includes Cancer in her father and sister; Diabetes in her mother and sister; Heart Disease in her father and mother; High Blood Pressure in her father and sister; High Cholesterol in her sister; Zaira Lapine / [place] in her mother; Stroke in her mother; Vision Loss in her father, mother, and sister.   Soc HX:   Social History     Socioeconomic History    Marital status: Single     Spouse name: None    Number of children: None    Years of education: None    Highest education level: None

## 2020-10-18 NOTE — ED NOTES
Talked with pts brother [place] at [phone] about xray results and plan to consult surgery.      Molly Baca RN  10/18/20 0509

## 2020-10-18 NOTE — ED NOTES
[facility] assumed at this time. Dr. Moncho Alexander at bedside inserting NG tube. Patient tolerating well. Updated on plan of care. Denies any further needs, questions, concerns or changes. Resps. Even and unlabored. Will medicate per orders.       Latonya Hawkins RN  10/18/20 6783

## 2020-10-19 ENCOUNTER — APPOINTMENT (OUTPATIENT)
Dept: GENERAL RADIOLOGY | Age: 55
DRG: 230 | End: 2020-10-19
Payer: COMMERCIAL

## 2020-10-19 LAB
ANION GAP SERPL CALCULATED.3IONS-SCNC: 8 MMOL/L (ref 4–16)
BUN BLDV-MCNC: 7 MG/DL (ref 6–23)
CALCIUM SERPL-MCNC: 8 MG/DL (ref 8.3–10.6)
CHLORIDE BLD-SCNC: 104 MMOL/L (ref 99–110)
CO2: 28 MMOL/L (ref 21–32)
CREAT SERPL-MCNC: 0.6 MG/DL (ref 0.6–1.1)
GFR AFRICAN AMERICAN: >60 ML/MIN/1.73M2
GFR NON-AFRICAN AMERICAN: >60 ML/MIN/1.73M2
GLUCOSE BLD-MCNC: 105 MG/DL (ref 70–99)
GLUCOSE BLD-MCNC: 124 MG/DL (ref 70–99)
GLUCOSE BLD-MCNC: 134 MG/DL (ref 70–99)
GLUCOSE BLD-MCNC: 85 MG/DL (ref 70–99)
GLUCOSE BLD-MCNC: 92 MG/DL (ref 70–99)
HCT VFR BLD CALC: 37.7 % (ref 37–47)
HEMOGLOBIN: 11.6 GM/DL (ref 12.5–16)
MCH RBC QN AUTO: 29.1 PG (ref 27–31)
MCHC RBC AUTO-ENTMCNC: 30.8 % (ref 32–36)
MCV RBC AUTO: 94.5 FL (ref 78–100)
PDW BLD-RTO: 14.1 % (ref 11.7–14.9)
PLATELET # BLD: 191 K/CU MM (ref 140–440)
PMV BLD AUTO: 10.7 FL (ref 7.5–11.1)
POTASSIUM SERPL-SCNC: 3.9 MMOL/L (ref 3.5–5.1)
RBC # BLD: 3.99 M/CU MM (ref 4.2–5.4)
SODIUM BLD-SCNC: 140 MMOL/L (ref 135–145)
WBC # BLD: 7.7 K/CU MM (ref 4–10.5)

## 2020-10-19 PROCEDURE — 6360000002 HC RX W HCPCS: Performed by: SURGERY

## 2020-10-19 PROCEDURE — 94761 N-INVAS EAR/PLS OXIMETRY MLT: CPT

## 2020-10-19 PROCEDURE — 1200000000 HC SEMI PRIVATE

## 2020-10-19 PROCEDURE — 94640 AIRWAY INHALATION TREATMENT: CPT

## 2020-10-19 PROCEDURE — 85027 COMPLETE CBC AUTOMATED: CPT

## 2020-10-19 PROCEDURE — 74018 RADEX ABDOMEN 1 VIEW: CPT

## 2020-10-19 PROCEDURE — 80048 BASIC METABOLIC PNL TOTAL CA: CPT

## 2020-10-19 PROCEDURE — 2700000000 HC OXYGEN THERAPY PER DAY

## 2020-10-19 PROCEDURE — 6370000000 HC RX 637 (ALT 250 FOR IP): Performed by: SURGERY

## 2020-10-19 PROCEDURE — C9113 INJ PANTOPRAZOLE SODIUM, VIA: HCPCS | Performed by: SURGERY

## 2020-10-19 PROCEDURE — 2500000003 HC RX 250 WO HCPCS: Performed by: SURGERY

## 2020-10-19 PROCEDURE — 2580000003 HC RX 258: Performed by: SURGERY

## 2020-10-19 PROCEDURE — 36592 COLLECT BLOOD FROM PICC: CPT

## 2020-10-19 PROCEDURE — 71045 X-RAY EXAM CHEST 1 VIEW: CPT

## 2020-10-19 PROCEDURE — 6370000000 HC RX 637 (ALT 250 FOR IP): Performed by: PHYSICIAN ASSISTANT

## 2020-10-19 PROCEDURE — 82962 GLUCOSE BLOOD TEST: CPT

## 2020-10-19 RX ADMIN — MONTELUKAST 10 MG: 10 TABLET, FILM COATED ORAL at 08:01

## 2020-10-19 RX ADMIN — SODIUM CHLORIDE, POTASSIUM CHLORIDE, SODIUM LACTATE AND CALCIUM CHLORIDE: 600; 310; 30; 20 INJECTION, SOLUTION INTRAVENOUS at 17:37

## 2020-10-19 RX ADMIN — HYDROMORPHONE HYDROCHLORIDE 1 MG: 1 INJECTION, SOLUTION INTRAMUSCULAR; INTRAVENOUS; SUBCUTANEOUS at 12:50

## 2020-10-19 RX ADMIN — HYDROMORPHONE HYDROCHLORIDE 1 MG: 1 INJECTION, SOLUTION INTRAMUSCULAR; INTRAVENOUS; SUBCUTANEOUS at 22:36

## 2020-10-19 RX ADMIN — LORAZEPAM 0.5 MG: 2 INJECTION INTRAMUSCULAR; INTRAVENOUS at 02:35

## 2020-10-19 RX ADMIN — TIOTROPIUM BROMIDE INHALATION SPRAY 2 PUFF: 3.12 SPRAY, METERED RESPIRATORY (INHALATION) at 11:23

## 2020-10-19 RX ADMIN — KETOROLAC TROMETHAMINE 30 MG: 30 INJECTION, SOLUTION INTRAMUSCULAR at 09:01

## 2020-10-19 RX ADMIN — METRONIDAZOLE 500 MG: 500 INJECTION, SOLUTION INTRAVENOUS at 23:36

## 2020-10-19 RX ADMIN — METRONIDAZOLE 500 MG: 500 INJECTION, SOLUTION INTRAVENOUS at 07:53

## 2020-10-19 RX ADMIN — SODIUM CHLORIDE, POTASSIUM CHLORIDE, SODIUM LACTATE AND CALCIUM CHLORIDE: 600; 310; 30; 20 INJECTION, SOLUTION INTRAVENOUS at 17:42

## 2020-10-19 RX ADMIN — CEFEPIME 2 G: 2 INJECTION, POWDER, FOR SOLUTION INTRAVENOUS at 18:55

## 2020-10-19 RX ADMIN — CEFEPIME 2 G: 2 INJECTION, POWDER, FOR SOLUTION INTRAVENOUS at 07:56

## 2020-10-19 RX ADMIN — METRONIDAZOLE 500 MG: 500 INJECTION, SOLUTION INTRAVENOUS at 17:44

## 2020-10-19 RX ADMIN — HYDROMORPHONE HYDROCHLORIDE 1 MG: 1 INJECTION, SOLUTION INTRAMUSCULAR; INTRAVENOUS; SUBCUTANEOUS at 17:38

## 2020-10-19 RX ADMIN — HYDROMORPHONE HYDROCHLORIDE 1 MG: 1 INJECTION, SOLUTION INTRAMUSCULAR; INTRAVENOUS; SUBCUTANEOUS at 02:32

## 2020-10-19 RX ADMIN — SODIUM CHLORIDE, POTASSIUM CHLORIDE, SODIUM LACTATE AND CALCIUM CHLORIDE: 600; 310; 30; 20 INJECTION, SOLUTION INTRAVENOUS at 02:32

## 2020-10-19 RX ADMIN — HYDROCORTISONE: 1 CREAM TOPICAL at 20:18

## 2020-10-19 RX ADMIN — HYDROCORTISONE: 1 CREAM TOPICAL at 08:05

## 2020-10-19 RX ADMIN — ENOXAPARIN SODIUM 40 MG: 40 INJECTION SUBCUTANEOUS at 08:05

## 2020-10-19 RX ADMIN — ONDANSETRON 4 MG: 2 INJECTION INTRAMUSCULAR; INTRAVENOUS at 02:35

## 2020-10-19 RX ADMIN — HYDROCORTISONE: 1 CREAM TOPICAL at 02:28

## 2020-10-19 RX ADMIN — PANTOPRAZOLE SODIUM 40 MG: 40 INJECTION, POWDER, FOR SOLUTION INTRAVENOUS at 08:01

## 2020-10-19 ASSESSMENT — PAIN DESCRIPTION - DESCRIPTORS
DESCRIPTORS: CONSTANT;SORE
DESCRIPTORS: CONSTANT;SORE

## 2020-10-19 ASSESSMENT — PAIN DESCRIPTION - ORIENTATION
ORIENTATION: MID
ORIENTATION: MID

## 2020-10-19 ASSESSMENT — PAIN SCALES - GENERAL
PAINLEVEL_OUTOF10: 10
PAINLEVEL_OUTOF10: 3
PAINLEVEL_OUTOF10: 7
PAINLEVEL_OUTOF10: 3
PAINLEVEL_OUTOF10: 7

## 2020-10-19 ASSESSMENT — PAIN DESCRIPTION - PROGRESSION

## 2020-10-19 ASSESSMENT — PAIN DESCRIPTION - ONSET
ONSET: ON-GOING
ONSET: ON-GOING

## 2020-10-19 ASSESSMENT — PAIN DESCRIPTION - FREQUENCY
FREQUENCY: CONTINUOUS
FREQUENCY: CONTINUOUS

## 2020-10-19 ASSESSMENT — PAIN DESCRIPTION - LOCATION
LOCATION: ABDOMEN
LOCATION: ABDOMEN

## 2020-10-19 ASSESSMENT — PAIN DESCRIPTION - PAIN TYPE
TYPE: SURGICAL PAIN;ACUTE PAIN
TYPE: SURGICAL PAIN

## 2020-10-19 NOTE — CARE COORDINATION
Cm met with pt to initiate discharge planning. Pt is POD # 1 Exp Lap, XU, sm bowel resect, remove previous mesh, close wound with mesh. Pt lives alone in a 1 floor apt. Pt states that her sister and brother in law have offered her to come stay with them while recuperating. Pt has a walker, cane, BSC and shower chair. Pt has PCP and insurance to assist with cost of Rxs. Pt is on SSI but states that she is not getting any food assistance. Pt anticipates a discharge home with her sister at discharge.

## 2020-10-19 NOTE — PROGRESS NOTES
Hospitalist Progress Note      Name:  Kimberli Garcia /Age/Sex: 1965  (54 y.o. female)   MRN & CSN:  [id number] & [id number] Admission Date/Time: 10/18/2020  3:41 AM   Location:  -A PCP: Marisel Mccarthy, [address] [place] [address] Day: 2  DISPOSITION: Home  REASON FOR CONTINUED HOSPITALIZATION: Surgical clearance for discharge    ASSESSMENT/PLAN:  Kimberli Garcia is a 54 y.o.  female presented to the hospital with a complaint of abdominal pain. CT abdomen pelvis showed free air as a result of a perforated small bowel on small bowel obstruction.     Perforated small bowel/small bowel obstruction---s/p exploratory laparotomy and lysis of multiple adhesions, small bowel resection with stapled side-to-side anastomosis, removal of previously placed prosthetic mesh material, closure of abdominal wound with Vicryl mesh. -Continue NG tube  -Remain n.p.o.  -Pain control with Dilaudid  -Continue cefepime and Flagyl    Hypertension--- BP controlled. She takes hydrochlorothiazide 12.5 and losartan 100 mg daily.  -Hold antihypertensives since she is n.p.o.  -Hydralazine PRN     Depression/anxiety-- on Prozac and BuSpar as needed  -Resume Prozac and BuSpar when diet is advanced  -Ativan PRN     Right breast cancer---diagnosed about 5 years ago. S/p lumpectomy with lymph node dissection. S/p radiation and chemotherapy.   She is currently on letrozole 2.5 mg daily  -Continue letrozole 2.5 mg daily     Morbid obesity---weight 257 pounds, BMI 40.36      MEDICAL DECISION MAKING:  -Labs reviewed  -Imaging reviewed  -Level of risk moderate  Diet Diet NPO Effective Now Exceptions are: Ice Chips, Sips with Meds, Popsicles   DVT Prophylaxis [x] Lovenox, []  Heparin, [] SCDs, [] Ambulation   GI Prophylaxis [] PPI,  [] H2 Blocker,  [] Carafate,  [] Diet/Tube Feeds   Code Status Full Code   Disposition    MDM [] Low, [] Moderate,[]  High     Chief complaint/Interval History/ROS     Chief Complaint:   Abdominal pain    INTERVAL HISTORY: Noted to have a perforated small bowel and small bowel obstruction. Underwent the surgery on 10/18. Currently doing well post surgery. ROS:  Some abdominal pain. No chest pain. No fevers. Objective: Intake/Output Summary (Last 24 hours) at 10/19/2020 1513  Last data filed at 10/19/2020 0552  Gross per 24 hour   Intake 3560 ml   Output 1445 ml   Net 2115 ml      Vitals:   Vitals:    10/19/20 1200   BP: 122/67   Pulse: 83   Resp: 15   Temp: 98.8 °F (37.1 °C)   SpO2: 99%     Physical Exam:   GEN: Awake female, laying in bed. NG tube in place. Answers questions appropriately. Nontoxic-appearing. EYES: No eye discharge. Ocular muscles intact. HENT: Membranes moist.  No nasal discharge. NECK: Supple,  RESP: Clear to auscultation bilaterally. No wheezing. No crackles. CV: RRR. No murmur heard. No pitting lower extremity edema. GI: Abdomen soft. Tender to palpation. : Armenta in place. .  MSK: No bony fractures. No gross deformities. SKIN: warm, dry, no rashes, no pressure ulcer  NEURO: Cranial nerves appear grossly intact, normal speech, no lateralizing weakness.   PSYCH: Awake, alert, oriented     Medications:   Medications:    cefepime  2 g Intravenous Once    cefepime  2 g Intravenous Q12H    metroNIDAZOLE  500 mg Intravenous Q8H    pantoprazole  40 mg Intravenous Daily    enoxaparin  40 mg Subcutaneous Daily    montelukast  10 mg Oral Daily    insulin lispro  0-6 Units Subcutaneous TID WC    insulin lispro  0-3 Units Subcutaneous Nightly    tiotropium  2 puff Inhalation Daily    hydrocortisone   Topical BID      Infusions:    lactated ringers 150 mL/hr at 10/19/20 0232    dextrose       PRN Meds: ondansetron, 4 mg, Q6H PRN  albuterol sulfate HFA, 2 puff, Q6H PRN  hydrALAZINE (APRESOLINE) ivpb, 10 mg, Q4H PRN  glucose, 15 g, PRN  dextrose, 12.5 g, PRN  glucagon (rDNA), 1 mg, PRN  dextrose, 100 mL/hr, PRN  LORazepam, 0.5 mg, Q6H PRN  HYDROmorphone, 1 mg, Q2H PRN  ketorolac, 30 mg, Q6H PRN      Electronically signed by Darlen Harada, MD on 10/19/2020 at 3:13 PM

## 2020-10-19 NOTE — OP NOTE
[address] [place] [address], [address] [place] [address]                                OPERATIVE REPORT    PATIENT NAME: TAL MOSES                     :        1965  MED REC NO:   [id number]                          ROOM:       2122  ACCOUNT NO:   [de-identified]                           ADMIT DATE: 10/18/2020  PROVIDER:     Al Painting MD    DATE OF PROCEDURE:  10/18/2020    PREOPERATIVE DIAGNOSES:  Perforated viscus and recurrent hernia. POSTOPERATIVE DIAGNOSES:  Pneumatosis and free peritoneal air of the  small bowel with partial bowel obstruction, a recurrent ventral hernia,  and internal bowel hernia. OPERATING SURGEON:  Al Painting MD    PROCEDURE PERFORMED:  1. Exploratory laparotomy and lysis of multiple adhesions. 2.  Small bowel resection with stapled side-to-side anastomosis. 3.  Removal of previously placed prosthetic mesh material.  4.  Closure of abdominal wound with Vicryl mesh. ANESTHESIA:  General anesthesia. ESTIMATED BLOOD LOSS:  200 mL. DRAINS:  15-[place] Gene drain. COMPLICATIONS:  None. SUMMARY OF OPERATIVE FINDINGS:  The patient had bowel obstruction and  pneumatosis intestinalis of a segment of small bowel without evidence of  overt bowel perforation, multiple adhesions and recurrent hernia from  previously placed prosthetic mesh and also an internal hernia with the  small bowel herniating through the mesentery. DESCRIPTION OF PROCEDURE:  The patient was brought to the operating  room. Preoperative antibiotics were administered. General anesthetic  was administered. The abdomen was prepped and draped in a normal  sterile fashion. A midline incision was made through skin and  subcutaneous tissue and extended down through an old scar below the  umbilicus to the suprapubic area. I dissected down to the fascial  level.   I incised the fascia in the upper abdomen to enter the

## 2020-10-19 NOTE — PROGRESS NOTES
Progress Note    Subjective:      Leyla L Paz   For 1 following exploratory laparotomy small bowel resection excision of previously placed prosthetic mesh and abdominal wall closure with Vicryl mesh patient is awake and alert and has done quite well after extensive surgery. Nasogastric tube remains in place. An abdominal binder was ordered postoperatively which is not in place. I spoke to the nurse about the placement of this abdominal binder. Objective:     /70   Pulse 79   Temp 98.6 °F (37 °C) (Oral)   Resp 12   Wt 269 lb 6.4 oz (122.2 kg)   SpO2 100%   BMI 42.19 kg/m²     In: 3560 [I.V.:3560]  Out: 1445 [Urine:750; Drains:240]         General: Awake and alert and pain controlled  Abdomen: Obese soft dressing dry Boris-Diaz drain serous sanguinous  Lungs: Clear  Other: N/A    Labs:   CBC:   Lab Results   Component Value Date    WBC 7.7 10/19/2020    RBC 3.99 10/19/2020    RBC 4.08 08/22/2017    HGB 11.6 10/19/2020    HCT 37.7 10/19/2020    MCV 94.5 10/19/2020    MCH 29.1 10/19/2020    MCHC 30.8 10/19/2020    RDW 14.1 10/19/2020     10/19/2020    MPV 10.7 10/19/2020        Assessment:     Status post repair incarcerated recurrent ventral hernia with small bowel resection and mesh removal  Morbid obesity  COPD  Psychiatric history of schizophrenia     Plan:   Patient is doing quite well following extensive surgery  I am going to keep the patient at bedrest today with an abdominal binder. She will be transferred from the intensive care unit to the medical surgical floor. Would leave nasogastric tube until she is passing gas and postoperative ileus has resolved.   Dr. Tereza Montaño will be covering for me until Thursday

## 2020-10-20 LAB
CULTURE: ABNORMAL
CULTURE: ABNORMAL
EKG ATRIAL RATE: 69 BPM
EKG DIAGNOSIS: NORMAL
EKG P AXIS: 26 DEGREES
EKG P-R INTERVAL: 144 MS
EKG Q-T INTERVAL: 428 MS
EKG QRS DURATION: 102 MS
EKG QTC CALCULATION (BAZETT): 458 MS
EKG R AXIS: 16 DEGREES
EKG T AXIS: 23 DEGREES
EKG VENTRICULAR RATE: 69 BPM
GLUCOSE BLD-MCNC: 71 MG/DL (ref 70–99)
GLUCOSE BLD-MCNC: 91 MG/DL (ref 70–99)
GLUCOSE BLD-MCNC: 92 MG/DL (ref 70–99)
GLUCOSE BLD-MCNC: 93 MG/DL (ref 70–99)
Lab: ABNORMAL
SPECIMEN: ABNORMAL

## 2020-10-20 PROCEDURE — 2580000003 HC RX 258: Performed by: SURGERY

## 2020-10-20 PROCEDURE — 2500000003 HC RX 250 WO HCPCS: Performed by: SURGERY

## 2020-10-20 PROCEDURE — 1200000000 HC SEMI PRIVATE

## 2020-10-20 PROCEDURE — 6370000000 HC RX 637 (ALT 250 FOR IP): Performed by: SURGERY

## 2020-10-20 PROCEDURE — 82962 GLUCOSE BLOOD TEST: CPT

## 2020-10-20 PROCEDURE — 2700000000 HC OXYGEN THERAPY PER DAY

## 2020-10-20 PROCEDURE — 94640 AIRWAY INHALATION TREATMENT: CPT

## 2020-10-20 PROCEDURE — 94761 N-INVAS EAR/PLS OXIMETRY MLT: CPT

## 2020-10-20 PROCEDURE — 6360000002 HC RX W HCPCS: Performed by: SURGERY

## 2020-10-20 PROCEDURE — C9113 INJ PANTOPRAZOLE SODIUM, VIA: HCPCS | Performed by: SURGERY

## 2020-10-20 RX ADMIN — TIOTROPIUM BROMIDE INHALATION SPRAY 2 PUFF: 3.12 SPRAY, METERED RESPIRATORY (INHALATION) at 09:10

## 2020-10-20 RX ADMIN — METRONIDAZOLE 500 MG: 500 INJECTION, SOLUTION INTRAVENOUS at 23:59

## 2020-10-20 RX ADMIN — HYDROCORTISONE: 1 CREAM TOPICAL at 08:34

## 2020-10-20 RX ADMIN — CEFEPIME 2 G: 2 INJECTION, POWDER, FOR SOLUTION INTRAVENOUS at 19:51

## 2020-10-20 RX ADMIN — PANTOPRAZOLE SODIUM 40 MG: 40 INJECTION, POWDER, FOR SOLUTION INTRAVENOUS at 08:33

## 2020-10-20 RX ADMIN — METRONIDAZOLE 500 MG: 500 INJECTION, SOLUTION INTRAVENOUS at 08:34

## 2020-10-20 RX ADMIN — MONTELUKAST 10 MG: 10 TABLET, FILM COATED ORAL at 08:33

## 2020-10-20 RX ADMIN — HYDROMORPHONE HYDROCHLORIDE 1 MG: 1 INJECTION, SOLUTION INTRAMUSCULAR; INTRAVENOUS; SUBCUTANEOUS at 21:33

## 2020-10-20 RX ADMIN — HYDROMORPHONE HYDROCHLORIDE 1 MG: 1 INJECTION, SOLUTION INTRAMUSCULAR; INTRAVENOUS; SUBCUTANEOUS at 08:51

## 2020-10-20 RX ADMIN — HYDROCORTISONE: 1 CREAM TOPICAL at 19:51

## 2020-10-20 RX ADMIN — CEFEPIME 2 G: 2 INJECTION, POWDER, FOR SOLUTION INTRAVENOUS at 06:41

## 2020-10-20 RX ADMIN — ENOXAPARIN SODIUM 40 MG: 40 INJECTION SUBCUTANEOUS at 08:33

## 2020-10-20 RX ADMIN — METRONIDAZOLE 500 MG: 500 INJECTION, SOLUTION INTRAVENOUS at 15:57

## 2020-10-20 ASSESSMENT — PAIN SCALES - GENERAL
PAINLEVEL_OUTOF10: 0
PAINLEVEL_OUTOF10: 7
PAINLEVEL_OUTOF10: 0
PAINLEVEL_OUTOF10: 6
PAINLEVEL_OUTOF10: 0
PAINLEVEL_OUTOF10: 7

## 2020-10-20 ASSESSMENT — PAIN DESCRIPTION - PROGRESSION

## 2020-10-20 ASSESSMENT — PAIN DESCRIPTION - ORIENTATION: ORIENTATION: MID

## 2020-10-20 ASSESSMENT — PAIN DESCRIPTION - ONSET: ONSET: ON-GOING

## 2020-10-20 ASSESSMENT — PAIN DESCRIPTION - LOCATION: LOCATION: ABDOMEN

## 2020-10-20 ASSESSMENT — PAIN DESCRIPTION - PAIN TYPE: TYPE: SURGICAL PAIN

## 2020-10-20 ASSESSMENT — PAIN DESCRIPTION - FREQUENCY: FREQUENCY: CONTINUOUS

## 2020-10-20 ASSESSMENT — PAIN - FUNCTIONAL ASSESSMENT: PAIN_FUNCTIONAL_ASSESSMENT: PREVENTS OR INTERFERES SOME ACTIVE ACTIVITIES AND ADLS

## 2020-10-20 ASSESSMENT — PAIN DESCRIPTION - DESCRIPTORS: DESCRIPTORS: DISCOMFORT

## 2020-10-20 NOTE — PROGRESS NOTES
[facility]    Hospitalist Progress Note      Name:  Jericho Burton /Age/Sex: 1965  (54 y.o. female)   MRN & CSN:  [id number] & [id number] Admission Date/Time: 10/18/2020  3:41 AM   Location:  -A PCP: Joel Anderson [address] [place] [address] Day: 3    Assessment and Plan:   Jreicho Burton is a 54 y.o.  female  who presents with a complaint of abdominal pain - due to perforated intstine -hospitalist group consulted for medical management     Perforated small bowel/small bowel obstruction      S/p Exp Lap and lysis of multiple adhesions, small bowel resection and anastomosis, removal of previously placed prosthetic mesh material 10/18    Continue NG tube, remains n.p.o. per Surgery except ice chipps   Pain control with Dilaudid  Continue cefepime and Flagyl  Encourage ambulation, incentive spirometry     Hypertension      BP controlled currently   Hold antihypertensives due to n.p.o. status   Hydralazine PRN with parameters      Depression/anxiety    Resume Prozac and BuSpar when diet is advanced  Ativan PRN     Hx of Right breast cancer    Diagnosed about 5 years ago. S/p lumpectomy with lymph node dissection, radiation and chemotherapy.     Continue letrozole 2.5 mg daily     Morbid obesity - BMI 40.36 -counseling on diet and exercise to lose weight    Diet Diet NPO Effective Now Exceptions are: Ice Chips, Sips with Meds, Popsicles   DVT Prophylaxis [] Lovenox, []  Heparin, [] SCDs, []No VTE prophylaxis, patient ambulating   GI Prophylaxis [] PPI, [] H2 Blocker, [] No GI prophylaxis, patient is receiving diet/Tube Feeds   Code Status Full Code   Disposition Patient requires continued admission due to perforated viscus   MDM [] Low, [] Moderate,[x]  High     History of Present Illness: Subjective     Patient Seen & Examined at the bedside      Patient is resting in bed with no distress well on nasal cannula  Tolerating ice chips and wanting to try something liquid- -no BM or gas yet  She is willing to ambulate    Ten point ROS reviewed negative, unless as noted above    Objective: Intake/Output Summary (Last 24 hours) at 10/20/2020 1118  Last data filed at 10/19/2020 1834  Gross per 24 hour   Intake 2350 ml   Output 505 ml   Net 1845 ml      Vitals:   Vitals:    10/20/20 0900   BP: (!) 140/79   Pulse: 83   Resp: 15   Temp:    SpO2: 97%     Physical Exam:    GEN Awake female, resting in bed in no apparent distress. Appears given age. HENT Mucous membranes are moist.   RESP Clear to auscultation, no wheezes, rales or rhonchi. CARDIO/VASC -S1/S2 auscultated. Regular rate without appreciable murmurs, rubs, or gallops. Peripheral pulses equal bilaterally and palpable. No peripheral edema. GI abdominal binder on -surgical scar covered with dressing and abdominal binder , abdomen is soft without significant tenderness, masses, or guarding.      Medications:   Medications:    cefepime  2 g Intravenous Once    cefepime  2 g Intravenous Q12H    metroNIDAZOLE  500 mg Intravenous Q8H    pantoprazole  40 mg Intravenous Daily    enoxaparin  40 mg Subcutaneous Daily    montelukast  10 mg Oral Daily    insulin lispro  0-6 Units Subcutaneous TID WC    insulin lispro  0-3 Units Subcutaneous Nightly    tiotropium  2 puff Inhalation Daily    hydrocortisone   Topical BID      Infusions:    lactated ringers 150 mL/hr at 10/19/20 1742    dextrose       PRN Meds: ondansetron, 4 mg, Q6H PRN  albuterol sulfate HFA, 2 puff, Q6H PRN  hydrALAZINE (APRESOLINE) ivpb, 10 mg, Q4H PRN  glucose, 15 g, PRN  dextrose, 12.5 g, PRN  glucagon (rDNA), 1 mg, PRN  dextrose, 100 mL/hr, PRN  LORazepam, 0.5 mg, Q6H PRN  HYDROmorphone, 1 mg, Q2H PRN  ketorolac, 30 mg, Q6H PRN          Electronically signed by Gila Kwon MD on 10/20/2020 at 11:18 AM

## 2020-10-21 LAB
ANION GAP SERPL CALCULATED.3IONS-SCNC: 11 MMOL/L (ref 4–16)
BUN BLDV-MCNC: 7 MG/DL (ref 6–23)
CALCIUM SERPL-MCNC: 7.7 MG/DL (ref 8.3–10.6)
CHLORIDE BLD-SCNC: 102 MMOL/L (ref 99–110)
CO2: 26 MMOL/L (ref 21–32)
CREAT SERPL-MCNC: 0.5 MG/DL (ref 0.6–1.1)
GFR AFRICAN AMERICAN: >60 ML/MIN/1.73M2
GFR NON-AFRICAN AMERICAN: >60 ML/MIN/1.73M2
GLUCOSE BLD-MCNC: 134 MG/DL (ref 70–99)
GLUCOSE BLD-MCNC: 66 MG/DL (ref 70–99)
GLUCOSE BLD-MCNC: 69 MG/DL (ref 70–99)
GLUCOSE BLD-MCNC: 69 MG/DL (ref 70–99)
GLUCOSE BLD-MCNC: 73 MG/DL (ref 70–99)
GLUCOSE BLD-MCNC: 78 MG/DL (ref 70–99)
HCT VFR BLD CALC: 30.5 % (ref 37–47)
HEMOGLOBIN: 9.5 GM/DL (ref 12.5–16)
MAGNESIUM: 1.9 MG/DL (ref 1.8–2.4)
MCH RBC QN AUTO: 29.4 PG (ref 27–31)
MCHC RBC AUTO-ENTMCNC: 31.1 % (ref 32–36)
MCV RBC AUTO: 94.4 FL (ref 78–100)
PDW BLD-RTO: 13.5 % (ref 11.7–14.9)
PHOSPHORUS: 2.4 MG/DL (ref 2.5–4.9)
PLATELET # BLD: 164 K/CU MM (ref 140–440)
PMV BLD AUTO: 10.1 FL (ref 7.5–11.1)
POTASSIUM SERPL-SCNC: 3.6 MMOL/L (ref 3.5–5.1)
RBC # BLD: 3.23 M/CU MM (ref 4.2–5.4)
SODIUM BLD-SCNC: 139 MMOL/L (ref 135–145)
WBC # BLD: 7.2 K/CU MM (ref 4–10.5)

## 2020-10-21 PROCEDURE — 6360000002 HC RX W HCPCS: Performed by: SURGERY

## 2020-10-21 PROCEDURE — 1200000000 HC SEMI PRIVATE

## 2020-10-21 PROCEDURE — 6370000000 HC RX 637 (ALT 250 FOR IP): Performed by: SURGERY

## 2020-10-21 PROCEDURE — 2500000003 HC RX 250 WO HCPCS: Performed by: SURGERY

## 2020-10-21 PROCEDURE — 2580000003 HC RX 258: Performed by: SURGERY

## 2020-10-21 PROCEDURE — C9113 INJ PANTOPRAZOLE SODIUM, VIA: HCPCS | Performed by: SURGERY

## 2020-10-21 PROCEDURE — 97530 THERAPEUTIC ACTIVITIES: CPT

## 2020-10-21 PROCEDURE — 82962 GLUCOSE BLOOD TEST: CPT

## 2020-10-21 PROCEDURE — 97166 OT EVAL MOD COMPLEX 45 MIN: CPT

## 2020-10-21 PROCEDURE — 83735 ASSAY OF MAGNESIUM: CPT

## 2020-10-21 PROCEDURE — 80048 BASIC METABOLIC PNL TOTAL CA: CPT

## 2020-10-21 PROCEDURE — 85027 COMPLETE CBC AUTOMATED: CPT

## 2020-10-21 PROCEDURE — 97162 PT EVAL MOD COMPLEX 30 MIN: CPT

## 2020-10-21 PROCEDURE — 84100 ASSAY OF PHOSPHORUS: CPT

## 2020-10-21 RX ORDER — HYDROCHLOROTHIAZIDE 25 MG/1
25 TABLET ORAL DAILY
Status: DISCONTINUED | OUTPATIENT
Start: 2020-10-21 | End: 2020-10-27 | Stop reason: HOSPADM

## 2020-10-21 RX ORDER — LOSARTAN POTASSIUM 25 MG/1
25 TABLET ORAL DAILY
Status: DISCONTINUED | OUTPATIENT
Start: 2020-10-21 | End: 2020-10-27 | Stop reason: HOSPADM

## 2020-10-21 RX ORDER — SODIUM CHLORIDE AND POTASSIUM CHLORIDE .9; .15 G/100ML; G/100ML
SOLUTION INTRAVENOUS CONTINUOUS
Status: DISCONTINUED | OUTPATIENT
Start: 2020-10-21 | End: 2020-10-25

## 2020-10-21 RX ADMIN — CEFEPIME 2 G: 2 INJECTION, POWDER, FOR SOLUTION INTRAVENOUS at 23:16

## 2020-10-21 RX ADMIN — HYDROMORPHONE HYDROCHLORIDE 1 MG: 1 INJECTION, SOLUTION INTRAMUSCULAR; INTRAVENOUS; SUBCUTANEOUS at 05:56

## 2020-10-21 RX ADMIN — POTASSIUM CHLORIDE AND SODIUM CHLORIDE: 900; 150 INJECTION, SOLUTION INTRAVENOUS at 21:17

## 2020-10-21 RX ADMIN — HYDROCORTISONE: 1 CREAM TOPICAL at 08:08

## 2020-10-21 RX ADMIN — METRONIDAZOLE 500 MG: 500 INJECTION, SOLUTION INTRAVENOUS at 08:07

## 2020-10-21 RX ADMIN — POTASSIUM CHLORIDE AND SODIUM CHLORIDE: 900; 150 INJECTION, SOLUTION INTRAVENOUS at 11:55

## 2020-10-21 RX ADMIN — METRONIDAZOLE 500 MG: 500 INJECTION, SOLUTION INTRAVENOUS at 15:27

## 2020-10-21 RX ADMIN — DEXTROSE 15 G: 15 GEL ORAL at 12:00

## 2020-10-21 RX ADMIN — ENOXAPARIN SODIUM 40 MG: 40 INJECTION SUBCUTANEOUS at 08:07

## 2020-10-21 RX ADMIN — POTASSIUM PHOSPHATE, MONOBASIC AND POTASSIUM PHOSPHATE, DIBASIC 15 MMOL: 224; 236 INJECTION, SOLUTION, CONCENTRATE INTRAVENOUS at 11:56

## 2020-10-21 RX ADMIN — CEFEPIME 2 G: 2 INJECTION, POWDER, FOR SOLUTION INTRAVENOUS at 06:25

## 2020-10-21 RX ADMIN — PANTOPRAZOLE SODIUM 40 MG: 40 INJECTION, POWDER, FOR SOLUTION INTRAVENOUS at 08:07

## 2020-10-21 RX ADMIN — MONTELUKAST 10 MG: 10 TABLET, FILM COATED ORAL at 08:07

## 2020-10-21 ASSESSMENT — PAIN DESCRIPTION - PROGRESSION
CLINICAL_PROGRESSION: NOT CHANGED

## 2020-10-21 ASSESSMENT — PAIN SCALES - GENERAL
PAINLEVEL_OUTOF10: 7
PAINLEVEL_OUTOF10: 0

## 2020-10-21 NOTE — CARE COORDINATION
CM notes that PT/OT ordered 10/20/20. White board message left for PT/OT requesting evals be completed to assist with appropriate discharge planning for pt. CM also spoke with pt's nurse, [place] and Charge RN to see if staff might be able to assist pt to chair today. Pt states that she spoke with her sister and her sister advised pt that she cannot lift her if she comes to stay at sister's house. Cm discussed with pt making a potential back up plan in case she has difficulty when she gets up in chair and tries to walk. CM will provide pt with list of SNFs in [facility] that are in contract with pt's insurance should she need short term rehab prior to return home. Cm to follow.

## 2020-10-21 NOTE — CONSULTS
Patient:   JOSE FRAGOSO            MRN: [id number]            FIN: [id number]              Age:   68 years     Sex:  FEMALE     :  52   Associated Diagnoses:   None   Author:   SURINDER ASHTON     Due to COVID19 action plan, physical exam was deferred  Hx was taken from the nurse and the chart   Case Dw nurse, pharmacy  and primary team   Chart reviewed , Dw ID resident  Please refer to his note for further details  Intubated sedated   FIO2/PEEP  100/11  On Vaso and levo   No Fevers  Resp cx from  with serratia and MSSA   Leukocytosis same   Sp Toci 5/3  Sp Convalescent plasma on  and    Suspect vascular lung ,on therapeutic lovenox AC   On meropenem   Sp steroids   CXR looks better   Change meropenem to cefepime , organisms likely colonizers   prognosis grim   [address] [facility], 1965, 2122/2122-A, 10/21/2020    History  [place]:  The primary encounter diagnosis was Bowel perforation ([address] A diagnosis of SBO (small bowel obstruction) ([facility]) was also pertinent to this visit. Patient  has a past medical history of Abnormal ECG, Acid reflux, Anxiety, Arthritis, Back problem, Bipolar disorder (HCC), Chronic back pain, Chronic cough, COPD (chronic obstructive pulmonary disease) (HCC), COPD, moderate (HCC), Curvature of spine, Depression, Diabetes mellitus ([address] Diverticulitis, Esophageal dilatation, Family history of sudden death, Full dentures, H/O 24 hour EKG monitoring, H/O cardiovascular stress test, Headache(784.0), Heart murmur, History of nuclear stress test, [place] (hard of hearing), Hydronephrosis, Hyperlipidemia, Hypertension, Macular degeneration, Mild persistent asthma without complication, MRSA (methicillin resistant Staphylococcus aureus), Near syncope, Panic attacks, PONV (postoperative nausea and vomiting), Right Breast Cancer, S/P exploratory laparotomy, Seizure ([address] Sepsis ([address] Shortness of breath, Sleep apnea, SOB (shortness of breath) on exertion, Thyroid disease, Urinary incontinence, WD-Soft tissue radionecrosis, WD-Wound of right breast, and Wears glasses. Patient  has a past surgical history that includes Dental surgery; Tonsillectomy (1970's); laparoscopy (2005); Cholecystectomy, laparoscopic (2007); lymph node dissection; hernia repair (2007); hernia repair (2012); Cystoscopy (2016); Breast lumpectomy (Right, 08/2015); Breast biopsy (Right, 2015); Breast biopsy (Right, 2017); Hysterectomy, total abdominal (2006); Colonoscopy (2016); Colonoscopy (3/2019; 04/12/2017); Endoscopy, colon, diagnostic (04/12/2017); other surgical history (05/22/2018); Cystoscopy; Abdomen surgery (06/2018); other surgical history; Cystocopy (07/03/2018); Small intestine surgery (10/18/2020);  Abdominal hernia repair (10/18/2020); and laparotomy (N/A, 10/18/2020). Subjective:  Patient states: \"It hurts when I cough\". Pain:  7/10 R side, aching. Communication with other providers:  Handoff to RN, OT  Restrictions: general precautions, abdominal precautions, abdominal binder    Home Setup/Prior level of function  Social/Functional History  Lives With: Alone  Type of Home: Apartment(Elderly/Disabled Apartment Complex)  Home Layout: One level  Entrance Stairs - Number of Steps: 1 step  Bathroom Shower/Tub: Tub/Shower unit, Shower chair without back  Bathroom Toilet: Standard  Bathroom Equipment: Grab bars in shower, Grab bars around toilet, Commode(Call Button in [email or web address])  Bathroom Accessibility: Accessible  Home Equipment: 4 wheeled walker, Standard walker, Cane(Adjustable Bed)  Receives Help From: Family  ADL Assistance: Independent  Homemaking Assistance: Independent  Homemaking Responsibilities: Yes  Ambulation Assistance: Independent  Transfer Assistance: Independent  Active : Yes  Mode of Transportation: Car  Occupation: On disability    Examination of body systems (includes body structures/functions, activity/participation limitations):  · Observation:  Pt supine in bed upon arrival and agreeable to therapy  · Vision:  Wears glasses at all times  · Hearing:  [employer]  · Cardiopulmonary:  O2  · Cognition: see OT/SLP note for further evaluation. Musculoskeletal  · ROM R/L:  WFL. · Strength R/L:  4/5    · Neuro:  Pt reports numbness and tingling in bilateral hands and feet      Mobility:  · Rolling L/R:  Mod A with cues for sequencing, pt educated on log roll  · Supine to sit: Max A with assist at trunk and LEs, increased time and effort to complete with cues for seuqencing  · Transfers: pt performed stand pivot to chair with RW and cues for sequencing. Increased time and effort to complete task. · Sitting balance:  good. · Standing balance:  Fair+.     · Gait: Did not test as pt still connected to various machines and fatigue. [facility] 6 Clicks Inpatient Mobility:  AM-PAC Inpatient Mobility Raw Score : 16    Safety: increased time and effort to complete all tasks and rest bresks in between all tasks require. patient left in chair with alarm on, call light within reach, RN notified, gait belt used. Education: Pt educated on precautions, log roll, walker management, progression with therapy, and general safety. Pt's sister on phone requesting to talk to therapy and sister updated on pt's functional mobility status with pt's permission. Assessment:  Pt is a 54 y.o. female admitted to the hospital for a perforated bowel and underwent abdominal sx. Pt is typically performing all ambulation and functional transfers independently without an AD. Pt is presenting this session with increased pain, impaired bed mobility, impaired transfer status, impaired gait, and decreased endurance at this time. Pt currently requires CGA to perform stand pivot transfers this session. Due to multiple lines and fatigue, further mobilization was not attempted. Pt would benefit from continued acute care PT and further assessment to determine proper discharge disposition as pt progresses. Complexity: Moderate  Prognosis: Good, no significant barriers to participation at this time. Plan Times per week: 4+/week     Equipment: Pt owns necessary equipment    Goals:  Short term goals  Time Frame for Short term goals: 1 week  Short term goal 1: Pt to complete all bed mobility mod I  Short term goal 2: Pt to complete all STS transfers to/from bed, commode, and chair mod I  Short term goal 3: Pt to complete stand pivot transfers with LRAD mod I  Short term goal 4: Pt to ambulate 48' with LRAD mod I       Treatment plan:  Bed mobility, transfers, balance, gait, TA, TX.     Recommendations for NURSING mobility: stand pivot with RW and gait belt    Time:   Time in: 1158  Time out: 1230  Timed treatment minutes: 23  Total time:

## 2020-10-21 NOTE — PROGRESS NOTES
JOSAFAT    Hospitalist Progress Note      Name:  Mary Chew /Age/Sex: 1965  (54 y.o. female)   MRN & CSN:  [id number] & [id number] Admission Date/Time: 10/18/2020  3:41 AM   Location:  -A PCP: Hao Moreland, [address] [place] [address] Day: 4    Assessment and Plan:   Mary Chew is a 54 y.o.  female  who presents with a complaint of abdominal pain - due to perforated intstine -hospitalist group consulted for medical management     Pneumatosis and partial small bowel obstruction    S/p Exp Lap and lysis of multiple adhesions, small bowel resection and anastomosis, removal of previously placed prosthetic mesh material 10/18    Continue NG tube, remains n.p.o. per Surgery except ice chipps   Pain control with Dilaudid  Continue cefepime and Flagyl   Encourage ambulation, incentive spirometry  NGT to be clamped per Surgery      Hypertension      Hydralazine PRN with parameters   Resume home BP medications      Depression/anxiety    Resume Prozac and BuSpar when diet is advanced  Ativan PRN     Hx of Right breast cancer    Diagnosed about 5 years ago. S/p lumpectomy with lymph node dissection, radiation and chemotherapy.     Continue letrozole 2.5 mg daily     Morbid obesity - BMI 40.36 -counseling on diet and exercise to lose weight    Diet Diet NPO Effective Now Exceptions are: Ice Chips, Sips with Meds, Popsicles   DVT Prophylaxis [] Lovenox, []  Heparin, [] SCDs, []No VTE prophylaxis, patient ambulating   GI Prophylaxis [] PPI, [] H2 Blocker, [] No GI prophylaxis, patient is receiving diet/Tube Feeds   Code Status Full Code   Disposition Patient requires continued admission due to perforated viscus   MDM [] Low, [] Moderate,[x]  High     History of Present Illness: Subjective     Patient Seen & Examined at the bedside      Patient is resting in bed with no distress well on nasal cannula  Tolerating ice chips and wanting to try something liquid- -no BM or gas yet  No abdominal pain     Ten point ROS reviewed negative, unless as noted above    Objective: Intake/Output Summary (Last 24 hours) at 10/21/2020 1411  Last data filed at 10/21/2020 0607  Gross per 24 hour   Intake 2200 ml   Output 1190 ml   Net 1010 ml      Vitals:   Vitals:    10/21/20 1200   BP: (!) 156/66   Pulse: 79   Resp:    Temp: 98.9 °F (37.2 °C)   SpO2: 100%     Physical Exam:    GEN Awake female, resting in bed in no apparent distress. Appears given age. HENT Mucous membranes are moist.   RESP Clear to auscultation, no wheezes, rales or rhonchi. CARDIO/VASC -S1/S2 auscultated. Regular rate without appreciable murmurs, rubs, or gallops. Peripheral pulses equal bilaterally and palpable. No peripheral edema. GI J tube in place with serosanguineous fluid in bulb, abdominal binder on -surgical scar covered with dressing and abdominal binder , abdomen is soft without significant tenderness, masses, or guarding.      Medications:   Medications:    potassium phosphate IVPB  15 mmol Intravenous Once    cefepime  2 g Intravenous Once    cefepime  2 g Intravenous Q12H    metroNIDAZOLE  500 mg Intravenous Q8H    pantoprazole  40 mg Intravenous Daily    enoxaparin  40 mg Subcutaneous Daily    montelukast  10 mg Oral Daily    insulin lispro  0-6 Units Subcutaneous TID WC    insulin lispro  0-3 Units Subcutaneous Nightly    tiotropium  2 puff Inhalation Daily    hydrocortisone   Topical BID      Infusions:    0.9% NaCl with KCl 20 mEq 125 mL/hr at 10/21/20 1155    dextrose       PRN Meds: ondansetron, 4 mg, Q6H PRN  albuterol sulfate HFA, 2 puff, Q6H PRN  hydrALAZINE (APRESOLINE) ivpb, 10 mg, Q4H PRN  glucose, 15 g, PRN  dextrose, 12.5 g, PRN  glucagon (rDNA), 1 mg, PRN  dextrose, 100 mL/hr, PRN  LORazepam, 0.5 mg, Q6H PRN  HYDROmorphone, 1 mg, Q2H PRN  ketorolac, 30 mg, Q6H PRN          Electronically signed by Lilli Montalvo MD on 10/21/2020 at 2:11 PM

## 2020-10-21 NOTE — CONSULTS
[address], 1965, 2122/2122-A, 10/21/2020    Discharge Recommendation: Continue to assess pending progress (limited eval d/t lines). History:  [place]:  The primary encounter diagnosis was Bowel perforation ([address] A diagnosis of SBO (small bowel obstruction) (HCC) was also pertinent to this visit. Subjective:  Patient states: Agreeable to therapy. Pain: 7/10 R side  Communication with other providers: PT, RN, LSW  Restrictions: General Precautions, Fall Risk, Abdominal Binder, Log Rolling    Home Setup/Prior level of function:  Social/Functional History  Lives With: Alone  Type of Home: Apartment(Elderly/Disabled Apartment Complex)  Home Layout: One level  Entrance Stairs - Number of Steps: 1 step  Bathroom Shower/Tub: Tub/Shower unit, Shower chair without back  Bathroom Toilet: Standard  Bathroom Equipment: Grab bars in shower, Grab bars around toilet, Commode(Call Button in [place])  Bathroom Accessibility: Accessible  Home Equipment: 4 wheeled walker, Standard walker, Cane(Adjustable Bed)  Receives Help From: Family  ADL Assistance: Independent  Homemaking Assistance: Independent  Homemaking Responsibilities: Yes  Ambulation Assistance: Independent  Transfer Assistance: Independent  Active : Yes  Mode of Transportation: Car  Occupation: On disability    Examination:  · Observation: Supine in bed upon arrival  · Vision: [employer] [place]  · Hearing: WFL  · Vitals: Stable vitals throughout session    Body Systems and functions:  · ROM: WFL   · Strength: Unable to formally test, appears [employer] [place]. · Sensation: WFL  · Tone: Normal  · Coordination: WFL  · Perception: WNL    Activities of Daily Living (ADLs):  · Feeding: Tommie (setup and increased time). · Grooming: Supervision (anticipate pt could complete in seated with increased time and VC). · UB bathing: Karol (anticipate increased assist d/t pain at sx site).    · LB bathing: MaxA (recommend pt complete in seated. Anticipate increased assist d/t pain with forward flexing during distal reaching). · UB dressing: Karol (anticipate increased assist d/t pain in abdominal region). · LB dressing: MaxA (pt required assist to rita socks this date d/t pain in forward flexion). · Toileting: Karol (anticipate assist for toilet tranfers, LB clothing manipulation and jyotsna care while in standing). Cognitive and Psychosocial Functioning:  · Overall cognitive status: WFL (self, time, location). · Affect: Normal     Balance:   · Sitting: Independent (pt sat EOB demo good dynamic sitting balance). · Standing: CGA (stood with RW demo fair+ standing balance). Functional Mobility:  · Bed Mobility: MaxA (supine to seated bed mobility with assist for trunk support, BLE positioning, and increased time). · Transfers: CGA (STS from EOB with RW and VC. Stand to sit to chair with VC for sequencing). · Ambulation: NT d/t lines limiting ability to ambulate away from wall). AM-PAC 6 click short form for inpatient daily activity:   How much help from another person does the patient currently need. .. Unable  Dep A Lot  Max A A Lot   Mod A A Little  Min A A Little   CGA  SBA None   Mod I  Indep  Sup   1. Putting on and taking off regular lower body clothing? [] 1    [x] 2   [] 2   [] 3   [] 3   [] 4      2. Bathing (including washing, rinsing, drying)? [] 1   [] 2   [x] 2 [] 3 [] 3 [] 4   3. Toileting, which includes using toilet, bedpan, or urinal? [] 1    [] 2   [] 2   [x] 3   [] 3   [] 4     4. Putting on and taking off regular upper body clothing? [] 1   [] 2   [] 2   [x] 3   [] 3    [] 4      5. Taking care of personal grooming such as brushing teeth? [] 1   [] 2    [] 2 [] 3    [] 3   [x] 4      6. Eating meals?    [] 1   [] 2   [] 2   [] 3   [] 3   [x] 4      Raw Score:  18     [24=0% impaired(CH), 23=1-19%(CI), 20-22=20-39%(CJ), 15-19=40-59%(CK), 10-14=60-79%(CL), 7-9=80-99%(CM), 6=100%(CN)] Treatment:  Therapeutic Activity Training:   Therapeutic activity training was instructed today. Cues were given for safety, sequence, UE/LE placement, awareness, and balance. Activities performed today included bed mobility training, sup-sit, sit-stand, SPT. Safety Measures: Gait belt used, Left in chair, Alarm in place    Assessment:  Assessment  Performance deficits / Impairments: Decreased functional mobility , Decreased ADL status, Decreased high-level IADLs, Decreased strength, Decreased balance  Treatment Diagnosis: Perforated Bowel  Prognosis: Good  Decision Making: Medium Complexity  REQUIRES OT FOLLOW UP: Yes  Discharge Recommendations: Continue to assess pending progress    Pt is a 54year old F admitted for perforated bowel, resulting in abdominal surgery. Pt reports that prior to admission she was independent in ADLs, IADLs, and functional mobility. This date, pt demo decreased impaired mobility and increased pain impacting ADL status. Pt was also limited by lines connected to wall impacting ability to thoroughly assess ADLs and activity tolerance. Pt is currently functioning below baseline and would benefit from acute OT services. Further assessment necessary to determine d/c planning. Plan:  Plan  Times per week: 2+  Times per day: Daily      Goals:  1. Pt will complete all aspects of bed mobility for EOB/OOB ADLs with Karol. 2. Pt will complete UB/LB bathing with CGA and AE as needed. 3. Pt will complete all aspects of LB dressing with Karol and AE as needed. 4. Pt will complete all functional transfers to and from bed, chair, toilet, shower chair with SBA and RW.  5. Pt will ambulate HH distance to bathroom for toileting with SBA and RW. 6. Pt will complete all aspects of toileting task with SBA. 7. Pt will complete oral hygiene/grooming routine in standing at sink with SBA demo good dynamic standing balance for approx 8 minutes.    8. Pt will complete ther ex/ther act with focus on UB strengthening. Time:   Time in: 1158  Time out: 1230  Timed treatment minutes: 23  Total time: 32      Electronically signed by:       ARIANA Bryant/L, [facility], [id number]

## 2020-10-21 NOTE — PROGRESS NOTES
Attempted to reach pts brother Duc Resides per pt request of transfer. No answer and unable to leave voicemail.

## 2020-10-21 NOTE — PROGRESS NOTES
Physician Progress Note      Franc Lopez  [facility] #:                  [id number]  :                       1965  ADMIT DATE:       10/18/2020 3:41 AM  DISCH DATE:  RESPONDING  PROVIDER #:        Nico Dubois MD          QUERY TEXT:    Patient admitted with pneumatosis and free peritoneal air of sbo with partial   obstruction and recurrent hernia, s/p bowel resection with mesh removal. Noted   documentation of perforated bowel per hospitalist PNs on 10/19-20, op note on   10/18 reflects no evidence of  perforation. If possible, please document in   progress notes and discharge summary:    The medical record reflects the following:  Risk Factors: sbo,  Clinical Indicators: 10/18 op note--The pt had bowel obstruction and   pneumatosis intestinalis of a segment of small bowel without evidence of overt   bowel perforation. Cleopatra Money Cleopatra Money There was a small amount of free air in the peritoneal   cavity and pneumatosis of the bowel with a segment of 8 inches of bowel with   significant pneumatosis, but no evidence of  perforation or ischemia, and there was no contaminated peritoneal fluid. 10/19-20 Hospitalist PNs-Perforated small bowel/small bowel obstruction  Treatment: repair of incarcerated recurrent ventral hernia with small bowel   resection and mesh removal, imaging    Thank you, Loly Bird RN BSN [facility]  [phone]  Options provided:  -- Perforated bowel present as evidenced by, Please document evidence. -- Perforated bowel was ruled out  -- Defer to surgery documentation regarding no evidence of perforation  -- Other - I will add my own diagnosis  -- Disagree - Not applicable / Not valid  -- Disagree - Clinically unable to determine / Unknown  -- Refer to Clinical Documentation Reviewer    PROVIDER RESPONSE TEXT:    I defer to surgery regarding documentation of no evidence of perforation.     Query created by: Brissa Anderson on 10/21/2020 1:25 PM      Electronically signed by:  Nico Dubois MD 10/21/2020 2:10 PM

## 2020-10-21 NOTE — PROGRESS NOTES
Patient awake, alert  Has not gotten up from bed, patient wants to but waiting on PT/OT  Patient is hungry, no flatus or BM yet    PE:    Vitals:    10/21/20 0200 10/21/20 0800 10/21/20 0900 10/21/20 1000   BP: 130/63 129/80 139/80 (!) 146/70   Pulse: 79 73 80 80   Resp: 18 16 18 16   Temp: 98.9 °F (37.2 °C) 98.9 °F (37.2 °C)     TempSrc: Oral Oral     SpO2: 97% 97% 100% 96%   Weight:       Height:         Abd: soft, ANGELICA serous, few BS, dressings c/d/i    A/P:  -trial NG clamping and hope to remove this pm  -out of bed today  -change IVF and decrease infusion, replace phos  -d/c planning for rehab/SNF

## 2020-10-21 NOTE — PROGRESS NOTES
Pt had 50mL of aspirate from NG tube upon arrival to unit at 1555. As per orders, tube will be clamped and re-checked in 4 hours. Will pass along instructions to night shift nurse.

## 2020-10-22 LAB
ANION GAP SERPL CALCULATED.3IONS-SCNC: 11 MMOL/L (ref 4–16)
BASOPHILS ABSOLUTE: 0 K/CU MM
BASOPHILS RELATIVE PERCENT: 0.3 % (ref 0–1)
BUN BLDV-MCNC: 7 MG/DL (ref 6–23)
CALCIUM SERPL-MCNC: 7.6 MG/DL (ref 8.3–10.6)
CHLORIDE BLD-SCNC: 102 MMOL/L (ref 99–110)
CO2: 25 MMOL/L (ref 21–32)
CREAT SERPL-MCNC: 0.5 MG/DL (ref 0.6–1.1)
DIFFERENTIAL TYPE: ABNORMAL
EOSINOPHILS ABSOLUTE: 0.6 K/CU MM
EOSINOPHILS RELATIVE PERCENT: 9.5 % (ref 0–3)
GFR AFRICAN AMERICAN: >60 ML/MIN/1.73M2
GFR NON-AFRICAN AMERICAN: >60 ML/MIN/1.73M2
GLUCOSE BLD-MCNC: 100 MG/DL (ref 70–99)
GLUCOSE BLD-MCNC: 101 MG/DL (ref 70–99)
GLUCOSE BLD-MCNC: 102 MG/DL (ref 70–99)
GLUCOSE BLD-MCNC: 79 MG/DL (ref 70–99)
GLUCOSE BLD-MCNC: 85 MG/DL (ref 70–99)
GLUCOSE BLD-MCNC: 93 MG/DL (ref 70–99)
GLUCOSE BLD-MCNC: 94 MG/DL (ref 70–99)
GLUCOSE BLD-MCNC: 96 MG/DL (ref 70–99)
GLUCOSE BLD-MCNC: 97 MG/DL (ref 70–99)
HCT VFR BLD CALC: 32 % (ref 37–47)
HEMOGLOBIN: 10.1 GM/DL (ref 12.5–16)
IMMATURE NEUTROPHIL %: 0.9 % (ref 0–0.43)
LYMPHOCYTES ABSOLUTE: 0.6 K/CU MM
LYMPHOCYTES RELATIVE PERCENT: 8.3 % (ref 24–44)
MCH RBC QN AUTO: 29.4 PG (ref 27–31)
MCHC RBC AUTO-ENTMCNC: 31.6 % (ref 32–36)
MCV RBC AUTO: 93.3 FL (ref 78–100)
MONOCYTES ABSOLUTE: 0.4 K/CU MM
MONOCYTES RELATIVE PERCENT: 6.3 % (ref 0–4)
NUCLEATED RBC %: 0 %
PDW BLD-RTO: 13.5 % (ref 11.7–14.9)
PLATELET # BLD: 206 K/CU MM (ref 140–440)
PMV BLD AUTO: 10.5 FL (ref 7.5–11.1)
POTASSIUM SERPL-SCNC: 3.8 MMOL/L (ref 3.5–5.1)
RBC # BLD: 3.43 M/CU MM (ref 4.2–5.4)
SEGMENTED NEUTROPHILS ABSOLUTE COUNT: 5 K/CU MM
SEGMENTED NEUTROPHILS RELATIVE PERCENT: 74.7 % (ref 36–66)
SODIUM BLD-SCNC: 138 MMOL/L (ref 135–145)
TOTAL IMMATURE NEUTOROPHIL: 0.06 K/CU MM
TOTAL NUCLEATED RBC: 0 K/CU MM
WBC # BLD: 6.7 K/CU MM (ref 4–10.5)

## 2020-10-22 PROCEDURE — C9113 INJ PANTOPRAZOLE SODIUM, VIA: HCPCS | Performed by: SURGERY

## 2020-10-22 PROCEDURE — 6370000000 HC RX 637 (ALT 250 FOR IP): Performed by: INTERNAL MEDICINE

## 2020-10-22 PROCEDURE — 2500000003 HC RX 250 WO HCPCS: Performed by: SURGERY

## 2020-10-22 PROCEDURE — 6360000002 HC RX W HCPCS: Performed by: SURGERY

## 2020-10-22 PROCEDURE — 6370000000 HC RX 637 (ALT 250 FOR IP): Performed by: SURGERY

## 2020-10-22 PROCEDURE — 94761 N-INVAS EAR/PLS OXIMETRY MLT: CPT

## 2020-10-22 PROCEDURE — 85025 COMPLETE CBC W/AUTO DIFF WBC: CPT

## 2020-10-22 PROCEDURE — 97530 THERAPEUTIC ACTIVITIES: CPT

## 2020-10-22 PROCEDURE — 1200000000 HC SEMI PRIVATE

## 2020-10-22 PROCEDURE — 82962 GLUCOSE BLOOD TEST: CPT

## 2020-10-22 PROCEDURE — 97116 GAIT TRAINING THERAPY: CPT

## 2020-10-22 PROCEDURE — 36415 COLL VENOUS BLD VENIPUNCTURE: CPT

## 2020-10-22 PROCEDURE — 2700000000 HC OXYGEN THERAPY PER DAY

## 2020-10-22 PROCEDURE — 2580000003 HC RX 258: Performed by: SURGERY

## 2020-10-22 PROCEDURE — 97110 THERAPEUTIC EXERCISES: CPT

## 2020-10-22 PROCEDURE — 80048 BASIC METABOLIC PNL TOTAL CA: CPT

## 2020-10-22 PROCEDURE — 94640 AIRWAY INHALATION TREATMENT: CPT

## 2020-10-22 PROCEDURE — 94150 VITAL CAPACITY TEST: CPT

## 2020-10-22 RX ORDER — PANTOPRAZOLE SODIUM 40 MG/1
40 TABLET, DELAYED RELEASE ORAL
Status: DISCONTINUED | OUTPATIENT
Start: 2020-10-23 | End: 2020-10-27 | Stop reason: HOSPADM

## 2020-10-22 RX ADMIN — METRONIDAZOLE 500 MG: 500 INJECTION, SOLUTION INTRAVENOUS at 02:24

## 2020-10-22 RX ADMIN — HYDROCHLOROTHIAZIDE 25 MG: 25 TABLET ORAL at 09:48

## 2020-10-22 RX ADMIN — ENOXAPARIN SODIUM 40 MG: 40 INJECTION SUBCUTANEOUS at 09:47

## 2020-10-22 RX ADMIN — METRONIDAZOLE 500 MG: 500 INJECTION, SOLUTION INTRAVENOUS at 09:54

## 2020-10-22 RX ADMIN — CEFEPIME 2 G: 2 INJECTION, POWDER, FOR SOLUTION INTRAVENOUS at 09:53

## 2020-10-22 RX ADMIN — ONDANSETRON 4 MG: 2 INJECTION INTRAMUSCULAR; INTRAVENOUS at 21:38

## 2020-10-22 RX ADMIN — PANTOPRAZOLE SODIUM 40 MG: 40 INJECTION, POWDER, FOR SOLUTION INTRAVENOUS at 09:47

## 2020-10-22 RX ADMIN — MONTELUKAST 10 MG: 10 TABLET, FILM COATED ORAL at 09:48

## 2020-10-22 RX ADMIN — LOSARTAN POTASSIUM 25 MG: 25 TABLET, FILM COATED ORAL at 09:48

## 2020-10-22 RX ADMIN — ALBUTEROL SULFATE 2 PUFF: 90 AEROSOL, METERED RESPIRATORY (INHALATION) at 07:29

## 2020-10-22 RX ADMIN — TIOTROPIUM BROMIDE INHALATION SPRAY 2 PUFF: 3.12 SPRAY, METERED RESPIRATORY (INHALATION) at 07:28

## 2020-10-22 RX ADMIN — POTASSIUM CHLORIDE AND SODIUM CHLORIDE: 900; 150 INJECTION, SOLUTION INTRAVENOUS at 09:53

## 2020-10-22 ASSESSMENT — PAIN SCALES - GENERAL
PAINLEVEL_OUTOF10: 0

## 2020-10-22 NOTE — PROGRESS NOTES
JOSAFAT    Hospitalist Progress Note      Name:  Naa Price /Age/Sex: 1965  (54 y.o. female)   MRN & CSN:  [id number] & [id number] Admission Date/Time: 10/18/2020  3:41 AM   [facility] PCP: Sudhir Johnson [address] [place] [address] Day: 5    Assessment and Plan:   Naa Price is a 54 y.o.  female  who presents with a complaint of abdominal pain - due to perforated intstine -hospitalist group consulted for medical management     Pneumatosis and partial small bowel obstruction    S/p Exp Lap and lysis of multiple adhesions, small bowel resection and anastomosis, removal of previously placed prosthetic mesh material 10/18    Continue NG tube, remains n.p.o. per Surgery except ice chipps   Pain control with Dilaudid  Cefepime and Flagyl DC per Surgery on 10/22  Encourage ambulation, incentive spirometry  NGTremoved per Surgery and now allowed trial of clear liquids      Hypertension      Hydralazine PRN with parameters   Resume home BP medications      Depression/anxiety    Resume Prozac and BuSpar when diet is advanced  Ativan PRN     Hx of Right breast cancer    Diagnosed about 5 years ago. S/p lumpectomy with lymph node dissection, radiation and chemotherapy.     Continue letrozole 2.5 mg daily     Morbid obesity - BMI 40.36 -counseling on diet and exercise to lose weight    Diet Diet NPO Effective Now Exceptions are: Ice Chips, Sips with Meds, Popsicles   DVT Prophylaxis [] Lovenox, []  Heparin, [] SCDs, []No VTE prophylaxis, patient ambulating   GI Prophylaxis [] PPI, [] H2 Blocker, [] No GI prophylaxis, patient is receiving diet/Tube Feeds   Code Status Full Code   Disposition Patient requires continued admission due to perforated viscus   MDM [] Low, [] Moderate,[x]  High     History of Present Illness: Subjective     Patient Seen & Examined at the bedside      Patient is resting in bed with no distress while on room air  Complaining of headache, wants to try something different than ice chips  no BM or gas yet  No abdominal pain     Ten point ROS reviewed negative, unless as noted above    Objective: Intake/Output Summary (Last 24 hours) at 10/22/2020 0920  Last data filed at 10/22/2020 0810  Gross per 24 hour   Intake 150 ml   Output 2885 ml   Net -2735 ml      Vitals:   Vitals:    10/22/20 0800   BP: (!) 179/89   Pulse: 75   Resp: 16   Temp: 99.3 °F (37.4 °C)   SpO2:      Physical Exam:    GEN Awake female, resting in bed in no apparent distress. Appears given age. HENT Mucous membranes are moist.   RESP Clear to auscultation, no wheezes, rales or rhonchi. CARDIO/VASC -S1/S2 auscultated. Regular rate without appreciable murmurs, rubs, or gallops. Peripheral pulses equal bilaterally and palpable. No peripheral edema. GI J tube in place with serosanguineous fluid in bulb, abdominal binder on -surgical scar covered with dressing and abdominal binder , abdomen is soft without significant tenderness, masses, or guarding.      Medications:   Medications:    losartan  25 mg Oral Daily    hydroCHLOROthiazide  25 mg Oral Daily    cefepime  2 g Intravenous Once    cefepime  2 g Intravenous Q12H    metroNIDAZOLE  500 mg Intravenous Q8H    pantoprazole  40 mg Intravenous Daily    enoxaparin  40 mg Subcutaneous Daily    montelukast  10 mg Oral Daily    insulin lispro  0-6 Units Subcutaneous TID WC    insulin lispro  0-3 Units Subcutaneous Nightly    tiotropium  2 puff Inhalation Daily    hydrocortisone   Topical BID      Infusions:    0.9% NaCl with KCl 20 mEq 125 mL/hr at 10/21/20 2117    dextrose       PRN Meds: ondansetron, 4 mg, Q6H PRN  albuterol sulfate HFA, 2 puff, Q6H PRN  hydrALAZINE (APRESOLINE) ivpb, 10 mg, Q4H PRN  glucose, 15 g, PRN  dextrose, 12.5 g, PRN  glucagon (rDNA), 1 mg, PRN  dextrose, 100 mL/hr, PRN  LORazepam, 0.5 mg, Q6H PRN  HYDROmorphone, 1 mg, Q2H PRN  ketorolac, 30 mg, Q6H PRN          Electronically signed by Candy Acosta MD on 10/22/2020 at 9:20 AM

## 2020-10-22 NOTE — PROGRESS NOTES
Equipment: Grab bars in shower, Grab bars around toilet, Commode(Call Button in [place])  Bathroom Accessibility: Accessible  Home Equipment: 4 wheeled walker, Standard walker, Cane(Adjustable Bed)  Receives Help From: Family  ADL Assistance: [address]: Independent  Homemaking Responsibilities: Yes  Ambulation Assistance: Independent  Transfer Assistance: Independent  Active : Yes  Mode of Transportation: Car  Occupation: On disability  Short term goals  Time Frame for Short term goals: 1 week  Short term goal 1: Pt to complete all bed mobility mod I  Short term goal 2: Pt to complete all STS transfers to/from bed, commode, and chair mod I  Short term goal 3: Pt to complete stand pivot transfers with LRAD mod I  Short term goal 4: Pt to ambulate 48' with LRAD mod I       Electronically signed by:    Liza Waters PTA  10/22/2020, 8:24 AM

## 2020-10-22 NOTE — PROGRESS NOTES
Aspirated clear gastric content. Placed on LIWS as ordered. Will continue to monitor gastric residual. NPO, sips with med, ice chips & popsicles. Verbalized mild nausea.

## 2020-10-23 LAB
ANION GAP SERPL CALCULATED.3IONS-SCNC: 8 MMOL/L (ref 4–16)
BUN BLDV-MCNC: 6 MG/DL (ref 6–23)
CALCIUM SERPL-MCNC: 8.1 MG/DL (ref 8.3–10.6)
CHLORIDE BLD-SCNC: 99 MMOL/L (ref 99–110)
CO2: 29 MMOL/L (ref 21–32)
CREAT SERPL-MCNC: 0.5 MG/DL (ref 0.6–1.1)
GFR AFRICAN AMERICAN: >60 ML/MIN/1.73M2
GFR NON-AFRICAN AMERICAN: >60 ML/MIN/1.73M2
GLUCOSE BLD-MCNC: 108 MG/DL (ref 70–99)
GLUCOSE BLD-MCNC: 119 MG/DL (ref 70–99)
GLUCOSE BLD-MCNC: 124 MG/DL (ref 70–99)
GLUCOSE BLD-MCNC: 129 MG/DL (ref 70–99)
GLUCOSE BLD-MCNC: 130 MG/DL (ref 70–99)
POTASSIUM SERPL-SCNC: 3.5 MMOL/L (ref 3.5–5.1)
SARS-COV-2, NAAT: NOT DETECTED
SODIUM BLD-SCNC: 136 MMOL/L (ref 135–145)
SOURCE: NORMAL

## 2020-10-23 PROCEDURE — 97116 GAIT TRAINING THERAPY: CPT

## 2020-10-23 PROCEDURE — 1200000000 HC SEMI PRIVATE

## 2020-10-23 PROCEDURE — 6370000000 HC RX 637 (ALT 250 FOR IP): Performed by: SURGERY

## 2020-10-23 PROCEDURE — 6370000000 HC RX 637 (ALT 250 FOR IP): Performed by: PHYSICIAN ASSISTANT

## 2020-10-23 PROCEDURE — 6370000000 HC RX 637 (ALT 250 FOR IP): Performed by: INTERNAL MEDICINE

## 2020-10-23 PROCEDURE — 6360000002 HC RX W HCPCS: Performed by: SURGERY

## 2020-10-23 PROCEDURE — 94761 N-INVAS EAR/PLS OXIMETRY MLT: CPT

## 2020-10-23 PROCEDURE — U0002 COVID-19 LAB TEST NON-CDC: HCPCS

## 2020-10-23 PROCEDURE — 80048 BASIC METABOLIC PNL TOTAL CA: CPT

## 2020-10-23 PROCEDURE — 94640 AIRWAY INHALATION TREATMENT: CPT

## 2020-10-23 PROCEDURE — 82962 GLUCOSE BLOOD TEST: CPT

## 2020-10-23 PROCEDURE — 97530 THERAPEUTIC ACTIVITIES: CPT

## 2020-10-23 PROCEDURE — 94150 VITAL CAPACITY TEST: CPT

## 2020-10-23 RX ORDER — FLUOXETINE 10 MG/1
10 CAPSULE ORAL DAILY
Status: DISCONTINUED | OUTPATIENT
Start: 2020-10-23 | End: 2020-10-27 | Stop reason: HOSPADM

## 2020-10-23 RX ORDER — BUSPIRONE HYDROCHLORIDE 10 MG/1
10 TABLET ORAL 3 TIMES DAILY
Status: DISCONTINUED | OUTPATIENT
Start: 2020-10-23 | End: 2020-10-27 | Stop reason: HOSPADM

## 2020-10-23 RX ADMIN — TIOTROPIUM BROMIDE INHALATION SPRAY 2 PUFF: 3.12 SPRAY, METERED RESPIRATORY (INHALATION) at 07:44

## 2020-10-23 RX ADMIN — POTASSIUM CHLORIDE AND SODIUM CHLORIDE: 900; 150 INJECTION, SOLUTION INTRAVENOUS at 22:34

## 2020-10-23 RX ADMIN — HYDROCORTISONE: 1 CREAM TOPICAL at 22:34

## 2020-10-23 RX ADMIN — POTASSIUM CHLORIDE AND SODIUM CHLORIDE: 900; 150 INJECTION, SOLUTION INTRAVENOUS at 02:15

## 2020-10-23 RX ADMIN — PANTOPRAZOLE SODIUM 40 MG: 40 TABLET, DELAYED RELEASE ORAL at 09:52

## 2020-10-23 RX ADMIN — FLUOXETINE HYDROCHLORIDE 10 MG: 10 CAPSULE ORAL at 15:20

## 2020-10-23 RX ADMIN — LOSARTAN POTASSIUM 25 MG: 25 TABLET, FILM COATED ORAL at 09:52

## 2020-10-23 RX ADMIN — ALBUTEROL SULFATE 2 PUFF: 90 AEROSOL, METERED RESPIRATORY (INHALATION) at 07:47

## 2020-10-23 RX ADMIN — ENOXAPARIN SODIUM 40 MG: 40 INJECTION SUBCUTANEOUS at 09:52

## 2020-10-23 RX ADMIN — HYDROCHLOROTHIAZIDE 25 MG: 25 TABLET ORAL at 09:52

## 2020-10-23 RX ADMIN — MONTELUKAST 10 MG: 10 TABLET, FILM COATED ORAL at 09:52

## 2020-10-23 ASSESSMENT — PAIN DESCRIPTION - PROGRESSION: CLINICAL_PROGRESSION: NOT CHANGED

## 2020-10-23 ASSESSMENT — PAIN DESCRIPTION - ONSET: ONSET: ON-GOING

## 2020-10-23 ASSESSMENT — PAIN DESCRIPTION - PAIN TYPE: TYPE: ACUTE PAIN

## 2020-10-23 ASSESSMENT — PAIN SCALES - GENERAL: PAINLEVEL_OUTOF10: 7

## 2020-10-23 ASSESSMENT — PAIN DESCRIPTION - FREQUENCY: FREQUENCY: INTERMITTENT

## 2020-10-23 ASSESSMENT — PAIN DESCRIPTION - DESCRIPTORS: DESCRIPTORS: ACHING

## 2020-10-23 ASSESSMENT — PAIN - FUNCTIONAL ASSESSMENT: PAIN_FUNCTIONAL_ASSESSMENT: ACTIVITIES ARE NOT PREVENTED

## 2020-10-23 ASSESSMENT — PAIN DESCRIPTION - LOCATION: LOCATION: NECK

## 2020-10-23 ASSESSMENT — PAIN DESCRIPTION - ORIENTATION: ORIENTATION: RIGHT

## 2020-10-23 NOTE — PROGRESS NOTES
Comprehensive Nutrition Assessment    Type and Reason for Visit:  Initial(length of stay)    Nutrition Recommendations/Plan:   Advance from clear liquid diet as tolerates  Will trial oral nutrition supplement-clear at this time    Nutrition Assessment:  Admit with perforated bowel, now s/p exp lap with bowel surgery, XU. Able to resume clear liquid diet and tolerating so far. Willing to try ensure clear supplement. High nutrition risk with inadequate intake during stay due to altered GI. Malnutrition Assessment:  Malnutrition Status: At risk for malnutrition (Comment)    Context:  Acute Illness       Estimated Daily Nutrient Needs:  Energy (kcal):  6087-5180; Weight Used for Energy Requirements:  Current     Protein (g):  74-86 (1.2-1.4 g/kg); Weight Used for Protein Requirements:  Ideal        Fluid (ml/day):  3691-5937 (1ml/judith); Weight Used for Fluid Requirements:  Current      Nutrition Related Findings:  sitting up in chair, hx DM, feeling better      Wounds:  Surgical Wound       Current Nutrition Therapies:    DIET CLEAR LIQUID;     Anthropometric Measures:  · Height: 5' 7\" (170.2 cm)  · Current Body Weight: 270 lb 1 oz (122.5 kg)   · Admission Body Weight: 257 lb 11.5 oz (116.9 kg)    · Usual Body Weight: 275 lb (124.7 kg)(per hx)     · Ideal Body Weight: 135 lbs; % Ideal Body Weight 200 %   · BMI: 42.3  · Adjusted Body Weight:  ; No Adjustment   · BMI Categories: Obese Class 3 (BMI 40.0 or greater)       Nutrition Diagnosis:   · Inadequate energy intake related to altered GI function as evidenced by NPO or clear liquid status due to medical condition      Nutrition Interventions:   Food and/or Nutrient Delivery:  Continue Current Diet, Start Oral Nutrition Supplement  Nutrition Education/Counseling:  No recommendation at this time   Coordination of Nutrition Care:  Continued Inpatient Monitoring    Goals:  Patient will tolerate at least full liquid diet, meal intake at least 50%       Nutrition Monitoring and Evaluation:   Food/Nutrient Intake Outcomes:  Diet Advancement/Tolerance, Food and Nutrient Intake, Supplement Intake  Physical Signs/Symptoms Outcomes:  Skin, Weight, GI Status, Biochemical Data     Discharge Planning:     Too soon to determine     Electronically signed by Grace Villanueva RD, LD on 10/23/20 at 1:55 PM EDT    Contact: [phone]

## 2020-10-23 NOTE — PROGRESS NOTES
POD#5  One episode of emesis yesterday  No nausea or recurrent vomiting  Low grade fever  Abdomen soft   Clear liquids until passing gas and having BM  Dr. Mccurdy Half on call  Will order PCR for covid given patients fever

## 2020-10-23 NOTE — PROGRESS NOTES
Physical Therapy    Physical Therapy Treatment Note  Name: Munir Leyva MRN: [id number] :   1965   Date:  10/23/2020   Admission Date: 10/18/2020 Room:  [facility]   Restrictions/Precautions:        abdominal precautions, log roll  Communication with other providers:  Case neptali discussed discharge plan with pt during rest break. Subjective:  Patient states:  Pt reports she will be going home with her brother at discharge  Pain:   Location, Type, Intensity (0/10 to 10/10): Pt reports ribs sore with transfers and gt but denies having pain at rest.  Objective:    Observation:  Alert and oriented  Treatment, including education/measures:  Sit<=>stand from chair and commode with safety rail cga and cues for safety  Pt amb with rw 10' x 2, 20' x 1 cga. Pt needs to do jyotsna care in standing but only needing sba for safety. Ex in sitting:  Trunk stretches with shoulder flex and cues for deep breathing and posture. aps  7 reps laqs  Safety  Patient left safely in the chair, with call light/phone in reach with alarm applied. Gait belt was used for transfers and gait. Assessment / Impression:       Patient's tolerance of treatment:  good  Adverse Reaction: na  Significant change in status and impact:  na  Barriers to improvement:  Strength and safety  Plan for Next Session:    Cont.  POC  Time in:  1035  Time out:  1100  Timed treatment minutes:  25  Total treatment time:  25    Previously filed items:  Social/Functional History  Lives With: Alone  Type of Home: Apartment(Elderly/Disabled Apartment Complex)  Home Layout: One level  Entrance Stairs - Number of Steps: 1 step  Bathroom Shower/Tub: Tub/Shower unit, Shower chair without back  Bathroom Toilet: Standard  Bathroom Equipment: Grab bars in shower, Grab bars around toilet, Commode(Call Button in [place])  Bathroom Accessibility: Accessible  Home Equipment: 4 wheeled walker, Standard walker, Cane(Adjustable Bed)  Receives Help From: Family  ADL Assistance: Independent  Homemaking Assistance: Independent  Homemaking Responsibilities: Yes  Ambulation Assistance: Independent  Transfer Assistance: Independent  Active : Yes  Mode of Transportation: Car  Occupation: On disability  Short term goals  Time Frame for Short term goals: 1 week  Short term goal 1: Pt to complete all bed mobility mod I  Short term goal 2: Pt to complete all STS transfers to/from bed, commode, and chair mod I  Short term goal 3: Pt to complete stand pivot transfers with LRAD mod I  Short term goal 4: Pt to ambulate 48' with LRAD mod I       Electronically signed by:    Renae Ibrahim PTA  10/23/2020, 8:28 AM

## 2020-10-23 NOTE — PROGRESS NOTES
[facility]    Hospitalist Progress Note      Name:  Veronique Swain /Age/Sex: 1965  (54 y.o. female)   MRN & CSN:  [id number] & [id number] Admission Date/Time: 10/18/2020  3:41 AM   Location:  [address] [place] [address]- PCP: Fernanda Maciel [address] [place] [address] Day: 6    Assessment and Plan:   Veronique Swain is a 54 y.o.  female  who presents with a complaint of abdominal pain - due to perforated intstine -hospitalist group consulted for medical management     Pneumatosis and partial small bowel obstruction    S/p Exp Lap and lysis of multiple adhesions, small bowel resection and anastomosis, removal of previously placed prosthetic mesh material 10/18    Pain control with Dilaudid  Cefepime and Flagyl DC per Surgery on 10/22  Encourage ambulation, incentive spirometry  NGTremoved per Surgery and now allowed trial of clear liquids   No BM or gas yet      Hypertension      Hydralazine PRN with parameters   Resumed home BP medications      Depression/anxiety    Resumed Prozac and BuSpar, Ativan PRN     Hx of Right breast cancer    Diagnosed about 5 years ago. S/p lumpectomy with lymph node dissection, radiation and chemotherapy.     Continue letrozole 2.5 mg daily     Morbid obesity - BMI 40.36 -counseling on diet and exercise to lose weight    Diet DIET CLEAR LIQUID;   DVT Prophylaxis [] Lovenox, []  Heparin, [] SCDs, []No VTE prophylaxis, patient ambulating   GI Prophylaxis [] PPI, [] H2 Blocker, [] No GI prophylaxis, patient is receiving diet/Tube Feeds   Code Status Full Code   Disposition Patient requires continued admission due to perforated viscus   MDM [] Low, [] Moderate,[x]  High     History of Present Illness: Subjective     Patient Seen & Examined at the bedside      Patient is resting in her recliner with no distress while on room air  Denies having any BM or gas yet - was able to take few steps with therpay yesterday   No abdominal pain     Ten point ROS reviewed negative, unless as noted above    Objective:

## 2020-10-23 NOTE — CARE COORDINATION
CM in to see Pt for follow up on discharge planning. Pt plans to live with brother who's home is 1 floor plan and handicap accessible. Pt has DME to include walker, shower chair, bedside commode. CM spoke with Pt brother, Betsy Longo [phone], who insists they can care for her along with [facility] for any medical needs. Pt has pcp and insurance. CM following.

## 2020-10-23 NOTE — ADT AUTH CERT
· tiotropium 2 puff Inhalation Daily    · hydrocortisone Topical BID            Infusions Meds    · 0.9% NaCl with KCl 20 mEq 125 mL/hr at 10/21/20 1155    · dextrose       Pneumatosis and partial small bowel obstruction      S/p Exp Lap and lysis of multiple adhesions, small bowel resection and anastomosis, removal of previously placed prosthetic mesh material 10/18         Continue NG tube, remains n.p.o. per Surgery except ice chipps    Pain control with Dilaudid    Continue cefepime and Flagyl    Encourage ambulation, incentive spirometry    NGT to be clamped per Surgery         Hypertension           Hydralazine PRN with parameters    Resume home BP medications         Depression/anxiety         Resume Prozac and BuSpar when diet is advanced    Ativan PRN         Hx of Right breast cancer         Diagnosed about 5 years ago.  S/p lumpectomy with lymph node dissection, radiation and chemotherapy.      Continue letrozole 2.5 mg daily         Morbid obesity - BMI 40.36 -counseling on diet and exercise to lose weight         Diet Diet NPO Effective Now Exceptions are: Ice Chips, Sips with Meds, Popsicles       General Surg         A/P:    -trial NG clamping and hope to remove this pm    -out of bed today    -change IVF and decrease infusion, replace phos    -d/c planning for rehab/SNF       WBC 7.2 K/CU MM      RBC 3.23 M/CU MM      Hemoglobin 9.5 GM/DL      Hematocrit 30.5 %      MCV 94.4 FL      MCH 29.4 PG      MCHC 31.1 %      RDW 13.5 %      Platelets 531 K/CU MM      MPV 10.1 FL       Sodium 139 MMOL/L      Potassium 3.6 MMOL/L      Chloride 102 mMol/L      CO2 26 MMOL/L      Anion Gap 11      BUN 7 MG/DL      CREATININE 0.5 MG/DL      Glucose 69 MG/DL      Calcium 7.7 MG/DL      GFR Non-African American >60 mL/min/1.73m2      GFR African American >60 mL/min/1.73m2                 Bowel Surgery: Small Intestine Resection - Care Day 3 (10/20/2020) by Taylor Smith RN         Review Status  Review Entered Completed  10/21/2020 16:38        Criteria Review       Care Day: 3 Care Date: 10/20/2020 Level of Care: ICU    Guideline Day 3    Clinical Status    ( ) * No evidence of ileus or bowel obstruction    Activity    (X) * Ambulatory [C]    10/21/2020 4:38 PM EDT by Gil Schwarz as edwin    Interventions    ( ) * NG tube absent    * Milestone    Additional Notes    10/20  ICU    Patient is resting in bed with no distress well on nasal cannula    Tolerating ice chips and wanting to try something liquid- -no BM or gas yet    She is willing to ambulate       Intake/Output Summary (Last 24 hours) at 10/20/2020 1118    Last data filed at 10/19/2020 1834    Gross per 24 hour    Intake 2350 ml    Output 505 ml    Net 1845 ml         BP: (!) 140/79    Pulse: 83    Resp: 15    Temp:      SpO2: 97%         Physical Exam:    GEN    Awake female, resting in bed in no apparent distress. Appears given age. HENT  Mucous membranes are moist.    RESP  Clear to auscultation, no wheezes, rales or rhonchi.      CARDIO/VASC -S1/S2 auscultated. Regular rate without appreciable murmurs, rubs, or gallops. Peripheral pulses equal bilaterally and palpable. No peripheral edema. GI        abdominal binder on -surgical scar covered with dressing and abdominal binder , abdomen is soft without significant tenderness, masses, or guarding.         Scheduled Medications    · cefepime 2 g Intravenous Once    · cefepime 2 g Intravenous Q12H    · metroNIDAZOLE 500 mg Intravenous Q8H    · pantoprazole 40 mg Intravenous Daily    · enoxaparin 40 mg Subcutaneous Daily    · montelukast 10 mg Oral Daily    · insulin lispro 0-6 Units Subcutaneous TID WC    · insulin lispro 0-3 Units Subcutaneous Nightly    · tiotropium 2 puff Inhalation Daily    · hydrocortisone Topical BID            Infusions Meds    · lactated ringers 150 mL/hr at 10/19/20 1742    · dextrose    Assessment and Plan:    Demetra Jimenez is a 54 y. oOllie Chalet presents with a complaint of abdominal pain - due to perforated intstine -hospitalist group consulted for medical management         Perforated small bowel/small bowel obstruction           S/p Exp Lap and lysis of multiple adhesions, small bowel resection and anastomosis, removal of previously placed prosthetic mesh material 10/18         Continue NG tube, remains n.p.o. per Surgery except ice chipps    Pain control with Dilaudid    Continue cefepime and Flagyl    Encourage ambulation, incentive spirometry         Hypertension           BP controlled currently    Hold antihypertensives due to n.p.o. status    Hydralazine PRN with parameters         Depression/anxiety         Resume Prozac and BuSpar when diet is advanced    Ativan PRN         Hx of Right breast cancer         Diagnosed about 5 years ago.  S/p lumpectomy with lymph node dissection, radiation and chemotherapy.      Continue letrozole 2.5 mg daily         Morbid obesity - BMI 40.36 -counseling on diet and exercise to lose weight       General surg    A/P:    -continue NG decompression    -up to chair today    -discussed with patient plan for discharge, recommendation for rehab          Specimen URINE CLEAN CATCH      Special Requests NONE      Culture Final Report      ENTEROCOCCUS FAECALIS 10,000 CFU/ml

## 2020-10-24 LAB
GLUCOSE BLD-MCNC: 120 MG/DL (ref 70–99)
GLUCOSE BLD-MCNC: 127 MG/DL (ref 70–99)
GLUCOSE BLD-MCNC: 157 MG/DL (ref 70–99)
GLUCOSE BLD-MCNC: 158 MG/DL (ref 70–99)
HCT VFR BLD CALC: 32.9 % (ref 37–47)
HEMOGLOBIN: 10.8 GM/DL (ref 12.5–16)
MCH RBC QN AUTO: 30.4 PG (ref 27–31)
MCHC RBC AUTO-ENTMCNC: 32.8 % (ref 32–36)
MCV RBC AUTO: 92.7 FL (ref 78–100)
PDW BLD-RTO: 13.9 % (ref 11.7–14.9)
PLATELET # BLD: 207 K/CU MM (ref 140–440)
PMV BLD AUTO: 10.5 FL (ref 7.5–11.1)
RBC # BLD: 3.55 M/CU MM (ref 4.2–5.4)
WBC # BLD: 6.1 K/CU MM (ref 4–10.5)

## 2020-10-24 PROCEDURE — 82962 GLUCOSE BLOOD TEST: CPT

## 2020-10-24 PROCEDURE — 97530 THERAPEUTIC ACTIVITIES: CPT

## 2020-10-24 PROCEDURE — 6370000000 HC RX 637 (ALT 250 FOR IP): Performed by: SURGERY

## 2020-10-24 PROCEDURE — 6370000000 HC RX 637 (ALT 250 FOR IP): Performed by: INTERNAL MEDICINE

## 2020-10-24 PROCEDURE — 94150 VITAL CAPACITY TEST: CPT

## 2020-10-24 PROCEDURE — 6360000002 HC RX W HCPCS: Performed by: SURGERY

## 2020-10-24 PROCEDURE — 94761 N-INVAS EAR/PLS OXIMETRY MLT: CPT

## 2020-10-24 PROCEDURE — 94640 AIRWAY INHALATION TREATMENT: CPT

## 2020-10-24 PROCEDURE — 36415 COLL VENOUS BLD VENIPUNCTURE: CPT

## 2020-10-24 PROCEDURE — 94664 DEMO&/EVAL PT USE INHALER: CPT

## 2020-10-24 PROCEDURE — 97116 GAIT TRAINING THERAPY: CPT

## 2020-10-24 PROCEDURE — 85027 COMPLETE CBC AUTOMATED: CPT

## 2020-10-24 PROCEDURE — 97110 THERAPEUTIC EXERCISES: CPT

## 2020-10-24 PROCEDURE — 1200000000 HC SEMI PRIVATE

## 2020-10-24 RX ADMIN — ALBUTEROL SULFATE 2 PUFF: 90 AEROSOL, METERED RESPIRATORY (INHALATION) at 08:54

## 2020-10-24 RX ADMIN — MONTELUKAST 10 MG: 10 TABLET, FILM COATED ORAL at 08:34

## 2020-10-24 RX ADMIN — BUSPIRONE HYDROCHLORIDE 10 MG: 10 TABLET ORAL at 08:34

## 2020-10-24 RX ADMIN — PANTOPRAZOLE SODIUM 40 MG: 40 TABLET, DELAYED RELEASE ORAL at 05:56

## 2020-10-24 RX ADMIN — INSULIN LISPRO 1 UNITS: 100 INJECTION, SOLUTION INTRAVENOUS; SUBCUTANEOUS at 21:55

## 2020-10-24 RX ADMIN — LOSARTAN POTASSIUM 25 MG: 25 TABLET, FILM COATED ORAL at 08:34

## 2020-10-24 RX ADMIN — HYDROCORTISONE: 1 CREAM TOPICAL at 08:35

## 2020-10-24 RX ADMIN — POTASSIUM CHLORIDE AND SODIUM CHLORIDE: 900; 150 INJECTION, SOLUTION INTRAVENOUS at 10:02

## 2020-10-24 RX ADMIN — TIOTROPIUM BROMIDE INHALATION SPRAY 2 PUFF: 3.12 SPRAY, METERED RESPIRATORY (INHALATION) at 08:55

## 2020-10-24 RX ADMIN — HYDROCHLOROTHIAZIDE 25 MG: 25 TABLET ORAL at 08:36

## 2020-10-24 RX ADMIN — FLUOXETINE HYDROCHLORIDE 10 MG: 10 CAPSULE ORAL at 08:35

## 2020-10-24 ASSESSMENT — PAIN SCALES - GENERAL
PAINLEVEL_OUTOF10: 0

## 2020-10-24 NOTE — PROGRESS NOTES
Physical Therapy    Physical Therapy Treatment Note  Name: Demetra Jimenez MRN: [id number] :   1965   Date:  10/24/2020   Admission Date: 10/18/2020 [facility]   Restrictions/Precautions:        abdominal precautions, log roll Communication with other providers:  Per nurse ok to tx  Subjective:  Patient states:  Agreeable to tx but needs emotional support and encouragement. Pt declined sitting up in chair at end of tx session. Pain:   Location, Type, Intensity (0/10 to 10/10): States just a little when asked to rate pain. Objective:    Observation:  Alert and oriented but with some anxiety about returning home. Pt requesting more assistance then needed and gentle encourage to do more for herself during tx. Treatment, including education/measures:  Sup <=> sit with cues for log rolling using bed rail and cga assist to move legs back into bed. Sit<=>stand sba and cues for hand placement and safety. amb with rw 40' x 1, 20' x1 and then declined further gt training. Pt needed extended time to rest between bouts of amb. Ex in sitting:  Trunk stretches with shoulder flex and cues for deep breathing  10 reps aps  10 reps laqs  Safety  Patient left safely in the bed, with call light/phone in reach with alarm applied. Gait belt was used for transfers and gait. Assessment / Impression:       Patient's tolerance of treatment:  good  Adverse Reaction: na  Significant change in status and impact:  na  Barriers to improvement:  Strength and safety  Plan for Next Session:    Cont.  POC  Time in:  0915  Time out:  09  Timed treatment minutes:  40  Total treatment time:  36    Previously filed items:  Social/Functional History  Lives With: Alone  Type of Home: Apartment(Elderly/Disabled Apartment Complex)  Home Layout: One level  Entrance Stairs - Number of Steps: 1 step  Bathroom Shower/Tub: Tub/Shower unit, Shower chair without back  Bathroom Toilet: Standard  Bathroom Equipment: Grab bars in shower, [employer] bars around toilet, Commode(Call Button in [place])  Bathroom Accessibility: Accessible  Home Equipment: 4 wheeled walker, Standard walker, Cane(Adjustable Bed)  Receives Help From: Family  ADL Assistance: [address]: Independent  Homemaking Responsibilities: Yes  Ambulation Assistance: Independent  Transfer Assistance: Independent  Active : Yes  Mode of Transportation: Car  Occupation: On disability  Short term goals  Time Frame for Short term goals: 1 week  Short term goal 1: Pt to complete all bed mobility mod I  Short term goal 2: Pt to complete all STS transfers to/from bed, commode, and chair mod I  Short term goal 3: Pt to complete stand pivot transfers with LRAD mod I  Short term goal 4: Pt to ambulate 48' with LRAD mod I       Electronically signed by:    Alanis Felix PTA  10/24/2020, 8:16 AM

## 2020-10-24 NOTE — PROGRESS NOTES
Patient is hungry, continues to pass flatus, no BM    PE:    Vitals:    10/23/20 2228 10/24/20 0324 10/24/20 0815 10/24/20 0855   BP: (!) 127/92 136/70 (!) 142/78    Pulse: 78 80 86    Resp: 16 16 16 16   Temp: 99.2 °F (37.3 °C) 98.9 °F (37.2 °C) 98.8 °F (37.1 °C)    TempSrc: Oral Oral Oral    SpO2: 95% 95% 97% 96%   Weight:  269 lb 6.4 oz (122.2 kg)     Height:         Abd: soft, dressings c/d/i, +bs    Labs reviewed    A/P:  -patient requested I call her brother, spoke to him on the phone at [phone].  She will be d/c'd to his house when she's ready  -remove soriano today  -advance to full liquid diet  -continue to ambulate

## 2020-10-24 NOTE — PROGRESS NOTES
[facility]    Hospitalist Progress Note      Name:  Aleks Giron /Age/Sex: 1965  (54 y.o. female)   MRN & CSN:  [id number] & [id number] Admission Date/Time: 10/18/2020  3:41 AM   [facility]  [facility] PCP: Kecia Rob, [address] [place] [address] Day: 7    Assessment and Plan:   Aleks Giron is a 54 y.o.  female  who presents with a complaint of abdominal pain - due to perforated intstine -hospitalist group consulted for medical management     Pneumatosis and partial small bowel obstruction    S/p Exp Lap and lysis of multiple adhesions, small bowel resection and anastomosis, removal of previously placed prosthetic mesh material 10/18    Pain control with Dilaudid   Cefepime and Flagyl DC per Surgery on 10/22  Encourage ambulation, incentive spirometry  Trial of full liquids per Surgery (10/24)       Hypertension      Hydralazine PRN with parameters   Resumed home BP medications      Depression/anxiety    Resumed Prozac and BuSpar, Ativan PRN     Hx of Right breast cancer    Diagnosed about 5 years ago. S/p lumpectomy with lymph node dissection, radiation and chemotherapy.     Continue letrozole 2.5 mg daily     Morbid obesity - BMI 40.36 -counseling on diet and exercise to lose weight    Diet DIET CLEAR LIQUID;  Dietary Nutrition Supplements: Clear Liquid Oral Supplement   DVT Prophylaxis [] Lovenox, []  Heparin, [] SCDs, []No VTE prophylaxis, patient ambulating   GI Prophylaxis [] PPI, [] H2 Blocker, [] No GI prophylaxis, patient is receiving diet/Tube Feeds   Code Status Full Code   Disposition Patient requires continued admission due to perforated viscus   MDM [] Low, [] Moderate,[x]  High     History of Present Illness: Subjective     Patient Seen & Examined at the bedside      Patient is resting in her recliner with no distress while on room air  Has some gas and BM - was able to ambulate with therapy   No abdominal pain     Ten point ROS reviewed negative, unless as noted above    Objective: Intake/Output Summary (Last 24 hours) at 10/24/2020 0928  Last data filed at 10/24/2020 2339  Gross per 24 hour   Intake 1500 ml   Output 5645 ml   Net -4145 ml      Vitals:   Vitals:    10/24/20 0855   BP:    Pulse:    Resp: 16   Temp:    SpO2: 96%     Physical Exam:    GEN Awake female, resting in bed in no apparent distress. Appears given age. HENT Mucous membranes are moist.   RESP Clear to auscultation, no wheezes, rales or rhonchi. CARDIO/VASC -S1/S2 auscultated. Regular rate without appreciable murmurs, rubs, or gallops. Peripheral pulses equal bilaterally and palpable. No peripheral edema. GI J tube in place with serosanguineous fluid in bulb, abdominal binder on -surgical scar covered with dressing, abdomen is soft without significant tenderness, masses, or guarding.  Active BS    Medications:   Medications:    FLUoxetine  10 mg Oral Daily    busPIRone  10 mg Oral TID    pantoprazole  40 mg Oral QAM AC    losartan  25 mg Oral Daily    hydroCHLOROthiazide  25 mg Oral Daily    enoxaparin  40 mg Subcutaneous Daily    montelukast  10 mg Oral Daily    insulin lispro  0-6 Units Subcutaneous TID WC    insulin lispro  0-3 Units Subcutaneous Nightly    tiotropium  2 puff Inhalation Daily    hydrocortisone   Topical BID      Infusions:    0.9% NaCl with KCl 20 mEq 125 mL/hr at 10/23/20 2234    dextrose       PRN Meds: ondansetron, 4 mg, Q6H PRN  albuterol sulfate HFA, 2 puff, Q6H PRN  hydrALAZINE (APRESOLINE) ivpb, 10 mg, Q4H PRN  glucose, 15 g, PRN  dextrose, 12.5 g, PRN  glucagon (rDNA), 1 mg, PRN  dextrose, 100 mL/hr, PRN  LORazepam, 0.5 mg, Q6H PRN  HYDROmorphone, 1 mg, Q2H PRN          Electronically signed by Marcella Rosa MD on 10/24/2020 at 9:28 AM

## 2020-10-25 LAB
GLUCOSE BLD-MCNC: 128 MG/DL (ref 70–99)
GLUCOSE BLD-MCNC: 131 MG/DL (ref 70–99)
GLUCOSE BLD-MCNC: 135 MG/DL (ref 70–99)

## 2020-10-25 PROCEDURE — 6370000000 HC RX 637 (ALT 250 FOR IP): Performed by: SURGERY

## 2020-10-25 PROCEDURE — 82962 GLUCOSE BLOOD TEST: CPT

## 2020-10-25 PROCEDURE — 6360000002 HC RX W HCPCS: Performed by: SURGERY

## 2020-10-25 PROCEDURE — 6370000000 HC RX 637 (ALT 250 FOR IP): Performed by: INTERNAL MEDICINE

## 2020-10-25 PROCEDURE — 1200000000 HC SEMI PRIVATE

## 2020-10-25 PROCEDURE — 94640 AIRWAY INHALATION TREATMENT: CPT

## 2020-10-25 PROCEDURE — 94761 N-INVAS EAR/PLS OXIMETRY MLT: CPT

## 2020-10-25 RX ADMIN — POTASSIUM CHLORIDE AND SODIUM CHLORIDE: 900; 150 INJECTION, SOLUTION INTRAVENOUS at 13:27

## 2020-10-25 RX ADMIN — MONTELUKAST 10 MG: 10 TABLET, FILM COATED ORAL at 09:16

## 2020-10-25 RX ADMIN — ALBUTEROL SULFATE 2 PUFF: 90 AEROSOL, METERED RESPIRATORY (INHALATION) at 09:00

## 2020-10-25 RX ADMIN — HYDROCHLOROTHIAZIDE 25 MG: 25 TABLET ORAL at 09:16

## 2020-10-25 RX ADMIN — ENOXAPARIN SODIUM 40 MG: 40 INJECTION SUBCUTANEOUS at 09:16

## 2020-10-25 RX ADMIN — FLUOXETINE HYDROCHLORIDE 10 MG: 10 CAPSULE ORAL at 09:16

## 2020-10-25 RX ADMIN — LOSARTAN POTASSIUM 25 MG: 25 TABLET, FILM COATED ORAL at 09:16

## 2020-10-25 RX ADMIN — PANTOPRAZOLE SODIUM 40 MG: 40 TABLET, DELAYED RELEASE ORAL at 09:16

## 2020-10-25 RX ADMIN — TIOTROPIUM BROMIDE INHALATION SPRAY 2 PUFF: 3.12 SPRAY, METERED RESPIRATORY (INHALATION) at 09:00

## 2020-10-25 ASSESSMENT — PAIN SCALES - GENERAL: PAINLEVEL_OUTOF10: 0

## 2020-10-25 NOTE — PROGRESS NOTES
JOSAFAT    Hospitalist Progress Note      Name:  Mary Chew /Age/Sex: 1965  (54 y.o. female)   MRN & CSN:  [id number] & [id number] Admission Date/Time: 10/18/2020  3:41 AM   [facility]:  [facility] PCP: Hao Moreland [address] [place] [address] Day: 8    Assessment and Plan:   Mary Chew is a 54 y.o.  female  who presents with a complaint of abdominal pain - due to perforated intstine -hospitalist group consulted for medical management     Pneumatosis and partial small bowel obstruction    S/p Exp Lap and lysis of multiple adhesions, small bowel resection and anastomosis, removal of previously placed prosthetic mesh material 10/18    Pain control with Dilaudid   Cefepime and Flagyl DC per Surgery on 10/22  Encourage ambulation, incentive spirometry  Trial of full liquids per Surgery (10/24)       Hypertension  - controled     Hydralazine PRN with parameters   Resumed home BP medications      Depression/anxiety    Resumed Prozac and BuSpar, Ativan PRN     Hx of Right breast cancer    Diagnosed about 5 years ago. S/p lumpectomy with lymph node dissection, radiation and chemotherapy. Continue letrozole 2.5 mg daily     Morbid obesity - BMI 40.36 -counseling on diet and exercise to lose weight    Diet Dietary Nutrition Supplements: Clear Liquid Oral Supplement  DIET FULL LIQUID;   DVT Prophylaxis [] Lovenox, []  Heparin, [] SCDs, []No VTE prophylaxis, patient ambulating   GI Prophylaxis [] PPI, [] H2 Blocker, [] No GI prophylaxis, patient is receiving diet/Tube Feeds   Code Status Full Code   Disposition Patient requires continued admission due to perforated viscus   MDM [] Low, [] Moderate,[x]  High     History of Present Illness: Subjective     Patient Seen & Examined at the bedside      Patient is resting in her recliner with no distress while on room air  Has some gas and did have BM yesterday   No abdominal pain     Ten point ROS reviewed negative, unless as noted above    Objective:        Intake/Output Summary (Last 24 hours) at 10/25/2020 0900  Last data filed at 10/25/2020 0701  Gross per 24 hour   Intake 2160 ml   Output 750 ml   Net 1410 ml      Vitals:   Vitals:    10/25/20 0321   BP: 132/68   Pulse: 77   Resp: 18   Temp: 98.6 °F (37 °C)   SpO2: 97%     Physical Exam:    GEN Awake female, resting in bed in no apparent distress. Appears given age. HENT Mucous membranes are moist.   RESP Clear to auscultation, no wheezes, rales or rhonchi. CARDIO/VASC -S1/S2 auscultated. Regular rate without appreciable murmurs, rubs, or gallops. Peripheral pulses equal bilaterally and palpable. No peripheral edema. GI J tube in place with serosanguineous fluid in bulb, abdominal binder on -surgical scar covered with dressing, abdomen is soft without significant tenderness, masses, or guarding.  Active BS    Medications:   Medications:    FLUoxetine  10 mg Oral Daily    busPIRone  10 mg Oral TID    pantoprazole  40 mg Oral QAM AC    losartan  25 mg Oral Daily    hydroCHLOROthiazide  25 mg Oral Daily    enoxaparin  40 mg Subcutaneous Daily    montelukast  10 mg Oral Daily    insulin lispro  0-6 Units Subcutaneous TID WC    insulin lispro  0-3 Units Subcutaneous Nightly    tiotropium  2 puff Inhalation Daily    hydrocortisone   Topical BID      Infusions:    0.9% NaCl with KCl 20 mEq 100 mL/hr at 10/24/20 1025    dextrose       PRN Meds: ondansetron, 4 mg, Q6H PRN  albuterol sulfate HFA, 2 puff, Q6H PRN  hydrALAZINE (APRESOLINE) ivpb, 10 mg, Q4H PRN  glucose, 15 g, PRN  dextrose, 12.5 g, PRN  glucagon (rDNA), 1 mg, PRN  dextrose, 100 mL/hr, PRN  LORazepam, 0.5 mg, Q6H PRN  HYDROmorphone, 1 mg, Q2H PRN          Electronically signed by Candy Acosta MD on 10/25/2020 at 9:00 AM

## 2020-10-25 NOTE — PLAN OF CARE
Problem: Falls - Risk of:  Goal: Will remain free from falls  Description: Will remain free from falls  10/24/2020 1518 by Alina Forte RN  Outcome: Met This Shift  Goal: Absence of physical injury  Description: Absence of physical injury  10/24/2020 1518 by Alina Forte RN  Outcome: Met This Shift     Problem: Skin Integrity:  Goal: Will show no infection signs and symptoms  Description: Will show no infection signs and symptoms  10/24/2020 1518 by Alina Forte RN  Outcome: Met This Shift  Goal: Absence of new skin breakdown  Description: Absence of new skin breakdown  10/24/2020 1518 by Alina Forte RN  Outcome: Met This Shift

## 2020-10-26 LAB
GLUCOSE BLD-MCNC: 125 MG/DL (ref 70–99)
GLUCOSE BLD-MCNC: 128 MG/DL (ref 70–99)
GLUCOSE BLD-MCNC: 129 MG/DL (ref 70–99)
GLUCOSE BLD-MCNC: 142 MG/DL (ref 70–99)

## 2020-10-26 PROCEDURE — 6370000000 HC RX 637 (ALT 250 FOR IP): Performed by: SURGERY

## 2020-10-26 PROCEDURE — 1200000000 HC SEMI PRIVATE

## 2020-10-26 PROCEDURE — 97530 THERAPEUTIC ACTIVITIES: CPT

## 2020-10-26 PROCEDURE — 94640 AIRWAY INHALATION TREATMENT: CPT

## 2020-10-26 PROCEDURE — 97116 GAIT TRAINING THERAPY: CPT

## 2020-10-26 PROCEDURE — 94761 N-INVAS EAR/PLS OXIMETRY MLT: CPT

## 2020-10-26 PROCEDURE — 82962 GLUCOSE BLOOD TEST: CPT

## 2020-10-26 PROCEDURE — 6360000002 HC RX W HCPCS: Performed by: SURGERY

## 2020-10-26 PROCEDURE — 6370000000 HC RX 637 (ALT 250 FOR IP): Performed by: INTERNAL MEDICINE

## 2020-10-26 RX ADMIN — BUSPIRONE HYDROCHLORIDE 10 MG: 10 TABLET ORAL at 08:13

## 2020-10-26 RX ADMIN — ENOXAPARIN SODIUM 40 MG: 40 INJECTION SUBCUTANEOUS at 08:13

## 2020-10-26 RX ADMIN — MONTELUKAST 10 MG: 10 TABLET, FILM COATED ORAL at 08:14

## 2020-10-26 RX ADMIN — TIOTROPIUM BROMIDE INHALATION SPRAY 2 PUFF: 3.12 SPRAY, METERED RESPIRATORY (INHALATION) at 08:35

## 2020-10-26 RX ADMIN — HYDROCHLOROTHIAZIDE 25 MG: 25 TABLET ORAL at 08:13

## 2020-10-26 RX ADMIN — INSULIN LISPRO 1 UNITS: 100 INJECTION, SOLUTION INTRAVENOUS; SUBCUTANEOUS at 20:20

## 2020-10-26 RX ADMIN — FLUOXETINE HYDROCHLORIDE 10 MG: 10 CAPSULE ORAL at 08:13

## 2020-10-26 RX ADMIN — LOSARTAN POTASSIUM 25 MG: 25 TABLET, FILM COATED ORAL at 08:14

## 2020-10-26 RX ADMIN — PANTOPRAZOLE SODIUM 40 MG: 40 TABLET, DELAYED RELEASE ORAL at 08:14

## 2020-10-26 ASSESSMENT — PAIN SCALES - GENERAL: PAINLEVEL_OUTOF10: 0

## 2020-10-26 NOTE — CARE COORDINATION
LSW received call from [facility] for pt. She noted pt is ready for d/c. Pt is going home w/her brother vs. going to SNF. Pt needs hospital bed. Pt's brother stated the bed was not delivered. LSW reviewed CM notes. The coordination for the bed was not able to be completed until 1630. Thus, it appears the order and notes were not sent to [facility] in time. LSW directed Brigid Roach to call [facility] to confirm this. LSW noted unlikley the bed will be delivered tonight and if pt's brother cannot take pt w/out bed, pt will not be able to go home tonight. If further assistance is needed, Brigid Roach can call LSW back.

## 2020-10-26 NOTE — PROGRESS NOTES
Spoke to Patients Brother at 6pm who stated the bed has not been delivered and he cannot take patient home until she has a bed to sleep in. This nurse spoke to [facility] in [place] whom told this nurse to call [facility], leave message for return call to find out when the bed is to be delivered.

## 2020-10-26 NOTE — PROGRESS NOTES
Progress Note    Subjective:      Leyla Paz   Wants to go home with brother  Doing well and ready for discharge       Objective:     /79   Pulse 89   Temp 98.9 °F (37.2 °C) (Oral)   Resp 16   Ht 5' 7\" (1.702 m)   Wt 269 lb 6.4 oz (122.2 kg)   SpO2 98%   BMI 42.19 kg/m²     In: 490 [P.O.:490]  Out: -          General: Up in chair  Abdomen: soft dressing intact  Binder in place  Lungs: clear  Other: NA    Labs:   CBC:   Lab Results   Component Value Date    WBC 6.1 10/24/2020    RBC 3.55 10/24/2020    RBC 4.08 08/22/2017    HGB 10.8 10/24/2020    HCT 32.9 10/24/2020    MCV 92.7 10/24/2020    MCH 30.4 10/24/2020    MCHC 32.8 10/24/2020    RDW 13.9 10/24/2020     10/24/2020    MPV 10.5 10/24/2020        Assessment:     Bowel resection and Hernia repair     Plan:   Home with home care  Samara Romero was evaluated today and a DME order was entered for variable height hospital bed because she requires assistance for positioning needs not possible in an ordinary bed, complexity of body positioning needs, requirement of elevation of head of bed more than 30 degrees for the diagnosis of COPD, requirement of traction equipment. Patient needs variability of bed height to perform patient transfers and for eating, bathing, toileting, personal cares, ambulating, grooming, hygiene, dressing upper body, dressing lower body, meal preparation and taking own medications. Current body Weight: 269 lb 6.4 oz (122.2 kg). The need for this equipment was discussed with the patient and she understands and is in agreement.

## 2020-10-26 NOTE — PROGRESS NOTES
JOSAFAT    Hospitalist Progress Note      Name:  Samraa Romero /Age/Sex: 1965  (54 y.o. female)   MRN & CSN:  [id number] & [id number] Admission Date/Time: 10/18/2020  3:41 AM   Location:  [facility] PCP: Cate Ramirez [address] [place] [address] Day: 9    Assessment and Plan:   Samara Romero is a 54 y.o.  female  who presents with a complaint of abdominal pain - due to perforated intstine -hospitalist group consulted for medical management     Pneumatosis and partial small bowel obstruction    S/p Exp Lap and lysis of multiple adhesions, small bowel resection and anastomosis, removal of previously placed prosthetic mesh material 10/18    Pain control with Dilaudid   Cefepime and Flagyl DC per Surgery on 10/22  Encourage ambulation, incentive spirometry  Tolerating full liquids per Surgery (10/24)       Hypertension  - controled     Hydralazine PRN with parameters   Resumed home BP medications      Depression/anxiety    Resumed Prozac and BuSpar, Ativan PRN     Hx of Right breast cancer    Diagnosed about 5 years ago. S/p lumpectomy with lymph node dissection, radiation and chemotherapy.     Continue letrozole 2.5 mg daily     Morbid obesity - BMI 40.36 -counseling on diet and exercise to lose weight    Diet DIET GENERAL; Carb Control: 4 carb choices (60 gms)/meal  Dietary Nutrition Supplements: Diabetic Oral Supplement   DVT Prophylaxis [] Lovenox, []  Heparin, [] SCDs, []No VTE prophylaxis, patient ambulating   GI Prophylaxis [] PPI, [] H2 Blocker, [] No GI prophylaxis, patient is receiving diet/Tube Feeds   Code Status Full Code   Disposition Patient requires continued admission due to perforated viscus   MDM [] Low, [] Moderate,[x]  High     History of Present Illness: Subjective     Patient Seen & Examined at the bedside      Patient is resting in her recliner with no distress while on room air  Feels better, had BM this morning   No abdominal pain     Ten point ROS reviewed negative, unless as noted above    Objective: Intake/Output Summary (Last 24 hours) at 10/26/2020 0849  Last data filed at 10/25/2020 1922  Gross per 24 hour   Intake --   Output 750 ml   Net -750 ml      Vitals:   Vitals:    10/26/20 0837   BP:    Pulse:    Resp:    Temp:    SpO2: 98%     Physical Exam:    GEN Awake female, resting in bed in no apparent distress. Appears given age. HENT Mucous membranes are moist.   RESP Clear to auscultation, no wheezes, rales or rhonchi. CARDIO/VASC -S1/S2 auscultated. Regular rate without appreciable murmurs, rubs, or gallops. Peripheral pulses equal bilaterally and palpable. No peripheral edema. GI J tube in place with serosanguineous fluid in bulb, abdominal binder on -surgical scar covered with dressing, abdomen is soft without significant tenderness, masses, or guarding.  Active BS    Medications:   Medications:    FLUoxetine  10 mg Oral Daily    busPIRone  10 mg Oral TID    pantoprazole  40 mg Oral QAM AC    losartan  25 mg Oral Daily    hydroCHLOROthiazide  25 mg Oral Daily    enoxaparin  40 mg Subcutaneous Daily    montelukast  10 mg Oral Daily    insulin lispro  0-6 Units Subcutaneous TID WC    insulin lispro  0-3 Units Subcutaneous Nightly    tiotropium  2 puff Inhalation Daily    hydrocortisone   Topical BID      Infusions:    dextrose       PRN Meds: ondansetron, 4 mg, Q6H PRN  albuterol sulfate HFA, 2 puff, Q6H PRN  hydrALAZINE (APRESOLINE) ivpb, 10 mg, Q4H PRN  glucose, 15 g, PRN  dextrose, 12.5 g, PRN  glucagon (rDNA), 1 mg, PRN  dextrose, 100 mL/hr, PRN  LORazepam, 0.5 mg, Q6H PRN  HYDROmorphone, 1 mg, Q2H PRN          Electronically signed by Jaoquín Montenegro MD on 10/26/2020 at 8:49 AM

## 2020-10-26 NOTE — CARE COORDINATION
Discharging physician, Pt would like hospital bed at discharge. If you are in agreement, please order a hospital bed, cut and paste the following in a progress note and in the discharge summary. Veronique Swain was evaluated today and a DME order was entered for variable height hospital bed because she requires assistance for positioning needs not possible in an ordinary bed, complexity of body positioning needs, requirement of elevation of head of bed more than 30 degrees for the diagnosis of COPD, requirement of traction equipment. Patient needs variability of bed height to perform patient transfers and for eating, bathing, toileting, personal cares, ambulating, grooming, hygiene, dressing upper body, dressing lower body, meal preparation and taking own medications. Current body Weight: 269 lb 6.4 oz (122.2 kg). The need for this equipment was discussed with the patient and she understands and is in agreement.

## 2020-10-26 NOTE — CARE COORDINATION
CM in to see Pt for follow up on [facility]. Pt plans home with brother and is interested in [facility] at discharge. PS to Dr. Ritu Espinoza for [facility].      1:57 PM   Referral to [facility], Pt accepted. CM faxed Pt clinicals     Pt will not be at home of record at discharge, [facility] and DME will be at her brothers house:    [address] Carlita Niño, [facility] [phone]

## 2020-10-26 NOTE — PROGRESS NOTES
Comprehensive Nutrition Assessment    Type and Reason for Visit:  Reassess    Nutrition Recommendations/Plan:   Continue carb controlled diet with diabetic oral nutrition supplement  Encourage meal intake     Nutrition Assessment:  Tolerating diet, able to advance to carb controlled diet with glucerna supplement. Recent meal intake %. Improving intake and diet tolerance, moderate nutrition risk at this time. Malnutrition Assessment:  Malnutrition Status: At risk for malnutrition (Comment)    Context:  Acute Illness       Estimated Daily Nutrient Needs:  Energy (kcal):  1815-6951; Weight Used for Energy Requirements:  Current     Protein (g):  74-86 (1.2-1.4 g/kg); Weight Used for Protein Requirements:  Ideal        Fluid (ml/day):  2356-3224 (1ml/judith);  Weight Used for Fluid Requirements:  Current      Nutrition Related Findings:  getting up to go to the bathroom on visit      Wounds:  Surgical Wound       Current Nutrition Therapies:    DIET GENERAL; Carb Control: 4 carb choices (60 gms)/meal  Dietary Nutrition Supplements: Diabetic Oral Supplement    Anthropometric Measures:  · Height: 5' 7\" (170.2 cm)  · Current Body Weight: 269 lb 6.4 oz (122.2 kg)   · Admission Body Weight: 257 lb 11.5 oz (116.9 kg)    · Usual Body Weight: 275 lb (124.7 kg)(per hx)     · Ideal Body Weight: 135 lbs; % Ideal Body Weight 200 %   · BMI: 42.2  · Adjusted Body Weight:  ; No Adjustment    · BMI Categories: Obese Class 3 (BMI 40.0 or greater)       Nutrition Diagnosis:   · Inadequate energy intake related to altered GI function as evidenced by NPO or clear liquid status due to medical condition      Nutrition Interventions:   Food and/or Nutrient Delivery:  Continue Current Diet, Continue Oral Nutrition Supplement  Nutrition Education/Counseling:  No recommendation at this time   Coordination of Nutrition Care:  Continued Inpatient Monitoring    Goals:  Patient will tolerate at least full liquid diet, meal intake at least 50%       Nutrition Monitoring and Evaluation:   Food/Nutrient Intake Outcomes:  Diet Advancement/Tolerance, Food and Nutrient Intake, Supplement Intake  Physical Signs/Symptoms Outcomes:  Biochemical Data, GI Status, Skin, Weight, Meal Time Behavior     Discharge Planning:    Continue current diet     Electronically signed by Reji Collins RD, LD on 10/26/20 at 11:25 AM EDT    Contact: [phone]

## 2020-10-26 NOTE — PROGRESS NOTES
This nurse spoke with Paulino Gaucher at [facility] who stated they do not deliver beds after hours.

## 2020-10-26 NOTE — PROGRESS NOTES
Reviewed case and attending hospitalist is Dr Sarahi Valentine and attending is Dr Beth Jennings. Seen pt and has abd binder in chair and pain is controlled. She is morbid obese and evaluated for hospital bed from . Has seen PT/OT. Labs stable. Continue medications as hypertensive and inhaler for COPD. She is not on antibiotics    Vitals:    10/26/20 1615   BP: 123/61   Pulse: 87   Resp: 16   Temp: 99 °F (37.2 °C)   SpO2: 97%     HEENT:PEERLA. Central line right neck  CVS:RRR  RESP:Decreased   ABD; abd binder in place        Kareem Mendez was evaluated today and a DME order was entered for variable height hospital bed because she requires assistance for positioning needs not possible in an ordinary bed, complexity of body positioning needs, requirement of elevation of head of bed more than 30 degrees for the diagnosis of COPD, requirement of traction equipment. Patient needs variability of bed height to perform patient transfers and for eating, bathing, toileting, personal cares, ambulating, grooming, hygiene, dressing upper body, dressing lower body, meal preparation and taking own medications. Current body Weight: 269 lb 6.4 oz (122.2 kg).   The need for this equipment was discussed with the patient and she understands and is in agreement.

## 2020-10-26 NOTE — PROGRESS NOTES
Physical Therapy Treatment Note  Name: Laura Mathew MRN: [id number] :   1965   Date:  10/26/2020   Admission Date: 10/18/2020 Room:  1102/[facility]-A   Restrictions/Precautions:        Abdominal precautions, fall risk, general precautions, abdominal binder    Communication with other providers:  RN, OT    Subjective:  Patient states:  \"I'm feeling much better\"  Pain:   Location, Type, Intensity (0/10 to 10/10): States a little pain at sx site in abdomen but does not rate    Objective:    Observation:  Pt up in chair upon entry and agreeable to therapy    Treatment, including education/measures:    Transfers: Pt completed STS transfer to/from chair x2  with supervision and safe transfer technique    Gait: Pt ambulated [de-identified]' with RW with supervision with no LOB, SOB, or dizziness throughout bout. Pt ambulated with decreased halie but no other gait abnormalities. Distance limited due to fatigue. Balance: Sitting balance good; standing balance good- pt performed upper and lower body dressing with supervision and no LOB throughout bout    Education: pt with questions about further therapy after discharge, all questions answered    Assessment / Impression:    Pt with much improved activity tolerance, gait, and transfer status this session compared to discharge. Pt safe to discharge to brother's house when medically stable as pt is now ambulating household distances with supervision and RW. Patient's tolerance of treatment:  good   Adverse Reaction: n/a  Significant change in status and impact:  n/a  Barriers to improvement:  Decreased endurance, active wound    Plan for Next Session:    Continue to address bed mobility, transfer training, and ambulation in future sessions.     Time in:  1250  Time out:  1315  Timed treatment minutes:  25  Total treatment time:  25    Previously filed items:  Social/Functional History  Lives With: Alone  Type of Home: Apartment(Elderly/Disabled Apartment Complex)  Home Layout: One level  Entrance Stairs - Number of Steps: 1 step  Bathroom Shower/Tub: Tub/Shower unit, Shower chair without back  Bathroom Toilet: Standard  Bathroom Equipment: Grab bars in shower, Grab bars around toilet, Commode(Call Button in [place])  Bathroom Accessibility: Accessible  Home Equipment: 4 wheeled walker, Standard walker, Cane(Adjustable Bed)  Receives Help From: Family  ADL Assistance: [address] [place] [address] Avenue: Independent  Homemaking Responsibilities: Yes  Ambulation Assistance: Independent  Transfer Assistance: Independent  Active : Yes  Mode of Transportation: Car  Occupation: On disability  Short term goals  Time Frame for Short term goals: 1 week  Short term goal 1: Pt to complete all bed mobility mod I  Short term goal 2: Pt to complete all STS transfers to/from bed, commode, and chair mod I  Short term goal 3: Pt to complete stand pivot transfers with LRAD mod I  Short term goal 4: Pt to ambulate 48' with LRAD mod I       Electronically signed by:    Aly Baltazar PT  10/26/2020, 2:30 PM

## 2020-10-27 VITALS
BODY MASS INDEX: 42.28 KG/M2 | SYSTOLIC BLOOD PRESSURE: 117 MMHG | HEART RATE: 79 BPM | WEIGHT: 269.4 LBS | OXYGEN SATURATION: 97 % | RESPIRATION RATE: 16 BRPM | DIASTOLIC BLOOD PRESSURE: 57 MMHG | TEMPERATURE: 98.5 F | HEIGHT: 67 IN

## 2020-10-27 LAB
GLUCOSE BLD-MCNC: 131 MG/DL (ref 70–99)
GLUCOSE BLD-MCNC: 133 MG/DL (ref 70–99)

## 2020-10-27 PROCEDURE — 82962 GLUCOSE BLOOD TEST: CPT

## 2020-10-27 PROCEDURE — 6360000002 HC RX W HCPCS: Performed by: SURGERY

## 2020-10-27 PROCEDURE — 6370000000 HC RX 637 (ALT 250 FOR IP): Performed by: INTERNAL MEDICINE

## 2020-10-27 PROCEDURE — 6370000000 HC RX 637 (ALT 250 FOR IP): Performed by: SURGERY

## 2020-10-27 PROCEDURE — 94640 AIRWAY INHALATION TREATMENT: CPT

## 2020-10-27 PROCEDURE — 94150 VITAL CAPACITY TEST: CPT

## 2020-10-27 PROCEDURE — 94761 N-INVAS EAR/PLS OXIMETRY MLT: CPT

## 2020-10-27 RX ADMIN — TIOTROPIUM BROMIDE INHALATION SPRAY 2 PUFF: 3.12 SPRAY, METERED RESPIRATORY (INHALATION) at 07:39

## 2020-10-27 RX ADMIN — ENOXAPARIN SODIUM 40 MG: 40 INJECTION SUBCUTANEOUS at 09:45

## 2020-10-27 RX ADMIN — PANTOPRAZOLE SODIUM 40 MG: 40 TABLET, DELAYED RELEASE ORAL at 06:45

## 2020-10-27 RX ADMIN — FLUOXETINE HYDROCHLORIDE 10 MG: 10 CAPSULE ORAL at 09:44

## 2020-10-27 RX ADMIN — LOSARTAN POTASSIUM 25 MG: 25 TABLET, FILM COATED ORAL at 09:45

## 2020-10-27 RX ADMIN — HYDROCHLOROTHIAZIDE 25 MG: 25 TABLET ORAL at 09:44

## 2020-10-27 ASSESSMENT — PAIN SCALES - GENERAL
PAINLEVEL_OUTOF10: 0
PAINLEVEL_OUTOF10: 0

## 2020-10-27 NOTE — PROGRESS NOTES
JOSAFAT    Hospitalist Progress Note      Name:  Kwesi Hicks /Age/Sex: 1965  (54 y.o. female)   MRN & CSN:  [id number] & [id number] Admission Date/Time: 10/18/2020  3:41 AM   [facility]:  [facility] PCP: Palmer Campbell [address] [place] [address] Day: 10    Assessment and Plan:   Kwesi Hicks is a 54 y.o.  female  who presents with a complaint of abdominal pain - due to perforated intstine -hospitalist group consulted for medical management     Pneumatosis and partial small bowel obstruction    S/p Exp Lap and lysis of multiple adhesions, small bowel resection and anastomosis, removal of previously placed prosthetic mesh material 10/18  -pain controlled and tolerating oral diet     Hypertension  - controled     Hydralazine PRN with parameters   Resumed home BP medications      Depression/anxiety    Resumed Prozac and BuSpar, Ativan PRN     Hx of Right breast cancer    Diagnosed about 5 years ago. S/p lumpectomy with lymph node dissection, radiation and chemotherapy. Continue letrozole 2.5 mg daily     Morbid obesity - BMI 40.36 -counseling on diet and exercise to lose weight    Diet DIET GENERAL; Carb Control: 4 carb choices (60 gms)/meal  Dietary Nutrition Supplements: Diabetic Oral Supplement   DVT Prophylaxis [] Lovenox, []  Heparin, [] SCDs, []No VTE prophylaxis, patient ambulating   GI Prophylaxis [] PPI, [] H2 Blocker, [] No GI prophylaxis, patient is receiving diet/Tube Feeds   Code Status Full Code   Disposition Patient requires continued admission due to perforated viscus   MDM [] Low, [x] Moderate,[x]  High     History of Present Illness: Subjective     Awake, out of bed on chair, awaiting to be discharged this morning by surgery, went over home medications with her. Vitals appear stable and clinically stable    Ten point ROS reviewed negative, unless as noted above    Objective:        Intake/Output Summary (Last 24 hours) at 10/27/2020 0831  Last data filed at 10/26/2020 1226  Gross per 24 hour Intake 490 ml   Output --   Net 490 ml      Vitals:   Vitals:    10/27/20 0747   BP:    Pulse:    Resp:    Temp:    SpO2: 98%     Physical Exam:    GEN Awake female, resting in bed in no apparent distress. Appears given age. HENT Mucous membranes are moist.   RESP Clear to auscultation, no wheezes, rales or rhonchi. CARDIO/VASC -S1/S2 auscultated. Regular rate without appreciable murmurs, rubs, or gallops. Peripheral pulses equal bilaterally and palpable. No peripheral edema. GI J tube in place with serosanguineous fluid in bulb, abdominal binder on -surgical scar covered with dressing, abdomen is soft without significant tenderness, masses, or guarding.  Active BS    Medications:   Medications:    FLUoxetine  10 mg Oral Daily    busPIRone  10 mg Oral TID    pantoprazole  40 mg Oral QAM AC    losartan  25 mg Oral Daily    hydroCHLOROthiazide  25 mg Oral Daily    enoxaparin  40 mg Subcutaneous Daily    montelukast  10 mg Oral Daily    insulin lispro  0-6 Units Subcutaneous TID WC    insulin lispro  0-3 Units Subcutaneous Nightly    tiotropium  2 puff Inhalation Daily    hydrocortisone   Topical BID      Infusions:    dextrose       PRN Meds: ondansetron, 4 mg, Q6H PRN  albuterol sulfate HFA, 2 puff, Q6H PRN  hydrALAZINE (APRESOLINE) ivpb, 10 mg, Q4H PRN  glucose, 15 g, PRN  dextrose, 12.5 g, PRN  glucagon (rDNA), 1 mg, PRN  dextrose, 100 mL/hr, PRN  LORazepam, 0.5 mg, Q6H PRN  HYDROmorphone, 1 mg, Q2H PRN          Electronically signed by Qi Neff MD on 10/27/2020 at 8:31 AM

## 2020-10-27 NOTE — ADT AUTH CERT
Bowel Surgery: Small Intestine Resection - Care Day 9 (10/26/2020) by Orlando Swain RN         Review Status  Review Entered    Completed  10/27/2020 15:41        Criteria Review       Care Day: 9 Care Date: 10/26/2020 Level of Care: ICU    Guideline Day 4    Clinical Status    ( ) * Pain absent or managed    Activity    (X) Ambulatory    Routes    ( ) * Oral medications, hydration    ( ) * Liquid diet    * Milestone    Additional Notes    10-          /79   Pulse 89   Temp 98.9 °F (37.2 °C) (Oral)   Resp 16   Ht 5' 7\" (1.702 m)   Wt 269 lb 6.4 oz (122.2 kg)   SpO2 98%   BMI 42.19 kg/m²       ** ** ** GENERAL SURGERY       Wants to go home with brother    Doing well and ready for discharge          Assessment:         Bowel resection and Hernia repair         Plan:    Home with home care    Jac Doss evaluated today and a DME order was entered for variable height hospital bed because she requires assistance for positioning needs not possible in an ordinary bed, complexity of body positioning needs, requirement of elevation of head of bed more than 30 degrees for the diagnosis of COPD, requirement of traction equipment.  Patient needs variability of bed height to perform patient transfers and for eating, bathing, toileting, personal cares, ambulating, grooming, hygiene, dressing upper body, dressing lower body, meal preparation and taking own medications.  Current body Weight: 269 lb 6.4 oz (122.2 kg).  The need for this equipment was discussed with the patient and she understands and is in agreement.           CASE MANAGEMENT/SOCIAL WORK DISCHARGE NOTE       Pt requested [facility] and hospital bed due to staying with brother after discharge.  Pt will not have a bed at her brothers house and has qualifying diagnosis for a hospital bed.           CM sent PS to Dr. Hans Hawk for [facility] and hospital bed.  Dr. Hans Hawk put in order for [facility].  CM set up [facility] through [facility] who the Pt has a history with.  CM advised [facility] of Pt's new address.           1530    Dr. Jona Hart ready to discharge Pt. CM asked about PS for hospital bed and advised DME verbiage was in CM note.  Dr. Jona Hart stated the hospitalist usually does that and he doesn't know how.  CM advised to copy and paste the DME verbiage into their progress note for the face to face and put the order in.  Dr. Jona Hart asked CM to verify he did the face to face right and CM agreed.           Dr. Jona Hart forgot to do the order.  CM verified with Juaquin Jones at [facility] that order and face to face needed to be same physician.  Dr. Jona Hart had already left.  CM sent PS to Dr. Anthony Marie, the hospitalist seeing the Pt, to do the order and face to face, he did not agree with the need and refused to do the order.  CM sent PS to Dr. Jona Hart who said that this CM said all he needed to do was the copy and paste of the DME verbiage and that he had left and that if the hospitalist wasn't going to he also was not going to do the order.           CM consulted Kami, Director of case management who advised to ask Dr. Kristopher Mercedes to do the order and face to face.  That the Pt is on discharge and needs the hospital bed.  Dr. Kristopher Mercedes said that he would do the order. LSW received call from [facility] for pt. She noted pt is ready for d/c. Pt is going home w/her brother vs. going to SNF. Pt needs hospital bed. Pt's brother stated the bed was not delivered. LSW reviewed CM notes. The coordination for the bed was not able to be completed until 1630. Thus, it appears the order and notes were not sent to [facility] in time. LSW directed Dariana Rooney to call CME to confirm this. LSW noted unlikley the bed will be delivered tonight and if pt's brother cannot take pt w/out bed,       ** ** ** ** NOTE DELAY IN DISCHARGE     pt will not be able to go home tonight. If further assistance is needed, Dariana Rooney can call LSW back.           MEDS:    ·  FLUoxetine, 10 mg, Oral, Daily    ·  busPIRone, 10 mg, Oral, TID    ·  pantoprazole, 40 mg, Oral, QAM AC    ·  losartan, 25 mg, Oral, Daily    ·  hydroCHLOROthiazide, 25 mg, Oral, Daily    ·  enoxaparin, 40 mg, Subcutaneous, Daily    ·  montelukast, 10 mg, Oral, Daily    ·  insulin lispro, 0-6 Units, Subcutaneous, TID WC    ·  insulin lispro, 0-3 Units, Subcutaneous, Nightly    ·  tiotropium, 2 puff, Inhalation, Daily    ·  hydrocortisone, , Topical, BID           Bowel Surgery: Small Intestine Resection - Care Day 8 (10/25/2020) by Jovanny Alvarez RN         Review Status  Review Entered    Completed  10/27/2020 15:35        Criteria Review       Care Day: 8 Care Date: 10/25/2020 Level of Care: ICU    Guideline Day 4    Clinical Status    ( ) * Pain absent or managed    Activity    (X) Ambulatory    Routes    ( ) * Oral medications, hydration    ( ) * Liquid diet    * Milestone    Additional Notes    10-       BP: 132/68    Pulse: 77    Resp: 18    Temp: 98.6 °F (37 °C)    SpO2: 97%       GI        J tube in place with serosanguineous fluid in bulb, abdominal binder on -surgical scar covered with dressing, abdomen is soft without significant tenderness, masses, or guarding.  Active BS          ** ** ** ** GENERAL SURGERY       Patient awake and alert, no complaints    Tolerating full liquids    +BM       A/P:    -advance diet    -stop IVF    -hopefully home to her brothers house tomorrow    -change abdominal dressing    -patient has ambulated in her room    -called patient's brother and updated him on the plan       MEDS:    · 0.9% NaCl with KCl 20 mEq 100 mL/hr    · FLUoxetine 10 mg Oral Daily    · busPIRone 10 mg Oral TID    · pantoprazole 40 mg Oral QAM AC    · losartan 25 mg Oral Daily    · hydroCHLOROthiazide 25 mg Oral Daily    · enoxaparin 40 mg Subcutaneous Daily    · montelukast 10 mg Oral Daily    · insulin lispro 0-6 Units Subcutaneous TID WC    · insulin lispro 0-3 Units Subcutaneous Nightly    · tiotropium 2 puff Inhalation Daily · hydrocortisone Topical BID       PRN:    Albuterol HFA once        Bowel Surgery: Small Intestine Resection - Care Day 7 (10/24/2020) by Elsa Boudreaux RN         Review Status  Review Entered    Completed  10/27/2020 14:01        Criteria Review       Care Day: 7 Care Date: 10/24/2020 Level of Care: ICU    Guideline Day 4    Clinical Status    ( ) * Pain absent or managed    Activity    (X) Ambulatory    Routes    ( ) * Oral medications, hydration    ( ) * Liquid diet    * Milestone    Additional Notes    10-       96.8    83    16    142/70    96% RA       10/24/2020 06:12    Hemoglobin Quant: 10.8 (L)    Hematocrit: 32.9 (L)       ** ** ** ** INTERNAL MEDICINE       GI        J tube in place with serosanguineous fluid in bulb, abdominal binder on -surgical scar covered with dressing, abdomen is soft without significant tenderness, masses, or guarding. Active BS       Assessment and Plan:    Dylan Mcintyre is a 54 y. Carron Gaucher presents with a complaint of abdominal pain - due to perforated intstine -hospitalist group consulted for medical management         Pneumatosis and partial small bowel obstruction      S/p Exp Lap and lysis of multiple adhesions, small bowel resection and anastomosis, removal of previously placed prosthetic mesh material 10/18         Pain control with Dilaudid    Cefepime and Flagyl DC per Surgery on 10/22    Encourage ambulation, incentive spirometry    Trial of full liquids per Surgery (10/24)           Hypertension           Hydralazine PRN with parameters    Resumed home BP medications         Depression/anxiety         Resumed Prozac and BuSpar, Ativan PRN         Hx of Right breast cancer         Diagnosed about 5 years ago.  S/p lumpectomy with lymph node dissection, radiation and chemotherapy.      Continue letrozole 2.5 mg daily         Morbid obesity - BMI 40.36 -counseling on diet and exercise to lose weight       MEDS:    0.9% NaCl with KCl 20 mEq 125 mL/hr · FLUoxetine 10 mg Oral Daily    · busPIRone 10 mg Oral TID    · pantoprazole 40 mg Oral QAM AC    · losartan 25 mg Oral Daily    · hydroCHLOROthiazide 25 mg Oral Daily    · enoxaparin 40 mg Subcutaneous Daily    · montelukast 10 mg Oral Daily    · insulin lispro 0-6 Units Subcutaneous TID WC    · insulin lispro 0-3 Units Subcutaneous Nightly    · tiotropium 2 puff Inhalation Daily    · hydrocortisone Topical BID       PRN:    Albuterol HFA once           Bowel Surgery: Small Intestine Resection - Care Day 6 (10/23/2020) by Toshia Jarvis RN         Review Status  Review Entered    Completed  10/27/2020 13:55        Criteria Review       Care Day: 6 Care Date: 10/23/2020 Level of Care: ICU    Guideline Day 4    Clinical Status    ( ) * Pain absent or managed    Activity    (X) Ambulatory    Routes    ( ) * Oral medications, hydration    ( ) * Liquid diet    * Milestone    Additional Notes    10-       99.2    78    16    127/92    95% RA          10/23/2020 13:28    SARS-CoV-2, NAAT: NOT DETECTED       GI        J tube in place with serosanguineous fluid in bulb, abdominal binder on -surgical scar covered with dressing and abdominal binder , abdomen is soft without significant tenderness, masses, or guarding.           ** ** ** GENERAL SURGERY    POD#5    One episode of emesis yesterday    No nausea or recurrent vomiting    Low grade fever    Abdomen soft    Clear liquids until passing gas and having BM    Dr. Noy Benavides on call    Will order PCR for covid given patients fever          MEDS:    · 0.9% NaCl with KCl 20 mEq 125 mL/hr at    · pantoprazole 40 mg Oral QAM AC    · losartan 25 mg Oral Daily    · hydroCHLOROthiazide 25 mg Oral Daily    · enoxaparin 40 mg Subcutaneous Daily    · montelukast 10 mg Oral Daily    · insulin lispro 0-6 Units Subcutaneous TID WC    · insulin lispro 0-3 Units Subcutaneous Nightly    · tiotropium 2 puff Inhalation Daily    · hydrocortisone Topical BID       PRN:    Albuterol HFA once

## 2020-10-27 NOTE — PLAN OF CARE
Problem: Falls - Risk of:  Goal: Will remain free from falls  Description: Will remain free from falls  10/27/2020 1541 by Lauren Mercedes RN  Outcome: Ongoing  10/27/2020 0353 by Poly Guzman RN  Outcome: Ongoing  Goal: Absence of physical injury  Description: Absence of physical injury  10/27/2020 1541 by Lauren Mercedes RN  Outcome: Ongoing  10/27/2020 0353 by Poly Guzman RN  Outcome: Ongoing     Problem: Skin Integrity:  Goal: Will show no infection signs and symptoms  Description: Will show no infection signs and symptoms  10/27/2020 1541 by Lauren Mercedes RN  Outcome: Ongoing  10/27/2020 0353 by Poly Guzman RN  Outcome: Ongoing  Goal: Absence of new skin breakdown  Description: Absence of new skin breakdown  10/27/2020 1541 by Lauren Mercedes RN  Outcome: Ongoing  10/27/2020 0353 by Poly Guzman RN  Outcome: Ongoing     Problem: SAFETY  Goal: Free from accidental physical injury  Outcome: Ongoing  Goal: Free from intentional harm  Outcome: Ongoing     Problem: DAILY CARE  Goal: Daily care needs are met  Outcome: Ongoing     Problem: PAIN  Goal: Patient's pain/discomfort is manageable  Outcome: Ongoing     Problem: SKIN INTEGRITY  Goal: Skin integrity is maintained or improved  Outcome: Ongoing     Problem: KNOWLEDGE DEFICIT  Goal: Patient/S.O. demonstrates understanding of disease process, treatment plan, medications, and discharge instructions.   Outcome: Ongoing     Problem: DISCHARGE BARRIERS  Goal: Patient's continuum of care needs are met  Outcome: Ongoing

## 2020-10-27 NOTE — CARE COORDINATION
GISEL followed up on hospital bed order with Giovanna at [facility]. Elan Ramirez stated that the need for the bed was ordered incorrectly and needs to be reordered. Perfect serve sent to Dr. Jonn Hook, lead hospitalist, asking him to re-enter the order and the documentation. Elan Ramirez stated that the bed is on the schedule for delivery this afternoon. Elan Ramirez to call the patients brother with the delivery information.

## 2020-11-03 NOTE — DISCHARGE SUMMARY
[address] [place] [address], [address] [place] [address]                               DISCHARGE SUMMARY    PATIENT NAME: TAL MOSES                     :        1965  MED REC NO:   [id number]                          ROOM:       [id number]  ACCOUNT NO:   [de-identified]                           ADMIT DATE: 10/18/2020  PROVIDER:     Earnestine Perez MD                  DISCHARGE DATE:  10/27/2020    DISCHARGE DIAGNOSES:  1. Abdominal pain secondary to recurrent hernia with pneumatosis of the  small bowel and free air. 2.  Morbid obesity. 3.  Remote history of stage III breast cancer. 4.  Schizophrenia. PROCEDURES PERFORMED:  Exploratory laparotomy, removal of previously  placed prosthetic mesh, small bowel resection, and placement of Vicryl  mesh for closure of abdominal wall defect. DISPOSITION AND FOLLOWUP:  The patient was discharged home to the care  of a family member. The patient was discharged home on 10/27/2020. Initial discharge was 10/26/2020, but arrangements for a hospital bed  which was necessary could not be made until the following day. CONDITION AT THE TIME OF DISCHARGE:  Good. DISCHARGE MEDICATIONS:  Include all her prehospitalization medications. No narcotic medication was needed for pain control. The patient will see me back in the office in 1 week. CONSULTATIONS REQUESTED:  Internal Medicine for medical management. HISTORY OF PRESENT ILLNESS/HOSPITAL COURSE:  The patient is a  17-year-old female I know well from previous advanced breast cancer  treatment years ago, who has had previous recurrent hernia surgery by a  different surgeon. She initially had a open hernia repair with  prosthetic mesh which had failed, then she went to another surgeon for  robotic attempted surgery which was complicated by needing for open  repair with prosthetic mesh and bowel resection.   The patient was in the  emergency room with severe abdominal pain, was seen in the emergency  room, was found to have small amount of free air, pneumatosis of the  bowel, and signs of another recurrent hernia causing bowel obstruction. She was taken to surgery after receiving preoperative antibiotics and at  that time of surgery, she did not have bowel obvious perforation, but  did have a segment of small bowel with extensive pneumatosis and  incarceration of bowel with two previously placed prosthetic mesh  materials which had failed to apparently repair her hernia defect. She  underwent the above procedure as described and was maintained on  perioperative antibiotics, IV fluids and pain medication. Internal  Medicine was consulted to help manage the patient medically. The  patient did have a drain in place at the time of surgery and she was  kept at bedrest for 24 hours. Abdominal binder was applied to the  abdominal wound. The patient did have a nasogastric tube in place for  approximately 2 days which was then removed. The tube was eventually  removed and she was started on clear liquid diet which was tolerated. The patient having morbid obesity and a large abdominal defect which was  closed with absorbable mesh which would leave her with another large  hernia required a hospital bed at home for mobilization, so we made  arrangements for a hospital bed to be delivered to her family member's  house. The patient's rest of her hospital course was uneventful and she  was able to be discharged home in good condition. At the time of  discharge, her drain had been removed. She was tolerating a diet. Her  bowel function returned to normal and her incision was healing without  signs of infection or skin breakdown. She was discharged home and will  be seen back in the office in one week.         Elizabeth Lai MD    D: 11/02/2020 15:03:54       T: 11/02/2020 15:07:33     [email or web address]  Job#: [id number]     Doc#: [id number]    CC:

## 2020-12-17 ENCOUNTER — HOSPITAL ENCOUNTER (OUTPATIENT)
Dept: INFUSION THERAPY | Age: 55
Discharge: HOME | End: 2020-12-17
Payer: COMMERCIAL

## 2020-12-17 ENCOUNTER — OFFICE VISIT (OUTPATIENT)
Dept: ONCOLOGY | Age: 55
End: 2020-12-17
Payer: COMMERCIAL

## 2020-12-17 VITALS
DIASTOLIC BLOOD PRESSURE: 72 MMHG | SYSTOLIC BLOOD PRESSURE: 124 MMHG | TEMPERATURE: 97.5 F | HEIGHT: 67 IN | WEIGHT: 254.8 LBS | OXYGEN SATURATION: 97 % | HEART RATE: 86 BPM | BODY MASS INDEX: 39.99 KG/M2

## 2020-12-17 DIAGNOSIS — Z17.0 MALIGNANT NEOPLASM OF UPPER-OUTER QUADRANT OF RIGHT BREAST IN FEMALE, ESTROGEN RECEPTOR POSITIVE (HCC): ICD-10-CM

## 2020-12-17 DIAGNOSIS — C50.411 MALIGNANT NEOPLASM OF UPPER-OUTER QUADRANT OF RIGHT BREAST IN FEMALE, ESTROGEN RECEPTOR POSITIVE (HCC): ICD-10-CM

## 2020-12-17 LAB
ALBUMIN SERPL-MCNC: 4 GM/DL (ref 3.4–5)
ALP BLD-CCNC: 84 IU/L (ref 40–129)
ALT SERPL-CCNC: 32 U/L (ref 10–40)
ANION GAP SERPL CALCULATED.3IONS-SCNC: 8 MMOL/L (ref 4–16)
AST SERPL-CCNC: 21 IU/L (ref 15–37)
BASOPHILS ABSOLUTE: 0 K/CU MM
BASOPHILS RELATIVE PERCENT: 0.3 % (ref 0–1)
BILIRUB SERPL-MCNC: 0.3 MG/DL (ref 0–1)
BUN BLDV-MCNC: 8 MG/DL (ref 6–23)
CALCIUM SERPL-MCNC: 8.8 MG/DL (ref 8.3–10.6)
CHLORIDE BLD-SCNC: 97 MMOL/L (ref 99–110)
CO2: 27 MMOL/L (ref 21–32)
CREAT SERPL-MCNC: 0.6 MG/DL (ref 0.6–1.1)
DIFFERENTIAL TYPE: ABNORMAL
EOSINOPHILS ABSOLUTE: 0 K/CU MM
EOSINOPHILS RELATIVE PERCENT: 1.2 % (ref 0–3)
GFR AFRICAN AMERICAN: >60 ML/MIN/1.73M2
GFR NON-AFRICAN AMERICAN: >60 ML/MIN/1.73M2
GLUCOSE BLD-MCNC: 137 MG/DL (ref 70–99)
HCT VFR BLD CALC: 40.4 % (ref 37–47)
HEMOGLOBIN: 13.4 GM/DL (ref 12.5–16)
LYMPHOCYTES ABSOLUTE: 0.9 K/CU MM
LYMPHOCYTES RELATIVE PERCENT: 26.7 % (ref 24–44)
MCH RBC QN AUTO: 28.9 PG (ref 27–31)
MCHC RBC AUTO-ENTMCNC: 33.2 % (ref 32–36)
MCV RBC AUTO: 87.3 FL (ref 78–100)
MONOCYTES ABSOLUTE: 0.4 K/CU MM
MONOCYTES RELATIVE PERCENT: 10.7 % (ref 0–4)
PDW BLD-RTO: 14.7 % (ref 11.7–14.9)
PLATELET # BLD: 217 K/CU MM (ref 140–440)
PMV BLD AUTO: 10.6 FL (ref 7.5–11.1)
POTASSIUM SERPL-SCNC: 3.8 MMOL/L (ref 3.5–5.1)
RBC # BLD: 4.63 M/CU MM (ref 4.2–5.4)
SEGMENTED NEUTROPHILS ABSOLUTE COUNT: 2.1 K/CU MM
SEGMENTED NEUTROPHILS RELATIVE PERCENT: 61.1 % (ref 36–66)
SODIUM BLD-SCNC: 132 MMOL/L (ref 135–145)
TOTAL PROTEIN: 6.9 GM/DL (ref 6.4–8.2)
WBC # BLD: 3.4 K/CU MM (ref 4–10.5)

## 2020-12-17 PROCEDURE — G9899 SCRN MAM PERF RSLTS DOC: HCPCS | Performed by: INTERNAL MEDICINE

## 2020-12-17 PROCEDURE — G8417 CALC BMI ABV UP PARAM F/U: HCPCS | Performed by: INTERNAL MEDICINE

## 2020-12-17 PROCEDURE — G8427 DOCREV CUR MEDS BY ELIG CLIN: HCPCS | Performed by: INTERNAL MEDICINE

## 2020-12-17 PROCEDURE — G8484 FLU IMMUNIZE NO ADMIN: HCPCS | Performed by: INTERNAL MEDICINE

## 2020-12-17 PROCEDURE — 85025 COMPLETE CBC W/AUTO DIFF WBC: CPT

## 2020-12-17 PROCEDURE — 99213 OFFICE O/P EST LOW 20 MIN: CPT | Performed by: INTERNAL MEDICINE

## 2020-12-17 PROCEDURE — 3017F COLORECTAL CA SCREEN DOC REV: CPT | Performed by: INTERNAL MEDICINE

## 2020-12-17 PROCEDURE — 1036F TOBACCO NON-USER: CPT | Performed by: INTERNAL MEDICINE

## 2020-12-17 PROCEDURE — 86300 IMMUNOASSAY TUMOR CA 15-3: CPT

## 2020-12-17 PROCEDURE — 36415 COLL VENOUS BLD VENIPUNCTURE: CPT

## 2020-12-17 PROCEDURE — 99211 OFF/OP EST MAY X REQ PHY/QHP: CPT

## 2020-12-17 PROCEDURE — 80053 COMPREHEN METABOLIC PANEL: CPT

## 2020-12-17 RX ORDER — IBUPROFEN 800 MG/1
TABLET ORAL
Status: ON HOLD | COMMUNITY
Start: 2020-10-19 | End: 2021-06-06 | Stop reason: HOSPADM

## 2020-12-17 ASSESSMENT — PATIENT HEALTH QUESTIONNAIRE - PHQ9
1. LITTLE INTEREST OR PLEASURE IN DOING THINGS: 0
SUM OF ALL RESPONSES TO PHQ QUESTIONS 1-9: 0
SUM OF ALL RESPONSES TO PHQ9 QUESTIONS 1 & 2: 0
2. FEELING DOWN, DEPRESSED OR HOPELESS: 0
SUM OF ALL RESPONSES TO PHQ QUESTIONS 1-9: 0
SUM OF ALL RESPONSES TO PHQ QUESTIONS 1-9: 0

## 2020-12-17 NOTE — PROGRESS NOTES
Patient Name: Zahra Ojeda  Patient : 1965  Patient MRN: [id number]     Primary Oncologist: Florentin Thompson MD  Referring Provider: REGINALD Paz     Date of Service: 2020      Chief Complaint:   Chief Complaint   Patient presents with    Follow-up     Patient Active Problem List:     Malignant neoplasm of upper-outer quadrant of right female breast       HPI:   Ms. Yanelis Rider is a very pleasant 42-year-old patient with medical history significant for hypertension, diabetes mellitus, initially referred to me on 2015, for evaluation of right breast infiltrating ductal carcinoma. She stated that she felt a knot in her right breast and she went to the primary care physician for that. She had mammogram followed by sonogram on 2015, and it showed spiculated mass in the anterior 12 o'clock position in the right breast, most likely representing malignancy. She underwent sonoguided biopsy of the lesion on 2015, and the final pathology was consistent with infiltrating ductal carcinoma. Estrogen receptors positive (95 percent strong), progesterone receptor positive (75 percent), and HER-2/jossy by immunohistochemical stain is equivocal and HER-2/jossy by FISH study was not amplified. She was evaluated by Dr. Munir Pendleton and he requested core biopsy of the right breast axillary lymph node and it was done on 2015. Final pathology showed metastatic carcinoma consistent with breast primary involving all cores (10 mm in the largest dimension). Since she wanted to preserve her breast if that is possible, Dr. Munir Pendleton discussed her case in our Tumor Board. Consensus was to give neoadjuvant chemotherapy followed by surgery. Neoadjuvant chemotherapy with doxorubicin, cyclophosphamide followed by Paclitaxel was started on 2015 and she has completed it on 2015. Ms. Paz underwent right breast lumpectomy on 2015, and the final pathology was consistent with stage IIB right breast infiltrating ductal carcinoma. There were two tumor sites less than 2 cm apart and the tumor is located at the 12 o'clock position. One tumor is 2.1 cm in size and another one is 0.4 cm in size. It is low-grade tumor and DCIS present. Estrogen receptor strongly positive (95 percent), progesterone receptor positive (75 percent), and HER-2 by FISH is not amplified. All the surgical margins were negative and the lymphovascular invasion present in the axillary dissection. One out of 19 lymph nodes were positive for macrometastases. Adjuvant hormonal therapy with letrozole was started on September 1, 2015. She was started with adjuvant radiation therapy by Dr. Apoorva Blanca and she completed it successfully on November 11, 2015. Ms. Paz had diagnostic right breast digital tomosynthesis mammograph on March 10, 2016, and it showed stable skin thickening and trabecular thickening. In the absence of clinical infectious signs and symptoms this is likely related to post-treatment change. Slight decrease in size of right breast hematoma. No evidence of malignancy. Recommend clinical follow up as necessary. She also had an abnormal MRI of the right breast in April 2017 and we believe it was due to surgical changes at that time. Because of her concern and symptoms of nipple inversion, we requested a targeted sonogram of the right breast.  It was done on August 15, 2017, and it showed 8 mm mass at the 9 o'clock in the right breast, likely corresponding to an enhancing mass seen on the MRI. Radiologist recommended sonoguided biopsy of the lesion. She underwent the sonoguided biopsy of the lesion on August 21, 2017, and final pathology was negative for invasive carcinoma. I discussed with Dr. Alex Glaser since she was found to have some atypical epithelial cells on the previous biopsy.   We both believed that it is due to changes from the prior surgery and the radiation treatment. We will recommend to continue with close observation. MRI of the breast done on 12/18/18 showed 1.7 cm ill-defined area of abnormal enhancement along the ventral margin of the patient's scar upper-outer quadrant posterior third right breast. Targeted right breast ultrasound and diagnostic right mammogram is warranted for complete assessment. She had incisional biopsy of the right breast suspicious site on 1/25/19 and final pathology only revealed changes related to surgery and radiation. She had EGD on 3/14 and colonoscopy on 3/20/19 by Dr. Prabha Ching. Colonoscopy showed 8 mm polyp in cecum, 4 mm polyp in descending colon, and internal hemorrhoids. DEXA scan done on August 21, 2019 showed normal bone mineral density by WHO criteria. She required to have exploratory laparotomy, removal of previously placed prosthesis mesh, small bowel resection and placement of Vicryl mesh for closure of abdominal wall defects on 10/18/20, for her recurrent hernia with pneumatosis of the small bowel and free air. On December 17, 2020, she presented to me for follow up. I have been following Ms. Paz for right breast infiltrating ductal carcinoma and she is status post neoadjuvant chemotherapy followed by right breast lumpectomy and sentinel lymph node biopsy. She is also status post adjuvant radiation therapy and she is currently on adjuvant endocrine therapy with letrozole. She is tolerating letrozole very well and she does not encounter any major side effects from it. She did have hot flashes due to letrozole. However it is getting better lately and that she does not get hot flashes that often. She is not on any medication for hot flashes at this moment. I recommend her to continue with letrozole on a regular basis. I will continue to follow her closely during adjuvant endocrine therapy with letrozole.     She does not have any signs or symptoms suggestive of recurrent or metastatic breast cancer at today's visit. I believe she is in complete clinical remission. She is more than 4 years on letrozole and we will continue with that for now. Since she has stage IIB disease, we decided to give total 7 years of adjuvant endocrine therapy with letrozole. Repeat mammogram on 1/2020 showed no evidence of malignancy in either breast. I will check her blood test today. She does not have any significant symptoms at today's visit. PAST MEDICAL HISTORY:  Significant for:  1. Hypertension. 2.         Diabetes mellitus. PAST SURGICAL HISTORY:  Significant for:  1. Total abdominal hysterectomy and bilateral salpingo-oophorectomy in 2006. 2.         Cholecystectomy. 3.         Hernia surgery. FAMILY HISTORY:  Significant for ovarian cancer in her sister and breast cancer in a couple of her aunts. No other family history of cancer disease. SOCIAL HISTORY:  She denies smoking, alcohol drinking, and illicit drug abuse. She is single and has no children. She is not working right now. ALLERGIES:  She claims to have allergies to Codeine, penicillin, and Darvocet. Review of Systems: \"Per interval history; otherwise 10 point ROS is negative. \"   Her energy level is fair, appetite, and sleep are okay. She deniesfever, chills, night sweats, cough, shortness of breath, chest pain, hemoptysis or palpitations. No anxiety or depression. Her bowel and bladder functions are normal. She denies nausea, vomiting, abdominal pain, diarrhea, constipation, dysuria, loss of appetite or weight loss. She doesn't have neuropathy and she denies bleeding or clotting issues. She doesn't have any pain in her body. The rest of the systems are unremarkable.     Vital Signs:  /72 (Site: Left Lower Arm, Position: Sitting, Cuff Size: Medium Adult)   Pulse 86   Temp 97.5 °F (36.4 °C) (Infrared)   Ht 5' 7\" (1.702 m)   Wt 254 lb 12.8 oz (115.6 kg)   SpO2 97%   BMI Component Value Date     (L) 12/17/2020    K 3.8 12/17/2020    CL 97 (L) 12/17/2020    CO2 27 12/17/2020    BUN 8 12/17/2020    CREATININE 0.6 12/17/2020    GLUCOSE 137 (H) 12/17/2020    CALCIUM 8.8 12/17/2020    PROT 6.9 12/17/2020    LABALBU 4.0 12/17/2020    BILITOT 0.3 12/17/2020    ALKPHOS 84 12/17/2020    AST 21 12/17/2020    ALT 32 12/17/2020    LABGLOM >60 12/17/2020    GFRAA >60 12/17/2020    PHOS 2.4 (L) 10/21/2020    MG 1.9 10/21/2020    POCGLU 131 (H) 10/27/2020     No results found for: MMA, LDH, HOMOCYSTEINE  No components found for: LD  Lab Results   Component Value Date    TSHHS 3.070 05/10/2019     Immunology:  Lab Results   Component Value Date    PROT 6.9 12/17/2020     No results found for: Floyce Milder, KLFLCR  No results found for: B2M  Coagulation Panel:  Lab Results   Component Value Date    PROTIME 14.5 (H) 06/11/2018    INR 1.27 06/11/2018    APTT 31.1 06/11/2018     Anemia Panel:  No results found for: WVABUWVH92, FOLATE  Tumor Markers:  Lab Results   Component Value Date    LABCA2 18.0 05/08/2020     Observations:  PHQ-9 Total Score: 0 (12/17/2020  9:28 AM)        Assessment & Plan:   Right breast infiltrating ductal carcinoma (estrogen receptor positive, progesterone receptor positive, and HER-2/jossy negative). PLAN:  Ms. Vandana Huggins has been followed for her right breast cancer. She required to have exploratory laparotomy, removal of previously placed prosthesis mesh, small bowel resection and placement of Vicryl mesh for closure of abdominal wall defects on 10/18/20, for her recurrent hernia with pneumatosis of the small bowel and free air. On December 17, 2020, she presented to me for follow up. I have been following Ms. Paz for right breast infiltrating ductal carcinoma and she is status post neoadjuvant chemotherapy followed by right breast lumpectomy and sentinel lymph node biopsy.  She is also status post adjuvant radiation therapy and she is currently on adjuvant endocrine therapy with letrozole. She is tolerating letrozole very well and she does not encounter any major side effects from it. She did have hot flashes due to letrozole. However it is getting better lately and that she does not get hot flashes that often. She is not on any medication for hot flashes at this moment. I recommend her to continue with letrozole on a regular basis. I will continue to follow her closely during adjuvant endocrine therapy with letrozole. She does not have any signs or symptoms suggestive of recurrent or metastatic breast cancer at today's visit. I believe she is in complete clinical remission. She is more than 4 years on letrozole and we will continue with that for now. Since she has stage IIB disease, we decided to give total 7 years of adjuvant endocrine therapy with letrozole. Repeat mammogram on 1/2020 showed no evidence of malignancy in either breast. I will check her blood test today. I answered all her questions and concerns for today. I asked her to followup with her primary care physician, Dr. Andrey Beal and Dr. Alcira Cuellar on regular basis. Recent imaging and labs were reviewed and discussed with the patient.

## 2020-12-19 LAB — CA 27.29: 22.9 U/ML (ref 0–40)

## 2021-03-18 RX ORDER — LETROZOLE 2.5 MG/1
TABLET, FILM COATED ORAL
Qty: 7 TABLET | Refills: 6 | Status: SHIPPED | OUTPATIENT
Start: 2021-03-18 | End: 2021-06-01

## 2021-03-30 ENCOUNTER — HOSPITAL ENCOUNTER (OUTPATIENT)
Age: 56
Discharge: HOME | End: 2021-03-30
Payer: COMMERCIAL

## 2021-03-30 LAB
ADENOVIRUS F 40 41 PCR: NOT DETECTED
ASTROVIRUS PCR: NOT DETECTED
CAMPYLOBACTER PCR: NOT DETECTED
CRYPTOSPORIDIUM PCR: NOT DETECTED
CYCLOSPORA CAYETANENSIS PCR: NOT DETECTED
E COLI 0157 PCR: NOT DETECTED
E COLI ENTEROAGGREGATIVE PCR: NOT DETECTED
E COLI ENTEROPATHOGENIC PCR: NOT DETECTED
E COLI ENTEROTOXIGENIC PCR: NOT DETECTED
E COLI SHIGA LIKE TOXIN PCR: NOT DETECTED
E COLI SHIGELLA/ENTEROINVASIVE PCR: NOT DETECTED
ENTAMOEBA HISTOLYTICA PCR: NOT DETECTED
GIARDIA LAMBLIA PCR: NOT DETECTED
NOROVIRUS GI GII PCR: NOT DETECTED
PLESIOMONAS SHIGELLOIDES PCR: NOT DETECTED
REASON FOR REJECTION: NORMAL
REJECTED TEST: NORMAL
ROTAVIRUS A PCR: NOT DETECTED
SALMONELLA PCR: NOT DETECTED
SAPOVIRUS PCR: NOT DETECTED
VIBRIO CHOLERAE PCR: NOT DETECTED
VIBRIO PCR: NOT DETECTED
YERSINIA ENTEROCOLITICA PCR: NOT DETECTED

## 2021-03-30 PROCEDURE — 87507 IADNA-DNA/RNA PROBE TQ 12-25: CPT

## 2021-04-12 NOTE — PROGRESS NOTES
Patient Name: Kacy Ulloa  Patient : 1965  Patient MRN: [id number]     Primary Oncologist: Wilma Chin MD  Referring Provider: REGINALD Hall     Date of Service: 2021      Chief Complaint:   Chief Complaint   Patient presents with    Follow-up     Patient Active Problem List:     Malignant neoplasm of upper-outer quadrant of right female breast       HPI:   Ms. Autumn Pak is a very pleasant 49-year-old patient with medical history significant for hypertension, diabetes mellitus, initially referred to me on 2015, for evaluation of right breast infiltrating ductal carcinoma. She stated that she felt a knot in her right breast and she went to the primary care physician for that. She had mammogram followed by sonogram on 2015, and it showed spiculated mass in the anterior 12 o'clock position in the right breast, most likely representing malignancy. She underwent sonoguided biopsy of the lesion on 2015, and the final pathology was consistent with infiltrating ductal carcinoma. Estrogen receptors positive (95 percent strong), progesterone receptor positive (75 percent), and HER-2/jossy by immunohistochemical stain is equivocal and HER-2/jossy by FISH study was not amplified. She was evaluated by Dr. Malena Barriga and he requested core biopsy of the right breast axillary lymph node and it was done on 2015. Final pathology showed metastatic carcinoma consistent with breast primary involving all cores (10 mm in the largest dimension). Since she wanted to preserve her breast if that is possible, Dr. Malena Barriga discussed her case in our Tumor Board. Consensus was to give neoadjuvant chemotherapy followed by surgery. Neoadjuvant chemotherapy with doxorubicin, cyclophosphamide followed by Paclitaxel was started on 2015 and she has completed it on 2015. Ms. Paz underwent right breast lumpectomy on 2015, and the final pathology was consistent with stage IIB right breast infiltrating ductal carcinoma. There were two tumor sites less than 2 cm apart and the tumor is located at the 12 o'clock position. One tumor is 2.1 cm in size and another one is 0.4 cm in size. It is low-grade tumor and DCIS present. Estrogen receptor strongly positive (95 percent), progesterone receptor positive (75 percent), and HER-2 by FISH is not amplified. All the surgical margins were negative and the lymphovascular invasion present in the axillary dissection. One out of 19 lymph nodes were positive for macrometastases. Adjuvant hormonal therapy with letrozole was started on September 1, 2015. She was started with adjuvant radiation therapy by Dr. Nannette Guardado and she completed it successfully on November 11, 2015. Ms. Paz had diagnostic right breast digital tomosynthesis mammograph on March 10, 2016, and it showed stable skin thickening and trabecular thickening. In the absence of clinical infectious signs and symptoms this is likely related to post-treatment change. Slight decrease in size of right breast hematoma. No evidence of malignancy. Recommend clinical follow up as necessary. She also had an abnormal MRI of the right breast in April 2017 and we believe it was due to surgical changes at that time. Because of her concern and symptoms of nipple inversion, we requested a targeted sonogram of the right breast.  It was done on August 15, 2017, and it showed 8 mm mass at the 9 o'clock in the right breast, likely corresponding to an enhancing mass seen on the MRI. Radiologist recommended sonoguided biopsy of the lesion. She underwent the sonoguided biopsy of the lesion on August 21, 2017, and final pathology was negative for invasive carcinoma. I discussed with Dr. Isma Weaver since she was found to have some atypical epithelial cells on the previous biopsy.   We both believed that it is due to changes from the prior surgery and the radiation treatment. We will recommend to continue with close observation. MRI of the breast done on 12/18/18 showed 1.7 cm ill-defined area of abnormal enhancement along the ventral margin of the patient's scar upper-outer quadrant posterior third right breast. Targeted right breast ultrasound and diagnostic right mammogram is warranted for complete assessment. She had incisional biopsy of the right breast suspicious site on 1/25/19 and final pathology only revealed changes related to surgery and radiation. She had EGD on 3/14 and colonoscopy on 3/20/19 by Dr. Franklyn Prader. Colonoscopy showed 8 mm polyp in cecum, 4 mm polyp in descending colon, and internal hemorrhoids. DEXA scan done on August 21, 2019 showed normal bone mineral density by WHO criteria. She required to have exploratory laparotomy, removal of previously placed prosthesis mesh, small bowel resection and placement of Vicryl mesh for closure of abdominal wall defects on 10/18/20, for her recurrent hernia with pneumatosis of the small bowel and free air. Mammogram done on January 11, 2021 showed probably benign findings in the bilateral breasts and radiologist recommend to have repeat mammogram in 6 months. On April 16, 2021, she presented to me for follow up. I have been following Ms. Paz for right breast infiltrating ductal carcinoma and she is status post neoadjuvant chemotherapy followed by right breast lumpectomy and sentinel lymph node biopsy. She is also status post adjuvant radiation therapy and she is currently on adjuvant endocrine therapy with letrozole. She is tolerating letrozole very well and she does not encounter any major side effects from it. She did have hot flashes due to letrozole. However it is getting better lately and that she does not get hot flashes that often. She is not on any medication for hot flashes at this moment. I recommend her to continue with letrozole on a regular basis.  I will continue to follow her closely during adjuvant endocrine therapy with letrozole. She does not have any signs or symptoms suggestive of recurrent or metastatic breast cancer at today's visit. I believe she is in complete clinical remission. She is more than 5 years on letrozole and we will continue with that for now. Since she has stage IIB disease, we decided to give total 7 years of adjuvant endocrine therapy with letrozole. Repeat mammogram on 1/11/2021 showed probably benign findings and radiologist recommend her to have repeat mammogram in 6 months. I will schedule her to have repeat mammogram in July 2021 and I will follow-up with the results. I will request a CBC, CMP and CA 27-29 level today and I will follow-up with the results. She does not have any significant symptoms at today's visit. PAST MEDICAL HISTORY:  Significant for:  1. Hypertension. 2.         Diabetes mellitus. PAST SURGICAL HISTORY:  Significant for:  1. Total abdominal hysterectomy and bilateral salpingo-oophorectomy in 2006. 2.         Cholecystectomy. 3.         Hernia surgery. FAMILY HISTORY:  Significant for ovarian cancer in her sister and breast cancer in a couple of her aunts. No other family history of cancer disease. SOCIAL HISTORY:  She denies smoking, alcohol drinking, and illicit drug abuse. She is single and has no children. She is not working right now. ALLERGIES:  She claims to have allergies to Codeine, penicillin, and Darvocet. Review of Systems: \"Per interval history; otherwise 10 point ROS is negative. \"   Her energy level is pretty good, appetite, and sleep are fair. She doesn't have fever, chills, night sweats, cough, shortness of breath, chest pain, hemoptysis or palpitations. Denies anxiety or depression.  Her bowel and bladder functions are normal. She doesn't have nausea, vomiting, abdominal pain, diarrhea, constipation, dysuria, loss of appetite or weight loss. She denies neuropathy and she doesn't have bleeding or clotting issues. She denies any pain in her body. The rest of the systems are unremarkable. Vital Signs:  /77 (Site: Left Lower Arm, Position: Sitting, Cuff Size: Medium Adult)   Pulse 78   Temp 98.1 °F (36.7 °C) (Temporal)   Ht 5' 7\" (1.702 m)   Wt 269 lb (122 kg)   SpO2 98%   BMI 42.13 kg/m²     Physical Exam:  CONSTITUTIONAL: awake, no apparent distress, alert, cooperative,    EYES: pupils equal, round and reactive to light, sclera clear and conjunctiva normal  ENT: Normocephalic, atraumatic, without obvious abnormality,   NECK: supple, symmetrical, no jugular venous distension and no carotid bruits   HEMATOLOGIC/LYMPHATIC: no cervical, supraclavicular or axillary lymphadenopathy   LUNGS: VBS, no rhonchi, no increased work of breathing, no wheezes, no crackles, clear to auscultation   CARDIOVASCULAR: regular rate and rhythm, normal S1 and S2, no murmur noted  ABDOMEN: normal bowel sounds x 4, soft, non-distended, non-tender, no masses palpated, no hepatosplenomegaly   MUSCULOSKELETAL: full range of motion noted, tone is normal  NEUROLOGIC: awake, alert, oriented to name, place and time. Motor skills grossly intact. SKIN: Normal skin color, texture, turgor and no jaundice.  appears intact   EXTREMITIES: no leg swelling, no cyanosis, no LE edema, no clubbing  BREASTS: post op changes noted in her right breast, no palpable suspicious mass in her right breast, no palpable mass in left breast and bilateral axilla     Labs:  Hematology:  Lab Results   Component Value Date    WBC 5.7 04/16/2021    RBC 4.40 04/16/2021    HGB 12.6 04/16/2021    HCT 38.3 04/16/2021    MCV 87.0 04/16/2021    MCH 28.6 04/16/2021    MCHC 32.9 04/16/2021    RDW 14.3 04/16/2021     04/16/2021    MPV 10.0 04/16/2021    BANDSPCT 8 06/17/2018    SEGSPCT 68.6 (H) 04/16/2021    EOSRELPCT 3.5 (H) 04/16/2021    BASOPCT 0.5 04/16/2021    LYMPHOPCT 21.2 (L) 04/16/2021    MONOPCT 6.2 (H) 04/16/2021    BANDABS 0.66 06/17/2018    SEGSABS 3.9 04/16/2021    EOSABS 0.2 04/16/2021    BASOSABS 0.0 04/16/2021    LYMPHSABS 1.2 04/16/2021    MONOSABS 0.4 04/16/2021    DIFFTYPE AUTOMATED DIFFERENTIAL 04/16/2021    POLYCHROM 1+ 06/17/2018    PLTM SEVERAL LARGE PLATELETS 14/64/5888     No results found for: ESR  Chemistry:  Lab Results   Component Value Date     04/16/2021    K 4.2 04/16/2021    CL 99 04/16/2021    CO2 29 04/16/2021    BUN 14 04/16/2021    CREATININE 0.7 04/16/2021    GLUCOSE 164 (H) 04/16/2021    CALCIUM 8.9 04/16/2021    PROT 6.9 04/16/2021    LABALBU 4.1 04/16/2021    BILITOT 0.3 04/16/2021    ALKPHOS 78 04/16/2021    AST 18 04/16/2021    ALT 23 04/16/2021    LABGLOM >60 04/16/2021    GFRAA >60 04/16/2021    PHOS 2.4 (L) 10/21/2020    MG 1.9 10/21/2020    POCGLU 131 (H) 10/27/2020     No results found for: MMA, LDH, HOMOCYSTEINE  No components found for: LD  Lab Results   Component Value Date    TSHHS 3.070 05/10/2019     Immunology:  Lab Results   Component Value Date    PROT 6.9 04/16/2021     No results found for: YOMAIRA Milian  No results found for: B2M  Coagulation Panel:  Lab Results   Component Value Date    PROTIME 14.5 (H) 06/11/2018    INR 1.27 06/11/2018    APTT 31.1 06/11/2018     Anemia Panel:  No results found for: MEGVOBDG61, FOLATE  Tumor Markers:  Lab Results   Component Value Date    LABCA2 13.6 04/16/2021     Observations:  PHQ-9 Total Score: 0 (4/16/2021  9:08 AM)        Assessment & Plan:   Right breast infiltrating ductal carcinoma (estrogen receptor positive, progesterone receptor positive, and HER-2/jossy negative). PLAN:  Ms. Ulices Leonard has been followed for her right breast cancer.      She required to have exploratory laparotomy, removal of previously placed prosthesis mesh, small bowel resection and placement of Vicryl mesh for closure of abdominal wall defects on 10/18/20, for her recurrent hernia with pneumatosis of the small bowel and free air. Mammogram done on January 11, 2021 showed probably benign findings in the bilateral breasts and radiologist recommend to have repeat mammogram in 6 months. On April 16, 2021, she presented to me for follow up. I have been following Ms. Paz for right breast infiltrating ductal carcinoma and she is status post neoadjuvant chemotherapy followed by right breast lumpectomy and sentinel lymph node biopsy. She is also status post adjuvant radiation therapy and she is currently on adjuvant endocrine therapy with letrozole. She is tolerating letrozole very well and she does not encounter any major side effects from it. She did have hot flashes due to letrozole. However it is getting better lately and that she does not get hot flashes that often. She is not on any medication for hot flashes at this moment. I recommend her to continue with letrozole on a regular basis. I will continue to follow her closely during adjuvant endocrine therapy with letrozole. She does not have any signs or symptoms suggestive of recurrent or metastatic breast cancer at today's visit. I believe she is in complete clinical remission. She is more than 5 years on letrozole and we will continue with that for now. Since she has stage IIB disease, we decided to give total 7 years of adjuvant endocrine therapy with letrozole. Repeat mammogram on 1/11/2021 showed probably benign findings and radiologist recommend her to have repeat mammogram in 6 months. I will schedule her to have repeat mammogram in July 2021 and I will follow-up with the results. I will request a CBC, CMP and CA 27-29 level today and I will follow-up with the results. I answered all her questions and concerns for today. I asked her to followup with her primary care physician, Dr. Gena Johnson and Dr. Zamzam Pak on regular basis. Recent imaging and labs were reviewed and discussed with the patient.

## 2021-04-16 ENCOUNTER — HOSPITAL ENCOUNTER (OUTPATIENT)
Dept: INFUSION THERAPY | Age: 56
Discharge: HOME | End: 2021-04-16
Payer: COMMERCIAL

## 2021-04-16 ENCOUNTER — OFFICE VISIT (OUTPATIENT)
Dept: ONCOLOGY | Age: 56
End: 2021-04-16
Payer: COMMERCIAL

## 2021-04-16 VITALS
BODY MASS INDEX: 42.22 KG/M2 | HEART RATE: 78 BPM | DIASTOLIC BLOOD PRESSURE: 77 MMHG | OXYGEN SATURATION: 98 % | WEIGHT: 269 LBS | SYSTOLIC BLOOD PRESSURE: 139 MMHG | HEIGHT: 67 IN | TEMPERATURE: 98.1 F

## 2021-04-16 DIAGNOSIS — C50.411 MALIGNANT NEOPLASM OF UPPER-OUTER QUADRANT OF RIGHT BREAST IN FEMALE, ESTROGEN RECEPTOR POSITIVE (HCC): Primary | ICD-10-CM

## 2021-04-16 DIAGNOSIS — Z17.0 MALIGNANT NEOPLASM OF UPPER-OUTER QUADRANT OF RIGHT BREAST IN FEMALE, ESTROGEN RECEPTOR POSITIVE (HCC): ICD-10-CM

## 2021-04-16 DIAGNOSIS — Z17.0 MALIGNANT NEOPLASM OF UPPER-OUTER QUADRANT OF RIGHT BREAST IN FEMALE, ESTROGEN RECEPTOR POSITIVE (HCC): Primary | ICD-10-CM

## 2021-04-16 DIAGNOSIS — C50.411 MALIGNANT NEOPLASM OF UPPER-OUTER QUADRANT OF RIGHT BREAST IN FEMALE, ESTROGEN RECEPTOR POSITIVE (HCC): ICD-10-CM

## 2021-04-16 LAB
ALBUMIN SERPL-MCNC: 4.1 GM/DL (ref 3.4–5)
ALP BLD-CCNC: 78 IU/L (ref 40–128)
ALT SERPL-CCNC: 23 U/L (ref 10–40)
ANION GAP SERPL CALCULATED.3IONS-SCNC: 10 MMOL/L (ref 4–16)
AST SERPL-CCNC: 18 IU/L (ref 15–37)
BASOPHILS ABSOLUTE: 0 K/CU MM
BASOPHILS RELATIVE PERCENT: 0.5 % (ref 0–1)
BILIRUB SERPL-MCNC: 0.3 MG/DL (ref 0–1)
BUN BLDV-MCNC: 14 MG/DL (ref 6–23)
CALCIUM SERPL-MCNC: 8.9 MG/DL (ref 8.3–10.6)
CHLORIDE BLD-SCNC: 99 MMOL/L (ref 99–110)
CO2: 29 MMOL/L (ref 21–32)
CREAT SERPL-MCNC: 0.7 MG/DL (ref 0.6–1.1)
DIFFERENTIAL TYPE: ABNORMAL
EOSINOPHILS ABSOLUTE: 0.2 K/CU MM
EOSINOPHILS RELATIVE PERCENT: 3.5 % (ref 0–3)
GFR AFRICAN AMERICAN: >60 ML/MIN/1.73M2
GFR NON-AFRICAN AMERICAN: >60 ML/MIN/1.73M2
GLUCOSE BLD-MCNC: 164 MG/DL (ref 70–99)
HCT VFR BLD CALC: 38.3 % (ref 37–47)
HEMOGLOBIN: 12.6 GM/DL (ref 12.5–16)
LYMPHOCYTES ABSOLUTE: 1.2 K/CU MM
LYMPHOCYTES RELATIVE PERCENT: 21.2 % (ref 24–44)
MCH RBC QN AUTO: 28.6 PG (ref 27–31)
MCHC RBC AUTO-ENTMCNC: 32.9 % (ref 32–36)
MCV RBC AUTO: 87 FL (ref 78–100)
MONOCYTES ABSOLUTE: 0.4 K/CU MM
MONOCYTES RELATIVE PERCENT: 6.2 % (ref 0–4)
PDW BLD-RTO: 14.3 % (ref 11.7–14.9)
PLATELET # BLD: 190 K/CU MM (ref 140–440)
PMV BLD AUTO: 10 FL (ref 7.5–11.1)
POTASSIUM SERPL-SCNC: 4.2 MMOL/L (ref 3.5–5.1)
RBC # BLD: 4.4 M/CU MM (ref 4.2–5.4)
SEGMENTED NEUTROPHILS ABSOLUTE COUNT: 3.9 K/CU MM
SEGMENTED NEUTROPHILS RELATIVE PERCENT: 68.6 % (ref 36–66)
SODIUM BLD-SCNC: 138 MMOL/L (ref 135–145)
TOTAL PROTEIN: 6.9 GM/DL (ref 6.4–8.2)
WBC # BLD: 5.7 K/CU MM (ref 4–10.5)

## 2021-04-16 PROCEDURE — G8427 DOCREV CUR MEDS BY ELIG CLIN: HCPCS | Performed by: INTERNAL MEDICINE

## 2021-04-16 PROCEDURE — 80053 COMPREHEN METABOLIC PANEL: CPT

## 2021-04-16 PROCEDURE — 85025 COMPLETE CBC W/AUTO DIFF WBC: CPT

## 2021-04-16 PROCEDURE — 3017F COLORECTAL CA SCREEN DOC REV: CPT | Performed by: INTERNAL MEDICINE

## 2021-04-16 PROCEDURE — G0463 HOSPITAL OUTPT CLINIC VISIT: HCPCS

## 2021-04-16 PROCEDURE — 99211 OFF/OP EST MAY X REQ PHY/QHP: CPT

## 2021-04-16 PROCEDURE — G9899 SCRN MAM PERF RSLTS DOC: HCPCS | Performed by: INTERNAL MEDICINE

## 2021-04-16 PROCEDURE — 1036F TOBACCO NON-USER: CPT | Performed by: INTERNAL MEDICINE

## 2021-04-16 PROCEDURE — 36415 COLL VENOUS BLD VENIPUNCTURE: CPT

## 2021-04-16 PROCEDURE — G8417 CALC BMI ABV UP PARAM F/U: HCPCS | Performed by: INTERNAL MEDICINE

## 2021-04-16 PROCEDURE — 86300 IMMUNOASSAY TUMOR CA 15-3: CPT

## 2021-04-16 PROCEDURE — 99214 OFFICE O/P EST MOD 30 MIN: CPT | Performed by: INTERNAL MEDICINE

## 2021-04-16 RX ORDER — PANTOPRAZOLE SODIUM 40 MG/1
TABLET, DELAYED RELEASE ORAL
COMMUNITY
Start: 2021-04-12

## 2021-04-16 ASSESSMENT — PATIENT HEALTH QUESTIONNAIRE - PHQ9
2. FEELING DOWN, DEPRESSED OR HOPELESS: 0
1. LITTLE INTEREST OR PLEASURE IN DOING THINGS: 0
SUM OF ALL RESPONSES TO PHQ QUESTIONS 1-9: 0
SUM OF ALL RESPONSES TO PHQ QUESTIONS 1-9: 0

## 2021-04-16 NOTE — PROGRESS NOTES
MA Rooming Questions  Patient: Kt Poole  MRN: [id number]    Date: 4/16/2021        1. Do you have any new issues?   no         2. Do you need any refills on medications?    no    3. Have you had any imaging done since your last visit?   no    4. Have you been hospitalized or seen in the emergency room since your last visit here?   no    5. Did the patient have a depression screening completed today?  Yes    PHQ-9 Total Score: 0 (4/16/2021  9:08 AM)       PHQ-9 Given to (if applicable):               PHQ-9 Score (if applicable):                     [] Positive     []  Negative              Does question #9 need addressed (if applicable)                     [] Yes    []  No               Duane Miller MA

## 2021-04-17 LAB — CA 27.29: 13.6 U/ML (ref 0–40)

## 2021-05-21 ENCOUNTER — TELEPHONE (OUTPATIENT)
Dept: PULMONOLOGY | Age: 56
End: 2021-05-21

## 2021-05-21 ENCOUNTER — OFFICE VISIT (OUTPATIENT)
Dept: PULMONOLOGY | Age: 56
End: 2021-05-21
Payer: COMMERCIAL

## 2021-05-21 VITALS
WEIGHT: 269 LBS | BODY MASS INDEX: 42.22 KG/M2 | DIASTOLIC BLOOD PRESSURE: 70 MMHG | OXYGEN SATURATION: 98 % | HEART RATE: 85 BPM | SYSTOLIC BLOOD PRESSURE: 126 MMHG | HEIGHT: 67 IN

## 2021-05-21 DIAGNOSIS — G47.33 OSA (OBSTRUCTIVE SLEEP APNEA): ICD-10-CM

## 2021-05-21 DIAGNOSIS — R06.09 DYSPNEA ON EXERTION: ICD-10-CM

## 2021-05-21 DIAGNOSIS — G47.19 EXCESSIVE DAYTIME SLEEPINESS: ICD-10-CM

## 2021-05-21 DIAGNOSIS — Z87.891 EX-SMOKER: ICD-10-CM

## 2021-05-21 DIAGNOSIS — E66.01 MORBID OBESITY WITH BMI OF 45.0-49.9, ADULT (HCC): ICD-10-CM

## 2021-05-21 DIAGNOSIS — J45.30 MILD PERSISTENT ASTHMA WITHOUT COMPLICATION: ICD-10-CM

## 2021-05-21 PROCEDURE — G8427 DOCREV CUR MEDS BY ELIG CLIN: HCPCS | Performed by: INTERNAL MEDICINE

## 2021-05-21 PROCEDURE — G9899 SCRN MAM PERF RSLTS DOC: HCPCS | Performed by: INTERNAL MEDICINE

## 2021-05-21 PROCEDURE — G8417 CALC BMI ABV UP PARAM F/U: HCPCS | Performed by: INTERNAL MEDICINE

## 2021-05-21 PROCEDURE — 99243 OFF/OP CNSLTJ NEW/EST LOW 30: CPT | Performed by: INTERNAL MEDICINE

## 2021-05-21 RX ORDER — BUDESONIDE AND FORMOTEROL FUMARATE DIHYDRATE 160; 4.5 UG/1; UG/1
2 AEROSOL RESPIRATORY (INHALATION) 2 TIMES DAILY
Qty: 1 INHALER | Refills: 3 | Status: SHIPPED | OUTPATIENT
Start: 2021-05-21 | End: 2022-03-04

## 2021-05-21 RX ORDER — MONTELUKAST SODIUM 10 MG/1
10 TABLET ORAL DAILY
Qty: 30 TABLET | Refills: 3 | Status: SHIPPED | OUTPATIENT
Start: 2021-05-21 | End: 2021-07-26

## 2021-05-21 ASSESSMENT — ENCOUNTER SYMPTOMS
COUGH: 0
ABDOMINAL PAIN: 0
EYE ITCHING: 0
EYE DISCHARGE: 0
ABDOMINAL DISTENTION: 0
BACK PAIN: 0
SHORTNESS OF BREATH: 1

## 2021-05-21 NOTE — TELEPHONE ENCOUNTER
Spoke with Dr. Ottoniel Oconnor office about patient and they state they will contact patient about ECHO.

## 2021-05-21 NOTE — PROGRESS NOTES
Johana Nix  1965  Referring Provider: REGINALD Morel    Subjective:     Chief Complaint   Patient presents with    Follow-up     dyspnea       HPI  Agnes Sung is a 54 y.o. female has come back as a follow up. She has moderate DILLON on a CPAP of 11 cm h2o which she was not using it so it was taken away by the company . She is tired during the day time. She is not interested in going back to get the machine and use it. She has SOBOE walking 200 feet. She has h/o social smoking many years ago. She has h/o Asthma and COPD. She has occasional cough, occasional phlegm, no hemoptysis, no loss of weight, good appetite. She is not on oxygen. She is on Albuterol prn. She has mild LE swelling.      Current Outpatient Medications   Medication Sig Dispense Refill    metFORMIN (GLUCOPHAGE) 500 MG tablet Take 500 mg by mouth 2 times daily (with meals)      montelukast (SINGULAIR) 10 MG tablet Take 1 tablet by mouth daily 30 tablet 3    budesonide-formoterol (SYMBICORT) 160-4.5 MCG/ACT AERO Inhale 2 puffs into the lungs 2 times daily 1 Inhaler 3    pantoprazole (PROTONIX) 40 MG tablet       loperamide (RA ANTI-DIARRHEAL) 2 MG capsule Take 1 capsule by mouth 4 times daily as needed for Diarrhea 40 capsule 0    letrozole (FEMARA) 2.5 MG tablet TAKE ONE TABLET BY MOUTH EVERY DAY 7 tablet 6    ibuprofen (ADVIL;MOTRIN) 800 MG tablet       FLUoxetine (PROZAC) 10 MG capsule Take 1 capsule by mouth daily      hydroCHLOROthiazide (MICROZIDE) 12.5 MG capsule Take 1 capsule by mouth daily      hydrOXYzine (VISTARIL) 25 MG capsule Take 1 capsule by mouth daily      losartan (COZAAR) 100 MG tablet Take 1 tablet by mouth daily      Elastic Bandages & Supports (ABDOMINAL BINDER/ELASTIC XL) MISC 1 Units by Does not apply route as needed (AS NEEDED) (Patient not taking: Reported on 5/21/2021) 1 each 0    fexofenadine (ALLEGRA ALLERGY) 60 MG tablet Take 60 mg by mouth every 12 hours as needed  (Patient not taking: Reported on 5/21/2021)       No current facility-administered medications for this visit. Allergies   Allergen Reactions    Latex      \"Redness\"    Codeine      \"I Don't Remember The Reaction , I Was A Child\"   [facility] Darvon [Propoxyphene]      \"I Don't Remember The Reaction, I Was A Child\"    Pcn [Penicillins]      \"I Don't Remember The Reaction, I Was A Child\"       Past Medical History:   Diagnosis Date    Abnormal ECG 1/29/2019    Acid reflux     Anxiety     Arthritis     \"Back, Legs, Knees And Feet\"    Back problem     \"Three Discs Smashed Together Lower Back\"    Bipolar disorder ([address]     Chronic back pain     Chronic cough 1/31/2020    COPD (chronic obstructive pulmonary disease) ([address]     COPD, moderate ([address] 1/31/2020    Curvature of spine     \"Wore A Brace For Three Years\"    Depression     Diabetes mellitus ([address] Dx 2010    Diverticulitis     Esophageal dilatation 04/12/2017    Family history of sudden death 1/29/2019    Mother at age 58    Full dentures     H/O 24 hour EKG monitoring 01/29/2019    Conclusion: Normal study suggesting normal sinus rhythm with no clinically significant arrhythmias.  H/O cardiovascular stress test 02/12/2019    EF65% Normal    Headache(784.0)     \"Occ\"    Heart murmur     No Cardiologist At This Time    History of nuclear stress test 02/12/2019    EF 65%. Normal study.     Tejon (hard of hearing)     Bilateral Hearing Aides    Hydronephrosis     dx with hydronephrosis with admission 6/2018- right ureteral stone/\"had kidney stone stone about 2-3 yrs ago and passed it\"    Hyperlipidemia     Hypertension     Macular degeneration     Right Eye    Mild persistent asthma without complication 8/18/1242    MRSA (methicillin resistant Staphylococcus aureus)     After PRICILLA In 2006 (Abdomen)    Near syncope 1/29/2019 1/28/19 in ED H    Panic attacks     PONV (postoperative nausea and vomiting)     denies any motion sickness    Right Breast Cancer Dx 8-15 Right Breast Lumpectomy, Had Chemotherapy And Radiation Treatments    S/P exploratory laparotomy 10/18/2020    explore lap for small bowel perf repair and abdominal wall hernia repair    Seizure ([address]     \"when I was a young kid had seizure and was on medication- not sure how old when last seizure but as a kid\"    Sepsis ([address]     per old chart - admitted 6/2018 with sepsis secondary to wound infection    Shortness of breath 2/4/2020    Sleep apnea     Has CPAP- last sleep study 2-3 yrs ago\"    SOB (shortness of breath) on exertion 1/29/2019    Thyroid disease     \"Thyroid Problems As A Child\"    Urinary incontinence     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    WD-Wound of right breast 10/28/2016    Wears glasses        Past Surgical History:   Procedure Laterality Date    ABDOMEN SURGERY  06/2018    per old chart pt had exploratory abd wound with drainage of infected seroma with wound vac    ABDOMINAL HERNIA REPAIR  10/18/2020    BREAST BIOPSY Right 2015    Right Breast Cancer , \"I Have A Chip In The Breast\"    BREAST BIOPSY Right 2017    Benign    BREAST LUMPECTOMY Right 08/2015    Right Breast Cancer    CHOLECYSTECTOMY, LAPAROSCOPIC  2007    COLONOSCOPY  2016    dr green 1 polyp    COLONOSCOPY  3/2019; 04/12/2017    3/2019 WNL; 4/2017 Internal hemorroids, diverticulosis    CYSTOSCOPY  2016    Kidney Stones    CYSTOSCOPY      per old chart with admission 6/2018 pt had cysto, right RGPG and attempted to insert right ureteral stent    CYSTOSCOPY  07/03/2018    cysto, right stent removal, right ureteroscopy, laser litho    DENTAL SURGERY      All Teeth Extracted In Past    ENDOSCOPY, COLON, DIAGNOSTIC  04/12/2017    EGD: Dilatation, Hiatal hernia, mild gastritis    HERNIA REPAIR  2007    Abdominal Hernia Repair With Mesh    HERNIA REPAIR  2012    Ventral Hernia Repair With Mesh    HYSTERECTOMY, TOTAL ABDOMINAL  2006    LAPAROSCOPY  2005    Laparoscopy, D & C, Hysteroscopy    LAPAROTOMY N/A 10/18/2020    LAPAROTOMY EXPLORATORY WITH SMALL BOWEL RESECTION, REMOVAL OF PROSTHETIC MESH, AND REPAIR OF ABDOMINAL WALL HERNIA WITH VICRYL MESH performed by Rosa Anton MD at [address] [place] [address]          OTHER SURGICAL HISTORY  2018    Laparoscopic-converted to open ventral hernia repair with mesh/ XU    OTHER SURGICAL HISTORY      per old chart with admission - in IR 2018- had right perc. nephrostomy with cath placement\"took the tube out before I left the hospital\"   [id number]  10/18/2020    TONSILLECTOMY  1970's       Social History     Socioeconomic History    Marital status: Single     Spouse name: None    Number of children: None    Years of education: None    Highest education level: None   Occupational History    None   Tobacco Use    Smoking status: Former Smoker     Packs/day: 0.25     Years: 5.00     Pack years: 1.25     Types: Cigarettes     Start date:      Quit date:      Years since quittin.4    Smokeless tobacco: Never Used    Tobacco comment: \"I Only Smoked Socially When I Quit Smoking\"   Vaping Use    Vaping Use: Never used   Substance and Sexual Activity    Alcohol use: No     Alcohol/week: 0.0 standard drinks    Drug use: No    Sexual activity: Never   Other Topics Concern    None   Social History Narrative    None     Social Determinants of Health     Financial Resource Strain:     Difficulty of Paying Living Expenses:    Food Insecurity:     Worried About Running Out of Food in the Last Year:     Ran Out of Food in the Last Year:    Transportation Needs:     Lack of Transportation (Medical):      Lack of Transportation (Non-Medical):    Physical Activity:     Days of Exercise per Week:     Minutes of Exercise per Session:    Stress:     Feeling of Stress :    Social Connections:     Frequency of Communication with Friends and Family:     Frequency of Social Gatherings with Friends Mouth: Mucous membranes are moist.   Eyes:      Extraocular Movements: Extraocular movements intact. Pupils: Pupils are equal, round, and reactive to light. Cardiovascular:      Rate and Rhythm: Normal rate and regular rhythm. Pulses: Normal pulses. Heart sounds: Normal heart sounds. Pulmonary:      Effort: Pulmonary effort is normal.      Breath sounds: Normal breath sounds. Abdominal:      General: Abdomen is flat. Palpations: Abdomen is soft. Musculoskeletal:         General: Normal range of motion. Cervical back: Normal range of motion and neck supple. Skin:     General: Skin is warm and dry. Neurological:      General: No focal deficit present. Mental Status: She is alert.    Psychiatric:         Mood and Affect: Mood normal.         Behavior: Behavior normal.         Radiology: None    Assessment and Plan     Problem List        Pulmonary Problems    Dyspnea on exertion     Loose weight  PFT  6 MWT  ECHO  CXR PA and Lat         Relevant Orders    XR CHEST (2 VW)    ECHO Complete 2D W Doppler W Color    Full PFT Study With Bronchodilator    6 Minute Walk Test    DILLON (obstructive sleep apnea)     She has moderate DILLON but patient is not willing to use the CPAP  Loose weight         Mild persistent asthma without complication     Albuterol PRN  Singulair  Symbicort  Get yearly flu vaccine           Relevant Medications    montelukast (SINGULAIR) 10 MG tablet    budesonide-formoterol (SYMBICORT) 160-4.5 MCG/ACT AERO       Other    Morbid obesity with BMI of 45.0-49.9, adult (HCC)     Advised to loose weight with diet and exercise           Relevant Medications    metFORMIN (GLUCOPHAGE) 500 MG tablet    Excessive daytime sleepiness     Patient is refusing to use CPAP  Loose weight         Ex-smoker     Advised to c/w quitting smoking         Relevant Orders    Full PFT Study With Bronchodilator    6 Minute Walk Test               Return in about 4 weeks (around 6/18/2021) for PFT, 6 MWT, chest x ray, ECHO.      Progress notes sent to the referring Provider    Rodrigo Reyes MD MD  5/21/2021  10:16 AM

## 2021-05-24 ENCOUNTER — TELEPHONE (OUTPATIENT)
Dept: PULMONOLOGY | Age: 56
End: 2021-05-24

## 2021-05-24 NOTE — TELEPHONE ENCOUNTER
Patient called asking where she would have pft and [id number] and xray.  I advised the patient to call central scheduling at [phone] and the xray doesn't need to be scheduled it can be a walk in test.

## 2021-05-26 DIAGNOSIS — Z01.818 PRE-OP TESTING: Primary | ICD-10-CM

## 2021-06-01 ENCOUNTER — TELEPHONE (OUTPATIENT)
Dept: CARDIOLOGY CLINIC | Age: 56
End: 2021-06-01

## 2021-06-02 ENCOUNTER — PROCEDURE VISIT (OUTPATIENT)
Dept: CARDIOLOGY CLINIC | Age: 56
End: 2021-06-02
Payer: COMMERCIAL

## 2021-06-02 DIAGNOSIS — R06.09 DYSPNEA ON EXERTION: ICD-10-CM

## 2021-06-02 LAB
LV EF: 58 %
LVEF MODALITY: NORMAL

## 2021-06-02 PROCEDURE — 93306 TTE W/DOPPLER COMPLETE: CPT | Performed by: INTERNAL MEDICINE

## 2021-06-03 ENCOUNTER — TELEPHONE (OUTPATIENT)
Dept: CARDIOLOGY CLINIC | Age: 56
End: 2021-06-03

## 2021-06-04 ENCOUNTER — HOSPITAL ENCOUNTER (OUTPATIENT)
Age: 56
Setting detail: OBSERVATION
Discharge: HOME | End: 2021-06-06
Attending: INTERNAL MEDICINE | Admitting: STUDENT IN AN ORGANIZED HEALTH CARE EDUCATION/TRAINING PROGRAM
Payer: COMMERCIAL

## 2021-06-04 ENCOUNTER — HOSPITAL ENCOUNTER (EMERGENCY)
Age: 56
Discharge: ANOTHER HOSPITAL | End: 2021-06-04
Attending: EMERGENCY MEDICINE
Payer: COMMERCIAL

## 2021-06-04 ENCOUNTER — APPOINTMENT (OUTPATIENT)
Dept: GENERAL RADIOLOGY | Age: 56
End: 2021-06-04
Payer: COMMERCIAL

## 2021-06-04 VITALS
OXYGEN SATURATION: 96 % | BODY MASS INDEX: 42.22 KG/M2 | HEART RATE: 72 BPM | TEMPERATURE: 99.3 F | RESPIRATION RATE: 16 BRPM | HEIGHT: 67 IN | WEIGHT: 269 LBS | DIASTOLIC BLOOD PRESSURE: 77 MMHG | SYSTOLIC BLOOD PRESSURE: 106 MMHG

## 2021-06-04 DIAGNOSIS — I10 ACCELERATED HYPERTENSION: ICD-10-CM

## 2021-06-04 DIAGNOSIS — R07.9 ACUTE CHEST PAIN: Primary | ICD-10-CM

## 2021-06-04 DIAGNOSIS — R06.09 DYSPNEA ON EXERTION: ICD-10-CM

## 2021-06-04 LAB
ALBUMIN SERPL-MCNC: 4.1 GM/DL (ref 3.4–5)
ALP BLD-CCNC: 87 IU/L (ref 40–129)
ALT SERPL-CCNC: 22 U/L (ref 10–40)
ANION GAP SERPL CALCULATED.3IONS-SCNC: 12 MMOL/L (ref 4–16)
AST SERPL-CCNC: 20 IU/L (ref 15–37)
BASOPHILS ABSOLUTE: 0 K/CU MM
BASOPHILS RELATIVE PERCENT: 0.4 % (ref 0–1)
BILIRUB SERPL-MCNC: 0.2 MG/DL (ref 0–1)
BUN BLDV-MCNC: 11 MG/DL (ref 6–23)
CALCIUM SERPL-MCNC: 9.1 MG/DL (ref 8.3–10.6)
CHLORIDE BLD-SCNC: 100 MMOL/L (ref 99–110)
CO2: 26 MMOL/L (ref 21–32)
CREAT SERPL-MCNC: 0.6 MG/DL (ref 0.6–1.1)
DIFFERENTIAL TYPE: ABNORMAL
EOSINOPHILS ABSOLUTE: 0.1 K/CU MM
EOSINOPHILS RELATIVE PERCENT: 1.6 % (ref 0–3)
GFR AFRICAN AMERICAN: >60 ML/MIN/1.73M2
GFR NON-AFRICAN AMERICAN: >60 ML/MIN/1.73M2
GLUCOSE BLD-MCNC: 149 MG/DL (ref 70–99)
HCT VFR BLD CALC: 39.3 % (ref 37–47)
HEMOGLOBIN: 12.9 GM/DL (ref 12.5–16)
IMMATURE NEUTROPHIL %: 0.4 % (ref 0–0.43)
LYMPHOCYTES ABSOLUTE: 1.5 K/CU MM
LYMPHOCYTES RELATIVE PERCENT: 22.2 % (ref 24–44)
MCH RBC QN AUTO: 28.4 PG (ref 27–31)
MCHC RBC AUTO-ENTMCNC: 32.8 % (ref 32–36)
MCV RBC AUTO: 86.4 FL (ref 78–100)
MONOCYTES ABSOLUTE: 0.4 K/CU MM
MONOCYTES RELATIVE PERCENT: 6.4 % (ref 0–4)
PDW BLD-RTO: 13.3 % (ref 11.7–14.9)
PLATELET # BLD: 216 K/CU MM (ref 140–440)
PMV BLD AUTO: 10.4 FL (ref 7.5–11.1)
POTASSIUM SERPL-SCNC: 4.1 MMOL/L (ref 3.5–5.1)
PRO-BNP: 37.1 PG/ML
RBC # BLD: 4.55 M/CU MM (ref 4.2–5.4)
SEGMENTED NEUTROPHILS ABSOLUTE COUNT: 4.7 K/CU MM
SEGMENTED NEUTROPHILS RELATIVE PERCENT: 69 % (ref 36–66)
SODIUM BLD-SCNC: 138 MMOL/L (ref 135–145)
TOTAL IMMATURE NEUTOROPHIL: 0.03 K/CU MM
TOTAL PROTEIN: 6.8 GM/DL (ref 6.4–8.2)
TROPONIN T: <0.01 NG/ML
WBC # BLD: 6.9 K/CU MM (ref 4–10.5)

## 2021-06-04 PROCEDURE — G0379 DIRECT REFER HOSPITAL OBSERV: HCPCS

## 2021-06-04 PROCEDURE — 99285 EMERGENCY DEPT VISIT HI MDM: CPT

## 2021-06-04 PROCEDURE — 80053 COMPREHEN METABOLIC PANEL: CPT

## 2021-06-04 PROCEDURE — 71046 X-RAY EXAM CHEST 2 VIEWS: CPT

## 2021-06-04 PROCEDURE — G0378 HOSPITAL OBSERVATION PER HR: HCPCS

## 2021-06-04 PROCEDURE — 2140000000 HC CCU INTERMEDIATE R&B

## 2021-06-04 PROCEDURE — 85025 COMPLETE CBC W/AUTO DIFF WBC: CPT

## 2021-06-04 PROCEDURE — 83880 ASSAY OF NATRIURETIC PEPTIDE: CPT

## 2021-06-04 PROCEDURE — 84484 ASSAY OF TROPONIN QUANT: CPT

## 2021-06-04 PROCEDURE — 6370000000 HC RX 637 (ALT 250 FOR IP): Performed by: EMERGENCY MEDICINE

## 2021-06-04 PROCEDURE — 93005 ELECTROCARDIOGRAM TRACING: CPT | Performed by: EMERGENCY MEDICINE

## 2021-06-04 RX ORDER — ASPIRIN 81 MG/1
324 TABLET, CHEWABLE ORAL ONCE
Status: COMPLETED | OUTPATIENT
Start: 2021-06-04 | End: 2021-06-04

## 2021-06-04 RX ADMIN — ASPIRIN 81 MG CHEWABLE TABLET 324 MG: 81 TABLET CHEWABLE at 16:47

## 2021-06-04 ASSESSMENT — PAIN DESCRIPTION - LOCATION
LOCATION: CHEST
LOCATION: ABDOMEN

## 2021-06-04 ASSESSMENT — PAIN SCALES - GENERAL
PAINLEVEL_OUTOF10: 5
PAINLEVEL_OUTOF10: 7
PAINLEVEL_OUTOF10: 5

## 2021-06-04 ASSESSMENT — HEART SCORE: ECG: 1

## 2021-06-04 ASSESSMENT — PAIN DESCRIPTION - ONSET: ONSET: SUDDEN

## 2021-06-04 ASSESSMENT — PAIN DESCRIPTION - DESCRIPTORS
DESCRIPTORS: TIGHTNESS;PRESSURE
DESCRIPTORS: PRESSURE

## 2021-06-04 ASSESSMENT — PAIN DESCRIPTION - PAIN TYPE: TYPE: ACUTE PAIN

## 2021-06-04 ASSESSMENT — PAIN DESCRIPTION - FREQUENCY: FREQUENCY: CONTINUOUS

## 2021-06-04 NOTE — ED PROVIDER NOTES
Wears glasses        Past Surgical History:   Procedure Laterality Date    ABDOMEN SURGERY  06/2018    per old chart pt had exploratory abd wound with drainage of infected seroma with wound vac    ABDOMINAL HERNIA REPAIR  10/18/2020    BREAST BIOPSY Right 2015    Right Breast Cancer , \"I Have A Chip In The Breast\"    BREAST BIOPSY Right 2017    Benign    BREAST LUMPECTOMY Right 08/2015    Right Breast Cancer    CHOLECYSTECTOMY, LAPAROSCOPIC  2007    COLONOSCOPY  2016    dr green 1 polyp    COLONOSCOPY  3/2019; 04/12/2017    3/2019 WNL; 4/2017 Internal hemorroids, diverticulosis    CYSTOSCOPY  2016    Kidney Stones    CYSTOSCOPY      per old chart with admission 6/2018 pt had cysto, right RGPG and attempted to insert right ureteral stent    CYSTOSCOPY  07/03/2018    cysto, right stent removal, right ureteroscopy, laser litho    DENTAL SURGERY      All Teeth Extracted In Past    ENDOSCOPY, COLON, DIAGNOSTIC  04/12/2017    EGD: Dilatation, Hiatal hernia, mild gastritis    HERNIA REPAIR  2007    Abdominal Hernia Repair With Mesh    HERNIA REPAIR  2012    Ventral Hernia Repair With Mesh    HYSTERECTOMY, TOTAL ABDOMINAL  2006    LAPAROSCOPY  2005    Laparoscopy, D & C, Hysteroscopy    LAPAROTOMY N/A 10/18/2020    LAPAROTOMY EXPLORATORY WITH SMALL BOWEL RESECTION, REMOVAL OF PROSTHETIC MESH, AND REPAIR OF ABDOMINAL WALL HERNIA WITH VICRYL MESH performed by Kahlil Correa MD at [address] [place] [address]      2015    OTHER SURGICAL HISTORY  05/22/2018    Laparoscopic-converted to open ventral hernia repair with mesh/ XU    OTHER SURGICAL HISTORY      per old chart with admission 6/20180- in IR 6/11/2018- had right perc. nephrostomy with cath placement\"took the tube out before I left the hospital\"   [address]  10/18/2020    TONSILLECTOMY  1970's       Family History   Problem Relation Age of Onset    Heart Disease Mother         \"Open Heart\"    Diabetes Mother    Danielle Lindsay Miscarriages / [place] Mother     Stroke Mother     Vision Loss Mother         \"Wore Glasses\"    High Blood Pressure Father     Vision Loss Father         \"Had Cataract Surgery\"    Cancer Father         \"Kidney Cancer\"    Heart Disease Father         \"Atrial Fib\"    Diabetes Sister     High Blood Pressure Sister     High Cholesterol Sister     Cancer Sister         \"Ovarian Cancer\"   [facility] Vision Loss Sister         \"Glaucoma\"       Social History     Socioeconomic History    Marital status: Single     Spouse name: Not on file    Number of children: Not on file    Years of education: Not on file    Highest education level: Not on file   Occupational History    Not on file   Tobacco Use    Smoking status: Former Smoker     Packs/day: 0.25     Years: 5.00     Pack years: 1.25     Types: Cigarettes     Start date:      Quit date:      Years since quittin.4    Smokeless tobacco: Never Used    Tobacco comment: \"I Only Smoked Socially When I Quit Smoking\"   Vaping Use    Vaping Use: Never used   Substance and Sexual Activity    Alcohol use: No     Alcohol/week: 0.0 standard drinks    Drug use: No    Sexual activity: Never   Other Topics Concern    Not on file   Social History Narrative    Not on file     Social Determinants of Health     Financial Resource Strain:     Difficulty of Paying Living Expenses:    Food Insecurity:     Worried About Running Out of Food in the Last Year:     Ran Out of Food in the Last Year:    Transportation Needs:     Lack of Transportation (Medical):      Lack of Transportation (Non-Medical):    Physical Activity:     Days of Exercise per Week:     Minutes of Exercise per Session:    Stress:     Feeling of Stress :    Social Connections:     Frequency of Communication with Friends and Family:     Frequency of Social Gatherings with Friends and Family:     Attends [employer] Services:     Active Member of Clubs or Organizations:     Attends [employer] or Organization Meetings:     Marital Status:    Intimate Partner Violence:     Fear of Current or Ex-Partner:     Emotionally Abused:     Physically Abused:     Sexually Abused:        No current facility-administered medications for this encounter.      Current Outpatient Medications   Medication Sig Dispense Refill    letrozole (FEMARA) 2.5 MG tablet TAKE ONE TABLET BY MOUTH EVERY DAY 30 tablet 2    metFORMIN (GLUCOPHAGE) 500 MG tablet Take 500 mg by mouth 2 times daily (with meals)      montelukast (SINGULAIR) 10 MG tablet Take 1 tablet by mouth daily 30 tablet 3    budesonide-formoterol (SYMBICORT) 160-4.5 MCG/ACT AERO Inhale 2 puffs into the lungs 2 times daily 1 Inhaler 3    pantoprazole (PROTONIX) 40 MG tablet       loperamide (RA ANTI-DIARRHEAL) 2 MG capsule Take 1 capsule by mouth 4 times daily as needed for Diarrhea 40 capsule 0    ibuprofen (ADVIL;MOTRIN) 800 MG tablet       Elastic Bandages & Supports (ABDOMINAL BINDER/ELASTIC XL) MISC 1 Units by Does not apply route as needed (AS NEEDED) (Patient not taking: Reported on 5/21/2021) 1 each 0    fexofenadine (ALLEGRA ALLERGY) 60 MG tablet Take 60 mg by mouth every 12 hours as needed  (Patient not taking: Reported on 5/21/2021)      FLUoxetine (PROZAC) 10 MG capsule Take 1 capsule by mouth daily      hydroCHLOROthiazide (MICROZIDE) 12.5 MG capsule Take 1 capsule by mouth daily      hydrOXYzine (VISTARIL) 25 MG capsule Take 1 capsule by mouth daily      losartan (COZAAR) 100 MG tablet Take 1 tablet by mouth daily         Allergies   Allergen Reactions    Latex      \"Redness\"    Codeine      \"I Don't Remember The Reaction , I Was A Child\"    Darvon [Propoxyphene]      \"I Don't Remember The Reaction, I Was A Child\"    Pcn [Penicillins]      \"I Don't Remember The Reaction, I Was A Child\"       Nursing Notes Reviewed    Physical Exam:  Triage VS:    ED Triage Vitals   Enc Vitals Group      BP 06/04/21 1355 (!) 160/73      Pulse 06/04/21 1355 93      Resp 06/04/21 1355 15      Temp 06/04/21 1355 99.3 °F (37.4 °C)      Temp Source 06/04/21 1355 Oral      SpO2 06/04/21 1355 99 %      Weight 06/04/21 1352 269 lb (122 kg)      Height 06/04/21 1352 5' 7\" (1.702 m)       My pulse ox interpretation is - WNL    General appearance:  Patient is awake, alert, oriented. Following commands and answering questions. GCS is 15. Non-toxic in appearance. Skin:  Warm. Dry. Intact. No rashes, petechiae, purpura. No herpes zoster lesions. Eyes:  Pupils are equal, round, reactive. Extraocular movements intact. No nystagmus. No gaze deviation. Head, ears, nose, mouth and throat:  Head is normocephalic, atraumatic. No external masses or lesions. No nasal drainage. Pharynx is clear, non-erythematous. Airway is patent. Mucous membranes are moist  Neck:  Supple. No nuchal rigidity. Trachea midline. No masses, thyromegaly or lymphadenopathy. No JVD. No carotid thrills or bruits. Extremity:  No clubbing, cyanosis, or edema. No joint swelling. Normal muscle tone. Full range of motion in extremities. Negative black's sign bilateral lower extremities. Heart:  Regular rate and regular rhythm. Audible S1 & S2. No audible murmurs, rubs, or gallops. Perfusion:  Symmetric peripheral pulses. Brisk capillary refill. Respiratory:  Lungs clear to auscultation bilaterally. No rales, rhonchi or wheezes. No retractions. No accessory muscle use. Respirations nonlabored. No chest wall trauma, bruising, or ecchymosis. Abdomen:  Soft. Nontender. Non distended. Normal bowel sounds in all quadrants. No midline pulsatile abdominal masses, thrills or bruits. Back:  No CVA tenderness to palpation. No midline tenderness to palpation. Neurological:  Alert and oriented times 3. No focal or lateralizing neurological deficits.              Psychiatric:  Appropriate    *I have reviewed and interpreted all of the currently available results from this visit*    Labs  Results for orders placed or performed during the hospital encounter of 06/04/21   CBC Auto Differential   Result Value Ref Range    WBC 6.9 4.0 - 10.5 K/CU MM    RBC 4.55 4.2 - 5.4 M/CU MM    Hemoglobin 12.9 12.5 - 16.0 GM/DL    Hematocrit 39.3 37 - 47 %    MCV 86.4 78 - 100 FL    MCH 28.4 27 - 31 PG    MCHC 32.8 32.0 - 36.0 %    RDW 13.3 11.7 - 14.9 %    Platelets 177 480 - 714 K/CU MM    MPV 10.4 7.5 - 11.1 FL    Differential Type AUTOMATED DIFFERENTIAL     Segs Relative 69.0 (H) 36 - 66 %    Lymphocytes % 22.2 (L) 24 - 44 %    Monocytes % 6.4 (H) 0 - 4 %    Eosinophils % 1.6 0 - 3 %    Basophils % 0.4 0 - 1 %    Segs Absolute 4.7 K/CU MM    Lymphocytes Absolute 1.5 K/CU MM    Monocytes Absolute 0.4 K/CU MM    Eosinophils Absolute 0.1 K/CU MM    Basophils Absolute 0.0 K/CU MM    Immature Neutrophil % 0.4 0 - 0.43 %    Total Immature Neutrophil 0.03 K/CU MM   Comprehensive Metabolic Panel w/ Reflex to MG   Result Value Ref Range    Sodium 138 135 - 145 MMOL/L    Potassium 4.1 3.5 - 5.1 MMOL/L    Chloride 100 99 - 110 mMol/L    CO2 26 21 - 32 MMOL/L    BUN 11 6 - 23 MG/DL    CREATININE 0.6 0.6 - 1.1 MG/DL    Glucose 149 (H) 70 - 99 MG/DL    Calcium 9.1 8.3 - 10.6 MG/DL    Albumin 4.1 3.4 - 5.0 GM/DL    Total Protein 6.8 6.4 - 8.2 GM/DL    Total Bilirubin 0.2 0.0 - 1.0 MG/DL    ALT 22 10 - 40 U/L    AST 20 15 - 37 IU/L    Alkaline Phosphatase 87 40 - 129 IU/L    GFR Non-African American >60 >60 mL/min/1.73m2    GFR African American >60 >60 mL/min/1.73m2    Anion Gap 12 4 - 16   Troponin   Result Value Ref Range    Troponin T <0.010 <0.01 NG/ML   Brain Natriuretic Peptide   Result Value Ref Range    Pro-BNP 37.10 <300 PG/ML        Radiographs:  Radiologist's Report Reviewed:  XR CHEST (2 VW)    Result Date: 6/4/2021  EXAMINATION: TWO XRAY VIEWS OF THE CHEST 6/4/2021 2:16 pm COMPARISON: 10/19/2020 HISTORY: ORDERING SYSTEM PROVIDED HISTORY: CP TECHNOLOGIST PROVIDED HISTORY: Reason for exam:->CP Additional signs and symptoms: cp sob Follow-up exam FINDINGS: Surgical clips are in the right chest.  No focal consolidation, pleural effusion or pneumothorax. The cardiomediastinal silhouette is stable. No overt pulmonary edema. The osseous structures are stable. No acute cardiopulmonary findings. EKG: (All EKG's are interpreted by myself in the absence of a cardiologist). EKG performed on 6/4/2021 at 1400 demonstrates ventricular rate of 84 beats per minute in sinus rhythm. Incomplete right bundle branch block pattern. Nonspecific ST and T wave changes in the septal leads. No signs of acute ST segment elevation myocardial infarction. EKG compared to previous EKG performed on 10/18/2020 at which time patient was in normal sinus rhythm. MDM:  Patient was seen and evaluated in the emergency department by myself. A thorough history and physical exam were performed, prior medical records were reviewed. Patient had an echocardiogram on 6/2/2021 demonstrating left ventricular ejection fraction 55 to 60%. Grade 1 diastolic dysfunction. Upon arrival to the emergency department, patient's vital signs were noted. Patient is hemodynamically stable. Patient was connected to continuous cardiopulmonary monitoring. Rhythm strip interpreted by myself demonstrating sinus rhythm. EKG was performed, interpreted by myself, as detailed above. Differential diagnoses and treatment plan were discussed. IV access was established. Pertinent labs were drawn and radiographic studies were performed. Once results are available, they are reviewed by myself. Labs demonstrate no leukocytosis, anemia, thrombocytopenia. Electrolytes are within normal limits. No signs of kidney injury. Glucose 149. AST and ALT are unremarkable. Troponin less than 0.010. proBNP 37.1. On repeat evaluations, patient remains hemodynamically stable. Reports improvement in symptoms with therapy provided in the ED.  Results of laboratory and radiographic data along with differential diagnoses and treatment plan were discussed with the patient. Patient presents with acute chest pain. Heart score is:    HEART Score:  Heart Score for chest pain patients  History: Moderately Suspicious  ECG: Non-Specifc repolarization disturbance/LBTB/PM  Patient Age: > 39 and < 65 years  *Risk factors for Atherosclerotic disease: Diabetes Mellitus, Hypercholesterolemia, Hypertension, Obesity, Positive family History  Risk Factors: > 3 Risk factors or history of atherosclerotic disease*  Troponin: < 1X normal limit  Heart Score Total: 5    Patient is at moderate risk for major acute coronary event. Low clinical suspicion for pulmonary embolism. Given patient's symptoms, I recommend admission to the hospital for further monitoring and treatment as necessary. Patient is agreeable. Case is discussed with admitting hospitalist Edward who is agreeable with treatment plan and accepts admission. Patient is updated bedside. All questions were answered. 324 mg chewable aspirin is given. Patient is currently in the emergency department, in stable condition, awaiting admission orders and bed placement. Clinical Impressions:  1. Acute chest pain    2. Accelerated hypertension    3. Dyspnea on exertion        ED Provider Disposition:  DISPOSITION Decision To Admit 06/04/2021 03:20:18 PM      Comment: Please note this report has been produced using speech recognition software and may contain errors related to that system including errors in grammar, punctuation, and spelling, as well as words and phrases that may be inappropriate. Efforts were made to edit the dictations. [facility],   06/04/21 1525    I was instructed as this patient is having active chest pain, may not be a candidate for admission to  Treva Rao is recommending transfer to [facility] for cardiac evaluation. Patient is agreeable to transfer.   Case discussed with on-call hospitalist  who is agreeable with treatment plan and accepts transfer. Patient is updated bedside. All questions were answered. Patient is currently in the emergency department, stable condition, awaiting ALS transport.        Tyra Ziegler DO  06/04/21 [id number]

## 2021-06-05 ENCOUNTER — APPOINTMENT (OUTPATIENT)
Dept: CT IMAGING | Age: 56
End: 2021-06-05
Attending: INTERNAL MEDICINE
Payer: COMMERCIAL

## 2021-06-05 LAB
ANION GAP SERPL CALCULATED.3IONS-SCNC: 10 MMOL/L (ref 4–16)
BUN BLDV-MCNC: 10 MG/DL (ref 6–23)
CALCIUM SERPL-MCNC: 8.9 MG/DL (ref 8.3–10.6)
CHLORIDE BLD-SCNC: 101 MMOL/L (ref 99–110)
CHOLESTEROL, FASTING: 204 MG/DL
CO2: 28 MMOL/L (ref 21–32)
CREAT SERPL-MCNC: 0.7 MG/DL (ref 0.6–1.1)
D DIMER: 256 NG/ML(DDU)
EKG ATRIAL RATE: 84 BPM
EKG DIAGNOSIS: NORMAL
EKG P AXIS: 15 DEGREES
EKG P-R INTERVAL: 160 MS
EKG Q-T INTERVAL: 390 MS
EKG QRS DURATION: 108 MS
EKG QTC CALCULATION (BAZETT): 460 MS
EKG R AXIS: 24 DEGREES
EKG T AXIS: 18 DEGREES
EKG VENTRICULAR RATE: 84 BPM
ESTIMATED AVERAGE GLUCOSE: 157 MG/DL
GFR AFRICAN AMERICAN: >60 ML/MIN/1.73M2
GFR NON-AFRICAN AMERICAN: >60 ML/MIN/1.73M2
GLUCOSE BLD-MCNC: 103 MG/DL (ref 70–99)
GLUCOSE BLD-MCNC: 121 MG/DL (ref 70–99)
GLUCOSE BLD-MCNC: 138 MG/DL (ref 70–99)
GLUCOSE BLD-MCNC: 140 MG/DL (ref 70–99)
GLUCOSE BLD-MCNC: 151 MG/DL (ref 70–99)
GLUCOSE BLD-MCNC: 177 MG/DL (ref 70–99)
HBA1C MFR BLD: 7.1 % (ref 4.2–6.3)
HCT VFR BLD CALC: 39.9 % (ref 37–47)
HDLC SERPL-MCNC: 68 MG/DL
HEMOGLOBIN: 12.8 GM/DL (ref 12.5–16)
LDL CHOLESTEROL DIRECT: 132 MG/DL
MCH RBC QN AUTO: 28.1 PG (ref 27–31)
MCHC RBC AUTO-ENTMCNC: 32.1 % (ref 32–36)
MCV RBC AUTO: 87.7 FL (ref 78–100)
PDW BLD-RTO: 13.6 % (ref 11.7–14.9)
PLATELET # BLD: 227 K/CU MM (ref 140–440)
PMV BLD AUTO: 10.4 FL (ref 7.5–11.1)
POTASSIUM SERPL-SCNC: 4 MMOL/L (ref 3.5–5.1)
RBC # BLD: 4.55 M/CU MM (ref 4.2–5.4)
SODIUM BLD-SCNC: 139 MMOL/L (ref 135–145)
TRIGLYCERIDE, FASTING: 159 MG/DL
TROPONIN T: <0.01 NG/ML
TROPONIN T: <0.01 NG/ML
WBC # BLD: 6.5 K/CU MM (ref 4–10.5)

## 2021-06-05 PROCEDURE — 36415 COLL VENOUS BLD VENIPUNCTURE: CPT

## 2021-06-05 PROCEDURE — 99244 OFF/OP CNSLTJ NEW/EST MOD 40: CPT | Performed by: INTERNAL MEDICINE

## 2021-06-05 PROCEDURE — 96372 THER/PROPH/DIAG INJ SC/IM: CPT

## 2021-06-05 PROCEDURE — 94640 AIRWAY INHALATION TREATMENT: CPT

## 2021-06-05 PROCEDURE — 84484 ASSAY OF TROPONIN QUANT: CPT

## 2021-06-05 PROCEDURE — 85027 COMPLETE CBC AUTOMATED: CPT

## 2021-06-05 PROCEDURE — 80048 BASIC METABOLIC PNL TOTAL CA: CPT

## 2021-06-05 PROCEDURE — 6370000000 HC RX 637 (ALT 250 FOR IP): Performed by: NURSE PRACTITIONER

## 2021-06-05 PROCEDURE — 71275 CT ANGIOGRAPHY CHEST: CPT

## 2021-06-05 PROCEDURE — 2580000003 HC RX 258: Performed by: STUDENT IN AN ORGANIZED HEALTH CARE EDUCATION/TRAINING PROGRAM

## 2021-06-05 PROCEDURE — 85379 FIBRIN DEGRADATION QUANT: CPT

## 2021-06-05 PROCEDURE — G0378 HOSPITAL OBSERVATION PER HR: HCPCS

## 2021-06-05 PROCEDURE — 6360000002 HC RX W HCPCS: Performed by: NURSE PRACTITIONER

## 2021-06-05 PROCEDURE — 80061 LIPID PANEL: CPT

## 2021-06-05 PROCEDURE — 6360000004 HC RX CONTRAST MEDICATION: Performed by: STUDENT IN AN ORGANIZED HEALTH CARE EDUCATION/TRAINING PROGRAM

## 2021-06-05 PROCEDURE — 82962 GLUCOSE BLOOD TEST: CPT

## 2021-06-05 PROCEDURE — 83036 HEMOGLOBIN GLYCOSYLATED A1C: CPT

## 2021-06-05 PROCEDURE — 93010 ELECTROCARDIOGRAM REPORT: CPT | Performed by: INTERNAL MEDICINE

## 2021-06-05 PROCEDURE — 2580000003 HC RX 258: Performed by: NURSE PRACTITIONER

## 2021-06-05 RX ORDER — DEXTROSE MONOHYDRATE 50 MG/ML
100 INJECTION, SOLUTION INTRAVENOUS PRN
Status: DISCONTINUED | OUTPATIENT
Start: 2021-06-05 | End: 2021-06-06 | Stop reason: HOSPADM

## 2021-06-05 RX ORDER — ATORVASTATIN CALCIUM 40 MG/1
40 TABLET, FILM COATED ORAL NIGHTLY
Status: DISCONTINUED | OUTPATIENT
Start: 2021-06-05 | End: 2021-06-06 | Stop reason: HOSPADM

## 2021-06-05 RX ORDER — MONTELUKAST SODIUM 10 MG/1
10 TABLET ORAL DAILY
Status: DISCONTINUED | OUTPATIENT
Start: 2021-06-05 | End: 2021-06-06 | Stop reason: HOSPADM

## 2021-06-05 RX ORDER — HYDROCHLOROTHIAZIDE 12.5 MG/1
12.5 TABLET ORAL DAILY
Status: DISCONTINUED | OUTPATIENT
Start: 2021-06-05 | End: 2021-06-06 | Stop reason: HOSPADM

## 2021-06-05 RX ORDER — PANTOPRAZOLE SODIUM 40 MG/1
40 TABLET, DELAYED RELEASE ORAL
Status: DISCONTINUED | OUTPATIENT
Start: 2021-06-05 | End: 2021-06-06 | Stop reason: HOSPADM

## 2021-06-05 RX ORDER — ACETAMINOPHEN 650 MG/1
650 SUPPOSITORY RECTAL EVERY 6 HOURS PRN
Status: DISCONTINUED | OUTPATIENT
Start: 2021-06-05 | End: 2021-06-06 | Stop reason: HOSPADM

## 2021-06-05 RX ORDER — FLUOXETINE 10 MG/1
10 CAPSULE ORAL DAILY
Status: DISCONTINUED | OUTPATIENT
Start: 2021-06-05 | End: 2021-06-06 | Stop reason: HOSPADM

## 2021-06-05 RX ORDER — LETROZOLE 2.5 MG/1
2.5 TABLET, FILM COATED ORAL DAILY
Status: DISCONTINUED | OUTPATIENT
Start: 2021-06-05 | End: 2021-06-06 | Stop reason: HOSPADM

## 2021-06-05 RX ORDER — ONDANSETRON 2 MG/ML
4 INJECTION INTRAMUSCULAR; INTRAVENOUS EVERY 6 HOURS PRN
Status: DISCONTINUED | OUTPATIENT
Start: 2021-06-05 | End: 2021-06-06 | Stop reason: HOSPADM

## 2021-06-05 RX ORDER — POLYETHYLENE GLYCOL 3350 17 G/17G
17 POWDER, FOR SOLUTION ORAL DAILY PRN
Status: DISCONTINUED | OUTPATIENT
Start: 2021-06-05 | End: 2021-06-06 | Stop reason: HOSPADM

## 2021-06-05 RX ORDER — BUDESONIDE AND FORMOTEROL FUMARATE DIHYDRATE 160; 4.5 UG/1; UG/1
2 AEROSOL RESPIRATORY (INHALATION) 2 TIMES DAILY
Status: DISCONTINUED | OUTPATIENT
Start: 2021-06-05 | End: 2021-06-06 | Stop reason: HOSPADM

## 2021-06-05 RX ORDER — DEXTROSE MONOHYDRATE 25 G/50ML
12.5 INJECTION, SOLUTION INTRAVENOUS PRN
Status: DISCONTINUED | OUTPATIENT
Start: 2021-06-05 | End: 2021-06-06 | Stop reason: HOSPADM

## 2021-06-05 RX ORDER — SODIUM CHLORIDE 0.9 % (FLUSH) 0.9 %
10 SYRINGE (ML) INJECTION PRN
Status: DISCONTINUED | OUTPATIENT
Start: 2021-06-05 | End: 2021-06-06 | Stop reason: HOSPADM

## 2021-06-05 RX ORDER — HYDROXYZINE PAMOATE 25 MG/1
25 CAPSULE ORAL DAILY
Status: DISCONTINUED | OUTPATIENT
Start: 2021-06-05 | End: 2021-06-06 | Stop reason: HOSPADM

## 2021-06-05 RX ORDER — SODIUM CHLORIDE 0.9 % (FLUSH) 0.9 %
5-40 SYRINGE (ML) INJECTION EVERY 12 HOURS SCHEDULED
Status: DISCONTINUED | OUTPATIENT
Start: 2021-06-05 | End: 2021-06-06 | Stop reason: HOSPADM

## 2021-06-05 RX ORDER — LOSARTAN POTASSIUM 100 MG/1
100 TABLET ORAL DAILY
Status: DISCONTINUED | OUTPATIENT
Start: 2021-06-05 | End: 2021-06-06 | Stop reason: HOSPADM

## 2021-06-05 RX ORDER — ACETAMINOPHEN 325 MG/1
650 TABLET ORAL EVERY 6 HOURS PRN
Status: DISCONTINUED | OUTPATIENT
Start: 2021-06-05 | End: 2021-06-06 | Stop reason: HOSPADM

## 2021-06-05 RX ORDER — NICOTINE POLACRILEX 4 MG
15 LOZENGE BUCCAL PRN
Status: DISCONTINUED | OUTPATIENT
Start: 2021-06-05 | End: 2021-06-06 | Stop reason: HOSPADM

## 2021-06-05 RX ORDER — SODIUM CHLORIDE 9 MG/ML
25 INJECTION, SOLUTION INTRAVENOUS PRN
Status: DISCONTINUED | OUTPATIENT
Start: 2021-06-05 | End: 2021-06-06 | Stop reason: HOSPADM

## 2021-06-05 RX ORDER — ASPIRIN 81 MG/1
81 TABLET, CHEWABLE ORAL DAILY
Status: DISCONTINUED | OUTPATIENT
Start: 2021-06-06 | End: 2021-06-06 | Stop reason: HOSPADM

## 2021-06-05 RX ORDER — ONDANSETRON 4 MG/1
4 TABLET, ORALLY DISINTEGRATING ORAL EVERY 8 HOURS PRN
Status: DISCONTINUED | OUTPATIENT
Start: 2021-06-05 | End: 2021-06-06 | Stop reason: HOSPADM

## 2021-06-05 RX ORDER — NITROGLYCERIN 0.4 MG/1
0.4 TABLET SUBLINGUAL EVERY 5 MIN PRN
Status: DISCONTINUED | OUTPATIENT
Start: 2021-06-05 | End: 2021-06-06 | Stop reason: HOSPADM

## 2021-06-05 RX ORDER — SODIUM CHLORIDE 0.9 % (FLUSH) 0.9 %
5-40 SYRINGE (ML) INJECTION PRN
Status: DISCONTINUED | OUTPATIENT
Start: 2021-06-05 | End: 2021-06-06 | Stop reason: HOSPADM

## 2021-06-05 RX ADMIN — MONTELUKAST 10 MG: 10 TABLET, FILM COATED ORAL at 09:58

## 2021-06-05 RX ADMIN — INSULIN LISPRO 1 UNITS: 100 INJECTION, SOLUTION INTRAVENOUS; SUBCUTANEOUS at 09:56

## 2021-06-05 RX ADMIN — INSULIN LISPRO 1 UNITS: 100 INJECTION, SOLUTION INTRAVENOUS; SUBCUTANEOUS at 12:39

## 2021-06-05 RX ADMIN — BUDESONIDE AND FORMOTEROL FUMARATE DIHYDRATE 2 PUFF: 160; 4.5 AEROSOL RESPIRATORY (INHALATION) at 12:30

## 2021-06-05 RX ADMIN — LOSARTAN POTASSIUM 100 MG: 100 TABLET, FILM COATED ORAL at 09:58

## 2021-06-05 RX ADMIN — ATORVASTATIN CALCIUM 40 MG: 40 TABLET, FILM COATED ORAL at 01:25

## 2021-06-05 RX ADMIN — HYDROXYZINE PAMOATE 25 MG: 25 CAPSULE ORAL at 09:58

## 2021-06-05 RX ADMIN — SODIUM CHLORIDE, PRESERVATIVE FREE 10 ML: 5 INJECTION INTRAVENOUS at 20:35

## 2021-06-05 RX ADMIN — SODIUM CHLORIDE, PRESERVATIVE FREE 10 ML: 5 INJECTION INTRAVENOUS at 10:00

## 2021-06-05 RX ADMIN — SODIUM CHLORIDE, PRESERVATIVE FREE 10 ML: 5 INJECTION INTRAVENOUS at 14:04

## 2021-06-05 RX ADMIN — ATORVASTATIN CALCIUM 40 MG: 40 TABLET, FILM COATED ORAL at 20:34

## 2021-06-05 RX ADMIN — FLUOXETINE 10 MG: 10 CAPSULE ORAL at 09:58

## 2021-06-05 RX ADMIN — IOPAMIDOL 80 ML: 755 INJECTION, SOLUTION INTRAVENOUS at 14:04

## 2021-06-05 RX ADMIN — LETROZOLE 2.5 MG: 2.5 TABLET ORAL at 10:00

## 2021-06-05 RX ADMIN — ENOXAPARIN SODIUM 40 MG: 100 INJECTION SUBCUTANEOUS at 09:58

## 2021-06-05 RX ADMIN — PANTOPRAZOLE SODIUM 40 MG: 40 TABLET, DELAYED RELEASE ORAL at 06:37

## 2021-06-05 RX ADMIN — HYDROCHLOROTHIAZIDE 12.5 MG: 12.5 TABLET ORAL at 09:58

## 2021-06-05 ASSESSMENT — ENCOUNTER SYMPTOMS
SHORTNESS OF BREATH: 1
VOMITING: 0
NAUSEA: 1
SORE THROAT: 0
RHINORRHEA: 0
ANAL BLEEDING: 0
WHEEZING: 0
COLOR CHANGE: 0
CHEST TIGHTNESS: 1
COUGH: 0
DIARRHEA: 0
CONSTIPATION: 0
BACK PAIN: 0
BLOOD IN STOOL: 0
ABDOMINAL PAIN: 0

## 2021-06-05 ASSESSMENT — PAIN SCALES - GENERAL
PAINLEVEL_OUTOF10: 0

## 2021-06-05 ASSESSMENT — PAIN SCALES - WONG BAKER
WONGBAKER_NUMERICALRESPONSE: 0
WONGBAKER_NUMERICALRESPONSE: 0

## 2021-06-05 NOTE — PROGRESS NOTES
Hospitalist Progress Note      Name:  Marlena Oviedo /Age/Sex: 1965  (54 y.o. female)   MRN & CSN:  [id number] & [id number] Admission Date/Time: 2021 11:15 PM   Location:  [id number]-[id number] PCP: Richy Muñoz, [address] [place] [address] Day: 2    Assessment and Plan:   Marlena Oviedo is a 54 y.o.  female  who presents with Chest pain    Chest pain history of coronary artery disease  · Troponin negative, recent echocardiogram with ejection fraction 55 to 60%. · Started on aspirin, statin  · Nitroglycerin as needed  · Cardiology on consult -stress test on Monday    Elevated D-dimer CT angio to rule out pulmonary embolism    Type 2 diabetes: Hemoglobin A1c 7.1. sliding scale. Hypoglycemia protocol. Hypertension  Hyperlipidemia  Depression with anxiety  Cancer: On letrozole  Morbid obesity    Diet ADULT DIET; Regular; 4 carb choices (60 gm/meal)   DVT Prophylaxis [x] Lovenox, []  Heparin, [] SCDs, [] Warfarin  [] NOAC     GI Prophylaxis [x] PPI,  [] H2 Blocker,  [] Carafate,  [] Diet/Tube Feeds   Code Status Full Code   MDM [] Low, [] Moderate,[x]  High     History of Present Illness:     Chief Complaint: Chest pain    Seen and examined today. Still with chest pain. Some shortness of breath, exertional.  No fever or chills. Ten point ROS reviewed negative, unless as noted above    Objective:   No intake or output data in the 24 hours ending 21 1142   Vitals:   Vitals:    21 2315   BP: (!) 110/58   Pulse: 70   Resp: 16   Temp: 98.2 °F (36.8 °C)   SpO2: 96%     Physical Exam:   GEN Awake female, sitting upright in bed in no apparent distress. Appears given age. Obese  EYES Pupils are equally round. No scleral erythema, discharge, or conjunctivitis. HENT Mucous membranes are moist. Oral pharynx without exudates, no evidence of thrush. NECK Supple, no apparent thyromegaly or masses. RESP Clear to auscultation, no wheezes, rales or rhonchi.   Symmetric chest movement while on room air.  CARDIO/VASC S1/S2 auscultated. Regular rate without appreciable murmurs, rubs, or gallops. No JVD or carotid bruits. Peripheral pulses equal bilaterally and palpable. No peripheral edema. GI Abdomen is soft without significant tenderness, masses, or guarding. Bowel sounds are normoactive. Rectal exam deferred. MSK No gross joint deformities. SKIN Normal coloration, warm, dry. NEURO Cranial nerves appear grossly intact, normal speech, no lateralizing weakness. PSYCH Awake, alert, oriented x 4. Affect appropriate.     Medications:   Medications:    budesonide-formoterol  2 puff Inhalation BID    FLUoxetine  10 mg Oral Daily    hydroCHLOROthiazide  12.5 mg Oral Daily    hydrOXYzine  25 mg Oral Daily    letrozole  2.5 mg Oral Daily    losartan  100 mg Oral Daily    insulin lispro  0-6 Units Subcutaneous TID WC    insulin lispro  0-3 Units Subcutaneous Nightly    montelukast  10 mg Oral Daily    pantoprazole  40 mg Oral QAM AC    sodium chloride flush  5-40 mL Intravenous 2 times per day    [START ON 6/6/2021] aspirin  81 mg Oral Daily    atorvastatin  40 mg Oral Nightly    enoxaparin  40 mg Subcutaneous Daily      Infusions:    sodium chloride      dextrose       PRN Meds: sodium chloride flush, 5-40 mL, PRN  sodium chloride, 25 mL, PRN  ondansetron, 4 mg, Q8H PRN   Or  ondansetron, 4 mg, Q6H PRN  acetaminophen, 650 mg, Q6H PRN   Or  acetaminophen, 650 mg, Q6H PRN  polyethylene glycol, 17 g, Daily PRN  glucose, 15 g, PRN  dextrose, 12.5 g, PRN  glucagon (rDNA), 1 mg, PRN  dextrose, 100 mL/hr, PRN  nitroGLYCERIN, 0.4 mg, Q5 Min PRN      Recent Labs     06/04/21  1410 06/05/21  0424   WBC 6.9 6.5   HGB 12.9 12.8   HCT 39.3 39.9    227      Recent Labs     06/04/21  1410 06/05/21  0424    139   K 4.1 4.0    101   CO2 26 28   BUN 11 10   CREATININE 0.6 0.7     Recent Labs     06/04/21  1410   AST 20   ALT 22   BILITOT 0.2   ALKPHOS 87     No results for input(s): INR in the last 72 hours.   Recent Labs     06/04/21  1410 06/05/21  0125 06/05/21  0424   TROPONINT <0.010 <0.010 <0.010      Imaging reviewed    Electronically signed by Jovany Meyers MD on 6/5/2021 at 11:42 AM

## 2021-06-05 NOTE — PROGRESS NOTES
Received from [facility]. ER Registration informed. SHIV Dorantes informed. Skin assessment completed. No wounds.

## 2021-06-05 NOTE — CONSULTS
CHART REVIEWED  FULL NOTE TO FOLLOW  OT EXAMINED  [facility] [place]  [facility]                      Name:  Vee Taylor /Age/Sex: 1965  (54 y.o. female)   MRN & CSN:  [id number] & [id number] Admission Date/Time: 2021 11:15 PM   Location:  [facility]- PCP: Yamila Drew, [address] [place] [address] Crossing Day: 2          Referring physician:  Kezia Brooks DO         Reason for consultation:  cp        Thanks for referral.    Information source: patient    CC;  cp      HPI:   Thank you for involving me in taking  care of Vee Taylor who  is a 54 y. o.year  Old female  Presents with  H/o dm, has recurrent mid sternal cp, exertional with mild sob, for a few days,  Last CAD dale was normal.                    Past medical history:    has a past medical history of Abnormal ECG, Acid reflux, Anxiety, Arthritis, Back problem, Bipolar disorder (HCC), Chronic back pain, Chronic cough, COPD (chronic obstructive pulmonary disease) (HCC), COPD, moderate (HCC), Curvature of spine, Depression, Diabetes mellitus ([address] Diverticulitis, Esophageal dilatation, Family history of sudden death, Full dentures, H/O 24 hour EKG monitoring, H/O cardiovascular stress test, H/O echocardiogram, Headache(784.0), Heart murmur, History of nuclear stress test, [place] (hard of hearing), Hydronephrosis, Hyperlipidemia, Hypertension, Macular degeneration, Mild persistent asthma without complication, MRSA (methicillin resistant Staphylococcus aureus), Near syncope, Panic attacks, PONV (postoperative nausea and vomiting), Right Breast Cancer, S/P exploratory laparotomy, Seizure ([address] Sepsis ([address] Shortness of breath, Sleep apnea, SOB (shortness of breath) on exertion, Thyroid disease, Urinary incontinence, WD-Soft tissue radionecrosis, WD-Wound of right breast, and Wears glasses. Past surgical history:   has a past surgical history that includes Dental surgery; Tonsillectomy ('s); laparoscopy ();  Cholecystectomy, laparoscopic (2007); lymph node dissection; hernia repair (2007); hernia repair (2012); Cystoscopy (2016); Breast lumpectomy (Right, 08/2015); Breast biopsy (Right, 2015); Breast biopsy (Right, 2017); Hysterectomy, total abdominal (2006); Colonoscopy (2016); Colonoscopy (3/2019; 04/12/2017); Endoscopy, colon, diagnostic (04/12/2017); other surgical history (05/22/2018); Cystoscopy; Abdomen surgery (06/2018); other surgical history; Cystocopy (07/03/2018); Small intestine surgery (10/18/2020); Abdominal hernia repair (10/18/2020); and laparotomy (N/A, 10/18/2020). Social History:   reports that she quit smoking about 28 years ago. Her smoking use included cigarettes. She started smoking about 33 years ago. She has a 1.25 pack-year smoking history. She has never used smokeless tobacco. She reports that she does not drink alcohol and does not use drugs. Family history:  family history includes Cancer in her father and sister; Diabetes in her mother and sister; Heart Disease in her father and mother; High Blood Pressure in her father and sister; High Cholesterol in her sister; [place] / [place] in her mother; Stroke in her mother; Vision Loss in her father, mother, and sister.     Allergies   Allergen Reactions    Latex      \"Redness\"    Codeine      \"I Don't Remember The Reaction , I Was A Child\"    Darvon [Propoxyphene]      \"I Don't Remember The Reaction, I Was A Child\"    Pcn [Penicillins]      \"I Don't Remember The Reaction, I Was A Child\"       budesonide-formoterol (SYMBICORT) 160-4.5 MCG/ACT inhaler 2 puff, BID  FLUoxetine (PROZAC) capsule 10 mg, Daily  hydroCHLOROthiazide (HYDRODIURIL) tablet 12.5 mg, Daily  hydrOXYzine (VISTARIL) capsule 25 mg, Daily  letrozole (FEMARA) tablet 2.5 mg, Daily  losartan (COZAAR) tablet 100 mg, Daily  insulin lispro (HUMALOG) injection vial 0-6 Units, TID WC  insulin lispro (HUMALOG) injection vial 0-3 Units, Nightly  montelukast (SINGULAIR) tablet 10 mg, Daily  pantoprazole (PROTONIX) tablet 40 mg, QAM AC  sodium chloride flush 0.9 % injection 5-40 mL, 2 times per day  sodium chloride flush 0.9 % injection 5-40 mL, PRN  0.9 % sodium chloride infusion, PRN  ondansetron (ZOFRAN-ODT) disintegrating tablet 4 mg, Q8H PRN   Or  ondansetron (ZOFRAN) injection 4 mg, Q6H PRN  acetaminophen (TYLENOL) tablet 650 mg, Q6H PRN   Or  acetaminophen (TYLENOL) suppository 650 mg, Q6H PRN  polyethylene glycol (GLYCOLAX) packet 17 g, Daily PRN  [START ON 6/6/2021] aspirin chewable tablet 81 mg, Daily  atorvastatin (LIPITOR) tablet 40 mg, Nightly  enoxaparin (LOVENOX) injection 40 mg, Daily  glucose (GLUTOSE) 40 % oral gel 15 g, PRN  dextrose 50 % IV solution, PRN  glucagon (rDNA) injection 1 mg, PRN  dextrose 5 % solution, PRN  nitroGLYCERIN (NITROSTAT) SL tablet 0.4 mg, Q5 Min PRN      Current Facility-Administered Medications   Medication Dose Route Frequency Provider Last Rate Last Admin    budesonide-formoterol (SYMBICORT) 160-4.5 MCG/ACT inhaler 2 puff  2 puff Inhalation BID JULIA Pittman CNP        FLUoxetine (PROZAC) capsule 10 mg  10 mg Oral Daily Perla Huddleston APRN - CNP   10 mg at 06/05/21 0958    hydroCHLOROthiazide (HYDRODIURIL) tablet 12.5 mg  12.5 mg Oral Daily JULIA Pittman - CNP   12.5 mg at 06/05/21 0958    hydrOXYzine (VISTARIL) capsule 25 mg  25 mg Oral Daily Perla Huddleston APRN - CNP   25 mg at 06/05/21 0958    letrozole [facility]) tablet 2.5 mg  2.5 mg Oral Daily JULIA Pittman - CNP   2.5 mg at 06/05/21 1000    losartan (COZAAR) tablet 100 mg  100 mg Oral Daily Perla Huddleston APRN - CNP   100 mg at 06/05/21 0958    insulin lispro (HUMALOG) injection vial 0-6 Units  0-6 Units Subcutaneous TID  JULIA Pittman CNP   1 Units at 06/05/21 0956    insulin lispro (HUMALOG) injection vial 0-3 Units  0-3 Units Subcutaneous Nightly JULIA Pittman CNP   1 Units at 06/05/21 0125    montelukast (SINGULAIR) tablet 10 mg  10 mg Oral Daily Sixto Fontanez, APRN - CNP   10 mg at 06/05/21 0958    pantoprazole (PROTONIX) tablet 40 mg  40 mg Oral QAM AC Perla Roselynlah, APRN - CNP   40 mg at 06/05/21 8199    sodium chloride flush 0.9 % injection 5-40 mL  5-40 mL Intravenous 2 times per day Darshanabrice Aubreyjustine, APRN - CNP   10 mL at 06/05/21 1000    sodium chloride flush 0.9 % injection 5-40 mL  5-40 mL Intravenous PRN Perla Dapilah, APRN - CNP        0.9 % sodium chloride infusion  25 mL Intravenous PRN Perla Dapilah, APRN - CNP        ondansetron (ZOFRAN-ODT) disintegrating tablet 4 mg  4 mg Oral Q8H PRN Perla Roselynlah, APRN - CNP        Or    ondansetron (ZOFRAN) injection 4 mg  4 mg Intravenous Q6H PRN Perla Dapilah, APRN - CNP        acetaminophen (TYLENOL) tablet 650 mg  650 mg Oral Q6H PRN Perla Chaparroh, APRN - CNP        Or    acetaminophen (TYLENOL) suppository 650 mg  650 mg Rectal Q6H PRN Perla Roselynlah, APRN - CNP        polyethylene glycol (GLYCOLAX) packet 17 g  17 g Oral Daily PRN Perla Huddleston, APRN - CNP        [START ON 6/6/2021] aspirin chewable tablet 81 mg  81 mg Oral Daily Perla Roselynlah, APRN - CNP        atorvastatin (LIPITOR) tablet 40 mg  40 mg Oral Nightly Perla Roselynlaadamaris, APRN - CNP   40 mg at 06/05/21 0125    enoxaparin (LOVENOX) injection 40 mg  40 mg Subcutaneous Daily Perla Roselynlah, APRN - CNP   40 mg at 06/05/21 0958    glucose (GLUTOSE) 40 % oral gel 15 g  15 g Oral PRN Perla Krystah, APRN - CNP        dextrose 50 % IV solution  12.5 g Intravenous PRN Perla Chaparroh, APRN - CNP        glucagon (rDNA) injection 1 mg  1 mg Intramuscular PRN Perla Dapilah, APRN - CNP        dextrose 5 % solution  100 mL/hr Intravenous PRN Perla Chaparroh, APRN - CNP        nitroGLYCERIN (NITROSTAT) SL tablet 0.4 mg  0.4 mg Sublingual Q5 Min PRN Cathleen Marts,          Review of Systems:  All 14 systems reviewed, all negative except for  CP    Physical Examination:    BP (!) 110/58   Pulse 70   Temp 98.2 °F (36.8 °C) (Oral)   Resp 16   Ht 5' 7\" (1.702 m)   Wt 268 lb (121.6 kg)   SpO2 96%   BMI 41.97 kg/m²      Wt Readings from Last 3 Encounters:   06/04/21 268 lb (121.6 kg)   06/04/21 269 lb (122 kg)   05/21/21 269 lb (122 kg)     Body mass index is 41.97 kg/m². General Appearance:  fair  Head: normocephalic     Eyes: normal, noninjected conjunctiva    ENT: normal mucosa, noninjected throat, normal     NECK: No JVP  No thyromegaly        Cardiovascular: No thrills palpated   Auscultation: Normal S1 and S2,  no murmur   carotid bruit no   Abdominal Aorta no bruit    Respiratory:    Breath sounds Clear = 0    Extremities:  none Edema clubbing ,   no cyanosis    SKIN: Warm and well perfused, no pallor or cyanosis    Vascular exam:  Pedal Pulses: palp  bilaterally        Abdomen:  No masses or tenderness. No organomegaly noted. Neurological:  Oriented to time, place, and person   No focal neurological deficit noted. Psychiatric:normal mood, no anxiety    Lab Review   Recent Labs     06/05/21  0424   WBC 6.5   HGB 12.8   HCT 39.9         Recent Labs     06/05/21  0424      K 4.0      CO2 28   BUN 10   CREATININE 0.7     Recent Labs     06/04/21  1410   AST 20   ALT 22   BILITOT 0.2   ALKPHOS 87     No results for input(s): TROPONINI in the last 72 hours.   No results found for: BNP  Lab Results   Component Value Date    INR 1.27 06/11/2018    PROTIME 14.5 (H) 06/11/2018           Assessment/Recommendations:     - CP; serial trops, EKGs, stress  On Monday  - Risk factor modification         Shailesh Grajeda MD, 6/5/2021 11:29 AM

## 2021-06-05 NOTE — H&P
chest pain. Onset was 1 month ago, with worsening course since that time. Today was worst day. The patient describes the pain as constant, precordial and substernal in nature, radiates to the epigastrium and left neck/jaw. Patient rates pain as a 7/10 in intensity. Associated symptoms are dyspnea, exertional chest pressure/discomfort, fatigue, nausea and diaphoresis. Aggravating factors are exercise and walking. Alleviating factors are: none. Patient's cardiac risk factors are diabetes mellitus, dyslipidemia, family history of premature cardiovascular disease, hypertension, obesity (BMI >= 30 kg/m2), sedentary lifestyle and smoking/ tobacco exposure. Patient's risk factors for DVT/PE: recent surgery. No additional complaints today. Discussed case with ED provider. ROS:   Review of Systems   Constitutional: Positive for fatigue. Negative for appetite change, chills and fever. HENT: Negative for congestion, rhinorrhea and sore throat. Eyes: Negative for visual disturbance. Respiratory: Positive for chest tightness and shortness of breath. Negative for cough and wheezing. Cardiovascular: Positive for chest pain and leg swelling. Negative for palpitations. Gastrointestinal: Positive for nausea. Negative for abdominal pain, anal bleeding, blood in stool, constipation, diarrhea and vomiting. Genitourinary: Negative for dysuria, frequency, hematuria and urgency. Musculoskeletal: Negative for arthralgias and back pain. Skin: Negative for color change, pallor and rash. Neurological: Negative for dizziness, seizures, syncope, speech difficulty, weakness, numbness and headaches. Psychiatric/Behavioral: Negative for decreased concentration.        Objective:   No intake or output data in the 24 hours ending 06/05/21 0459   Vitals:   Vitals:    06/04/21 2315   BP: (!) 110/58   Pulse: 70   Resp: 16   Temp: 98.2 °F (36.8 °C)   SpO2: 96%     BP (!) 110/58   Pulse 70   Temp 98.2 °F (36.8 °C) (Oral) Resp 16   Ht 5' 7\" (1.702 m)   Wt 268 lb (121.6 kg)   SpO2 96%   BMI 41.97 kg/m²   Physical Exam:   Physical Exam  Vitals and nursing note reviewed. Constitutional:       General: She is awake. She is not in acute distress. Appearance: Normal appearance. She is morbidly obese. She is not ill-appearing, toxic-appearing or diaphoretic. Interventions: She is not intubated. HENT:      Head: Atraumatic. Right Ear: External ear normal.      Left Ear: External ear normal.      Nose: Nose normal. No rhinorrhea. Mouth/Throat:      Mouth: Mucous membranes are moist.   Eyes:      General: No scleral icterus. Extraocular Movements: Extraocular movements intact. Conjunctiva/sclera: Conjunctivae normal.      Pupils: Pupils are equal, round, and reactive to light. Cardiovascular:      Rate and Rhythm: Normal rate and regular rhythm. Pulses: Normal pulses. Heart sounds: Normal heart sounds. No murmur heard. No gallop. Pulmonary:      Effort: Pulmonary effort is normal. No tachypnea, accessory muscle usage or prolonged expiration. She is not intubated. Breath sounds: Normal breath sounds. No wheezing, rhonchi or rales. Abdominal:      General: Abdomen is protuberant. Bowel sounds are normal. There is no distension. Palpations: Abdomen is soft. Tenderness: There is no abdominal tenderness. There is no guarding or rebound. Negative signs include Mejia's sign and Rovsing's sign. Musculoskeletal:         General: Normal range of motion. Cervical back: Neck supple. Right lower leg: No edema. Left lower leg: No edema. Skin:     General: Skin is warm and dry. Capillary Refill: Capillary refill takes less than 2 seconds. Neurological:      General: No focal deficit present. Mental Status: She is alert and oriented to person, place, and time. Mental status is at baseline.       Cranial Nerves: No cranial nerve deficit, dysarthria or facial asymmetry. Motor: No tremor or seizure activity. Psychiatric:         Attention and Perception: She is attentive. Mood and Affect: Mood is not anxious. Speech: She is communicative. Speech is not slurred. Behavior: Behavior is cooperative. Past Medical History:      Past Medical History:   Diagnosis Date    Abnormal ECG 01/29/2019    Acid reflux     Anxiety     Arthritis     \"Back, Legs, Knees And Feet\"    Back problem     \"Three Discs Smashed Together Lower Back\"    Bipolar disorder ([address]     Chronic back pain     Chronic cough 01/31/2020    COPD (chronic obstructive pulmonary disease) ([address]     COPD, moderate ([address] 01/31/2020    Curvature of spine     \"Wore A Brace For Three Years\"    Depression     Diabetes mellitus ([address] Dx 2010    Diverticulitis     Esophageal dilatation 04/12/2017    Family history of sudden death 01/29/2019    Mother at age 58    Full dentures     H/O 24 hour EKG monitoring 01/29/2019    Conclusion: Normal study suggesting normal sinus rhythm with no clinically significant arrhythmias.  H/O cardiovascular stress test 02/12/2019    EF65% Normal    H/O echocardiogram 06/02/2021    Normal left ventricle structure and systolic function with an ejection 55-60% No significant valvular disease noted.  FRVCKXRJ(344.6)     \"Occ\"    Heart murmur     No Cardiologist At This Time    History of nuclear stress test 02/12/2019    EF 65%. Normal study.     [place] (hard of hearing)     Bilateral Hearing Aides    Hydronephrosis     dx with hydronephrosis with admission 6/2018- right ureteral stone/\"had kidney stone stone about 2-3 yrs ago and passed it\"    Hyperlipidemia     Hypertension     Macular degeneration     Right Eye    Mild persistent asthma without complication 69/07/9720    MRSA (methicillin resistant Staphylococcus aureus)     After PRICILLA In 2006 (Abdomen)    Near syncope 01/29/2019 1/28/19 in ED [facility]    Panic attacks     PONV (postoperative nausea and vomiting)     denies any motion sickness    Right Breast Cancer Dx 8-15    Right Breast Lumpectomy, Had Chemotherapy And Radiation Treatments    S/P exploratory laparotomy 10/18/2020    explore lap for small bowel perf repair and abdominal wall hernia repair    Seizure ([facility]     \"when I was a young kid had seizure and was on medication- not sure how old when last seizure but as a kid\"    Sepsis ([facility]     per old chart - admitted 6/2018 with sepsis secondary to wound infection    Shortness of breath 02/04/2020    Sleep apnea     Has CPAP- last sleep study 2-3 yrs ago\"    SOB (shortness of breath) on exertion 01/29/2019    Thyroid disease     \"Thyroid Problems As A Child\"    Urinary incontinence     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    WD-Wound of right breast 10/28/2016    Wears glasses      PSHX:  has a past surgical history that includes Dental surgery; Tonsillectomy (1970's); laparoscopy (2005); Cholecystectomy, laparoscopic (2007); lymph node dissection; hernia repair (2007); hernia repair (2012); Cystoscopy (2016); Breast lumpectomy (Right, 08/2015); Breast biopsy (Right, 2015); Breast biopsy (Right, 2017); Hysterectomy, total abdominal (2006); Colonoscopy (2016); Colonoscopy (3/2019; 04/12/2017); Endoscopy, colon, diagnostic (04/12/2017); other surgical history (05/22/2018); Cystoscopy; Abdomen surgery (06/2018); other surgical history; Cystocopy (07/03/2018); Small intestine surgery (10/18/2020); Abdominal hernia repair (10/18/2020); and laparotomy (N/A, 10/18/2020). Allergies: Allergies   Allergen Reactions    Latex      \"Redness\"    Codeine      \"I Don't Remember The Reaction , I Was A Child\"    Darvon [Propoxyphene]      \"I Don't Remember The Reaction, I Was A Child\"    Pcn [Penicillins]      \"I Don't Remember The Reaction, I Was A Child\"       FAM HX: family history includes Cancer in her father and sister; Diabetes in her mother and sister;  Heart Disease in her father and mother; High Blood Pressure in her father and sister; High Cholesterol in her sister; [place] / [place] in her mother; Stroke in her mother; Vision Loss in her father, mother, and sister. Soc HX:   Social History     Socioeconomic History    Marital status: Single     Spouse name: Not on file    Number of children: Not on file    Years of education: Not on file    Highest education level: Not on file   Occupational History    Not on file   Tobacco Use    Smoking status: Former Smoker     Packs/day: 0.25     Years: 5.00     Pack years: 1.25     Types: Cigarettes     Start date:      Quit date:      Years since quittin.4    Smokeless tobacco: Never Used    Tobacco comment: \"I Only Smoked Socially When I Quit Smoking\"   Vaping Use    Vaping Use: Never used   Substance and Sexual Activity    Alcohol use: No     Alcohol/week: 0.0 standard drinks    Drug use: No    Sexual activity: Never   Other Topics Concern    Not on file   Social History Narrative    Not on file     Social Determinants of Health     Financial Resource Strain:     Difficulty of Paying Living Expenses:    Food Insecurity:     Worried About Running Out of Food in the Last Year:     [address] in the Last Year:    Transportation Needs:     Lack of Transportation (Medical):      Lack of Transportation (Non-Medical):    Physical Activity:     Days of Exercise per Week:     Minutes of Exercise per Session:    Stress:     Feeling of Stress :    Social Connections:     Frequency of Communication with Friends and Family:     Frequency of Social Gatherings with Friends and Family:     Attends Church Services:     Active Member of Clubs or Organizations:     Attends Club or Organization Meetings:     Marital Status:    Intimate Partner Violence:     Fear of Current or Ex-Partner:     Emotionally Abused:     Physically Abused:     Sexually Abused:        Data:   CBC with Differential:    Lab Results   Component Value Date    WBC 6.5 06/05/2021    RBC 4.55 06/05/2021    RBC 4.08 08/22/2017    HGB 12.8 06/05/2021    HCT 39.9 06/05/2021     06/05/2021    MCV 87.7 06/05/2021    MCH 28.1 06/05/2021    MCHC 32.1 06/05/2021    RDW 13.6 06/05/2021    SEGSPCT 69.0 06/04/2021    BANDSPCT 8 06/17/2018    LYMPHOPCT 22.2 06/04/2021    LYMPHOPCT 22.0 08/22/2017    MONOPCT 6.4 06/04/2021    EOSPCT 0.8 03/19/2012    BASOPCT 0.4 06/04/2021    MONOSABS 0.4 06/04/2021    LYMPHSABS 1.5 06/04/2021    EOSABS 0.1 06/04/2021    BASOSABS 0.0 06/04/2021    DIFFTYPE AUTOMATED DIFFERENTIAL 06/04/2021       CMP:     Lab Results   Component Value Date     06/04/2021    K 4.1 06/04/2021     06/04/2021    CO2 26 06/04/2021    BUN 11 06/04/2021    CREATININE 0.6 06/04/2021    GFRAA >60 06/04/2021    LABGLOM >60 06/04/2021    GLUCOSE 149 06/04/2021    PROT 6.8 06/04/2021    PROT 7.2 10/01/2012    LABALBU 4.1 06/04/2021    CALCIUM 9.1 06/04/2021    BILITOT 0.2 06/04/2021    ALKPHOS 87 06/04/2021    AST 20 06/04/2021    ALT 22 06/04/2021       Troponin:  Lab Results   Component Value Date    TROPONINT <0.010 06/05/2021     Radiology results:  CTA PULMONARY W CONTRAST    (Results Pending)          Medications:   Home Medications:   Prior to Admission medications    Medication Sig Start Date End Date Taking?  Authorizing Provider   letrozole (FEMARA) 2.5 MG tablet TAKE ONE TABLET BY MOUTH EVERY DAY 6/1/21  Yes Sarath Lauren MD   metFORMIN (GLUCOPHAGE) 500 MG tablet Take 500 mg by mouth 2 times daily (with meals)   Yes Historical Provider, MD   montelukast (SINGULAIR) 10 MG tablet Take 1 tablet by mouth daily 5/21/21  Yes Marilu Hill MD   budesonide-formoterol (SYMBICORT) 160-4.5 MCG/ACT AERO Inhale 2 puffs into the lungs 2 times daily 5/21/21  Yes Marilu Hill MD   pantoprazole (PROTONIX) 40 MG tablet  4/12/21  Yes Historical Provider, MD   loperamide (RA ANTI-DIARRHEAL) 2 MG capsule Take 1 capsule by mouth 4 times daily as needed for Diarrhea 3/29/21  Yes Khanh Crawford MD   ibuprofen (ADVIL;MOTRIN) 800 MG tablet  10/19/20  Yes Historical Provider, MD   fexofenadine (ALLEGRA ALLERGY) 60 MG tablet Take 60 mg by mouth every 12 hours as needed    Yes Historical Provider, MD   FLUoxetine (PROZAC) 10 MG capsule Take 1 capsule by mouth daily 4/22/20  Yes Historical Provider, MD   hydroCHLOROthiazide (MICROZIDE) 12.5 MG capsule Take 1 capsule by mouth daily 5/1/20  Yes Historical Provider, MD   hydrOXYzine (VISTARIL) 25 MG capsule Take 1 capsule by mouth daily 4/23/20  Yes Historical Provider, MD   losartan (COZAAR) 100 MG tablet Take 1 tablet by mouth daily 1/22/20  Yes Historical Provider, MD   Elastic Bandages & Supports (ABDOMINAL BINDER/ELASTIC XL) MISC 1 Units by Does not apply route as needed (AS NEEDED) 11/4/20   Khanh Crawford MD     Medications:    Infusions:   PRN Meds:     Electronically signed by Verito Olivares DO on 6/5/2021 at 4:59 AM      This dictation was created with voice recognition software. While attempts have been made to review the dictation as it is transcribed, on occasion the spoken word can be misinterpreted by the technology leading to omissions or inappropriate words, phrases or sentences.

## 2021-06-06 VITALS
TEMPERATURE: 98.1 F | WEIGHT: 268 LBS | HEIGHT: 67 IN | DIASTOLIC BLOOD PRESSURE: 75 MMHG | HEART RATE: 78 BPM | OXYGEN SATURATION: 99 % | BODY MASS INDEX: 42.06 KG/M2 | SYSTOLIC BLOOD PRESSURE: 129 MMHG | RESPIRATION RATE: 21 BRPM

## 2021-06-06 LAB — GLUCOSE BLD-MCNC: 150 MG/DL (ref 70–99)

## 2021-06-06 PROCEDURE — 6370000000 HC RX 637 (ALT 250 FOR IP): Performed by: NURSE PRACTITIONER

## 2021-06-06 PROCEDURE — 2580000003 HC RX 258: Performed by: NURSE PRACTITIONER

## 2021-06-06 PROCEDURE — 82962 GLUCOSE BLOOD TEST: CPT

## 2021-06-06 PROCEDURE — G0378 HOSPITAL OBSERVATION PER HR: HCPCS

## 2021-06-06 RX ORDER — NITROGLYCERIN 0.4 MG/1
TABLET SUBLINGUAL
Qty: 25 TABLET | Refills: 3 | Status: SHIPPED | OUTPATIENT
Start: 2021-06-06 | End: 2022-03-04

## 2021-06-06 RX ORDER — ATORVASTATIN CALCIUM 40 MG/1
40 TABLET, FILM COATED ORAL NIGHTLY
Qty: 30 TABLET | Refills: 3 | Status: SHIPPED | OUTPATIENT
Start: 2021-06-06 | End: 2021-08-09 | Stop reason: SINTOL

## 2021-06-06 RX ORDER — ASPIRIN 81 MG/1
81 TABLET, CHEWABLE ORAL DAILY
Qty: 30 TABLET | Refills: 3 | Status: SHIPPED | OUTPATIENT
Start: 2021-06-06 | End: 2022-03-04

## 2021-06-06 RX ADMIN — ASPIRIN 81 MG: 81 TABLET, CHEWABLE ORAL at 09:25

## 2021-06-06 RX ADMIN — BUDESONIDE AND FORMOTEROL FUMARATE DIHYDRATE 2 PUFF: 160; 4.5 AEROSOL RESPIRATORY (INHALATION) at 08:35

## 2021-06-06 RX ADMIN — LETROZOLE 2.5 MG: 2.5 TABLET ORAL at 09:25

## 2021-06-06 RX ADMIN — HYDROXYZINE PAMOATE 25 MG: 25 CAPSULE ORAL at 09:25

## 2021-06-06 RX ADMIN — PANTOPRAZOLE SODIUM 40 MG: 40 TABLET, DELAYED RELEASE ORAL at 06:22

## 2021-06-06 RX ADMIN — HYDROCHLOROTHIAZIDE 12.5 MG: 12.5 TABLET ORAL at 09:25

## 2021-06-06 RX ADMIN — SODIUM CHLORIDE, PRESERVATIVE FREE 10 ML: 5 INJECTION INTRAVENOUS at 09:25

## 2021-06-06 RX ADMIN — LOSARTAN POTASSIUM 100 MG: 100 TABLET, FILM COATED ORAL at 09:25

## 2021-06-06 RX ADMIN — MONTELUKAST 10 MG: 10 TABLET, FILM COATED ORAL at 09:25

## 2021-06-06 RX ADMIN — FLUOXETINE 10 MG: 10 CAPSULE ORAL at 09:25

## 2021-06-06 RX ADMIN — INSULIN LISPRO 1 UNITS: 100 INJECTION, SOLUTION INTRAVENOUS; SUBCUTANEOUS at 07:05

## 2021-06-06 ASSESSMENT — PAIN SCALES - GENERAL
PAINLEVEL_OUTOF10: 0

## 2021-06-06 ASSESSMENT — PAIN SCALES - WONG BAKER: WONGBAKER_NUMERICALRESPONSE: 0

## 2021-06-06 NOTE — DISCHARGE SUMMARY
Discharge Summary    Name:  Kacy Ulloa /Age/Sex: 1965  (54 y.o. female)   MRN & CSN:  [id number] & [id number] Admission Date/Time: 2021 11:15 PM   Attending:  Sneha Resendiz MD Discharging Physician: Sneha Resendiz MD     Hospital Course:   Kacy Ulloa is a 54 y.o.  female  who presents with Chest pain    Chest pain history of coronary artery disease  · Troponin negative, recent echocardiogram with ejection fraction 55 to 60%. · Started on aspirin, statin  · Nitroglycerin as needed  · Cardiology on consult -stress test   · Patient will make an appointment for stress test as OP     Elevated D-dimer CT angio negative pulmonary embolism     Type 2 diabetes: Hemoglobin A1c 7.1. sliding scale. Hypoglycemia protocol.     Hypertension  Hyperlipidemia  Depression with anxiety  Cancer: On letrozole  Morbid obesity    The patient expressed appropriate understanding of and agreement with the discharge recommendations, medications, and plan.      Consults this admission:  IP CONSULT TO CARDIOLOGY    Discharge Instruction:   Follow up appointments: Cardiology  Primary care physician:  within 2 weeks    Diet:  cardiac diet   Activity: activity as tolerated  Disposition: Discharged to:   [x]Home, []C, []SNF, []Acute Rehab, []Hospice   Condition on discharge: Stable    Discharge Medications:      , Yandy2 Prabhu Rivera Medication Instructions [id number]    Printed on:21 0836   Medication Information                      aspirin 81 MG chewable tablet  Take 1 tablet by mouth daily             atorvastatin (LIPITOR) 40 MG tablet  Take 1 tablet by mouth nightly             budesonide-formoterol (SYMBICORT) 160-4.5 MCG/ACT AERO  Inhale 2 puffs into the lungs 2 times daily             Elastic Bandages & Supports (ABDOMINAL BINDER/ELASTIC XL) MISC  1 Units by Does not apply route as needed (AS NEEDED)             fexofenadine (ALLEGRA ALLERGY) 60 MG tablet  Take 60 mg by mouth every 12 hours as needed              FLUoxetine (PROZAC) 10 MG capsule  Take 1 capsule by mouth daily             hydroCHLOROthiazide (MICROZIDE) 12.5 MG capsule  Take 1 capsule by mouth daily             hydrOXYzine (VISTARIL) 25 MG capsule  Take 1 capsule by mouth daily             letrozole (FEMARA) 2.5 MG tablet  TAKE ONE TABLET BY MOUTH EVERY DAY             loperamide (RA ANTI-DIARRHEAL) 2 MG capsule  Take 1 capsule by mouth 4 times daily as needed for Diarrhea             losartan (COZAAR) 100 MG tablet  Take 1 tablet by mouth daily             metFORMIN (GLUCOPHAGE) 500 MG tablet  Take 500 mg by mouth 2 times daily (with meals)             montelukast (SINGULAIR) 10 MG tablet  Take 1 tablet by mouth daily             nitroGLYCERIN (NITROSTAT) 0.4 MG SL tablet  up to max of 3 total doses. If no relief after 1 dose, call 911. pantoprazole (PROTONIX) 40 MG tablet                   Objective Findings at Discharge:   /70   Pulse 53   Temp 98.2 °F (36.8 °C) (Oral)   Resp 16   Ht 5' 7\" (1.702 m)   Wt 268 lb (121.6 kg)   SpO2 96%   BMI 41.97 kg/m²            GEN    Awake female, sitting upright in bed in no apparent distress. Appears given age. Obese  EYES   Pupils are equally round. No scleral erythema, discharge, or conjunctivitis. HENT  Mucous membranes are moist. Oral pharynx without exudates, no evidence of thrush. NECK  Supple, no apparent thyromegaly or masses. RESP  Clear to auscultation, no wheezes, rales or rhonchi. Symmetric chest movement while on room air. CARDIO/VASC           S1/S2 auscultated. Regular rate without appreciable murmurs, rubs, or gallops. No JVD or carotid bruits. Peripheral pulses equal bilaterally and palpable. No peripheral edema. GI        Abdomen is soft without significant tenderness, masses, or guarding. Bowel sounds are normoactive. Rectal exam deferred. MSK    No gross joint deformities. SKIN    Normal coloration, warm, dry.   NEURO           Cranial nerves ------------------------------------------------------------------   Findings   Left Ventricle  Normal left ventricle structure and systolic function with an ejection  fraction of 55-60%. No regional wall motion abnormalities were detected. Grade I diastolic dysfunction. Left Atrium  Mildly dilated left atrium. Right Atrium  Normal right atrium size and structure. Right Ventricle  Normal right ventricle structure and function. Aortic Valve  Normal aortic valve structure and function. Mitral Valve  Normal mitral valve structure and function. Tricuspid Valve  Trace tricuspid regurgitation. Tricuspid valve is structurally normal.  Normal pulmonary artery pressure, RVSP = 32 mmHg. Pulmonic Valve  The pulmonic valve was not well visualized. Pericardial Effusion  No evidence of pericardial effusion. Pleural Effusion  No evidence of pleural effusion. Miscellaneous  Aortic root dimension within normal limits. IVC not visualized. Abdominal aorta not visualized.   M-Mode/2D Measurements & Calculations   LV Diastolic Dimension:  LV Systolic Dimension:  LA Dimension: 3.9 cmAO Root  4.87 cm                  2.99 cm                 Dimension: 2.8 cmLA Area:  LV FS:38.6 %             LV Volume Diastolic:    48.5 cm2  LV PW Diastolic: 3.13 cm 451 ml  Septum Diastolic: 9.94   LV Volume Systolic: 57  cm                       ml                           LV EDV/LV EDV Index:    RV Diastolic Dimension:                           121 mlLV ESV/LV ESV     2.31 cm                           Index: 57 ml  LV Area Diastolic: 19.4  EF Calculated (A4C):    LA/Aorta: 1.39  cm2                      52.9 %  LV Area Systolic: 18.2   EF Calculated (2D):     LA volume/Index: 73 ml  cm2                      68.7 %                            LV Length: 7.34 cm                            LVOT: 2 cm  Doppler Measurements & Calculations   MV Peak E-Wave: 110  AV Peak Velocity: 183 cm/s    LVOT Mean Velocity: 58.8  cm/s subsegmental pulmonary arterial arteries in the lower lungs. No central pulmonary embolism is detected. No pulmonary emboli are seen within the segmental and subsegmental pulmonary arteries in the upper lungs. Mediastinum: Left thyroid lobe is either atrophic or has been previously resected. Right thyroid lobe unremarkable. No mediastinal or hilar lymphadenopathy. Cardiac chambers unremarkable. No pericardial effusion. Thoracic aorta is normal in caliber without evidence of dissection. Lungs/pleura: Lungs are clear. No pneumothorax. No pleural effusion. Airways are patent. Upper Abdomen: Hepatic steatosis noted. Images of the upper abdomen are otherwise unremarkable. Soft Tissues/Bones: Scarring in the right breast is found. Extra thoracic soft tissues otherwise unremarkable. No acute or suspicious bony abnormality identified. Limited evaluation of the segmental and subsegmental pulmonary arterial branches, especially the lower lungs. No central pulmonary embolism is detected. Overall, no acute abnormality identified.        Electronically signed by Bobby Ellison MD on 6/6/2021 at 8:36 AM

## 2021-06-07 DIAGNOSIS — R07.9 CHEST PAIN, UNSPECIFIED TYPE: Primary | ICD-10-CM

## 2021-06-15 ENCOUNTER — TELEPHONE (OUTPATIENT)
Dept: CARDIOLOGY CLINIC | Age: 56
End: 2021-06-15

## 2021-06-15 NOTE — TELEPHONE ENCOUNTER
Pt called and requested that her My Chart account be deactivated. I offered her the My Chart Help Desk number, but she refused, stating she couldn't figure out how to use My Chart, even after a employee showed her.

## 2021-06-16 ENCOUNTER — TELEPHONE (OUTPATIENT)
Dept: CARDIOLOGY CLINIC | Age: 56
End: 2021-06-16

## 2021-06-22 ENCOUNTER — TELEPHONE (OUTPATIENT)
Dept: CARDIOLOGY CLINIC | Age: 56
End: 2021-06-22

## 2021-06-23 ENCOUNTER — TELEPHONE (OUTPATIENT)
Dept: CARDIOLOGY CLINIC | Age: 56
End: 2021-06-23

## 2021-06-24 ENCOUNTER — TELEPHONE (OUTPATIENT)
Dept: CARDIOLOGY CLINIC | Age: 56
End: 2021-06-24

## 2021-06-28 ENCOUNTER — TELEPHONE (OUTPATIENT)
Dept: CARDIOLOGY CLINIC | Age: 56
End: 2021-06-28

## 2021-06-28 NOTE — TELEPHONE ENCOUNTER
Pt called and is requesting a call back either way on the pre auth of her testing. please call her when you find out anything.

## 2021-06-30 ENCOUNTER — TELEPHONE (OUTPATIENT)
Dept: CARDIOLOGY CLINIC | Age: 56
End: 2021-06-30

## 2021-06-30 DIAGNOSIS — R07.9 CHEST PAIN, UNSPECIFIED TYPE: Primary | ICD-10-CM

## 2021-06-30 DIAGNOSIS — R06.09 DYSPNEA ON EXERTION: ICD-10-CM

## 2021-06-30 NOTE — TELEPHONE ENCOUNTER
Need a stress test order in patient chart, NM stress was denied. Patient scheduled for 7/7/21 for treadmill.

## 2021-07-07 ENCOUNTER — PROCEDURE VISIT (OUTPATIENT)
Dept: CARDIOLOGY CLINIC | Age: 56
End: 2021-07-07

## 2021-07-07 DIAGNOSIS — R06.09 DYSPNEA ON EXERTION: ICD-10-CM

## 2021-07-07 DIAGNOSIS — R07.9 CHEST PAIN, UNSPECIFIED TYPE: ICD-10-CM

## 2021-07-14 ENCOUNTER — TELEPHONE (OUTPATIENT)
Dept: CARDIOLOGY CLINIC | Age: 56
End: 2021-07-14

## 2021-07-14 NOTE — TELEPHONE ENCOUNTER
Patient is frustrated, has appt sched tomorrow but not sure if she should keep it. Was to have NM but ins denied so sched for treadmill. Was unable to do treadmill and so not sure about tomorrow appt. She said someone here told her she had the treadmill and she said she did not. Wants to know what the next step is but she feels like she is getting the runaround.   Please call

## 2021-07-14 NOTE — TELEPHONE ENCOUNTER
Still waiting for [facility] approval for nuc med. Patient updated. She would like to keep OV with Dr Amelie Nava tomorrow.

## 2021-07-15 ENCOUNTER — TELEPHONE (OUTPATIENT)
Dept: CARDIOLOGY CLINIC | Age: 56
End: 2021-07-15

## 2021-07-15 ENCOUNTER — OFFICE VISIT (OUTPATIENT)
Dept: CARDIOLOGY CLINIC | Age: 56
End: 2021-07-15
Payer: COMMERCIAL

## 2021-07-15 VITALS
BODY MASS INDEX: 42.69 KG/M2 | WEIGHT: 272 LBS | HEIGHT: 67 IN | SYSTOLIC BLOOD PRESSURE: 130 MMHG | HEART RATE: 81 BPM | DIASTOLIC BLOOD PRESSURE: 85 MMHG

## 2021-07-15 DIAGNOSIS — E11.9 TYPE 2 DIABETES MELLITUS WITHOUT COMPLICATION, WITHOUT LONG-TERM CURRENT USE OF INSULIN (HCC): ICD-10-CM

## 2021-07-15 DIAGNOSIS — R06.09 DYSPNEA ON EXERTION: ICD-10-CM

## 2021-07-15 DIAGNOSIS — G47.33 OSA (OBSTRUCTIVE SLEEP APNEA): ICD-10-CM

## 2021-07-15 DIAGNOSIS — Z84.89 FAMILY HISTORY OF SUDDEN DEATH: ICD-10-CM

## 2021-07-15 DIAGNOSIS — R07.9 CHEST PAIN, UNSPECIFIED TYPE: ICD-10-CM

## 2021-07-15 DIAGNOSIS — Z01.810 PRE-OPERATIVE CARDIOVASCULAR EXAMINATION: Primary | ICD-10-CM

## 2021-07-15 PROCEDURE — 2022F DILAT RTA XM EVC RTNOPTHY: CPT | Performed by: INTERNAL MEDICINE

## 2021-07-15 PROCEDURE — 3017F COLORECTAL CA SCREEN DOC REV: CPT | Performed by: INTERNAL MEDICINE

## 2021-07-15 PROCEDURE — 99214 OFFICE O/P EST MOD 30 MIN: CPT | Performed by: INTERNAL MEDICINE

## 2021-07-15 PROCEDURE — 1036F TOBACCO NON-USER: CPT | Performed by: INTERNAL MEDICINE

## 2021-07-15 PROCEDURE — G8417 CALC BMI ABV UP PARAM F/U: HCPCS | Performed by: INTERNAL MEDICINE

## 2021-07-15 PROCEDURE — 93000 ELECTROCARDIOGRAM COMPLETE: CPT | Performed by: INTERNAL MEDICINE

## 2021-07-15 PROCEDURE — G8427 DOCREV CUR MEDS BY ELIG CLIN: HCPCS | Performed by: INTERNAL MEDICINE

## 2021-07-15 PROCEDURE — G9899 SCRN MAM PERF RSLTS DOC: HCPCS | Performed by: INTERNAL MEDICINE

## 2021-07-15 PROCEDURE — 3051F HG A1C>EQUAL 7.0%<8.0%: CPT | Performed by: INTERNAL MEDICINE

## 2021-07-15 NOTE — PROGRESS NOTES
Kimberli Garcia (:  1965) is a 54 y.o. female,     Chief Complaint   Patient presents with    Follow-Up from Hospital     Pt is a F/U from hospital. she states she was having tightness and pressure in her chest that lasted hours, she is waiting for approval for NM stress test. SOBOE. Pt states as the day goes on her legs, ankles and feet, she does not wear compression stockings. Per PT palpitations feels like a flutter and last a few seconds. She denies any surgeries or procedures scheduled that she  is aware of. For exercise she trys to walk in the stores along with chair exercises    Chest Pain    Shortness of Breath    Edema    Palpitations    Arm Pain     Patient is here for follow up for shortness of breath bilateral arm pain and chest pain off and on fairly frequently. She was in the hospital last month and I have reviewed the details of admission and consultation with Dr. Ruth Mohr. She had not had the nuclear stress test done which is not approved by her health coverage. She also describes profuse sweating at times and has underlying anxiety and depression as well. She has multiple risk factors including diabetes hypertension and borderline hyperlipidemia. She was started on statins on discharge last month which she could not tolerate due to severe leg pains. Extensive record review is done to answer multiple questions she had. She is concerned she gets mixed responses from different doctors she has been to and she wants to have definitive answers. Allergies   Allergen Reactions    Latex      \"Redness\"    Codeine      \"I Don't Remember The Reaction , I Was A Child\"    Darvon [Propoxyphene]      \"I Don't Remember The Reaction, I Was A Child\"    Pcn [Penicillins]      \"I Don't Remember The Reaction, I Was A Child\"     Prior to Admission medications    Medication Sig Start Date End Date Taking?  Authorizing Provider   aspirin 81 MG chewable tablet Take 1 tablet by mouth daily 21  Yes Virginia Merrill MD   atorvastatin (LIPITOR) 40 MG tablet Take 1 tablet by mouth nightly 6/6/21  Yes Virginia Merrill MD   letrozole [facility]) 2.5 MG tablet TAKE ONE TABLET BY MOUTH EVERY DAY 6/1/21  Yes [address] Gonzalez MD   metFORMIN (GLUCOPHAGE) 500 MG tablet Take 500 mg by mouth daily (with breakfast)    Yes Historical Provider, MD   budesonide-formoterol (SYMBICORT) 160-4.5 MCG/ACT AERO Inhale 2 puffs into the lungs 2 times daily 5/21/21  Yes Guera Grant MD   pantoprazole (PROTONIX) 40 MG tablet  4/12/21  Yes Historical Provider, MD   FLUoxetine (PROZAC) 10 MG capsule Take 1 capsule by mouth daily 4/22/20  Yes Historical Provider, MD   hydroCHLOROthiazide (MICROZIDE) 12.5 MG capsule Take 1 capsule by mouth daily 5/1/20  Yes Historical Provider, MD   hydrOXYzine (VISTARIL) 25 MG capsule Take 1 capsule by mouth daily 4/23/20  Yes Historical Provider, MD   losartan (COZAAR) 100 MG tablet Take 1 tablet by mouth daily 1/22/20  Yes Historical Provider, MD   SITagliptin (JANUVIA) 100 MG tablet Take 100 mg by mouth daily    Historical Provider, MD   nitroGLYCERIN (NITROSTAT) 0.4 MG SL tablet up to max of 3 total doses.  If no relief after 1 dose, call 911. 6/6/21   Virginia Merrill MD   montelukast (SINGULAIR) 10 MG tablet Take 1 tablet by mouth daily  Patient not taking: Reported on 7/15/2021 5/21/21   Guera Grant MD   loperamide (RA ANTI-DIARRHEAL) 2 MG capsule Take 1 capsule by mouth 4 times daily as needed for Diarrhea  Patient not taking: Reported on 7/15/2021 3/29/21   Lance Vega MD   fexofenadine [facility] ALLERGY) 60 MG tablet Take 60 mg by mouth every 12 hours as needed   Patient not taking: Reported on 7/15/2021    Historical Provider, MD     Past Medical History:   Diagnosis Date    Abnormal ECG 01/29/2019    Acid reflux     Anxiety     Arthritis     \"Back, Legs, Knees And Feet\"    Back problem     \"Three Discs Smashed Together Lower Back\"    Bipolar disorder ([facility] [address]     Chronic back pain     Chronic cough 01/31/2020    Class 3 severe obesity with body mass index (BMI) of 40.0 to 44.9 in adult [facility])     COPD, moderate ([address] 01/31/2020    Curvature of spine     \"Wore A Brace For Three Years\"    Depression     Diabetes mellitus ([address] Dx 2010    Diverticulitis     Esophageal dilatation 04/12/2017    Family history of sudden death 01/29/2019    Mother at age 58    Full dentures     H/O 24 hour EKG monitoring 01/29/2019    Conclusion: Normal study suggesting normal sinus rhythm with no clinically significant arrhythmias.  H/O cardiovascular stress test 02/12/2019    EF65% Normal    H/O echocardiogram 06/02/2021    Normal left ventricle structure and systolic function with an ejection 55-60% No significant valvular disease noted.  ZRNHSRAV(799.3)     \"Occ\"    Heart murmur     No Cardiologist At This Time    History of nuclear stress test 02/12/2019    EF 65%. Normal study.     [place] (hard of hearing)     Bilateral Hearing Aides    Hydronephrosis     dx with hydronephrosis with admission 6/2018- right ureteral stone/\"had kidney stone stone about 2-3 yrs ago and passed it\"    Hyperlipidemia     Hypertension     Macular degeneration     Right Eye    Mild persistent asthma without complication 40/16/3531    MRSA (methicillin resistant Staphylococcus aureus)     After PRICILLA In 2006 (Abdomen)    Near syncope 01/29/2019 1/28/19 in ED [place]    Panic attacks     PONV (postoperative nausea and vomiting)     denies any motion sickness    Right Breast Cancer Dx 8-15    Right Breast Lumpectomy, Had Chemotherapy And Radiation Treatments    S/P exploratory laparotomy 10/18/2020    explore lap for small bowel perf repair and abdominal wall hernia repair    Seizure ([address]     \"when I was a young kid had seizure and was on medication- not sure how old when last seizure but as a kid\"    Sepsis ([address]     per old chart - admitted 6/2018 with sepsis secondary to wound infection    Shortness of breath 02/04/2020    Sleep apnea     Has CPAP- last sleep study 2-3 yrs ago\"    Thyroid disease     \"Thyroid Problems As A Child\"    Type 2 diabetes mellitus without complication, without long-term current use of insulin ([facility] 7/15/2021    Urinary incontinence     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    WD-Wound of right breast 10/28/2016    Wears glasses       Vitals:    07/15/21 0859   BP: 130/85   Pulse: 81   Weight: 272 lb (123.4 kg)   Height: 5' 7\" (1.702 m)      Body mass index is 42.6 kg/m². Wt Readings from Last 3 Encounters:   07/15/21 272 lb (123.4 kg)   06/04/21 268 lb (121.6 kg)   06/04/21 269 lb (122 kg)     Constitutional:  Patient is obese female in no apparent distress. HEENT:   Effacements. Cardiovascular: Auscultation: Normal S1 and S2. No murmurs or gallops noted. Carotids are negative for bruits. Peripheral pulses: Pedal pulses are 1+ equal in both. Respiratory:  Respiratory effort is normal. Breath sounds are negative for wheezing or rhonchi. Extremities:  No edema, clubbing,  Cyanosis, petechiae. Abdomen:  No masses or tenderness. No organomegaly noted. Neurologic:  Oriented to time, place, and person and non-anxious. No focal neurological deficit noted. Psychiatric: Normal mood and effect. EKG today is consistent with normal sinus rhythm 81 bpm essentially normal EKG. Pertinent records reviewed and discussed with patient and results are as follow:  Echocardiogram done on June 2/2021 reported  Normal left ventricle structure and systolic function with an ejection   fraction of 55-60%. Grade I diastolic dysfunction. Mildly dilated left atrium. No significant valvular disease noted. Normal pulmonary artery pressure, RVSP = 32 mmHg. No evidence of pericardial effusion.     Lab Results   Component Value Date    WBC 6.5 06/05/2021    HGB 12.8 06/05/2021    HCT 39.9 06/05/2021     06/05/2021     Lab Results   Component Value Date    CHOL 200 (H) 10/01/2012    CHOLFAST 204 (H) 06/05/2021    TRIG 127 10/01/2012    TRIGLYCFAST 159 (H) 06/05/2021    HDL 68 06/05/2021    LDLDIRECT 132 (H) 06/05/2021     Lab Results   Component Value Date    BUN 10 06/05/2021    CREATININE 0.7 06/05/2021     06/05/2021    K 4.0 06/05/2021     Lab Results   Component Value Date    INR 1.27 06/11/2018     Lab Results   Component Value Date    LABA1C 7.1 (H) 06/05/2021     ASSESSMENT/PLAN:    1. Chest pain, unspecified type  -     EKG 12 Lead  2. Dyspnea on exertion  3. Family history of sudden death  3. DILLON (obstructive sleep apnea)  5. Type 2 diabetes mellitus without complication, without long-term current use of insulin (HCC)    Patient has a rather atypical symptoms has multiple coronary artery disease risk factors his significant underlying anxiety and depression. She is not tolerating statin therapy. I would recommend cardiac catheterization for definite evaluation and recommendations due to ongoing symptoms and multiple risk factors. Details are discussed. She is agreeable and I have asked Dr. Laura Braga to schedule her for the procedure from radial approach which she is requesting particularly. Follow-up in 4 weeks, sooner if needed. An electronic signature was used to authenticate this note.     --Kevon Brooks MD

## 2021-07-15 NOTE — LETTER
[address] WITH POSSIBLE PERCUTANEOUS CORONARY INTERVENTION     Patient Name: Kareem Mendez   : 1965   MRN# [id number]    Date of Procedure: 21 Time: 10:00 Arrival Time: 8:00    The catheterization and angiogram are usually outpatient procedures, however if stenting is needed you may need to stay overnight. You will need to arrive at the hospital two hours before the procedure. Go to registration in the main lobby. You will need to arrange for someone to drive you home. HOSPITAL:  [facility]  Call to Pre-[place] at: [phone] 1-2 days before your procedure. Please have blood work done 1 to 2 days before procedure at    [facility]. X Please do not have anything by mouth after midnight prior to or 8 hours before the procedure. X You may take your medications with a sip of water in the morning before your procedure or take them  with you. X If you are taking HCTZ (Hydrochlorothiazide) please do not take it the morning before your procedure. X If you are taking Metformin (Glucophage) or any medication containing Metformin (Glucophage) you must hold this medication the day before the procedure 21, the day of the procedure. and two days after your procedure. You may restart 21. X After your COVID-19 Test, lab work, and Xray are completed you will need to FLORIAN Hatch until procedure.        Patient Signature:  _________________________       Staff Giving Instructions_______________________________                                      Delsie Barcelona Dr. Mabel Runner     Patient Name: Kareem Mendez   : 1965   MRN# [id number]    Date of Procedure: 21  Time: 10:00     DIAGNOSIS:   Z01.810    LEFT HEART CATHETERIZATION WITH POSSIBLE PERCUTANEOUS CORONARY INTERVENTION           X Type & Screen   X CBC  X BMP ? PLEASE CALL ABNORMAL RESULTS TO THE  PHYSICIAN? ATTENTION PATIENT:   Pretesting is to be done before the cath. You do not have to fast for the lab work. You must go to the [facility] behind the [facility] at [address] to have this lab work done. Phone: [phone]   Hours: 7:00 am to 7:00 pm           8:00 am to 12:00 pm Saturday      PHYSICIAN SIGNATURE:      DATE:_________________Time:______________                                                      Chemo Jake    Pre Cath Lab Orders     Patient Name: Clari Lane   : 1965   MRN# [id number]    Pre Cath Lab orders     1. IV of 0.9 NS@ 75ml/hr started 2 hours prior to procedure. (#20 Gauge Insyte or larger)  2. Diazepam (Valium) 5 mg po ONCE in Cath Lab. 3. Diphenhydramine (Benadryl) 25 mg ONCE. PHYSICIAN SIGNATURE:      DATE:   TIME:______________                                                                                               [facility] Informed Consent for Anesthesia/Sedation, Surgery, Invasive Procedures, and other High-risk Interventions and Medication use      *This consent is applicable for 30 days following patient signature*    Procedure(s)   Leyla ESQUIVEL authorize, Dr. Chuy Enriquez  and the associate(s) or assistant(s) of his/her choice, to perform the following procedure(s): [address]       I know that unexpected conditions may require additional or different procedures than those above. I authorize the above named practitioner(s) perform these as necessary and desirable. This is based on the practitioners professional judgment.     The above named practitioner has discussed the above procedure(s) with me, including:   Potential benefits, including likelihood of success of the procedure(s) goals   Risks   Side effects, risk A Child\"            Victro Manuel Gonzalez RN

## 2021-07-22 ENCOUNTER — TELEPHONE (OUTPATIENT)
Dept: CARDIOLOGY CLINIC | Age: 56
End: 2021-07-22

## 2021-07-26 RX ORDER — MONTELUKAST SODIUM 10 MG/1
10 TABLET ORAL DAILY
Qty: 30 TABLET | Refills: 3 | Status: SHIPPED | OUTPATIENT
Start: 2021-07-26 | End: 2021-11-17

## 2021-08-02 ENCOUNTER — HOSPITAL ENCOUNTER (OUTPATIENT)
Age: 56
Discharge: HOME | End: 2021-08-02
Payer: COMMERCIAL

## 2021-08-02 ENCOUNTER — HOSPITAL ENCOUNTER (OUTPATIENT)
Age: 56
Setting detail: SPECIMEN
Discharge: HOME | End: 2021-08-02
Payer: COMMERCIAL

## 2021-08-02 ENCOUNTER — NURSE ONLY (OUTPATIENT)
Dept: CARDIOLOGY CLINIC | Age: 56
End: 2021-08-02
Payer: COMMERCIAL

## 2021-08-02 VITALS — TEMPERATURE: 96.3 F | SYSTOLIC BLOOD PRESSURE: 118 MMHG | DIASTOLIC BLOOD PRESSURE: 78 MMHG | HEART RATE: 80 BPM

## 2021-08-02 DIAGNOSIS — Z01.810 PRE-OPERATIVE CARDIOVASCULAR EXAMINATION: ICD-10-CM

## 2021-08-02 LAB
ABO/RH: NORMAL
ANION GAP SERPL CALCULATED.3IONS-SCNC: 12 MMOL/L (ref 4–16)
ANTIBODY SCREEN: NEGATIVE
BASOPHILS ABSOLUTE: 0 K/CU MM
BASOPHILS RELATIVE PERCENT: 0.5 % (ref 0–1)
BUN BLDV-MCNC: 10 MG/DL (ref 6–23)
CALCIUM SERPL-MCNC: 9.5 MG/DL (ref 8.3–10.6)
CHLORIDE BLD-SCNC: 99 MMOL/L (ref 99–110)
CO2: 28 MMOL/L (ref 21–32)
COMMENT: NORMAL
CREAT SERPL-MCNC: 0.7 MG/DL (ref 0.6–1.1)
DIFFERENTIAL TYPE: ABNORMAL
EOSINOPHILS ABSOLUTE: 0.2 K/CU MM
EOSINOPHILS RELATIVE PERCENT: 2.2 % (ref 0–3)
GFR AFRICAN AMERICAN: >60 ML/MIN/1.73M2
GFR NON-AFRICAN AMERICAN: >60 ML/MIN/1.73M2
GLUCOSE BLD-MCNC: 131 MG/DL (ref 70–99)
HCT VFR BLD CALC: 39.7 % (ref 37–47)
HEMOGLOBIN: 12.7 GM/DL (ref 12.5–16)
IMMATURE NEUTROPHIL %: 0.4 % (ref 0–0.43)
LYMPHOCYTES ABSOLUTE: 1.5 K/CU MM
LYMPHOCYTES RELATIVE PERCENT: 18.8 % (ref 24–44)
MCH RBC QN AUTO: 28.7 PG (ref 27–31)
MCHC RBC AUTO-ENTMCNC: 32 % (ref 32–36)
MCV RBC AUTO: 89.6 FL (ref 78–100)
MONOCYTES ABSOLUTE: 0.5 K/CU MM
MONOCYTES RELATIVE PERCENT: 6.2 % (ref 0–4)
NUCLEATED RBC %: 0 %
PDW BLD-RTO: 13.4 % (ref 11.7–14.9)
PLATELET # BLD: 235 K/CU MM (ref 140–440)
PMV BLD AUTO: 10.7 FL (ref 7.5–11.1)
POTASSIUM SERPL-SCNC: 4 MMOL/L (ref 3.5–5.1)
RBC # BLD: 4.43 M/CU MM (ref 4.2–5.4)
SEGMENTED NEUTROPHILS ABSOLUTE COUNT: 5.6 K/CU MM
SEGMENTED NEUTROPHILS RELATIVE PERCENT: 71.9 % (ref 36–66)
SODIUM BLD-SCNC: 139 MMOL/L (ref 135–145)
TOTAL IMMATURE NEUTOROPHIL: 0.03 K/CU MM
TOTAL NUCLEATED RBC: 0 K/CU MM
WBC # BLD: 7.7 K/CU MM (ref 4–10.5)

## 2021-08-02 PROCEDURE — 99211 OFF/OP EST MAY X REQ PHY/QHP: CPT | Performed by: INTERNAL MEDICINE

## 2021-08-02 PROCEDURE — U0003 INFECTIOUS AGENT DETECTION BY NUCLEIC ACID (DNA OR RNA); SEVERE ACUTE RESPIRATORY SYNDROME CORONAVIRUS 2 (SARS-COV-2) (CORONAVIRUS DISEASE [COVID-19]), AMPLIFIED PROBE TECHNIQUE, MAKING USE OF HIGH THROUGHPUT TECHNOLOGIES AS DESCRIBED BY CMS-2020-01-R: HCPCS

## 2021-08-02 PROCEDURE — 86850 RBC ANTIBODY SCREEN: CPT

## 2021-08-02 PROCEDURE — 36415 COLL VENOUS BLD VENIPUNCTURE: CPT

## 2021-08-02 PROCEDURE — 80048 BASIC METABOLIC PNL TOTAL CA: CPT

## 2021-08-02 PROCEDURE — 86901 BLOOD TYPING SEROLOGIC RH(D): CPT

## 2021-08-02 PROCEDURE — 85025 COMPLETE CBC W/AUTO DIFF WBC: CPT

## 2021-08-02 PROCEDURE — U0005 INFEC AGEN DETEC AMPLI PROBE: HCPCS

## 2021-08-02 PROCEDURE — 86900 BLOOD TYPING SEROLOGIC ABO: CPT

## 2021-08-02 NOTE — PROGRESS NOTES
Patient performed COVID throat swab Patient was wearing a mask and provided gloves and tissues. This CMA, LPN, RN present in the room, 6 feet away. Patient dropped swab in  labeled collection tube. Lab order, facesheet and copy of insurance card placed in collection bag. Bag placed in biohazard bag and placed in fridge.  called for . Patient here in office & educated on [facility] for Dx: CP, scheduled for 8/5/21 @ 10, w/arrival @ 8 AM, @ [facility]. Risks explained; & consents signed. Pre-admission orders given to pt for labs & CXR, which are due 8/2/21 @ [facility]. Instructions given to pt to: rickey WILLSON after midnight the night before procedure. Patient to call hospital @ [phone] to pre-register. May take morning meds the morning of procedure. Patient was notified that procedure could be delayed due to an emergency. Patient voiced understanding. Copies of consent, pre-testing orders, & info. sheet scanned into media.

## 2021-08-03 LAB
SARS-COV-2: NOT DETECTED
SOURCE: NORMAL

## 2021-08-05 ENCOUNTER — HOSPITAL ENCOUNTER (OUTPATIENT)
Dept: CARDIAC CATH/INVASIVE PROCEDURES | Age: 56
Discharge: HOME | End: 2021-08-05
Attending: INTERNAL MEDICINE | Admitting: INTERNAL MEDICINE
Payer: COMMERCIAL

## 2021-08-05 VITALS
WEIGHT: 274 LBS | TEMPERATURE: 96.9 F | HEART RATE: 69 BPM | BODY MASS INDEX: 43 KG/M2 | DIASTOLIC BLOOD PRESSURE: 62 MMHG | HEIGHT: 67 IN | RESPIRATION RATE: 19 BRPM | SYSTOLIC BLOOD PRESSURE: 135 MMHG | OXYGEN SATURATION: 100 %

## 2021-08-05 LAB — GLUCOSE BLD-MCNC: 160 MG/DL (ref 70–99)

## 2021-08-05 PROCEDURE — 93458 L HRT ARTERY/VENTRICLE ANGIO: CPT

## 2021-08-05 PROCEDURE — 2709999900 HC NON-CHARGEABLE SUPPLY

## 2021-08-05 PROCEDURE — 6360000002 HC RX W HCPCS

## 2021-08-05 PROCEDURE — 82962 GLUCOSE BLOOD TEST: CPT

## 2021-08-05 PROCEDURE — 6360000004 HC RX CONTRAST MEDICATION

## 2021-08-05 PROCEDURE — 93458 L HRT ARTERY/VENTRICLE ANGIO: CPT | Performed by: INTERNAL MEDICINE

## 2021-08-05 PROCEDURE — 6370000000 HC RX 637 (ALT 250 FOR IP): Performed by: INTERNAL MEDICINE

## 2021-08-05 PROCEDURE — C1769 GUIDE WIRE: HCPCS

## 2021-08-05 PROCEDURE — 2580000003 HC RX 258: Performed by: INTERNAL MEDICINE

## 2021-08-05 PROCEDURE — C1894 INTRO/SHEATH, NON-LASER: HCPCS

## 2021-08-05 RX ORDER — DIAZEPAM 5 MG/1
5 TABLET ORAL ONCE
Status: COMPLETED | OUTPATIENT
Start: 2021-08-05 | End: 2021-08-05

## 2021-08-05 RX ORDER — LOSARTAN POTASSIUM 100 MG/1
100 TABLET ORAL DAILY
Status: DISCONTINUED | OUTPATIENT
Start: 2021-08-05 | End: 2021-08-05

## 2021-08-05 RX ORDER — SODIUM CHLORIDE 9 MG/ML
INJECTION, SOLUTION INTRAVENOUS CONTINUOUS
Status: DISCONTINUED | OUTPATIENT
Start: 2021-08-05 | End: 2021-08-05 | Stop reason: HOSPADM

## 2021-08-05 RX ORDER — DIPHENHYDRAMINE HCL 25 MG
25 TABLET ORAL ONCE
Status: COMPLETED | OUTPATIENT
Start: 2021-08-05 | End: 2021-08-05

## 2021-08-05 RX ADMIN — DIPHENHYDRAMINE HYDROCHLORIDE 25 MG: 25 TABLET ORAL at 08:05

## 2021-08-05 RX ADMIN — SODIUM CHLORIDE: 9 INJECTION, SOLUTION INTRAVENOUS at 08:04

## 2021-08-05 RX ADMIN — DIAZEPAM 5 MG: 5 TABLET ORAL at 08:05

## 2021-08-05 NOTE — H&P
Chief complaints: chest pain     History of present illness:Leyla is a 54 y. o.year old who  presents with  chest pain for last few days, happening daily, intermittent for 15 to 20 mins and aggravated with activity substernal also,not reproducible with palpation, radiated to shoulder, 6/10, not tender to touch,associated with shortness of breath, + sweating, nausea, did not get NTG in ED. No fever, no chills, no nausea no vomiting. Blood pressure, cholesterol, blood glucose and weight are well controlled. Past medical history:    has a past medical history of Abnormal ECG, Acid reflux, Anxiety, Arthritis, Back problem, Bipolar disorder (HCC), Chronic back pain, Chronic cough, Class 3 severe obesity with body mass index (BMI) of 40.0 to 44.9 in [facility]), COPD, moderate (HCC), Curvature of spine, Depression, Diabetes mellitus ([address] Diverticulitis, Esophageal dilatation, Family history of sudden death, Full dentures, H/O 24 hour EKG monitoring, H/O cardiovascular stress test, H/O echocardiogram, Headache(784.0), Heart murmur, History of nuclear stress test, [place] (hard of hearing), Hydronephrosis, Hyperlipidemia, Hypertension, Macular degeneration, Mild persistent asthma without complication, MRSA (methicillin resistant Staphylococcus aureus), Near syncope, Panic attacks, PONV (postoperative nausea and vomiting), Right Breast Cancer, S/P exploratory laparotomy, Seizure ([address] Sepsis ([address] Shortness of breath, Sleep apnea, Thyroid disease, Type 2 diabetes mellitus without complication, without long-term current use of insulin ([address] Urinary incontinence, WD-Soft tissue radionecrosis, WD-Wound of right breast, and Wears glasses. Past surgical history:   has a past surgical history that includes Dental surgery; Tonsillectomy (1970's); laparoscopy (2005); Cholecystectomy, laparoscopic (2007); lymph node dissection; hernia repair (2007); hernia repair (2012); Cystoscopy (2016);  Breast lumpectomy (Right, 08/2015); Breast biopsy (Right, 2015); Breast biopsy (Right, 2017); Hysterectomy, total abdominal (2006); Colonoscopy (2016); Colonoscopy (3/2019; 04/12/2017); Endoscopy, colon, diagnostic (04/12/2017); other surgical history (05/22/2018); Cystoscopy; Abdomen surgery (06/2018); other surgical history; Cystocopy (07/03/2018); Small intestine surgery (10/18/2020); Abdominal hernia repair (10/18/2020); and laparotomy (N/A, 10/18/2020). Social History:   reports that she quit smoking about 28 years ago. Her smoking use included cigarettes. She started smoking about 33 years ago. She has a 1.25 pack-year smoking history. She has never used smokeless tobacco. She reports that she does not drink alcohol and does not use drugs.   Family history:   no family history of CAD, STROKE of DM    0.9 % sodium chloride infusion, Continuous      Current Facility-Administered Medications   Medication Dose Route Frequency Provider Last Rate Last Admin    0.9 % sodium chloride infusion   Intravenous Continuous To Lloyd MD 75 mL/hr at 08/05/21 0804 New Bag at 08/05/21 0804          Review of Systems:    · Constitutional: No Fever or Weight Loss    · Eyes: No Decreased Vision  · ENT: No Headaches, Hearing Loss or Vertigo  · Cardiovascular: + chest pain, dyspnea on exertion, palpitations or loss of consciousness  · Respiratory: No cough or wheezing    · Gastrointestinal: No abdominal pain, appetite loss, blood in stools, constipation, diarrhea or heartburn  · Genitourinary: No dysuria, trouble voiding, or hematuria  · Musculoskeletal: No gait disturbance, weakness or joint complaints  · Integumentary: No rash or pruritis  · Neurological: No TIA or stroke symptoms  · Psychiatric: No anxiety or depression  · Endocrine: No malaise, fatigue or temperature intolerance  · Hematologic/Lymphatic: No bleeding problems, blood clots or swollen lymph nodes  · Allergic/Immunologic: No nasal congestion or hives  All systems negative except as marked.      ·    ·    ·      Physical Examination:    Vitals:    08/05/21 1130   BP: 135/62   Pulse: 69   Resp: 19   Temp:    SpO2: 100%      Wt Readings from Last 3 Encounters:   08/05/21 274 lb (124.3 kg)   07/15/21 272 lb (123.4 kg)   06/04/21 268 lb (121.6 kg)     Body mass index is 42.91 kg/m².        General Appearance: No distress, conversant     Constitutional: Well developed, Well nourished, No acute distress, Non-toxic appearance.    HENT:  Normocephalic, Atraumatic, Bilateral external ears normal, Oropharynx moist, No oral exudates, Nose normal. Neck- Normal range of motion, No tenderness, Supple, No stridor,no apical-carotid delay, no carotid bruit  Eyes: PERRL, EOMI, Conjunctiva normal, No discharge.    Respiratory:  Normal breath sounds, No respiratory distress, No wheezing, No chest tenderness. ,no use of accessory muscles, diaphragm movement is normal  Cardiovascular: (PMI) apex non displaced,no lifts no thrills, no s3,no s4, Normal heart rate, Normal rhythm, No murmurs, No rubs, No gallops. Carotid arteries pulse and amplitude are normal no bruit, no abdominal bruit noted ( normal abdominal aorta ausculation), femoral arteries pulse and amplitude are normal no bruit, pedal pulses are normal.  GI: Bowel sounds normal, Soft, No tenderness, No masses, No pulsatile masses, no hepatosplenomegally, no bruits  : External genitalia appear normal, No masses or lesions. No discharge. No CVA tenderness.    Musculoskeletal: Intact distal pulses, No edema, No tenderness, No cyanosis, No clubbing. Good range of motion in all major joints. No tenderness to palpation or major deformities noted. Back- No tenderness. Integument: Warm, Dry, No erythema, No rash.    Skin: no rash, no ulcers  Lymphatic: No lymphadenopathy noted.    Neurologic: Alert & oriented x 3, Normal motor function, Normal sensory function, No focal deficits noted.    Psychiatric: Affect normal, Judgment normal, Mood normal.   Lab Review

## 2021-08-09 ENCOUNTER — OFFICE VISIT (OUTPATIENT)
Dept: CARDIOLOGY CLINIC | Age: 56
End: 2021-08-09
Payer: COMMERCIAL

## 2021-08-09 VITALS
BODY MASS INDEX: 42.53 KG/M2 | WEIGHT: 271 LBS | HEART RATE: 90 BPM | SYSTOLIC BLOOD PRESSURE: 120 MMHG | DIASTOLIC BLOOD PRESSURE: 68 MMHG | HEIGHT: 67 IN

## 2021-08-09 DIAGNOSIS — E11.9 TYPE 2 DIABETES MELLITUS WITHOUT COMPLICATION, WITHOUT LONG-TERM CURRENT USE OF INSULIN (HCC): Primary | ICD-10-CM

## 2021-08-09 DIAGNOSIS — R06.09 DYSPNEA ON EXERTION: ICD-10-CM

## 2021-08-09 DIAGNOSIS — Z84.89 FAMILY HISTORY OF SUDDEN DEATH: ICD-10-CM

## 2021-08-09 PROCEDURE — 99213 OFFICE O/P EST LOW 20 MIN: CPT | Performed by: INTERNAL MEDICINE

## 2021-08-09 PROCEDURE — 3017F COLORECTAL CA SCREEN DOC REV: CPT | Performed by: INTERNAL MEDICINE

## 2021-08-09 PROCEDURE — 3051F HG A1C>EQUAL 7.0%<8.0%: CPT | Performed by: INTERNAL MEDICINE

## 2021-08-09 PROCEDURE — 2022F DILAT RTA XM EVC RTNOPTHY: CPT | Performed by: INTERNAL MEDICINE

## 2021-08-09 PROCEDURE — 1036F TOBACCO NON-USER: CPT | Performed by: INTERNAL MEDICINE

## 2021-08-09 PROCEDURE — G8427 DOCREV CUR MEDS BY ELIG CLIN: HCPCS | Performed by: INTERNAL MEDICINE

## 2021-08-09 PROCEDURE — G8417 CALC BMI ABV UP PARAM F/U: HCPCS | Performed by: INTERNAL MEDICINE

## 2021-08-09 PROCEDURE — G9899 SCRN MAM PERF RSLTS DOC: HCPCS | Performed by: INTERNAL MEDICINE

## 2021-08-09 NOTE — PATIENT INSTRUCTIONS
Ok to stop Aspirin and Atorvastatin. Continue diabetes therapy as per PCP. Primary prevention is the goal by aggressive risk modification, healthy and therapeutic life style changes for cardiovascular risk reduction. Various goals are discussed and questions answered. Follow-up with PCP and see me as,  needed.

## 2021-08-20 ENCOUNTER — OFFICE VISIT (OUTPATIENT)
Dept: ONCOLOGY | Age: 56
End: 2021-08-20
Payer: COMMERCIAL

## 2021-08-20 ENCOUNTER — HOSPITAL ENCOUNTER (OUTPATIENT)
Dept: INFUSION THERAPY | Age: 56
Discharge: HOME | End: 2021-08-20
Payer: COMMERCIAL

## 2021-08-20 VITALS
RESPIRATION RATE: 18 BRPM | TEMPERATURE: 98.7 F | HEART RATE: 83 BPM | OXYGEN SATURATION: 98 % | BODY MASS INDEX: 42.5 KG/M2 | SYSTOLIC BLOOD PRESSURE: 144 MMHG | WEIGHT: 270.8 LBS | DIASTOLIC BLOOD PRESSURE: 71 MMHG | HEIGHT: 67 IN

## 2021-08-20 DIAGNOSIS — C50.411 MALIGNANT NEOPLASM OF UPPER-OUTER QUADRANT OF RIGHT BREAST IN FEMALE, ESTROGEN RECEPTOR POSITIVE (HCC): Primary | ICD-10-CM

## 2021-08-20 DIAGNOSIS — Z17.0 MALIGNANT NEOPLASM OF UPPER-OUTER QUADRANT OF RIGHT BREAST IN FEMALE, ESTROGEN RECEPTOR POSITIVE (HCC): Primary | ICD-10-CM

## 2021-08-20 PROCEDURE — 99211 OFF/OP EST MAY X REQ PHY/QHP: CPT

## 2021-08-20 PROCEDURE — 3017F COLORECTAL CA SCREEN DOC REV: CPT | Performed by: INTERNAL MEDICINE

## 2021-08-20 PROCEDURE — 99213 OFFICE O/P EST LOW 20 MIN: CPT | Performed by: INTERNAL MEDICINE

## 2021-08-20 PROCEDURE — G8417 CALC BMI ABV UP PARAM F/U: HCPCS | Performed by: INTERNAL MEDICINE

## 2021-08-20 PROCEDURE — 1036F TOBACCO NON-USER: CPT | Performed by: INTERNAL MEDICINE

## 2021-08-20 PROCEDURE — G9899 SCRN MAM PERF RSLTS DOC: HCPCS | Performed by: INTERNAL MEDICINE

## 2021-08-20 PROCEDURE — G8427 DOCREV CUR MEDS BY ELIG CLIN: HCPCS | Performed by: INTERNAL MEDICINE

## 2021-08-20 NOTE — PROGRESS NOTES
Patient Name: Darrell Aldrich  Patient : 1965  Patient MRN: [id number]     Primary Oncologist: Court Oconnell MD  Referring Provider: REGINALD Jones     Date of Service: 2021      Chief Complaint:   No chief complaint on file. Patient Active Problem List:     Malignant neoplasm of upper-outer quadrant of right female breast       HPI:   Ms. Nadege Felder is a very pleasant 77-year-old patient with medical history significant for hypertension, diabetes mellitus, initially referred to me on 2015, for evaluation of right breast infiltrating ductal carcinoma. She stated that she felt a knot in her right breast and she went to the primary care physician for that. She had mammogram followed by sonogram on 2015, and it showed spiculated mass in the anterior 12 o'clock position in the right breast, most likely representing malignancy. She underwent sonoguided biopsy of the lesion on 2015, and the final pathology was consistent with infiltrating ductal carcinoma. Estrogen receptors positive (95 percent strong), progesterone receptor positive (75 percent), and HER-2/jossy by immunohistochemical stain is equivocal and HER-2/jossy by FISH study was not amplified. She was evaluated by Dr. Jesu Melendez and he requested core biopsy of the right breast axillary lymph node and it was done on 2015. Final pathology showed metastatic carcinoma consistent with breast primary involving all cores (10 mm in the largest dimension). Since she wanted to preserve her breast if that is possible, Dr. Jesu Melendez discussed her case in our Tumor Board. Consensus was to give neoadjuvant chemotherapy followed by surgery. Neoadjuvant chemotherapy with doxorubicin, cyclophosphamide followed by Paclitaxel was started on 2015 and she has completed it on 2015. Ms. Paz underwent right breast lumpectomy on 2015, and the final pathology was consistent with stage IIB right breast infiltrating ductal carcinoma. There were two tumor sites less than 2 cm apart and the tumor is located at the 12 o'clock position. One tumor is 2.1 cm in size and another one is 0.4 cm in size. It is low-grade tumor and DCIS present. Estrogen receptor strongly positive (95 percent), progesterone receptor positive (75 percent), and HER-2 by FISH is not amplified. All the surgical margins were negative and the lymphovascular invasion present in the axillary dissection. One out of 19 lymph nodes were positive for macrometastases. Adjuvant hormonal therapy with letrozole was started on September 1, 2015. She was started with adjuvant radiation therapy by Dr. Becca Rosado and she completed it successfully on November 11, 2015. Ms. Paz had diagnostic right breast digital tomosynthesis mammograph on March 10, 2016, and it showed stable skin thickening and trabecular thickening. In the absence of clinical infectious signs and symptoms this is likely related to post-treatment change. Slight decrease in size of right breast hematoma. No evidence of malignancy. Recommend clinical follow up as necessary. She also had an abnormal MRI of the right breast in April 2017 and we believe it was due to surgical changes at that time. Because of her concern and symptoms of nipple inversion, we requested a targeted sonogram of the right breast.  It was done on August 15, 2017, and it showed 8 mm mass at the 9 o'clock in the right breast, likely corresponding to an enhancing mass seen on the MRI. Radiologist recommended sonoguided biopsy of the lesion. She underwent the sonoguided biopsy of the lesion on August 21, 2017, and final pathology was negative for invasive carcinoma. I discussed with Dr. Ondina Elizondo since she was found to have some atypical epithelial cells on the previous biopsy. We both believed that it is due to changes from the prior surgery and the radiation treatment.   We will recommend to continue with close observation. MRI of the breast done on 12/18/18 showed 1.7 cm ill-defined area of abnormal enhancement along the ventral margin of the patient's scar upper-outer quadrant posterior third right breast. Targeted right breast ultrasound and diagnostic right mammogram is warranted for complete assessment. She had incisional biopsy of the right breast suspicious site on 1/25/19 and final pathology only revealed changes related to surgery and radiation. She had EGD on 3/14 and colonoscopy on 3/20/19 by Dr. Jessica Bonner. Colonoscopy showed 8 mm polyp in cecum, 4 mm polyp in descending colon, and internal hemorrhoids. DEXA scan done on August 21, 2019 showed normal bone mineral density by WHO criteria. She required to have exploratory laparotomy, removal of previously placed prosthesis mesh, small bowel resection and placement of Vicryl mesh for closure of abdominal wall defects on 10/18/20, for her recurrent hernia with pneumatosis of the small bowel and free air. Mammogram done on January 11, 2021 showed probably benign findings in the bilateral breasts and radiologist recommend to have repeat mammogram in 6 months. Mammogram done on 07/29/2021 showed stable, probably benign changes of the right lumpectomy bed. Recommend continued clinical follow-up of this area. Also recommend short-term follow-up in 6 months with a right breast diagnostic tomosynthesis. On August 19, 2021, she presented to me for follow up. I have been following Ms. Paz for right breast infiltrating ductal carcinoma and she is status post neoadjuvant chemotherapy followed by right breast lumpectomy and sentinel lymph node biopsy. She is also status post adjuvant radiation therapy and she is currently on adjuvant endocrine therapy with letrozole. She is tolerating letrozole very well and she does not encounter any major side effects from it. She did have hot flashes due to letrozole. However it is getting better lately and that she does not get hot flashes that often. She is not on any medication for hot flashes at this moment. I recommend her to continue with letrozole on a regular basis. I will continue to follow her closely during adjuvant endocrine therapy with letrozole. She does not have any signs or symptoms suggestive of recurrent or metastatic breast cancer at today's visit. I believe she is in complete clinical remission. She is more than 5 years on letrozole and we will continue with that for now. Since she has stage IIB disease, we decided to give total 7 years of adjuvant endocrine therapy with letrozole. Repeat mammogram on 7/29/2021 showed probably benign findings and radiologist recommend her to have repeat mammogram in 6 months. I will schedule her to have repeat mammogram in 1/31/2022 and I will follow-up with the results. Health maintenance - I commend her to have age-appropriate cancer screening, exercise, low-fat and low-sodium diet. She does not have any significant symptoms at today's visit. PAST MEDICAL HISTORY:  Significant for:  1. Hypertension. 2.         Diabetes mellitus. PAST SURGICAL HISTORY:  Significant for:  1. Total abdominal hysterectomy and bilateral salpingo-oophorectomy in 2006. 2.         Cholecystectomy. 3.         Hernia surgery. FAMILY HISTORY:  Significant for ovarian cancer in her sister and breast cancer in a couple of her aunts. No other family history of cancer disease. SOCIAL HISTORY:  She denies smoking, alcohol drinking, and illicit drug abuse. She is single and has no children. She is not working right now. ALLERGIES:  She claims to have allergies to Codeine, penicillin, and Darvocet. Review of Systems: \"Per interval history; otherwise 10 point ROS is negative. \"   Her energy level is fair, appetite, and sleep are good.  She denies fever, chills, night sweats, cough, shortness of breath, chest pain, hemoptysis or palpitations. No anxiety or depression. Her bowel and bladder functions are normal. She denies nausea, vomiting, abdominal pain, diarrhea, constipation, dysuria, loss of appetite or weight loss. She doesn't have neuropathy and she denies bleeding or clotting issues. She doesn't have any pain in her body. The rest of the systems are unremarkable. Vital Signs: There were no vitals taken for this visit. Physical Exam:  CONSTITUTIONAL: awake, no apparent distress, alert, cooperative,    EYES: pupils equal, round and reactive to light, sclera clear and conjunctiva normal  ENT: Normocephalic, atraumatic, without obvious abnormality,   NECK: supple, symmetrical, no jugular venous distension and no carotid bruits   HEMATOLOGIC/LYMPHATIC: no cervical, supraclavicular or axillary lymphadenopathy   LUNGS: VBS, no wheezes, no crackles, clear to auscultation, no rhonchi, no increased work of breathing,   CARDIOVASCULAR: regular rate and rhythm, normal S1 and S2, no murmur noted  ABDOMEN: normal bowel sounds x 4, soft, non-distended, non-tender, no masses palpated, no hepatosplenomegaly   MUSCULOSKELETAL: full range of motion noted, tone is normal  NEUROLOGIC: awake, alert, oriented to name, place and time. Motor skills grossly intact. SKIN: Normal skin color, texture, turgor and no jaundice.  appears intact   EXTREMITIES: no leg swelling, no clubbing, no cyanosis, no LE edema,   BREASTS: post op changes noted in her right breast, no palpable suspicious mass in her right breast, no palpable mass in left breast and bilateral axilla     Labs:  Hematology:  Lab Results   Component Value Date    WBC 7.7 08/02/2021    RBC 4.43 08/02/2021    HGB 12.7 08/02/2021    HCT 39.7 08/02/2021    MCV 89.6 08/02/2021    MCH 28.7 08/02/2021    MCHC 32.0 08/02/2021    RDW 13.4 08/02/2021     08/02/2021    MPV 10.7 08/02/2021    BANDSPCT 8 06/17/2018    SEGSPCT 71.9 (H) 08/02/2021 EOSRELPCT 2.2 08/02/2021    BASOPCT 0.5 08/02/2021    LYMPHOPCT 18.8 (L) 08/02/2021    MONOPCT 6.2 (H) 08/02/2021    BANDABS 0.66 06/17/2018    SEGSABS 5.6 08/02/2021    EOSABS 0.2 08/02/2021    BASOSABS 0.0 08/02/2021    LYMPHSABS 1.5 08/02/2021    MONOSABS 0.5 08/02/2021    DIFFTYPE AUTOMATED DIFFERENTIAL 08/02/2021    POLYCHROM 1+ 06/17/2018    PLTM SEVERAL LARGE PLATELETS 51/42/4552     No results found for: ESR  Chemistry:  Lab Results   Component Value Date     08/02/2021    K 4.0 08/02/2021    CL 99 08/02/2021    CO2 28 08/02/2021    BUN 10 08/02/2021    CREATININE 0.7 08/02/2021    GLUCOSE 131 (H) 08/02/2021    CALCIUM 9.5 08/02/2021    PROT 6.8 06/04/2021    LABALBU 4.1 06/04/2021    BILITOT 0.2 06/04/2021    ALKPHOS 87 06/04/2021    AST 20 06/04/2021    ALT 22 06/04/2021    LABGLOM >60 08/02/2021    GFRAA >60 08/02/2021    PHOS 2.4 (L) 10/21/2020    MG 1.9 10/21/2020    POCGLU 160 (H) 08/05/2021     No results found for: MMA, LDH, HOMOCYSTEINE  No components found for: LD  Lab Results   Component Value Date    TSHHS 3.070 05/10/2019     Immunology:  Lab Results   Component Value Date    PROT 6.8 06/04/2021     No results found for: [place], KLFLCR  No results found for: B2M  Coagulation Panel:  Lab Results   Component Value Date    PROTIME 14.5 (H) 06/11/2018    INR 1.27 06/11/2018    APTT 31.1 06/11/2018    DDIMER 256 (H) 06/05/2021     Anemia Panel:  No results found for: [id number], FOLATE  Tumor Markers:  Lab Results   Component Value Date    LABCA2 13.6 04/16/2021     Observations:  No data recorded        Assessment & Plan:   Right breast infiltrating ductal carcinoma (estrogen receptor positive, progesterone receptor positive, and HER-2/jossy negative). PLAN:  Ms. Raheem Brown has been followed for her right breast cancer.      She required to have exploratory laparotomy, removal of previously placed prosthesis mesh, small bowel resection and placement of Vicryl mesh for closure of abdominal wall defects on 10/18/20, for her recurrent hernia with pneumatosis of the small bowel and free air. Mammogram done on January 11, 2021 showed probably benign findings in the bilateral breasts and radiologist recommend to have repeat mammogram in 6 months. Mammogram done on 07/29/2021 showed stable, probably benign changes of the right lumpectomy bed. Recommend continued clinical follow-up of this area. Also recommend short-term follow-up in 6 months with a right breast diagnostic tomosynthesis. On August 19, 2021, she presented to me for follow up. I have been following Ms. Paz for right breast infiltrating ductal carcinoma and she is status post neoadjuvant chemotherapy followed by right breast lumpectomy and sentinel lymph node biopsy. She is also status post adjuvant radiation therapy and she is currently on adjuvant endocrine therapy with letrozole. She is tolerating letrozole very well and she does not encounter any major side effects from it. She did have hot flashes due to letrozole. However it is getting better lately and that she does not get hot flashes that often. She is not on any medication for hot flashes at this moment. I recommend her to continue with letrozole on a regular basis. I will continue to follow her closely during adjuvant endocrine therapy with letrozole. She does not have any signs or symptoms suggestive of recurrent or metastatic breast cancer at today's visit. I believe she is in complete clinical remission. She is more than 5 years on letrozole and we will continue with that for now. Since she has stage IIB disease, we decided to give total 7 years of adjuvant endocrine therapy with letrozole. Repeat mammogram on 7/29/2021 showed probably benign findings and radiologist recommend her to have repeat mammogram in 6 months. I will schedule her to have repeat mammogram in 1/31/2022 and I will follow-up with the results.       [facility] maintenance - I commend her to have age-appropriate cancer screening, exercise, low-fat and low-sodium diet. I answered all her questions and concerns for today. I asked her to followup with her primary care physician, Dr. Eb Yates and Dr. Nancy Cardona on regular basis. Recent imaging and labs were reviewed and discussed with the patient.

## 2021-08-20 NOTE — PROGRESS NOTES
MA Rooming Questions  Patient: Paz Hager  MRN: [id number]    Date: 8/20/2021        1. Do you have any new issues?   no         2. Do you need any refills on medications?    no    3. Have you had any imaging done since your last visit? yes -     4. Have you been hospitalized or seen in the emergency room since your last visit here?   yes -     5. Did the patient have a depression screening completed today?  No    No data recorded     PHQ-9 Given to (if applicable):               PHQ-9 Score (if applicable):                     [] Positive     []  Negative              Does question #9 need addressed (if applicable)                     [] Yes    []  No               Crystal Reyes CMA

## 2021-10-10 ENCOUNTER — APPOINTMENT (OUTPATIENT)
Dept: CT IMAGING | Age: 56
End: 2021-10-10
Payer: COMMERCIAL

## 2021-10-10 ENCOUNTER — HOSPITAL ENCOUNTER (EMERGENCY)
Age: 56
Discharge: HOME | End: 2021-10-10
Attending: EMERGENCY MEDICINE
Payer: COMMERCIAL

## 2021-10-10 VITALS
SYSTOLIC BLOOD PRESSURE: 135 MMHG | BODY MASS INDEX: 41.66 KG/M2 | HEART RATE: 87 BPM | WEIGHT: 266 LBS | RESPIRATION RATE: 18 BRPM | TEMPERATURE: 100.2 F | OXYGEN SATURATION: 97 % | DIASTOLIC BLOOD PRESSURE: 82 MMHG

## 2021-10-10 DIAGNOSIS — R19.7 DIARRHEA, UNSPECIFIED TYPE: Primary | ICD-10-CM

## 2021-10-10 DIAGNOSIS — R10.84 GENERALIZED ABDOMINAL PAIN: ICD-10-CM

## 2021-10-10 LAB
ALBUMIN SERPL-MCNC: 4.2 GM/DL (ref 3.4–5)
ALP BLD-CCNC: 89 IU/L (ref 40–129)
ALT SERPL-CCNC: 20 U/L (ref 10–40)
ANION GAP SERPL CALCULATED.3IONS-SCNC: 7 MMOL/L (ref 4–16)
AST SERPL-CCNC: 18 IU/L (ref 15–37)
BACTERIA: ABNORMAL /HPF
BASOPHILS ABSOLUTE: 0 K/CU MM
BASOPHILS RELATIVE PERCENT: 0.5 % (ref 0–1)
BILIRUB SERPL-MCNC: 0.3 MG/DL (ref 0–1)
BILIRUBIN URINE: NEGATIVE MG/DL
BLOOD, URINE: NEGATIVE
BUN BLDV-MCNC: 6 MG/DL (ref 6–23)
CALCIUM SERPL-MCNC: 9.1 MG/DL (ref 8.3–10.6)
CAST TYPE: NEGATIVE /HPF
CHLORIDE BLD-SCNC: 103 MMOL/L (ref 99–110)
CLARITY: ABNORMAL
CO2: 28 MMOL/L (ref 21–32)
COLOR: YELLOW
CREAT SERPL-MCNC: 0.7 MG/DL (ref 0.6–1.1)
CRYSTAL TYPE: NEGATIVE /HPF
DIFFERENTIAL TYPE: ABNORMAL
EOSINOPHILS ABSOLUTE: 0.1 K/CU MM
EOSINOPHILS RELATIVE PERCENT: 1 % (ref 0–3)
EPITHELIAL CELLS, UA: ABNORMAL /HPF
GFR AFRICAN AMERICAN: >60 ML/MIN/1.73M2
GFR NON-AFRICAN AMERICAN: >60 ML/MIN/1.73M2
GLUCOSE BLD-MCNC: 143 MG/DL (ref 70–99)
GLUCOSE, URINE: ABNORMAL MG/DL
HCT VFR BLD CALC: 39.2 % (ref 37–47)
HEMOGLOBIN: 12.6 GM/DL (ref 12.5–16)
IMMATURE NEUTROPHIL %: 0.3 % (ref 0–0.43)
KETONES, URINE: NEGATIVE MG/DL
LACTATE: 2 MMOL/L (ref 0.4–2)
LEUKOCYTE ESTERASE, URINE: NEGATIVE
LIPASE: 31 IU/L (ref 13–60)
LYMPHOCYTES ABSOLUTE: 1.2 K/CU MM
LYMPHOCYTES RELATIVE PERCENT: 20.8 % (ref 24–44)
MCH RBC QN AUTO: 27.8 PG (ref 27–31)
MCHC RBC AUTO-ENTMCNC: 32.1 % (ref 32–36)
MCV RBC AUTO: 86.3 FL (ref 78–100)
MONOCYTES ABSOLUTE: 0.4 K/CU MM
MONOCYTES RELATIVE PERCENT: 6.8 % (ref 0–4)
NITRITE URINE, QUANTITATIVE: NEGATIVE
PDW BLD-RTO: 13.4 % (ref 11.7–14.9)
PH, URINE: 5.5 (ref 5–8)
PLATELET # BLD: 228 K/CU MM (ref 140–440)
PMV BLD AUTO: 10.6 FL (ref 7.5–11.1)
POTASSIUM SERPL-SCNC: 3.9 MMOL/L (ref 3.5–5.1)
PROTEIN UA: NEGATIVE MG/DL
RBC # BLD: 4.54 M/CU MM (ref 4.2–5.4)
RBC URINE: NEGATIVE /HPF (ref 0–6)
SEGMENTED NEUTROPHILS ABSOLUTE COUNT: 4.1 K/CU MM
SEGMENTED NEUTROPHILS RELATIVE PERCENT: 70.6 % (ref 36–66)
SODIUM BLD-SCNC: 138 MMOL/L (ref 135–145)
SPECIFIC GRAVITY UA: 1.01 (ref 1–1.03)
TOTAL IMMATURE NEUTOROPHIL: 0.02 K/CU MM
TOTAL PROTEIN: 7 GM/DL (ref 6.4–8.2)
UROBILINOGEN, URINE: 0.2 MG/DL (ref 0.2–1)
WBC # BLD: 5.8 K/CU MM (ref 4–10.5)
WBC UA: NEGATIVE /HPF (ref 0–5)

## 2021-10-10 PROCEDURE — 80053 COMPREHEN METABOLIC PANEL: CPT

## 2021-10-10 PROCEDURE — 81001 URINALYSIS AUTO W/SCOPE: CPT

## 2021-10-10 PROCEDURE — 74177 CT ABD & PELVIS W/CONTRAST: CPT

## 2021-10-10 PROCEDURE — 99283 EMERGENCY DEPT VISIT LOW MDM: CPT

## 2021-10-10 PROCEDURE — 96375 TX/PRO/DX INJ NEW DRUG ADDON: CPT

## 2021-10-10 PROCEDURE — 83605 ASSAY OF LACTIC ACID: CPT

## 2021-10-10 PROCEDURE — 85025 COMPLETE CBC W/AUTO DIFF WBC: CPT

## 2021-10-10 PROCEDURE — 96374 THER/PROPH/DIAG INJ IV PUSH: CPT

## 2021-10-10 PROCEDURE — 2580000003 HC RX 258: Performed by: EMERGENCY MEDICINE

## 2021-10-10 PROCEDURE — 83690 ASSAY OF LIPASE: CPT

## 2021-10-10 PROCEDURE — 6360000004 HC RX CONTRAST MEDICATION: Performed by: EMERGENCY MEDICINE

## 2021-10-10 PROCEDURE — 6360000002 HC RX W HCPCS: Performed by: EMERGENCY MEDICINE

## 2021-10-10 RX ORDER — DICYCLOMINE HYDROCHLORIDE 10 MG/1
10 CAPSULE ORAL 4 TIMES DAILY PRN
Qty: 20 CAPSULE | Refills: 1 | Status: SHIPPED | OUTPATIENT
Start: 2021-10-10

## 2021-10-10 RX ORDER — 0.9 % SODIUM CHLORIDE 0.9 %
1000 INTRAVENOUS SOLUTION INTRAVENOUS ONCE
Status: COMPLETED | OUTPATIENT
Start: 2021-10-10 | End: 2021-10-10

## 2021-10-10 RX ORDER — KETOROLAC TROMETHAMINE 30 MG/ML
30 INJECTION, SOLUTION INTRAMUSCULAR; INTRAVENOUS ONCE
Status: COMPLETED | OUTPATIENT
Start: 2021-10-10 | End: 2021-10-10

## 2021-10-10 RX ORDER — ONDANSETRON 4 MG/1
4 TABLET, ORALLY DISINTEGRATING ORAL 3 TIMES DAILY PRN
Qty: 21 TABLET | Refills: 0 | Status: SHIPPED | OUTPATIENT
Start: 2021-10-10 | End: 2022-03-04

## 2021-10-10 RX ORDER — ONDANSETRON 2 MG/ML
4 INJECTION INTRAMUSCULAR; INTRAVENOUS ONCE
Status: COMPLETED | OUTPATIENT
Start: 2021-10-10 | End: 2021-10-10

## 2021-10-10 RX ORDER — MORPHINE SULFATE 4 MG/ML
4 INJECTION, SOLUTION INTRAMUSCULAR; INTRAVENOUS ONCE
Status: DISCONTINUED | OUTPATIENT
Start: 2021-10-10 | End: 2021-10-10 | Stop reason: HOSPADM

## 2021-10-10 RX ORDER — ROSUVASTATIN CALCIUM 20 MG/1
20 TABLET, COATED ORAL DAILY
COMMUNITY
Start: 2021-09-30 | End: 2022-06-29

## 2021-10-10 RX ORDER — EMPAGLIFLOZIN 10 MG/1
25 TABLET, FILM COATED ORAL
COMMUNITY
Start: 2021-10-01 | End: 2022-10-27

## 2021-10-10 RX ADMIN — SODIUM CHLORIDE 1000 ML: 9 INJECTION, SOLUTION INTRAVENOUS at 15:36

## 2021-10-10 RX ADMIN — ONDANSETRON 4 MG: 2 INJECTION INTRAMUSCULAR; INTRAVENOUS at 15:37

## 2021-10-10 RX ADMIN — KETOROLAC TROMETHAMINE 30 MG: 30 INJECTION, SOLUTION INTRAMUSCULAR at 15:37

## 2021-10-10 RX ADMIN — IOPAMIDOL 100 ML: 755 INJECTION, SOLUTION INTRAVENOUS at 16:38

## 2021-10-10 ASSESSMENT — PAIN SCALES - GENERAL
PAINLEVEL_OUTOF10: 6
PAINLEVEL_OUTOF10: 6

## 2021-10-10 ASSESSMENT — PAIN DESCRIPTION - LOCATION: LOCATION: ABDOMEN

## 2021-10-10 ASSESSMENT — PAIN DESCRIPTION - PAIN TYPE: TYPE: ACUTE PAIN

## 2021-10-10 NOTE — ED PROVIDER NOTES
Emergency Department Encounter    Patient: Cici Rey  MRN: [id number]  : 1965  Date of Evaluation: 10/11/2021  ED Provider:  Lacho Sesay MD      Triage Chief Complaint:   Diarrhea (since tuesday ) and Abdominal Pain      [place]:  Cici Rey is a 64 y.o. female that presents to the emergency department with recurrent diarrhea beginning Tuesday, about 5 days prior to presentation. She continues to have frequent loose stools including prior to arrival today. This is associated with a pressure-like quality in the left upper quadrant abdomen. Within the past 4 to 5 days, the discomfort has settled into the lower middle abdomen. She has noted no blood or mucus in the stools. She denies any fevers. Patient is concerned because she has a history of 2 bowel surgeries with partial colectomy due to a \"leak\" with Dr. Angie Albert. She indicates that she was referred to the emergency department for evaluation to ensure that there were no complications. She denies any fevers or urinary symptoms. She denies any recent antibiotics. She denies any known history of C. difficile colitis. Patient reports no other particular provocative or alleviating factors. ROS - see HPI, below listed is current ROS at time of my eval:  CONSTITUTIONAL: No fevers, chills, or sweats. EYES: No vision change, redness, drainage, or discharge. HENT: No sore throat, runny nose, or earache. No dental pain. No painful swallowing. RESPIRATORY: No shortness of breath, cough, or sputum production. CARDIOVASCULAR: No anginal-type chest pain, orthopnea, or edema. GASTROINTESTINAL: As above. GENITOURINARY: No frequency, urgency, or dysuria. No hematuria. MUSCULOSKELETAL: No recent injury. No neck, back, or extremity pain. NEUROLOGICAL: No focal weakness, numbness, or tingling. SKIN: No rashes or other lesions reported. No yellowing of the skin.     Medical history:  Past Medical History:   Diagnosis Date    Abnormal ECG 01/29/2019    Acid reflux     Anxiety     Arthritis     \"Back, Legs, Knees And Feet\"    Back problem     \"Three Discs Smashed Together Lower Back\"    B[facility]     Chronic back pain     Chronic cough 01/31/2020    Class 3 severe obesity with body mass index (BMI) of 40.0 to 44.9 in adult ([address]     COPD, moderate ([address] 01/31/2020    Curvature of spine     \"Wore A Brace For Three Years\"    Depression     Diabetes mellitus ([address] Dx 2010    Diverticulitis     Esophageal dilatation 04/12/2017    Family history of sudden death 01/29/2019    Mother at age 58    Full dentures     H/O 24 hour EKG monitoring 01/29/2019    Conclusion: Normal study suggesting normal sinus rhythm with no clinically significant arrhythmias.  H/O cardiac catheterization 08/05/2021    normal coronaries, normal LVEDP    H/O cardiovascular stress test 02/12/2019    EF65% Normal    H/O echocardiogram 06/02/2021    Normal left ventricle structure and systolic function with an ejection 55-60% No significant valvular disease noted.  EEJKZZIC(902.2)     \"Occ\"    Heart murmur     No Cardiologist At This Time    History of nuclear stress test 02/12/2019    EF 65%. Normal study.     [place] (hard of hearing)     Bilateral Hearing Aides    Hydronephrosis     dx with hydronephrosis with admission 6/2018- right ureteral stone/\"had kidney stone stone about 2-3 yrs ago and passed it\"    Hyperlipidemia     Hypertension     Macular degeneration     Right Eye    Mild persistent asthma without complication 29/42/3414    MRSA (methicillin resistant Staphylococcus aureus)     After PRICILLA In 2006 (Abdomen)    Near syncope 01/29/2019 1/28/19 in ED [place]    Panic attacks     PONV (postoperative nausea and vomiting)     denies any motion sickness    Right Breast Cancer Dx 8-15    Right Breast Lumpectomy, Had Chemotherapy And Radiation Treatments    S/P exploratory laparotomy 10/18/2020    explore lap for small bowel perf repair and abdominal wall hernia repair    Seizure ([facility] [address]     \"when I was a young kid had seizure and was on medication- not sure how old when last seizure but as a kid\"    Sepsis ([facility] [address]     per old chart - admitted 6/2018 with sepsis secondary to wound infection    Shortness of breath 02/04/2020    Sleep apnea     Has CPAP- last sleep study 2-3 yrs ago\"    Thyroid disease     \"Thyroid Problems As A Child\"    Type 2 diabetes mellitus without complication, without long-term current use of insulin ([facility] [address] [facility] 07/15/2021    Urinary incontinence     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    WD-Wound of right breast 10/28/2016    Wears glasses      Past Surgical History:   Procedure Laterality Date    ABDOMEN SURGERY  06/2018    per old chart pt had exploratory abd wound with drainage of infected seroma with wound vac    ABDOMINAL HERNIA REPAIR  10/18/2020    BREAST BIOPSY Right 2015    Right Breast Cancer , \"I Have A Chip In The Breast\"    BREAST BIOPSY Right 2017    Benign    BREAST LUMPECTOMY Right 08/2015    Right Breast Cancer    CHOLECYSTECTOMY, LAPAROSCOPIC  2007    COLONOSCOPY  2016    dr green 1 polyp    COLONOSCOPY  3/2019; 04/12/2017    3/2019 WNL; 4/2017 Internal hemorroids, diverticulosis    CYSTOSCOPY  2016    Kidney Stones    CYSTOSCOPY      per old chart with admission 6/2018 pt had cysto, right RGPG and attempted to insert right ureteral stent    CYSTOSCOPY  07/03/2018    cysto, right stent removal, right ureteroscopy, laser litho    DENTAL SURGERY      All Teeth Extracted In Past    ENDOSCOPY, COLON, DIAGNOSTIC  04/12/2017    EGD: Dilatation, Hiatal hernia, mild gastritis    HERNIA REPAIR  2007    Abdominal Hernia Repair With Mesh    HERNIA REPAIR  2012    Ventral Hernia Repair With Mesh    HYSTERECTOMY, TOTAL ABDOMINAL  2006    LAPAROSCOPY  2005    Laparoscopy, D & C, Hysteroscopy    LAPAROTOMY N/A 10/18/2020    LAPAROTOMY EXPLORATORY WITH SMALL BOWEL RESECTION, REMOVAL OF PROSTHETIC (GLUCOPHAGE) 500 MG tablet Take 500 mg by mouth daily (with breakfast)       budesonide-formoterol (SYMBICORT) 160-4.5 MCG/ACT AERO Inhale 2 puffs into the lungs 2 times daily 1 Inhaler 3    pantoprazole (PROTONIX) 40 MG tablet       loperamide (RA ANTI-DIARRHEAL) 2 MG capsule Take 1 capsule by mouth 4 times daily as needed for Diarrhea 40 capsule 0    fexofenadine (ALLEGRA ALLERGY) 60 MG tablet Take 60 mg by mouth every 12 hours as needed       FLUoxetine (PROZAC) 10 MG capsule Take 1 capsule by mouth daily      hydroCHLOROthiazide (MICROZIDE) 12.5 MG capsule Take 1 capsule by mouth daily      hydrOXYzine (VISTARIL) 25 MG capsule Take 1 capsule by mouth daily      losartan (COZAAR) 100 MG tablet Take 1 tablet by mouth daily       Allergies   Allergen Reactions    Latex      \"Redness\"    Atorvastatin      Severe myalgias    Codeine      \"I Don't Remember The Reaction , I Was A Child\"    Darvon [Propoxyphene]      \"I Don't Remember The Reaction, I Was A Child\"    Pcn [Penicillins]      \"I Don't Remember The Reaction, I Was A Child\"       Nursing Notes Reviewed    Physical Exam:  Triage VS:    ED Triage Vitals [10/10/21 1136]   Enc Vitals Group      /82      Pulse 87      Resp 18      Temp 100.2 °F (37.9 °C)      Temp Source Oral      SpO2 97 %      Weight 266 lb (120.7 kg)      Height       Head Circumference       Peak Flow       Pain Score       Pain Loc       Pain Edu? Excl. in [address]? My pulse ox interpretation is -normal on room air    GENERAL: Patient is awake, alert, and oriented appropriately. Patient is resting comfortably in a still position on the exam table. Patient speaking in full and complete sentences. Well-nourished and well-developed. HEENT: Normocephalic and atraumatic. Pupils equal, round, and reactive to light. No redness or matting. Bilateral external ears are unremarkable. Nasal mucosa is pink without purulence. Oral mucosa is moist and pink.     NECK: Supple with normal range of motion. No Kernig's or Brudzinski sign. No JVD. RESPIRATORY: Symmetric aeration. No respiratory distress. No wheeze, stridor, rales, rhonchi. Chest wall stable and nontender. CARDIOVASCULAR: Regular rate and rhythm. No murmurs, rubs, or gallops. No central or peripheral cyanosis. GASTROINTESTINAL: Mildly protuberant. Mild diffuse tenderness with no McBurney's or Mejia's point tenderness. No other focal tenderness. No involuntary guarding, rebound, or rigidity. No mass or pulsatile mass. Bowel sounds normal.  No CVA tenderness. NEUROLOGICAL: Awake, alert and oriented x 3. Cranial nerves III through XII are grossly intact as tested without facial droop or dermatomal paresthesias. Of note, forehead wrinkles are symmetric and intact. Conjugate gaze without entrapment. No asymmetry of the corners of the mouth or nasolabial folds. No gross motor or cerebellar deficits. BACK/MUSCULOSKELETAL: No asymmetric edema, Eugene Embs' sign, or cords. SKIN: Normal tone for ethnicity. Normal turgor and brisk capillary refill peripherally. PSYCHIATRIC: Normal mood. Normal affect. Emergency department course. Initial advanced triage orders were placed by nursing. Patient is brought to bed 10 then and assessed and reassessed by me. After initial evaluation, additional orders are placed for CT of the abdomen pelvis to assess for possible signs of ischemia or obstruction. Abdomen is tender, but there are no peritoneal findings. Laboratory testing is generally reassuring. There is no inappropriate acidosis. Patient is very concerned about bowel leak, particularly with her history of surgeries. Normal saline 1 L bolus is ordered along with morphine 4 mg and ondansetron 4 mg. Ketorolac 30 mg ordered for pain and low-grade temperature elevation. Patient is agreeable to continuing plan. Patient is assessed and reassessed on multiple occasions.   Abdomen has been nonsurgical. CT scan does show some distention of the intestines without evidence of obstruction or perforation. Radiology indicates that this likely represents a mild colonic ileus. I believe this is consistent with a recent diarrheal illness. There appears to be no evidence of an infectious or surgical emergency. Patient's laboratory evaluation is similarly reassuring. I have tried to offer reassurance that there is not appear to be a finding that would require emergent surgery or transfer to higher level of care. Patient did have a low grade temperature elevation, so certainly consider a viral source for diarrhea. We have discussed conscious hydration, diet modification care, and careful outpatient follow-up with her primary care provider and surgeon. We have discussed all available results. Patient is satisfied with evaluation and agreeable to recommendations. Patient has had the opportunity to ask questions, and they have been answered to the best of my ability. Instructions are given to follow-up with primary care provider for reevaluation and further testing. Very strict return and follow-up instructions are provided. Patient seen during [facility], I did don appropriate PPE during my encounters with the patient, including n95 (when appropriate) mask and eye protection as appropriate.     I have reviewed and interpreted all of the currently available lab results from this visit (if applicable):  Results for orders placed or performed during the hospital encounter of 10/10/21   CBC auto diff   Result Value Ref Range    WBC 5.8 4.0 - 10.5 K/CU MM    RBC 4.54 4.2 - 5.4 M/CU MM    Hemoglobin 12.6 12.5 - 16.0 GM/DL    Hematocrit 39.2 37 - 47 %    MCV 86.3 78 - 100 FL    MCH 27.8 27 - 31 PG    MCHC 32.1 32.0 - 36.0 %    RDW 13.4 11.7 - 14.9 %    Platelets 644 954 - 789 K/CU MM    MPV 10.6 7.5 - 11.1 FL    Differential Type AUTOMATED DIFFERENTIAL     Segs Relative 70.6 (H) 36 - 66 %    Lymphocytes % 20.8 (L) 24 - 44 %    Monocytes % 6.8 (H) 0 - 4 %    Eosinophils % 1.0 0 - 3 %    Basophils % 0.5 0 - 1 %    Segs Absolute 4.1 K/CU MM    Lymphocytes Absolute 1.2 K/CU MM    Monocytes Absolute 0.4 K/CU MM    Eosinophils Absolute 0.1 K/CU MM    Basophils Absolute 0.0 K/CU MM    Immature Neutrophil % 0.3 0 - 0.43 %    Total Immature Neutrophil 0.02 K/CU MM   CMP   Result Value Ref Range    Sodium 138 135 - 145 MMOL/L    Potassium 3.9 3.5 - 5.1 MMOL/L    Chloride 103 99 - 110 mMol/L    CO2 28 21 - 32 MMOL/L    BUN 6 6 - 23 MG/DL    CREATININE 0.7 0.6 - 1.1 MG/DL    Glucose 143 (H) 70 - 99 MG/DL    Calcium 9.1 8.3 - 10.6 MG/DL    Albumin 4.2 3.4 - 5.0 GM/DL    Total Protein 7.0 6.4 - 8.2 GM/DL    Total Bilirubin 0.3 0.0 - 1.0 MG/DL    ALT 20 10 - 40 U/L    AST 18 15 - 37 IU/L    Alkaline Phosphatase 89 40 - 129 IU/L    GFR Non-African American >60 >60 mL/min/1.73m2    GFR African American >60 >60 mL/min/1.73m2    Anion Gap 7 4 - 16   Lactate, Plasma   Result Value Ref Range    Lactate 2.0 0.4 - 2.0 mMOL/L   Urinalysis with microscopic   Result Value Ref Range    Color, UA YELLOW YELLOW    Clarity, UA HAZY (A) CLEAR    Glucose, Urine 2+ NEGATIVE MG/DL    Bilirubin Urine NEGATIVE NEGATIVE MG/DL    Ketones, Urine NEGATIVE NEGATIVE MG/DL    Specific Gravity, UA 1.010 1.001 - 1.035    Blood, Urine NEGATIVE NEGATIVE    pH, Urine 5.5 5.0 - 8.0    Protein, UA NEGATIVE NEGATIVE MG/DL    Urobilinogen, Urine 0.2 0.2 - 1.0 MG/DL    Nitrite Urine, Quantitative NEGATIVE NEGATIVE    Leukocyte Esterase, Urine NEGATIVE NEGATIVE    RBC, UA NEGATIVE 0 - 6 /HPF    WBC, UA NEGATIVE 0 - 5 /HPF    Epithelial Cells, UA 0 TO 1 /HPF    Cast Type NEGATIVE (A) NO CAST FORMS SEEN /HPF    Bacteria, UA FEW NEGATIVE /HPF    Crystal Type NEGATIVE NEGATIVE /HPF   Lipase   Result Value Ref Range    Lipase 31 13 - 60 IU/L        Radiographs (if obtained):  Radiologist's Report Reviewed:  CT ABDOMEN PELVIS W IV CONTRAST Additional Contrast? None    Result Date: 10/10/2021  EXAMINATION: CT OF THE ABDOMEN AND PELVIS WITH CONTRAST 10/10/2021 4:06 pm TECHNIQUE: CT of the abdomen and pelvis was performed with the administration of intravenous contrast. Multiplanar reformatted images are provided for review. Dose modulation, iterative reconstruction, and/or weight based adjustment of the mA/kV was utilized to reduce the radiation dose to as low as reasonably achievable. COMPARISON: CT abdomen pelvis October 18, 2020 HISTORY: ORDERING SYSTEM PROVIDED HISTORY: Left upper and lower quadrant abdominal pain, frequent diarrhea, concern for obstruction or ischemia TECHNOLOGIST PROVIDED HISTORY: Additional Contrast?->None Reason for exam:->Left upper and lower quadrant abdominal pain, frequent diarrhea, concern for obstruction or ischemia Decision Support Exception - unselect if not a suspected or confirmed emergency medical condition->Emergency Medical Condition (MA) Reason for Exam: abd pain Acuity: Acute Type of Exam: Initial Additional signs and symptoms: 100ml isovue 370 @ lt anti @ 1615hrs, gfr>60, creat 0.7 10-, Left upper and lower quadrant abdominal pain, frequent diarrhea, concern for obstruction or ischemia, Diarrhea (since tuesday ) FINDINGS: Lower Chest: Imaged lung bases are clear. Organs: The liver appears unremarkable. The gallbladder is surgically absent. The spleen, pancreas, and bilateral adrenal glands demonstrate no acute findings. The kidneys enhance symmetrically. No hydronephrosis identified. GI/Bowel: Gaseous distention of the: To the level of the descending colon within the left lower quadrant with no well-defined transition point identified. Findings could reflect colonic ileus. The small bowel loops are normal in caliber. The stomach is collapsed and not well evaluated. Postoperative changes of bowel loops are noted within the abdomen consistent with prior bowel resection. Colonic diverticulosis without CT evidence of diverticulitis.   No pneumatosis identified. No evidence for portal venous gas. Pelvis: The bladder and solid pelvic organs demonstrate no acute findings. Peritoneum/Retroperitoneum: The abdominal aorta is normal in caliber. No retroperitoneal adenopathy. No free fluid or free air identified within the abdomen or pelvis. Bones/Soft Tissues: Diastasis of the anterior abdominal wall. No acute osseous or soft tissue findings. Multilevel degenerative changes of the spine. 1. Mild-to-moderate gaseous distention of the colon extending to the level of the sigmoid colon which is normal in caliber. No well-defined transition point identified. Findings may reflect mild colonic ileus. Recommend follow-up to ensure resolution. Small bowel loops are normal in caliber. 2. No bowel wall thickening identified. No pneumatosis or portal venous gas identified. No definitive CT evidence of bowel ischemia. Medical decision making:  Patient presents to the emergency department with recurrent diarrhea over the past 5 days, possibly viral in etiology. Certainly consider a foodborne illness. Patient is concerned about complications from her surgeries, but there is no clear evidence of obstruction, perforation, abscess, phlegmon, ischemia, obstructive uropathy, or aortic catastrophe among other considerations. Abdomen has been soft and nontender. There has been no respiratory distress, hypoxia, or cyanosis. There has been no intractable vomiting. There has been a low-grade temperature elevation, but evaluation does not suggest sepsis. There has been no lethargy or irritability. Patient appears generally well hydrated and nontoxic. We have discussed trial of outpatient management including conscientious hydration and careful follow-up with primary care provider. Procedures: None. Consultations: None. Clinical Impression:  1. Diarrhea, unspecified type    2.  Generalized abdominal pain      Disposition referral (if applicable):  Hesham Cruz MD  [address] [place] [id number]    Schedule an appointment as soon as possible for a visit       [facility] Emergency Department  [address]  [phone]  Go to   As needed, If symptoms worsen    Disposition medications (if applicable):  Discharge Medication List as of 10/10/2021  7:22 PM      START taking these medications    Details   dicyclomine (BENTYL) 10 MG capsule Take 1 capsule by mouth 4 times daily as needed (Abdominal cramping, diarrhea), Disp-20 capsule, R-1Normal      bismuth subsalicylate (BISMATROL) 262 MG/15ML suspension Take 15 mLs by mouth every 6 hours as needed for Cramping or Diarrhea, Disp-236 mL, R-0Normal      ondansetron (ZOFRAN-ODT) 4 MG disintegrating tablet Take 1 tablet by mouth 3 times daily as needed for Nausea or Vomiting, Disp-21 tablet, R-0Normal           ED Provider Disposition Time  DISPOSITION Decision To Discharge 10/10/2021 07:19:39 PM      Comment: Please note this report has been produced using speech recognition software and may contain errors related to that system including errors in grammar, punctuation, and spelling, as well as words and phrases that may be inappropriate. Efforts were made to edit the dictations.         Jean-Claude Ashton MD  10/11/21 [id number]

## 2021-11-17 RX ORDER — MONTELUKAST SODIUM 10 MG/1
10 TABLET ORAL DAILY
Qty: 30 TABLET | Refills: 5 | Status: SHIPPED | OUTPATIENT
Start: 2021-11-17 | End: 2022-06-13

## 2022-01-12 RX ORDER — LETROZOLE 2.5 MG/1
TABLET, FILM COATED ORAL
Qty: 30 TABLET | Refills: 1 | Status: SHIPPED | OUTPATIENT
Start: 2022-01-12 | End: 2022-02-15

## 2022-02-15 RX ORDER — LETROZOLE 2.5 MG/1
TABLET, FILM COATED ORAL
Qty: 30 TABLET | Refills: 2 | Status: SHIPPED | OUTPATIENT
Start: 2022-02-15 | End: 2022-07-07

## 2022-02-26 ENCOUNTER — HOSPITAL ENCOUNTER (EMERGENCY)
Age: 57
Discharge: HOME | End: 2022-02-27
Attending: EMERGENCY MEDICINE
Payer: COMMERCIAL

## 2022-02-26 ENCOUNTER — APPOINTMENT (OUTPATIENT)
Dept: GENERAL RADIOLOGY | Age: 57
End: 2022-02-26
Payer: COMMERCIAL

## 2022-02-26 VITALS
BODY MASS INDEX: 41.75 KG/M2 | TEMPERATURE: 99.4 F | HEIGHT: 67 IN | OXYGEN SATURATION: 94 % | RESPIRATION RATE: 18 BRPM | DIASTOLIC BLOOD PRESSURE: 97 MMHG | HEART RATE: 80 BPM | SYSTOLIC BLOOD PRESSURE: 121 MMHG | WEIGHT: 266 LBS

## 2022-02-26 DIAGNOSIS — R23.8 REDNESS AND SWELLING OF UPPER ARM: Primary | ICD-10-CM

## 2022-02-26 DIAGNOSIS — M79.89 REDNESS AND SWELLING OF UPPER ARM: Primary | ICD-10-CM

## 2022-02-26 PROCEDURE — 85610 PROTHROMBIN TIME: CPT

## 2022-02-26 PROCEDURE — 83605 ASSAY OF LACTIC ACID: CPT

## 2022-02-26 PROCEDURE — 87040 BLOOD CULTURE FOR BACTERIA: CPT

## 2022-02-26 PROCEDURE — 99283 EMERGENCY DEPT VISIT LOW MDM: CPT

## 2022-02-26 PROCEDURE — 80048 BASIC METABOLIC PNL TOTAL CA: CPT

## 2022-02-26 PROCEDURE — 71045 X-RAY EXAM CHEST 1 VIEW: CPT

## 2022-02-26 PROCEDURE — 85379 FIBRIN DEGRADATION QUANT: CPT

## 2022-02-26 PROCEDURE — 85025 COMPLETE CBC W/AUTO DIFF WBC: CPT

## 2022-02-26 PROCEDURE — 85730 THROMBOPLASTIN TIME PARTIAL: CPT

## 2022-02-27 ENCOUNTER — APPOINTMENT (OUTPATIENT)
Dept: ULTRASOUND IMAGING | Age: 57
End: 2022-02-27
Payer: COMMERCIAL

## 2022-02-27 VITALS
SYSTOLIC BLOOD PRESSURE: 146 MMHG | HEIGHT: 67 IN | HEART RATE: 67 BPM | RESPIRATION RATE: 16 BRPM | TEMPERATURE: 98 F | OXYGEN SATURATION: 98 % | BODY MASS INDEX: 41.75 KG/M2 | WEIGHT: 266 LBS | DIASTOLIC BLOOD PRESSURE: 82 MMHG

## 2022-02-27 DIAGNOSIS — L03.113 CELLULITIS OF RIGHT UPPER EXTREMITY: ICD-10-CM

## 2022-02-27 DIAGNOSIS — I89.0 LYMPHEDEMA OF RIGHT UPPER EXTREMITY: Primary | ICD-10-CM

## 2022-02-27 LAB
ANION GAP SERPL CALCULATED.3IONS-SCNC: 10 MMOL/L (ref 4–16)
APTT: 28.8 SECONDS (ref 25.1–37.1)
BASOPHILS ABSOLUTE: 0 K/CU MM
BASOPHILS RELATIVE PERCENT: 0.5 % (ref 0–1)
BUN BLDV-MCNC: 7 MG/DL (ref 6–23)
CALCIUM SERPL-MCNC: 8.6 MG/DL (ref 8.3–10.6)
CHLORIDE BLD-SCNC: 106 MMOL/L (ref 99–110)
CO2: 26 MMOL/L (ref 21–32)
CREAT SERPL-MCNC: 0.7 MG/DL (ref 0.6–1.1)
D DIMER: 411 NG/ML(DDU)
DIFFERENTIAL TYPE: ABNORMAL
EOSINOPHILS ABSOLUTE: 0.1 K/CU MM
EOSINOPHILS RELATIVE PERCENT: 2.1 % (ref 0–3)
GFR AFRICAN AMERICAN: >60 ML/MIN/1.73M2
GFR NON-AFRICAN AMERICAN: >60 ML/MIN/1.73M2
GLUCOSE BLD-MCNC: 162 MG/DL (ref 70–99)
HCT VFR BLD CALC: 36 % (ref 37–47)
HEMOGLOBIN: 11.5 GM/DL (ref 12.5–16)
IMMATURE NEUTROPHIL %: 0.5 % (ref 0–0.43)
INR BLD: 1.02 INDEX
LACTATE: 1.5 MMOL/L (ref 0.4–2)
LYMPHOCYTES ABSOLUTE: 1.2 K/CU MM
LYMPHOCYTES RELATIVE PERCENT: 19.4 % (ref 24–44)
MCH RBC QN AUTO: 27.5 PG (ref 27–31)
MCHC RBC AUTO-ENTMCNC: 31.9 % (ref 32–36)
MCV RBC AUTO: 86.1 FL (ref 78–100)
MONOCYTES ABSOLUTE: 0.4 K/CU MM
MONOCYTES RELATIVE PERCENT: 7.1 % (ref 0–4)
PDW BLD-RTO: 14.4 % (ref 11.7–14.9)
PLATELET # BLD: 162 K/CU MM (ref 140–440)
PMV BLD AUTO: 11.4 FL (ref 7.5–11.1)
POTASSIUM SERPL-SCNC: 3.7 MMOL/L (ref 3.5–5.1)
PROTHROMBIN TIME: 12.6 SECONDS (ref 11.7–14.5)
RBC # BLD: 4.18 M/CU MM (ref 4.2–5.4)
SEGMENTED NEUTROPHILS ABSOLUTE COUNT: 4.4 K/CU MM
SEGMENTED NEUTROPHILS RELATIVE PERCENT: 70.4 % (ref 36–66)
SODIUM BLD-SCNC: 142 MMOL/L (ref 135–145)
TOTAL IMMATURE NEUTOROPHIL: 0.03 K/CU MM
WBC # BLD: 6.2 K/CU MM (ref 4–10.5)

## 2022-02-27 PROCEDURE — 6370000000 HC RX 637 (ALT 250 FOR IP): Performed by: EMERGENCY MEDICINE

## 2022-02-27 PROCEDURE — 93971 EXTREMITY STUDY: CPT

## 2022-02-27 PROCEDURE — 99283 EMERGENCY DEPT VISIT LOW MDM: CPT

## 2022-02-27 RX ORDER — SULFAMETHOXAZOLE AND TRIMETHOPRIM 800; 160 MG/1; MG/1
1 TABLET ORAL 2 TIMES DAILY
Qty: 20 TABLET | Refills: 0 | Status: SHIPPED | OUTPATIENT
Start: 2022-02-27 | End: 2022-03-04

## 2022-02-27 RX ORDER — NAPROXEN 500 MG/1
500 TABLET ORAL 2 TIMES DAILY
Qty: 20 TABLET | Refills: 0 | Status: SHIPPED | OUTPATIENT
Start: 2022-02-27 | End: 2022-06-29

## 2022-02-27 RX ORDER — ACETAMINOPHEN 500 MG
1000 TABLET ORAL ONCE
Status: COMPLETED | OUTPATIENT
Start: 2022-02-27 | End: 2022-02-27

## 2022-02-27 RX ADMIN — ACETAMINOPHEN 1000 MG: 500 TABLET ORAL at 06:44

## 2022-02-27 RX ADMIN — RIVAROXABAN 15 MG: 15 TABLET, FILM COATED ORAL at 01:34

## 2022-02-27 ASSESSMENT — ENCOUNTER SYMPTOMS
EYES NEGATIVE: 1
BACK PAIN: 0
GASTROINTESTINAL NEGATIVE: 1
RESPIRATORY NEGATIVE: 1

## 2022-02-27 ASSESSMENT — PAIN SCALES - GENERAL: PAINLEVEL_OUTOF10: 9

## 2022-02-27 NOTE — ED NOTES
Pt to ED with c/o right arm swelling and pain. Seen earlier tonight for same and discharge on xarelto with instructions to follow up with pcp for US. Pt states symptoms have worsened since discharge.      Tung Rubin RN  02/27/22 [id number]

## 2022-02-27 NOTE — ED NOTES
Discharged with instructions and rx x 1. Pt acknowledges understanding. Ambulatory at discharge.       Phil Jerry RN  02/27/22 0141

## 2022-02-27 NOTE — ED PROVIDER NOTES
The history is provided by the patient. Arm Injury  Upper extremity pain location: Patient doesnt recall injury but she is having right arm swelling and pain. 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 Negative for fatigue and fever. HENT: Negative. Eyes: Negative. Respiratory: Negative. Cardiovascular: Negative. Gastrointestinal: Negative. Genitourinary: Negative. Musculoskeletal: Negative. Negative for back pain, neck pain and stiffness. Skin: Negative. Neurological: Negative. All other systems reviewed and are negative.       Family History   Problem Relation Age of Onset    Heart Disease Mother         \"Open Heart\"    Diabetes Mother    [de-identified] / [place] Mother     Stroke Mother     Vision Loss Mother         \"Wore Glasses\"    High Blood Pressure Father     Vision Loss Father         \"Had Cataract Surgery\"    Cancer Father         \"Kidney Cancer\"    Heart Disease Father         \"Atrial Fib\"    Diabetes Sister     High Blood Pressure Sister     High Cholesterol Sister     Cancer Sister         \"Ovarian Cancer\"   Marin Vision Loss Sister         \"Glaucoma\"     Social History     Socioeconomic History    Marital status: Single     Spouse name: Not on file    Number of children: Not on file    Years of education: Not on file    Highest education level: Not on file   Occupational History    Not on file   Tobacco Use    Smoking status: Former Smoker     Packs/day: 0.25     Years: 5.00     Pack years: 1.25     Types: Cigarettes     Start date:      Quit date:      Years since quittin.1    Smokeless tobacco: Never Used    Tobacco comment: \"I Only Smoked Socially When I Quit Smoking\"   Vaping Use    Vaping Use: Never used   Substance and Sexual Activity    Alcohol use: No     Alcohol/week: 0.0 standard drinks    Drug use: No    Sexual activity: Never   Other Topics Concern    Not on file   Social History Narrative    Not on file     Social Determinants of Health     Financial Resource Strain:     Difficulty of Paying Living Expenses: Not on file   Food Insecurity:     Worried About Running Out of Food in the Last Year: Not on file    Sherrie of Food in the Last Year: Not on file   Transportation Needs:     Lack of Transportation (Medical): Not on file    Lack of Transportation (Non-Medical):  Not on file   Physical Activity:     Days of Exercise per Week: Not on file    Minutes of Exercise per Session: Not on file   Stress:     Feeling of Stress : Not on file   Social Connections:     Frequency of Communication with Friends and Family: Not on file    Frequency of Social Gatherings with Friends and Family: Not on file    Attends Taoism Services: Not on file    Active Member of [address] [place] [address] or Organizations: Not on file    Attends Club or Organization Meetings: Not on file    Marital Status: Not on file   Intimate Partner Violence:     Fear of Current or Ex-Partner: Not on file    Emotionally Abused: Not on file    Physically Abused: Not on file    Sexually Abused: Not on file   Housing Stability:     Unable to Pay for Housing in the Last Year: Not on file    Number of [place] in the Last Year: Not on file    Unstable Housing in the Last Year: Not on file     Past Surgical History:   Procedure Laterality Date    ABDOMEN SURGERY  06/2018    per old chart pt had exploratory abd wound with drainage of infected seroma with wound vac    ABDOMINAL HERNIA REPAIR  10/18/2020    BREAST BIOPSY Right 2015    Right Breast Cancer , \"I Have A Chip In The Breast\"    BREAST BIOPSY Right 2017    Benign    BREAST LUMPECTOMY Right 08/2015    Right Breast Cancer    CHOLECYSTECTOMY, LAPAROSCOPIC  2007    COLONOSCOPY  2016    dr green 1 polyp    COLONOSCOPY  3/2019; 04/12/2017    3/2019 WNL; 4/2017 Internal hemorroids, diverticulosis    CYSTOSCOPY  2016    Kidney Stones    CYSTOSCOPY      per old chart with admission 6/2018 pt had cysto, right RGPG and attempted to insert right ureteral stent    CYSTOSCOPY  07/03/2018    cysto, right stent removal, right ureteroscopy, laser litho    DENTAL SURGERY      All Teeth Extracted In Past    ENDOSCOPY, COLON, DIAGNOSTIC  04/12/2017    EGD: Dilatation, Hiatal hernia, mild gastritis    HERNIA REPAIR  2007    Abdominal Hernia Repair With Mesh    HERNIA REPAIR  2012    Ventral Hernia Repair With Mesh    HYSTERECTOMY, TOTAL ABDOMINAL  2006    LAPAROSCOPY  2005    Laparoscopy, D & C, Hysteroscopy    LAPAROTOMY N/A 10/18/2020    LAPAROTOMY EXPLORATORY WITH SMALL BOWEL RESECTION, REMOVAL OF PROSTHETIC MESH, AND REPAIR OF ABDOMINAL WALL HERNIA WITH VICRYL MESH performed by Koko Connor MD at [address] [place] [address]      2015    OTHER SURGICAL HISTORY  05/22/2018    Laparoscopic-converted to open ventral hernia repair with mesh/ XU    OTHER SURGICAL HISTORY      per old chart with admission 6/20180- in IR 6/11/2018- had right perc. nephrostomy with cath placement\"took the tube out before I left the hospital\"   [address]   10/18/2020    TONSILLECTOMY  1970's     Past Medical History:   Diagnosis Date    Abnormal ECG 01/29/2019    Acid reflux     Anxiety     Arthritis     \"Back, Legs, Knees And Feet\"    Back problem     \"[facility]"    Bipolar disorder ([address]     Chronic back pain     Chronic cough 01/31/2020    Class 3 severe obesity with body mass index (BMI) of 40.0 to 44.9 in adult [facility])     COPD, moderate ([address] 01/31/2020    Curvature of spine     \"Wore A Brace For Three Years\"    Depression     Diabetes mellitus ([address] Dx 2010    Diverticulitis     Esophageal dilatation 04/12/2017    Family history of sudden death 01/29/2019    Mother at age 58    Full dentures     H/O 24 hour EKG monitoring 01/29/2019    Conclusion: Normal study suggesting normal sinus rhythm with no clinically significant arrhythmias.  H/O cardiac catheterization 08/05/2021    normal coronaries, normal LVEDP    H/O cardiovascular stress test 02/12/2019    EF65% Normal    H/O echocardiogram 06/02/2021    Normal left ventricle structure and systolic function with an ejection 55-60% No significant valvular disease noted.  NNSESBER(261.6)     \"Occ\"    Heart murmur     No Cardiologist At This Time    History of nuclear stress test 02/12/2019    EF 65%. Normal study.     [place] (hard of hearing)     Bilateral Hearing Aides    Hydronephrosis     dx with hydronephrosis with admission 6/2018- right ureteral stone/\"had kidney stone stone about 2-3 yrs ago and passed it\"    Hyperlipidemia     Hypertension     Macular degeneration     Right Eye    Mild persistent asthma without complication 41/98/3110    MRSA (methicillin resistant Staphylococcus aureus)     After PRICILLA In 2006 (Abdomen)    Near syncope 01/29/2019 1/28/19 in ED [place]    Panic attacks     PONV (postoperative nausea and vomiting)     denies any motion sickness    Right Breast Cancer Dx 8-15    Right Breast Lumpectomy, Had Chemotherapy And Radiation Treatments    S/P exploratory laparotomy 10/18/2020    explore lap for small bowel perf repair and abdominal wall hernia repair    Seizure ([address]     \"when I was a young kid had seizure and was on medication- not sure how old when last seizure but as a kid\"    Sepsis ([facility] [address] [address]     per old chart - admitted 6/2018 with sepsis secondary to wound infection    Shortness of breath 02/04/2020    Sleep apnea     Has CPAP- last sleep study 2-3 yrs ago\"    Thyroid disease     \"Thyroid Problems As A Child\"    Type 2 diabetes mellitus without complication, without long-term current use of insulin ([address] 07/15/2021    Urinary incontinence     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  Authorizing Provider   rivaroxaban (XARELTO) 15 MG TABS tablet Take 1 tablet by mouth 2 times daily (with meals) for 5 days 2/27/22 3/4/22 Yes Morgan Courtney, DO   letrozole [facility]) 2.5 MG tablet TAKE ONE TABLET BY MOUTH EVERY DAY 2/15/22   Shon Hardwick MD   montelukast (SINGULAIR) 10 MG tablet TAKE 1 TABLET BY MOUTH DAILY 11/17/21   Antonio Esteves MD   JARDIANCE 10 MG tablet  10/1/21   Historical Provider, MD   rosuvastatin (CRESTOR) 20 MG tablet Take 20 mg by mouth daily 9/30/21   Historical Provider, MD   dicyclomine (BENTYL) 10 MG capsule Take 1 capsule by mouth 4 times daily as needed (Abdominal cramping, diarrhea) 10/10/21   Yvon Georges MD   bismuth subsalicylate (BISMATROL) 262 MG/15ML suspension Take 15 mLs by mouth every 6 hours as needed for Cramping or Diarrhea 10/10/21   Yvon Georges MD   ondansetron (ZOFRAN-ODT) 4 MG disintegrating tablet Take 1 tablet by mouth 3 times daily as needed for Nausea or Vomiting 10/10/21   Yvon Georges MD   SITagliptin (JANUVIA) 100 MG tablet Take 100 mg by mouth daily     Historical Provider, MD normal.      Breath sounds: Normal breath sounds. Abdominal:      General: Bowel sounds are normal.      Palpations: Abdomen is soft. Tenderness: There is no abdominal tenderness. There is no guarding or rebound. Musculoskeletal:         General: Swelling and tenderness present. Normal range of motion. Cervical back: Normal range of motion and neck supple. Comments: Right upper extremity swelling in brachium with warmth. Swelling in forearm. Pulses are easily palpable. Tenderness in right axillar with no palpable mass   Skin:     General: Skin is warm and dry. Neurological:      Mental Status: She is alert and oriented to person, place, and time. GCS: GCS eye subscore is 4. GCS verbal subscore is 5. GCS motor subscore is 6. Psychiatric:         Behavior: Behavior normal.         Thought Content: Thought content normal.         Judgment: Judgment normal.         MDM:    Labs Reviewed   CBC WITH AUTO DIFFERENTIAL - Abnormal; Notable for the following components:       Result Value    RBC 4.18 (*)     Hemoglobin 11.5 (*)     Hematocrit 36.0 (*)     MCHC 31.9 (*)     MPV 11.4 (*)     Segs Relative 70.4 (*)     Lymphocytes % 19.4 (*)     Monocytes % 7.1 (*)     Immature Neutrophil % 0.5 (*)     All other components within normal limits   BASIC METABOLIC PANEL - Abnormal; Notable for the following components:    Glucose 162 (*)     All other components within normal limits   D-DIMER, QUANTITATIVE - Abnormal; Notable for the following components:    D-Dimer, Quant 411 (*)     All other components within normal limits   CULTURE, BLOOD 1   CULTURE, BLOOD 2   LACTIC ACID   PROTIME/INR & PTT       XR CHEST PORTABLE   Final Result   Linear left basilar opacity, atelectasis versus pneumonia. Case discussed with her physician Jamie Khan. Due to the inadequate coverage of ultrasound at this hospital during after hours, I can not obtain the needed test to rule out DVT.  Her PCM will order outpatient US and she will be given blood thinner until follow up  I have discussed with the patient  my clinical impression and the result of the patient's current clinical evaluation for their presentation. In addition we discussed the risk and benefits of further testing and hospitalization. I discussed candidly with the patient  and the patient  was allowed to provide input as to their thoughts concerning the current presentation. Although the risk of progression or development of new more serious signs and symptoms cannot be excluded we will attempt to manage outpatient and avoid unneeded hospitalization   My typical dicussion, presentation,and considerations for this patients' chief complaint, diagnosis, and differential diagnosis have been considered. I have stressed need for follow up and reexamination for this encounter. I have discussed my clinical impression and the results of the current evaluation. Patient was  prescribed Xarelto. The medication(s) use,  medication(s) safety and medication(s) interactions with already prescribed medication(s) have been explained and outlined for this encounter. The patient  was educated that it is their responsibility to verify this information is correct at the time of discharge and to contact this department of any complications with the pharmacy providing this medication(s) or if their any difficulty in obtaining this medication(s). Final Impression    1.  Redness and swelling of upper arm             Wiley Gooden, DO  02/27/22 [id number]

## 2022-02-27 NOTE — ED NOTES
Discharge instructions reviewed with patient. Reviewed prescriptions with patient. No additional questions asked. Voiced understanding. Encouraged patient to follow up as discussed by the ED physician.      Chari Hastings RN  02/27/22 [id number]

## 2022-02-27 NOTE — ED PROVIDER NOTES
Emergency Department Encounter  Location: [facility] [address] [place] [address]    Patient: Vee Taylor  MRN: [id number]  : 1965  Date of evaluation: 2022  ED Provider: Ute Perales DO, FACEP    Chief Complaint:    Arm Swelling (right)    [place]:  Vee Taylor is a 64 y.o. female that presents to the emergency department for the second time overnight. Patient seen by Modesto Taylor earlier in his shift and then returned about 6 hours later. She is complaining of pain and swelling in her right upper extremity. Patient was seen around 11 PM and a work-up was initiated. Patient did have elevated D-dimer she has had a history of a lumpectomy and harvesting the lymph nodes in her right axillary region. She states that she started having swelling yesterday. The patient care was discussed with her primary physician by Modesto Taylor. She was started on Xarelto with the intention of having an ultrasound ordered as an outpatient on Monday. The patient was discharged home however she returned shortly after because she states \"I could not get situated\". When asked if that meant that she was having pain was unable to sleep she kept repeating \"I could not get situated\". She states that she was unable to sleep and the swelling seemed to progress and she returned to the emergency department secondary to discomfort that was extending downward into her forearm. She states pain is also going up into her shoulder on the right. ROS:  At least 4 systems reviewed and otherwise acutely negative except as in the [address]   Negative for fever or chills  Negative for chest pain  Negative for shortness of breath  Negative for nausea vomiting diarrhea or constipation    Past Medical History:   Diagnosis Date    Abnormal ECG 2019    Acid reflux     Anxiety     Arthritis     \"Back, Legs, Knees And Feet\"    Back problem     \"Three Discs Smashed Together Lower Back\"    Bipolar disorder (HCC)     Chronic back pain     Chronic cough 01/31/2020    Class 3 severe obesity with body mass index (BMI) of 40.0 to 44.9 in adult [facility])     COPD, moderate ([address] 01/31/2020    Curvature of spine     \"Wore A Brace For Three Years\"    Depression     Diabetes mellitus ([address] Dx 2010    Diverticulitis     Esophageal dilatation 04/12/2017    Family history of sudden death 01/29/2019    Mother at age 58    Full dentures     H/O 24 hour EKG monitoring 01/29/2019    Conclusion: Normal study suggesting normal sinus rhythm with no clinically significant arrhythmias.  H/O cardiac catheterization 08/05/2021    normal coronaries, normal LVEDP    H/O cardiovascular stress test 02/12/2019    EF65% Normal    H/O echocardiogram 06/02/2021    Normal left ventricle structure and systolic function with an ejection 55-60% No significant valvular disease noted.  ASIPKNGB(600.4)     \"Occ\"    Heart murmur     No Cardiologist At This Time    History of nuclear stress test 02/12/2019    EF 65%. Normal study.     [place] (hard of hearing)     Bilateral Hearing Aides    Hydronephrosis     dx with hydronephrosis with admission 6/2018- right ureteral stone/\"had kidney stone stone about 2-3 yrs ago and passed it\"    Hyperlipidemia     Hypertension     Macular degeneration     Right Eye    Mild persistent asthma without complication 89/38/2726    MRSA (methicillin resistant Staphylococcus aureus)     After PRICILLA In 2006 (Abdomen)    Near syncope 01/29/2019 1/28/19 in ED [place]    Panic attacks     PONV (postoperative nausea and vomiting)     denies any motion sickness    Right Breast Cancer Dx 8-15    Right Breast Lumpectomy, Had Chemotherapy And Radiation Treatments    S/P exploratory laparotomy 10/18/2020    explore lap for small bowel perf repair and abdominal wall hernia repair    Seizure ([address]     \"when I was a young kid had seizure and was on medication- not sure how old when last seizure but as a kid\"    Sepsis ([address]     per old chart - admitted 6/2018 with sepsis secondary to wound infection    Shortness of breath 02/04/2020    Sleep apnea     Has CPAP- last sleep study 2-3 yrs ago\"    Thyroid disease     \"Thyroid Problems As A Child\"    Type 2 diabetes mellitus without complication, without long-term current use of insulin ([facility] 07/15/2021    Urinary incontinence     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    WD-Wound of right breast 10/28/2016    Wears glasses      Past Surgical History:   Procedure Laterality Date    ABDOMEN SURGERY  06/2018    per old chart pt had exploratory abd wound with drainage of infected seroma with wound vac    ABDOMINAL HERNIA REPAIR  10/18/2020    BREAST BIOPSY Right 2015    Right Breast Cancer , \"I Have A Chip In The Breast\"    BREAST BIOPSY Right 2017    Benign    BREAST LUMPECTOMY Right 08/2015    Right Breast Cancer    CHOLECYSTECTOMY, LAPAROSCOPIC  2007    COLONOSCOPY  2016    dr green 1 polyp    COLONOSCOPY  3/2019; 04/12/2017    3/2019 WNL; 4/2017 Internal hemorroids, diverticulosis    CYSTOSCOPY  2016    Kidney Stones    CYSTOSCOPY      per old chart with admission 6/2018 pt had cysto, right RGPG and attempted to insert right ureteral stent    CYSTOSCOPY  07/03/2018    cysto, right stent removal, right ureteroscopy, laser litho    DENTAL SURGERY      All Teeth Extracted In Past    ENDOSCOPY, COLON, DIAGNOSTIC  04/12/2017    EGD: Dilatation, Hiatal hernia, mild gastritis    HERNIA REPAIR  2007    Abdominal Hernia Repair With Mesh    HERNIA REPAIR  2012    Ventral Hernia Repair With Mesh    HYSTERECTOMY, TOTAL ABDOMINAL  2006    LAPAROSCOPY  2005    Laparoscopy, D & C, Hysteroscopy    LAPAROTOMY N/A 10/18/2020    LAPAROTOMY EXPLORATORY WITH SMALL BOWEL RESECTION, REMOVAL OF PROSTHETIC MESH, AND REPAIR OF ABDOMINAL WALL HERNIA WITH VICRYL MESH performed by Mehdi Pennington MD at [address] [place] [address]      2015    OTHER SURGICAL HISTORY  05/22/2018 Laparoscopic-converted to open ventral hernia repair with mesh/ XU    OTHER SURGICAL HISTORY      per old chart with admission - in IR 2018- had right perc. nephrostomy with cath placement\"took the tube out before I left the hospital\"   [address]  10/18/2020    TONSILLECTOMY  1970's     Family History   Problem Relation Age of Onset    Heart Disease Mother         \"Open Heart\"    Diabetes Mother    Vallery [place] / [place] Mother     Stroke Mother     Vision Loss Mother         \"Wore Glasses\"    High Blood Pressure Father     Vision Loss Father         \"Had Cataract Surgery\"    Cancer Father         \"Kidney Cancer\"    Heart Disease Father         \"Atrial Fib\"    Diabetes Sister     High Blood Pressure Sister     High Cholesterol Sister     Cancer Sister         \"Ovarian Cancer\"   Judeen Neel Vision Loss Sister         \"Glaucoma\"     Social History     Socioeconomic History    Marital status: Single     Spouse name: Not on file    Number of children: Not on file    Years of education: Not on file    Highest education level: Not on file   Occupational History    Not on file   Tobacco Use    Smoking status: Former Smoker     Packs/day: 0.25     Years: 5.00     Pack years: 1.25     Types: Cigarettes     Start date:      Quit date:      Years since quittin.1    Smokeless tobacco: Never Used    Tobacco comment: \"I Only Smoked Socially When I Quit Smoking\"   Vaping Use    Vaping Use: Never used   Substance and Sexual Activity    Alcohol use: No     Alcohol/week: 0.0 standard drinks    Drug use: No    Sexual activity: Never   Other Topics Concern    Not on file   Social History Narrative    Not on file     Social Determinants of Health     Financial Resource Strain:     Difficulty of Paying Living Expenses: Not on file   Food Insecurity:     Worried About Running Out of Food in the Last Year: Not on file    Sherrie of Food in the Last Year: Not on file Transportation Needs:     Lack of Transportation (Medical): Not on file    Lack of Transportation (Non-Medical): Not on file   Physical Activity:     Days of Exercise per Week: Not on file    Minutes of Exercise per Session: Not on file   Stress:     Feeling of Stress : Not on file   Social Connections:     Frequency of Communication with Friends and Family: Not on file    Frequency of Social Gatherings with Friends and Family: Not on file    Attends [employer] Services: Not on file    Active Member of [address] [place] [address] or Organizations: Not on file    Attends Club or Organization Meetings: Not on file    Marital Status: Not on file   Intimate Partner Violence:     Fear of Current or Ex-Partner: Not on file    Emotionally Abused: Not on file    Physically Abused: Not on file    Sexually Abused: Not on file   Housing Stability:     Unable to Pay for Housing in the Last Year: Not on file    Number of [place] in the Last Year: Not on file    Unstable Housing in the Last Year: Not on file     No current facility-administered medications for this encounter.      Current Outpatient Medications   Medication Sig Dispense Refill    naproxen (NAPROSYN) 500 MG tablet Take 1 tablet by mouth 2 times daily 20 tablet 0    sulfamethoxazole-trimethoprim (BACTRIM DS) 800-160 MG per tablet Take 1 tablet by mouth 2 times daily for 10 days 20 tablet 0    letrozole (FEMARA) 2.5 MG tablet TAKE ONE TABLET BY MOUTH EVERY DAY 30 tablet 2    montelukast (SINGULAIR) 10 MG tablet TAKE 1 TABLET BY MOUTH DAILY 30 tablet 5    JARDIANCE 10 MG tablet       rosuvastatin (CRESTOR) 20 MG tablet Take 20 mg by mouth daily      dicyclomine (BENTYL) 10 MG capsule Take 1 capsule by mouth 4 times daily as needed (Abdominal cramping, diarrhea) 20 capsule 1    bismuth subsalicylate (BISMATROL) 262 MG/15ML suspension Take 15 mLs by mouth every 6 hours as needed for Cramping or Diarrhea 236 mL 0    ondansetron (ZOFRAN-ODT) 4 MG disintegrating tablet Take 1 tablet by mouth 3 times daily as needed for Nausea or Vomiting 21 tablet 0    SITagliptin (JANUVIA) 100 MG tablet Take 100 mg by mouth daily       aspirin 81 MG chewable tablet Take 1 tablet by mouth daily (Patient not taking: Reported on 8/20/2021) 30 tablet 3    nitroGLYCERIN (NITROSTAT) 0.4 MG SL tablet up to max of 3 total doses.  If no relief after 1 dose, call 911. 25 tablet 3    metFORMIN (GLUCOPHAGE) 500 MG tablet Take 500 mg by mouth daily (with breakfast)       budesonide-formoterol (SYMBICORT) 160-4.5 MCG/ACT AERO Inhale 2 puffs into the lungs 2 times daily 1 Inhaler 3    pantoprazole (PROTONIX) 40 MG tablet       loperamide (RA ANTI-DIARRHEAL) 2 MG capsule Take 1 capsule by mouth 4 times daily as needed for Diarrhea 40 capsule 0    fexofenadine (ALLEGRA ALLERGY) 60 MG tablet Take 60 mg by mouth every 12 hours as needed       FLUoxetine (PROZAC) 10 MG capsule Take 1 capsule by mouth daily      hydroCHLOROthiazide (MICROZIDE) 12.5 MG capsule Take 1 capsule by mouth daily      hydrOXYzine (VISTARIL) 25 MG capsule Take 1 capsule by mouth daily      losartan (COZAAR) 100 MG tablet Take 1 tablet by mouth daily       Allergies   Allergen Reactions    Latex      \"Redness\"    Atorvastatin      Severe myalgias    Codeine      \"I Don't Remember The Reaction , I Was A Child\"    Darvon [Propoxyphene]      \"I Don't Remember The Reaction, I Was A Child\"    Pcn [Penicillins]      \"I Don't Remember The Reaction, I Was A Child\"     Nursing Notes Reviewed    Physical Exam:  ED Triage Vitals   Enc Vitals Group      BP 02/27/22 0600 (!) 146/82      Pulse 02/27/22 0600 67      Resp 02/27/22 0600 16      Temp 02/27/22 0600 98 °F (36.7 °C)      Temp Source 02/27/22 0600 Infrared      SpO2 02/27/22 0600 98 %      Weight 02/27/22 0541 266 lb (120.7 kg)      Height 02/27/22 0541 5' 7\" (1.702 m)      Head Circumference --       Peak Flow --       Pain Score --       Pain Loc --       Pain Edu? -- Excl. in GC? --      GENERAL APPEARANCE: Awake and alert. Cooperative. No acute distress. Nontoxic in appearance  HEAD: Normocephalic. Atraumatic. EYES: Sclera anicteric. ENT: Tolerates saliva. NECK: Supple. Trachea midline. LUNGS: Respirations unlabored. EXTREMITIES: No acute deformities. The patient does have redness and swelling localizing in her right upper extremity on her triceps region and extending into her right forearm. She has good pulses present in her right arm. The swelling does extend into her axilla on the right. There is no swelling on her anterior posterior shoulder. Full range of motion is present. SKIN: Warm and dry. Redness of the skin is present on the right triceps region and also on the right biceps region. There is no swelling over the elbow joint. There is some swelling in her forearm but minimal redness is noted. NEUROLOGICAL: No gross facial drooping. PSYCHIATRIC: Normal mood. Labs:  No results found for this visit on 02/27/22. Radiographs (if obtained):  [] The following radiograph was interpreted by myself in the absence of a radiologist:  [x] Radiologist's Report reviewed at time of ED visit:  VL DUP UPPER EXTREMITY VENOUS RIGHT   Final Result   No evidence of DVT. ED Course and MDM:  This patient's ultrasound was negative for DVT. Her swelling has decreased since we are evaluating her arm around 630. She states she still having some discomfort after using the Tylenol. I will start her on Naprosyn. With no evidence of DVT that leaves the diagnosis most likely lymphedema with possible cellulitis. We will start her on some Bactrim DS. To be started on Naprosyn for pain. She is referred to the wound clinic to be evaluated for lymphedema treatment. She is also referred back to her oncologist for lymphedema treatment as well she will be discharged stable condition is instructed return for any worsening signs and symptoms.   She has an appointment

## 2022-02-28 ENCOUNTER — TELEPHONE (OUTPATIENT)
Dept: ONCOLOGY | Age: 57
End: 2022-02-28

## 2022-02-28 NOTE — TELEPHONE ENCOUNTER
Patient called states she went to ER over the weekend for arm swelling and they told her that she had lymphedema, states she had lumpectomy back in 2015 and lymph nodes were removed, please call

## 2022-02-28 NOTE — TELEPHONE ENCOUNTER
Called and spoke with patient. States she was in the ED x2 this past weekend. Doppler study of right arm was negative for DVT. Was placed on an ATB and Naproxen. Appointment with Dr. Philly Doss made for this Friday, 3/4/22 at 1015. Will refer to lymphedema clinic if needed at that time. Patient voiced understanding of above.

## 2022-03-03 LAB
CULTURE: NORMAL
CULTURE: NORMAL
Lab: NORMAL
Lab: NORMAL
SPECIMEN: NORMAL
SPECIMEN: NORMAL

## 2022-03-04 ENCOUNTER — HOSPITAL ENCOUNTER (OUTPATIENT)
Dept: INFUSION THERAPY | Age: 57
Discharge: HOME | End: 2022-03-04

## 2022-03-04 ENCOUNTER — OFFICE VISIT (OUTPATIENT)
Dept: ONCOLOGY | Age: 57
End: 2022-03-04
Payer: COMMERCIAL

## 2022-03-04 VITALS
HEART RATE: 88 BPM | RESPIRATION RATE: 18 BRPM | TEMPERATURE: 96.9 F | OXYGEN SATURATION: 99 % | BODY MASS INDEX: 42.85 KG/M2 | SYSTOLIC BLOOD PRESSURE: 154 MMHG | DIASTOLIC BLOOD PRESSURE: 90 MMHG | WEIGHT: 273 LBS | HEIGHT: 67 IN

## 2022-03-04 DIAGNOSIS — Z17.0 MALIGNANT NEOPLASM OF UPPER-OUTER QUADRANT OF RIGHT BREAST IN FEMALE, ESTROGEN RECEPTOR POSITIVE (HCC): Primary | ICD-10-CM

## 2022-03-04 DIAGNOSIS — C50.411 MALIGNANT NEOPLASM OF UPPER-OUTER QUADRANT OF RIGHT BREAST IN FEMALE, ESTROGEN RECEPTOR POSITIVE (HCC): Primary | ICD-10-CM

## 2022-03-04 PROCEDURE — 99214 OFFICE O/P EST MOD 30 MIN: CPT | Performed by: INTERNAL MEDICINE

## 2022-03-04 PROCEDURE — 3017F COLORECTAL CA SCREEN DOC REV: CPT | Performed by: INTERNAL MEDICINE

## 2022-03-04 PROCEDURE — G8484 FLU IMMUNIZE NO ADMIN: HCPCS | Performed by: INTERNAL MEDICINE

## 2022-03-04 PROCEDURE — 1036F TOBACCO NON-USER: CPT | Performed by: INTERNAL MEDICINE

## 2022-03-04 PROCEDURE — G8417 CALC BMI ABV UP PARAM F/U: HCPCS | Performed by: INTERNAL MEDICINE

## 2022-03-04 PROCEDURE — G8427 DOCREV CUR MEDS BY ELIG CLIN: HCPCS | Performed by: INTERNAL MEDICINE

## 2022-03-04 ASSESSMENT — PATIENT HEALTH QUESTIONNAIRE - PHQ9
SUM OF ALL RESPONSES TO PHQ QUESTIONS 1-9: 0
1. LITTLE INTEREST OR PLEASURE IN DOING THINGS: 0
SUM OF ALL RESPONSES TO PHQ QUESTIONS 1-9: 0
SUM OF ALL RESPONSES TO PHQ9 QUESTIONS 1 & 2: 0
2. FEELING DOWN, DEPRESSED OR HOPELESS: 0

## 2022-03-04 NOTE — PROGRESS NOTES
MA Rooming Questions  Patient: Ciara Vallejo  MRN: [id number]    Date: 3/4/2022        1. Do you have any new issues? Yes - Pt c/o swelling and pain in right arm        2. Do you need any refills on medications?    no    3. Have you had any imaging done since your last visit?   no    4. Have you been hospitalized or seen in the emergency room since your last visit here?   no    5. Did the patient have a depression screening completed today?  Yes    PHQ-9 Total Score: 0 (3/4/2022 10:02 AM)       PHQ-9 Given to (if applicable):               PHQ-9 Score (if applicable):                     [] Positive     [x]  Negative              Does question #9 need addressed (if applicable)                     [] Yes    []  No               Issac Lanier MA

## 2022-03-04 NOTE — PROGRESS NOTES
Patient Name: Will Angulo  Patient : 1965  Patient MRN: [id number]     Primary Oncologist: Padmini Hou MD  Referring Provider: REGINALD Dewey     Date of Service: 3/4/2022      Chief Complaint:   Chief Complaint   Patient presents with    Follow-up     Patient Active Problem List:     Malignant neoplasm of upper-outer quadrant of right female breast       HPI:   Ms. Zora Frausto is a very pleasant 59-year-old patient with medical history significant for hypertension, diabetes mellitus, initially referred to me on 2015, for evaluation of right breast infiltrating ductal carcinoma. She stated that she felt a knot in her right breast and she went to the primary care physician for that. She had mammogram followed by sonogram on 2015, and it showed spiculated mass in the anterior 12 o'clock position in the right breast, most likely representing malignancy. She underwent sonoguided biopsy of the lesion on 2015, and the final pathology was consistent with infiltrating ductal carcinoma. Estrogen receptors positive (95 percent strong), progesterone receptor positive (75 percent), and HER-2/jossy by immunohistochemical stain is equivocal and HER-2/jossy by FISH study was not amplified. She was evaluated by Dr. Leighann Rosa and he requested core biopsy of the right breast axillary lymph node and it was done on 2015. Final pathology showed metastatic carcinoma consistent with breast primary involving all cores (10 mm in the largest dimension). Since she wanted to preserve her breast if that is possible, Dr. Leighann Rosa discussed her case in our Tumor Board. Consensus was to give neoadjuvant chemotherapy followed by surgery. Neoadjuvant chemotherapy with doxorubicin, cyclophosphamide followed by Paclitaxel was started on 2015 and she has completed it on 2015. Ms. Paz underwent right breast lumpectomy on 2015, and the final pathology was consistent with stage IIB right breast infiltrating ductal carcinoma. There were two tumor sites less than 2 cm apart and the tumor is located at the 12 o'clock position. One tumor is 2.1 cm in size and another one is 0.4 cm in size. It is low-grade tumor and DCIS present. Estrogen receptor strongly positive (95 percent), progesterone receptor positive (75 percent), and HER-2 by FISH is not amplified. All the surgical margins were negative and the lymphovascular invasion present in the axillary dissection. One out of 19 lymph nodes were positive for macrometastases. Adjuvant hormonal therapy with letrozole was started on September 1, 2015. She was started with adjuvant radiation therapy by Dr. Robbi Mccrary and she completed it successfully on November 11, 2015. Ms. Paz had diagnostic right breast digital tomosynthesis mammograph on March 10, 2016, and it showed stable skin thickening and trabecular thickening. In the absence of clinical infectious signs and symptoms this is likely related to post-treatment change. Slight decrease in size of right breast hematoma. No evidence of malignancy. Recommend clinical follow up as necessary. She also had an abnormal MRI of the right breast in April 2017 and we believe it was due to surgical changes at that time. Because of her concern and symptoms of nipple inversion, we requested a targeted sonogram of the right breast.  It was done on August 15, 2017, and it showed 8 mm mass at the 9 o'clock in the right breast, likely corresponding to an enhancing mass seen on the MRI. Radiologist recommended sonoguided biopsy of the lesion. She underwent the sonoguided biopsy of the lesion on August 21, 2017, and final pathology was negative for invasive carcinoma. I discussed with Dr. Yamile Castro since she was found to have some atypical epithelial cells on the previous biopsy.   We both believed that it is due to changes from the prior surgery and the radiation treatment. We will recommend to continue with close observation. MRI of the breast done on 12/18/18 showed 1.7 cm ill-defined area of abnormal enhancement along the ventral margin of the patient's scar upper-outer quadrant posterior third right breast. Targeted right breast ultrasound and diagnostic right mammogram is warranted for complete assessment. She had incisional biopsy of the right breast suspicious site on 1/25/19 and final pathology only revealed changes related to surgery and radiation. She had EGD on 3/14 and colonoscopy on 3/20/19 by Dr. Elle Larson. Colonoscopy showed 8 mm polyp in cecum, 4 mm polyp in descending colon, and internal hemorrhoids. DEXA scan done on August 21, 2019 showed normal bone mineral density by WHO criteria. She required to have exploratory laparotomy, removal of previously placed prosthesis mesh, small bowel resection and placement of Vicryl mesh for closure of abdominal wall defects on 10/18/20, for her recurrent hernia with pneumatosis of the small bowel and free air. Mammogram done on January 11, 2021 showed probably benign findings in the bilateral breasts and radiologist recommend to have repeat mammogram in 6 months. Mammogram done on 07/29/2021 showed stable, probably benign changes of the right lumpectomy bed. Recommend continued clinical follow-up of this area. Also recommend short-term follow-up in 6 months with a right breast diagnostic tomosynthesis. On March 5, 2022, she presented to me since she has been having swelling in her right upper extremity. Right upper extremity doppler done on 2/27/22 showed no evidence of DVT. I believe she developed lymphedema from prior surgery and will refer her to lymphedema therapy. She is going to have mammogram soon and I will see her again soon after it. I have been following Ms. Paz for right breast infiltrating ductal carcinoma and she is status post neoadjuvant chemotherapy followed by right breast lumpectomy and sentinel lymph node biopsy. She is also status post adjuvant radiation therapy and she is currently on adjuvant endocrine therapy with letrozole. She is tolerating letrozole very well and she does not encounter any major side effects from it. She did have hot flashes due to letrozole. However it is getting better lately and that she does not get hot flashes that often. She is not on any medication for hot flashes at this moment. I recommend her to continue with letrozole on a regular basis. I will continue to follow her closely during adjuvant endocrine therapy with letrozole. She does not have any signs or symptoms suggestive of recurrent or metastatic breast cancer at today's visit. I believe she is in complete clinical remission. She is more than 5 years on letrozole and we will continue with that for now. Since she has stage IIB disease, we decided to give ~ total 7 years of adjuvant endocrine therapy with letrozole. Repeat mammogram on 7/29/2021 showed probably benign findings and radiologist recommend her to have repeat mammogram in 6 months. Health maintenance - I commend her to have age-appropriate cancer screening, exercise, low-fat and low-sodium diet. She does not have any significant symptoms at today's visit. PAST MEDICAL HISTORY:  Significant for:  1. Hypertension. 2.         Diabetes mellitus. PAST SURGICAL HISTORY:  Significant for:  1. Total abdominal hysterectomy and bilateral salpingo-oophorectomy in 2006. 2.         Cholecystectomy. 3.         Hernia surgery. FAMILY HISTORY:  Significant for ovarian cancer in her sister and breast cancer in a couple of her aunts. No other family history of cancer disease. SOCIAL HISTORY:  She denies smoking, alcohol drinking, and illicit drug abuse. She is single and has no children. She is not working right now.      ALLERGIES:  She claims to have allergies to Codeine, penicillin, and Darvocet. Review of Systems: \"Per interval history; otherwise 10 point ROS is negative. \"  Her energy level is good and her sleep is fine. She doesn't have fever, chills, night sweats, cough, shortness of breath, chest pain, hemoptysis or palpitations. Her bowel and bladder functions are normal. She denies nausea, vomiting, abdominal pain, diarrhea, constipation, dysuria, loss of appetite or weight loss. She doesn't have neuropathy and she denies bleeding or clotting issues. She doesn't have any pain in her body. Denies anxiety or depression. The rest of the systems are unremarkable. Vital Signs:  BP (!) 154/90 (Site: Left Upper Arm, Position: Sitting, Cuff Size: Large Adult)   Pulse 88   Temp 96.9 °F (36.1 °C) (Infrared)   Resp 18   Ht 5' 7\" (1.702 m)   Wt 273 lb (123.8 kg)   SpO2 99%   BMI 42.76 kg/m²     Physical Exam:  CONSTITUTIONAL: awake, no apparent distress, alert, cooperative,    EYES: pupils equal, round and reactive to light, sclera clear and conjunctiva normal  ENT: Normocephalic, atraumatic, without obvious abnormality,   NECK: supple, symmetrical, no jugular venous distension and no carotid bruits   HEMATOLOGIC/LYMPHATIC: no cervical, supraclavicular or axillary lymphadenopathy   LUNGS: VBS, no wheezes, no increased work of breathing, no crackles, clear to auscultation, no rhonchi,   CARDIOVASCULAR: regular rate and rhythm, normal S1 and S2, no murmur noted  ABDOMEN: normal bowel sounds x 4, soft, non-distended, non-tender, no masses palpated, no hepatosplenomegaly   MUSCULOSKELETAL: full range of motion noted, tone is normal  NEUROLOGIC: awake, alert, oriented to name, place and time. Motor skills grossly intact. SKIN: Normal skin color, texture, turgor and no jaundice.  appears intact   EXTREMITIES: no clubbing, no cyanosis, no leg swelling, no LE edema,   BREASTS: post op changes noted in her right breast, no palpable suspicious mass in her right breast, no palpable mass in left breast and bilateral axilla     Labs:  Hematology:  Lab Results   Component Value Date    WBC 6.2 02/26/2022    RBC 4.18 (L) 02/26/2022    HGB 11.5 (L) 02/26/2022    HCT 36.0 (L) 02/26/2022    MCV 86.1 02/26/2022    MCH 27.5 02/26/2022    MCHC 31.9 (L) 02/26/2022    RDW 14.4 02/26/2022     02/26/2022    MPV 11.4 (H) 02/26/2022    BANDSPCT 8 06/17/2018    SEGSPCT 70.4 (H) 02/26/2022    EOSRELPCT 2.1 02/26/2022    BASOPCT 0.5 02/26/2022    LYMPHOPCT 19.4 (L) 02/26/2022    MONOPCT 7.1 (H) 02/26/2022    BANDABS 0.66 06/17/2018    SEGSABS 4.4 02/26/2022    EOSABS 0.1 02/26/2022    BASOSABS 0.0 02/26/2022    LYMPHSABS 1.2 02/26/2022    MONOSABS 0.4 02/26/2022    DIFFTYPE AUTOMATED DIFFERENTIAL 02/26/2022    POLYCHROM 1+ 06/17/2018    PLTM SEVERAL LARGE PLATELETS 71/93/9219     No results found for: ESR  Chemistry:  Lab Results   Component Value Date     02/26/2022    K 3.7 02/26/2022     02/26/2022    CO2 26 02/26/2022    BUN 7 02/26/2022    CREATININE 0.7 02/26/2022    GLUCOSE 162 (H) 02/26/2022    CALCIUM 8.6 02/26/2022    PROT 7.0 10/10/2021    LABALBU 4.2 10/10/2021    BILITOT 0.3 10/10/2021    ALKPHOS 89 10/10/2021    AST 18 10/10/2021    ALT 20 10/10/2021    LABGLOM >60 02/26/2022    GFRAA >60 02/26/2022    PHOS 2.4 (L) 10/21/2020    MG 1.9 10/21/2020    POCGLU 160 (H) 08/05/2021     No results found for: MMA, LDH, HOMOCYSTEINE  No components found for: LD  Lab Results   Component Value Date    TSHHS 3.070 05/10/2019     Immunology:  Lab Results   Component Value Date    PROT 7.0 10/10/2021     No results found for: [place], KLFLCR  No results found for: B2M  Coagulation Panel:  Lab Results   Component Value Date    PROTIME 12.6 02/26/2022    INR 1.02 02/26/2022    APTT 28.8 02/26/2022    DDIMER 411 (H) 02/26/2022     Anemia Panel:  No results found for: TGQVNWKO85, FOLATE  Tumor Markers:  Lab Results   Component Value Date    LABCA2 13.6 04/16/2021 Observations:  PHQ-9 Total Score: 0 (3/4/2022 10:02 AM)        Assessment:   Right breast infiltrating ductal carcinoma (estrogen receptor positive, progesterone receptor positive, and HER-2/jossy negative). Plan:  Ms. Brandon Howard has been followed for her right breast cancer. She required to have exploratory laparotomy, removal of previously placed prosthesis mesh, small bowel resection and placement of Vicryl mesh for closure of abdominal wall defects on 10/18/20, for her recurrent hernia with pneumatosis of the small bowel and free air. Mammogram done on January 11, 2021 showed probably benign findings in the bilateral breasts and radiologist recommend to have repeat mammogram in 6 months. Mammogram done on 07/29/2021 showed stable, probably benign changes of the right lumpectomy bed. Recommend continued clinical follow-up of this area. Also recommend short-term follow-up in 6 months with a right breast diagnostic tomosynthesis. On March 5, 2022, she presented to me since she has been having swelling in her right upper extremity. Right upper extremity doppler done on 2/27/22 showed no evidence of DVT. I believe she developed lymphedema from prior surgery and will refer her to lymphedema therapy. She is going to have mammogram soon and I will see her again soon after it. I have been following Ms. Paz for right breast infiltrating ductal carcinoma and she is status post neoadjuvant chemotherapy followed by right breast lumpectomy and sentinel lymph node biopsy. She is also status post adjuvant radiation therapy and she is currently on adjuvant endocrine therapy with letrozole. She is tolerating letrozole very well and she does not encounter any major side effects from it. She did have hot flashes due to letrozole. However it is getting better lately and that she does not get hot flashes that often. She is not on any medication for hot flashes at this moment.     I recommend her to continue with letrozole on a regular basis. I will continue to follow her closely during adjuvant endocrine therapy with letrozole. She does not have any signs or symptoms suggestive of recurrent or metastatic breast cancer at today's visit. I believe she is in complete clinical remission. She is more than 5 years on letrozole and we will continue with that for now. Since she has stage IIB disease, we decided to give ~ total 7 years of adjuvant endocrine therapy with letrozole. Repeat mammogram on 7/29/2021 showed probably benign findings and radiologist recommend her to have repeat mammogram in 6 months. Health maintenance - I commend her to have age-appropriate cancer screening, exercise, low-fat and low-sodium diet. I answered all her questions and concerns for today. I asked her to followup with her primary care physician, Dr. Pati Calix and Dr. Stephan Scales on regular basis. Recent imaging and labs were reviewed and discussed with the patient.

## 2022-03-17 ENCOUNTER — HOSPITAL ENCOUNTER (OUTPATIENT)
Dept: PHYSICAL THERAPY | Age: 57
Setting detail: THERAPIES SERIES
Discharge: HOME | End: 2022-03-17
Payer: COMMERCIAL

## 2022-03-17 PROCEDURE — 97140 MANUAL THERAPY 1/> REGIONS: CPT

## 2022-03-17 PROCEDURE — 97535 SELF CARE MNGMENT TRAINING: CPT

## 2022-03-17 PROCEDURE — 97162 PT EVAL MOD COMPLEX 30 MIN: CPT

## 2022-03-17 NOTE — PROGRESS NOTES
PHYSICAL THERAPY LYMPHEDEMA EVAL    Patient Name:  Gopi Khan Date:  3/17/2022  :  1965 MRN: [id number]  Referring Physician:  Dr. Fantasma Morrison Diagnosis:  RUE Neoplasm of upper outer quadrant of R breast.  Treatment dx:  RUE lymphedema    SUBJECTIVE    History of edema:  7yrs ago CA R breast CA with 13 lymph nodes removed. RUE started swelling about 3 weeks ago no known . States she had cellulitis recently. Pain starts in index finger and radiates up posterior forearm to lateral arm. Improved with:  Not sure  Worsened with:  Not sure    Functional Capacity:  Generally independent   R hand dominant  Edema Induced Limitations:  None currently    Assistance available for treatment:  Self, lives alone  Home/social/DME: [facility], retired/disabled due to health issues. Lives alone  Financial resources available for treatment:  insurance    Pain:  RUE    Patient Goal(s):  Decrease pain and swelling. OBJECTIVE    Other medical conditions/surgeries: mastectomy,     General Observations of Functional Mobility:  Independent in transfers, amb and bed mobbility. RUE:  Flex 115, abd 90 ER 70  LUE:  Flex  115 abd 110    Description of Edema:  Soft superficially, firmer deeper at forearm. Soft non pitting edema at arm. Observations of Skin:  Intact, warm, normal coloration. Scars:  Surgical.     Skin breakdown (current or previous):  none    Initial Basic Measures      UE Right Left   Web A 21.3 21.0   Web B 22.8 22.5   Styloid 19.0 19.2   10cm 31.7 26.7   20cm 35.5 31.5   30cm 42.0 39.0   40cm 46.3 44.2   50cm     axilla           SEVERITY as % of Impairment, per Lymphedema Life Impact Score  43/65    ASSESSMENT         Pt presents with complaints of RUE swelling onset about 3 weeks ago per patient. She states she was diagnosed and treated for cellulitis at that time and swelling has improved still has some persisting swelling.  History of R breast CA with lumpectomy, mastectomy and 13 lymph nodes removed ~7yrs ago per patient. She does have some limitations in shoulder AROM B. Secondary Lymphedema     Staging Of Lymphedema Staging Of Chronic Venous Insufficiency   [] 1:  Reversible edema [] 1:  Reversible edema    [] pitting  [] perimalleolar varicosities    [] borderline Stemmer's sign  [] corona phlebactacia paraplantaris veins   [] 2:  Irreversible edema [] 2:  Stage one plus     [x] nonpitting  []Irreversible edema    [x] firm edema  [] hyperpigmentation    [] positive Stemmer's sign  [] eczematous skin    [] proliferation of skin  [] trophic skin changes   [] 3:  Irreversible edema  [] proliferation of skin    [] nonpitting [] 3: Stage one and two, plus    [] hard edema  [] stasis ulcers    [] positive Stemmer's sign  [] ulcer scars    [] elephantiasis     [] Hyperkeratosis     [] papilloma      Bariers to Learning:  none  Patient Motivation:  Good/fair  Improvement Potential:  good        Other Health Services being provided: none      Factors that impact severity of disorder:      [] Age:       [] Time since onset:       []   Other:        [] None    When discharged from Part A services:  n/a    Prior therapy for same condition:    Yes      []          No   [x]    Present Health:  Excellent  []    Good  [x]    Fair  []  Poor   []        Prior level of function:  Pt was independent with ADL's without significant pain or difficulties. DME in use:[facility]        PLAN  After eval, patient will be seen for 2 times per week for 5 weeks. Measure for garments and order garments by the end of treatment week number 4. Treatment will include pt ed, ther ex, self care/ADL training, manual therapy: manual lymphatic drainage, compression garment measuring and ordering. GOALS  Due by 4/29/22.  1 [place] with HEP.  2 Compliance with elevation daily. 3 Independent with self massage principles.   4 Acquire and wear correctly fitting compression garment for RUE.  5 Consistent reduction of edema as evidenced by regular girth measures. TREATMENT TODAY   Pt was instructed/educated on the importance of proper hydration. Also to monitor salt intake - including prepackaged foods, soft drinks, breads, cereals, soups/broths, lunch/deli meats, snack foods, fast foods, etc.  Explain general anatomy and function of the lymphatic system, effects of treatment, compression, exercise/muscle pump, elevation. Instruct and perform Central clearing. Elevation and AROM of RUE throughout the day. Tubigrip F to R UE, folded/doubled at distal forearm. If [employer] Please Indicate Units for Rx this date and running total for each and overall total for Rx to date:  CPT Code Units today Running Total Units Total approved    STEF [place] 177 [id number]  1     Gait [id number]      NR [id number]      TA  [facility] [address] [facility]       ADL/Self care 88 [phone] 1 1    DNT 1-2 67056      DNT 3-4 90774      Other:    1            Total for episode of care   3               Time In:  0800   Time Out: 0851  Timed Code Treatment Minutes:  26   Total Treatment Minutes:  51    Electronically signed by:  Live Parker PT, 3/17/2022, 8:05 AM        Physical Therapy Certification Form    Please see the initial evaluation above for the initial plan of care. Electronically signed by:  Live Parker PT, PT      If you have any questions or concerns, please don't hesitate to call. The outpatient phone and fax numbers are listed below. Thank you for your referral.      Physician Signature:________________________________Date:__________________  By signing above, therapists plan is approved by physician  Please fax back if you agree with this plan.       [facility]       [x] Phone: [phone]   Fax: [phone]   [facility]     [] Phone: [phone]   Fax: [phone]          [] Phone: [phone]   Fax: [phone]  [facility]           [] Phone: [phone]   Fax: [phone]    Pediatric Therapy          [] Phone: [phone]   Fax: [phone]  [facility]          [] Phone: [phone]   Fax: [phone]

## 2022-03-20 NOTE — PROGRESS NOTES
Patient Name: Kasey Perry  Patient : 1965  Patient MRN: [id number]     Primary Oncologist: Tessie Burkitt, MD  Referring Provider: REGINALD Stanley     Date of Service: 3/24/2022      Chief Complaint:   Chief Complaint   Patient presents with    Follow-up     Patient Active Problem List:     Malignant neoplasm of upper-outer quadrant of right female breast       HPI:   Ms. Tez Kirby is a very pleasant 51-year-old patient with medical history significant for hypertension, diabetes mellitus, initially referred to me on 2015, for evaluation of right breast infiltrating ductal carcinoma. She stated that she felt a knot in her right breast and she went to the primary care physician for that. She had mammogram followed by sonogram on 2015, and it showed spiculated mass in the anterior 12 o'clock position in the right breast, most likely representing malignancy. She underwent sonoguided biopsy of the lesion on 2015, and the final pathology was consistent with infiltrating ductal carcinoma. Estrogen receptors positive (95 percent strong), progesterone receptor positive (75 percent), and HER-2/jossy by immunohistochemical stain is equivocal and HER-2/jossy by FISH study was not amplified. She was evaluated by Dr. Mary Carmen Cook and he requested core biopsy of the right breast axillary lymph node and it was done on 2015. Final pathology showed metastatic carcinoma consistent with breast primary involving all cores (10 mm in the largest dimension). Since she wanted to preserve her breast if that is possible, Dr. Mary Carmen Cook discussed her case in our Tumor Board. Consensus was to give neoadjuvant chemotherapy followed by surgery. Neoadjuvant chemotherapy with doxorubicin, cyclophosphamide followed by Paclitaxel was started on 2015 and she has completed it on 2015. Ms. Paz underwent right breast lumpectomy on 2015, and the final pathology was consistent with stage IIB right breast infiltrating ductal carcinoma. There were two tumor sites less than 2 cm apart and the tumor is located at the 12 o'clock position. One tumor is 2.1 cm in size and another one is 0.4 cm in size. It is low-grade tumor and DCIS present. Estrogen receptor strongly positive (95 percent), progesterone receptor positive (75 percent), and HER-2 by FISH is not amplified. All the surgical margins were negative and the lymphovascular invasion present in the axillary dissection. One out of 19 lymph nodes were positive for macrometastases. Adjuvant hormonal therapy with letrozole was started on September 1, 2015. She was started with adjuvant radiation therapy by Dr. Ariana Edmondson and she completed it successfully on November 11, 2015. Ms. Paz had diagnostic right breast digital tomosynthesis mammograph on March 10, 2016, and it showed stable skin thickening and trabecular thickening. In the absence of clinical infectious signs and symptoms this is likely related to post-treatment change. Slight decrease in size of right breast hematoma. No evidence of malignancy. Recommend clinical follow up as necessary. She also had an abnormal MRI of the right breast in April 2017 and we believe it was due to surgical changes at that time. Because of her concern and symptoms of nipple inversion, we requested a targeted sonogram of the right breast.  It was done on August 15, 2017, and it showed 8 mm mass at the 9 o'clock in the right breast, likely corresponding to an enhancing mass seen on the MRI. Radiologist recommended sonoguided biopsy of the lesion. She underwent the sonoguided biopsy of the lesion on August 21, 2017, and final pathology was negative for invasive carcinoma. I discussed with Dr. Gillian Clay since she was found to have some atypical epithelial cells on the previous biopsy.   We both believed that it is due to changes from the prior surgery and the radiation treatment. We will recommend to continue with close observation. MRI of the breast done on 12/18/18 showed 1.7 cm ill-defined area of abnormal enhancement along the ventral margin of the patient's scar upper-outer quadrant posterior third right breast. Targeted right breast ultrasound and diagnostic right mammogram is warranted for complete assessment. She had incisional biopsy of the right breast suspicious site on 1/25/19 and final pathology only revealed changes related to surgery and radiation. She had EGD on 3/14 and colonoscopy on 3/20/19 by Dr. Naif Murrieta. Colonoscopy showed 8 mm polyp in cecum, 4 mm polyp in descending colon, and internal hemorrhoids. DEXA scan done on August 21, 2019 showed normal bone mineral density by WHO criteria. She required to have exploratory laparotomy, removal of previously placed prosthesis mesh, small bowel resection and placement of Vicryl mesh for closure of abdominal wall defects on 10/18/20, for her recurrent hernia with pneumatosis of the small bowel and free air. Mammogram done on January 11, 2021 showed probably benign findings in the bilateral breasts and radiologist recommend to have repeat mammogram in 6 months. Mammogram done on 07/29/2021 showed stable, probably benign changes of the right lumpectomy bed. Recommend continued clinical follow-up of this area. Also recommend short-term follow-up in 6 months with a right breast diagnostic tomosynthesis. Mammogram done on 3/17/2022 showed stable probably benign changes of the right lumpectomy bed. Radiologist recommend right breast diagnostic exam in 1 year to complete evaluation for 2 years of stability. Stable probably benign left breast calcifications. On March 24, 2022, she presented to me since for follow up. I have been following Ms. Paz for right breast infiltrating ductal carcinoma and she is status post neoadjuvant chemotherapy followed by right breast lumpectomy and sentinel lymph node biopsy. She is also status post adjuvant radiation therapy and she is currently on adjuvant endocrine therapy with letrozole. She is tolerating letrozole very well and she does not encounter any major side effects from it. She did have hot flashes due to letrozole. However it is getting better lately and that she does not get hot flashes that often. She is not on any medication for hot flashes at this moment. I recommend her to continue with letrozole on a regular basis. I will continue to follow her closely during adjuvant endocrine therapy with letrozole. She does not have any signs or symptoms suggestive of recurrent or metastatic breast cancer at today's visit. I believe she is in complete clinical remission. She is more than 5 years on letrozole and we will continue with that for now. Since she has stage IIB disease, we decided to give ~ total 7 years of adjuvant endocrine therapy with letrozole. Right upper extremity lymph edema - to continue with lymph edema therapy. Right upper extremity doppler done on 2/27/22 showed no evidence of DVT. Repeat mammogram on 3/17/2022 showed probably benign findings and radiologist recommend her to have repeat mammogram in 12 months. Health maintenance - I commend her to have age-appropriate cancer screening, exercise, low-fat and low-sodium diet. She does not have any significant symptoms at today's visit. PAST MEDICAL HISTORY:  Significant for:  1. Hypertension. 2.         Diabetes mellitus. PAST SURGICAL HISTORY:  Significant for:  1. Total abdominal hysterectomy and bilateral salpingo-oophorectomy in 2006. 2.         Cholecystectomy. 3.         Hernia surgery. FAMILY HISTORY:  Significant for ovarian cancer in her sister and breast cancer in a couple of her aunts. No other family history of cancer disease.        SOCIAL HISTORY:  She denies smoking, alcohol drinking, and illicit drug abuse.  She is single and has no children. She is not working right now. ALLERGIES:  She claims to have allergies to Codeine, penicillin, and Darvocet. Review of Systems: \"Per interval history; otherwise 10 point ROS is negative. \"  Her energy level is fine and her sleep is good. She denies fever, chills, night sweats, cough, shortness of breath, chest pain, hemoptysis or palpitations. Her bowel and bladder functions are normal. She doesn't have nausea, vomiting, abdominal pain, diarrhea, constipation, dysuria, loss of appetite or weight loss. She denies neuropathy and she doesn't have bleeding or clotting issues. She denies any pain in her body. No anxiety or depression. The rest of the systems are unremarkable. Vital Signs:  BP (!) 155/71 (Site: Left Lower Arm, Position: Sitting, Cuff Size: Medium Adult)   Pulse 76   Temp 96 °F (35.6 °C) (Infrared)   Resp 16   Ht 5' 7\" (1.702 m)   Wt 267 lb (121.1 kg)   SpO2 99%   BMI 41.82 kg/m²     Physical Exam:  CONSTITUTIONAL: awake, no apparent distress, alert, cooperative,    EYES: pupils equal, round and reactive to light, sclera clear and conjunctiva normal  ENT: Normocephalic, atraumatic, without obvious abnormality,   NECK: supple, symmetrical, no jugular venous distension and no carotid bruits   HEMATOLOGIC/LYMPHATIC: no cervical, supraclavicular or axillary lymphadenopathy   LUNGS: VBS, no wheezes, clear to auscultation, no rhonchi, no increased work of breathing, no crackles,   CARDIOVASCULAR: regular rate and rhythm, normal S1 and S2, no murmur noted  ABDOMEN: normal bowel sounds x 4, soft, non-distended, non-tender, no masses palpated, no hepatosplenomegaly   MUSCULOSKELETAL: full range of motion noted, tone is normal  NEUROLOGIC: awake, alert, oriented to name, place and time. Motor skills grossly intact. SKIN: Normal skin color, texture, turgor and no jaundice.  appears intact   EXTREMITIES: no clubbing, no LE edema, no cyanosis, no leg swelling,   BREASTS: post op changes noted in her right breast, no palpable suspicious mass in her right breast, no palpable mass in left breast and bilateral axilla     Labs:  Hematology:  Lab Results   Component Value Date    WBC 6.2 02/26/2022    RBC 4.18 (L) 02/26/2022    HGB 11.5 (L) 02/26/2022    HCT 36.0 (L) 02/26/2022    MCV 86.1 02/26/2022    MCH 27.5 02/26/2022    MCHC 31.9 (L) 02/26/2022    RDW 14.4 02/26/2022     02/26/2022    MPV 11.4 (H) 02/26/2022    BANDSPCT 8 06/17/2018    SEGSPCT 70.4 (H) 02/26/2022    EOSRELPCT 2.1 02/26/2022    BASOPCT 0.5 02/26/2022    LYMPHOPCT 19.4 (L) 02/26/2022    MONOPCT 7.1 (H) 02/26/2022    BANDABS 0.66 06/17/2018    SEGSABS 4.4 02/26/2022    EOSABS 0.1 02/26/2022    BASOSABS 0.0 02/26/2022    LYMPHSABS 1.2 02/26/2022    MONOSABS 0.4 02/26/2022    DIFFTYPE AUTOMATED DIFFERENTIAL 02/26/2022    POLYCHROM 1+ 06/17/2018    PLTM SEVERAL LARGE PLATELETS 31/26/8589     No results found for: ESR  Chemistry:  Lab Results   Component Value Date     02/26/2022    K 3.7 02/26/2022     02/26/2022    CO2 26 02/26/2022    BUN 7 02/26/2022    CREATININE 0.7 02/26/2022    GLUCOSE 162 (H) 02/26/2022    CALCIUM 8.6 02/26/2022    PROT 7.0 10/10/2021    LABALBU 4.2 10/10/2021    BILITOT 0.3 10/10/2021    ALKPHOS 89 10/10/2021    AST 18 10/10/2021    ALT 20 10/10/2021    LABGLOM >60 02/26/2022    GFRAA >60 02/26/2022    PHOS 2.4 (L) 10/21/2020    MG 1.9 10/21/2020    POCGLU 160 (H) 08/05/2021     No results found for: MMA, LDH, HOMOCYSTEINE  No components found for: LD  Lab Results   Component Value Date    TSHHS 3.070 05/10/2019     Immunology:  Lab Results   Component Value Date    PROT 7.0 10/10/2021     No results found for: Jaziel Ha, KLFLCR  No results found for: B2M  Coagulation Panel:  Lab Results   Component Value Date    PROTIME 12.6 02/26/2022    INR 1.02 02/26/2022    APTT 28.8 02/26/2022    DDIMER 411 (H) 02/26/2022     Anemia Panel:  No results found for: Negar Lyn  Tumor Markers:  Lab Results   Component Value Date    LABCA2 13.6 04/16/2021     Observations:  No data recorded        Assessment:   Right breast infiltrating ductal carcinoma (estrogen receptor positive, progesterone receptor positive, and HER-2/jossy negative). Plan:  Ms. Zora Frausto has been followed for her right breast cancer. She required to have exploratory laparotomy, removal of previously placed prosthesis mesh, small bowel resection and placement of Vicryl mesh for closure of abdominal wall defects on 10/18/20, for her recurrent hernia with pneumatosis of the small bowel and free air. Mammogram done on January 11, 2021 showed probably benign findings in the bilateral breasts and radiologist recommend to have repeat mammogram in 6 months. Mammogram done on 07/29/2021 showed stable, probably benign changes of the right lumpectomy bed. Recommend continued clinical follow-up of this area. Also recommend short-term follow-up in 6 months with a right breast diagnostic tomosynthesis. Mammogram done on 07/29/2021 showed stable, probably benign changes of the right lumpectomy bed. Recommend continued clinical follow-up of this area. Also recommend short-term follow-up in 6 months with a right breast diagnostic tomosynthesis. Mammogram done on 3/17/2022 showed stable probably benign changes of the right lumpectomy bed. Radiologist recommend right breast diagnostic exam in 1 year to complete evaluation for 2 years of stability. Stable probably benign left breast calcifications. On March 24, 2022, she presented to me since for follow up. I have been following Ms. Paz for right breast infiltrating ductal carcinoma and she is status post neoadjuvant chemotherapy followed by right breast lumpectomy and sentinel lymph node biopsy. She is also status post adjuvant radiation therapy and she is currently on adjuvant endocrine therapy with letrozole.        She is tolerating letrozole very well and she does not encounter any major side effects from it. She did have hot flashes due to letrozole. However it is getting better lately and that she does not get hot flashes that often. She is not on any medication for hot flashes at this moment. I recommend her to continue with letrozole on a regular basis. I will continue to follow her closely during adjuvant endocrine therapy with letrozole. She does not have any signs or symptoms suggestive of recurrent or metastatic breast cancer at today's visit. I believe she is in complete clinical remission. She is more than 5 years on letrozole and we will continue with that for now. Since she has stage IIB disease, we decided to give ~ total 7 years of adjuvant endocrine therapy with letrozole. Right upper extremity lymph edema - to continue with lymph edema therapy. Right upper extremity doppler done on 2/27/22 showed no evidence of DVT. Repeat mammogram on 3/17/2022 showed probably benign findings and radiologist recommend her to have repeat mammogram in 12 months. Health maintenance - I commend her to have age-appropriate cancer screening, exercise, low-fat and low-sodium diet. I answered all her questions and concerns for today. I asked her to followup with her primary care physician, Dr. Liana Long and Dr. Doug Chacon on regular basis. Recent imaging and labs were reviewed and discussed with the patient.

## 2022-03-24 ENCOUNTER — OFFICE VISIT (OUTPATIENT)
Dept: ONCOLOGY | Age: 57
End: 2022-03-24
Payer: COMMERCIAL

## 2022-03-24 ENCOUNTER — HOSPITAL ENCOUNTER (OUTPATIENT)
Dept: PHYSICAL THERAPY | Age: 57
Setting detail: THERAPIES SERIES
Discharge: HOME | End: 2022-03-24
Payer: COMMERCIAL

## 2022-03-24 ENCOUNTER — HOSPITAL ENCOUNTER (OUTPATIENT)
Dept: INFUSION THERAPY | Age: 57
Discharge: HOME | End: 2022-03-24

## 2022-03-24 VITALS
DIASTOLIC BLOOD PRESSURE: 71 MMHG | HEIGHT: 67 IN | BODY MASS INDEX: 41.91 KG/M2 | TEMPERATURE: 96 F | OXYGEN SATURATION: 99 % | SYSTOLIC BLOOD PRESSURE: 155 MMHG | RESPIRATION RATE: 16 BRPM | HEART RATE: 76 BPM | WEIGHT: 267 LBS

## 2022-03-24 DIAGNOSIS — C50.411 MALIGNANT NEOPLASM OF UPPER-OUTER QUADRANT OF RIGHT BREAST IN FEMALE, ESTROGEN RECEPTOR POSITIVE (HCC): ICD-10-CM

## 2022-03-24 DIAGNOSIS — Z78.0 MENOPAUSE: Primary | ICD-10-CM

## 2022-03-24 DIAGNOSIS — Z17.0 MALIGNANT NEOPLASM OF UPPER-OUTER QUADRANT OF RIGHT BREAST IN FEMALE, ESTROGEN RECEPTOR POSITIVE (HCC): ICD-10-CM

## 2022-03-24 PROCEDURE — 97140 MANUAL THERAPY 1/> REGIONS: CPT

## 2022-03-24 PROCEDURE — 1036F TOBACCO NON-USER: CPT | Performed by: INTERNAL MEDICINE

## 2022-03-24 PROCEDURE — G8484 FLU IMMUNIZE NO ADMIN: HCPCS | Performed by: INTERNAL MEDICINE

## 2022-03-24 PROCEDURE — G8417 CALC BMI ABV UP PARAM F/U: HCPCS | Performed by: INTERNAL MEDICINE

## 2022-03-24 PROCEDURE — G8427 DOCREV CUR MEDS BY ELIG CLIN: HCPCS | Performed by: INTERNAL MEDICINE

## 2022-03-24 PROCEDURE — 3017F COLORECTAL CA SCREEN DOC REV: CPT | Performed by: INTERNAL MEDICINE

## 2022-03-24 PROCEDURE — 99213 OFFICE O/P EST LOW 20 MIN: CPT | Performed by: INTERNAL MEDICINE

## 2022-03-24 NOTE — PROGRESS NOTES
MA Rooming Questions  Patient: Yana Gorman  MRN: [id number]    Date: 3/24/2022        1. Do you have any new issues?   no         2. Do you need any refills on medications?    no    3. Have you had any imaging done since your last visit?   no    4. Have you been hospitalized or seen in the emergency room since your last visit here?   no    5. Did the patient have a depression screening completed today?  No    No data recorded     PHQ-9 Given to (if applicable):               PHQ-9 Score (if applicable):                     [] Positive     []  Negative              Does question #9 need addressed (if applicable)                     [] Yes    []  No               Kimberly Rojas MA

## 2022-03-24 NOTE — FLOWSHEET NOTE
Physical Therapy Treatment Note   Date:  3/24/2022    Patient Name: Yogi Graves Diagnosis: R UE Lymphedema    :  1965 Treatment Diagnosis:  Same   MRN: [id number]        Restrictions/Precautions: Insurance/Certification information:    [place] 160 UNITS  FROM 3/17-22       Visit# / total visits: 2 /10  Missed visits:   0   POC expiration date:   Plan of care signed (Y/N):  n       Initial Pain level: 0/10  Post Tx Pain Ratin /10   Changes/updates to ambulatory history sheet? Subjective: Pt stated she couldn't remember how to do the M on the stomach. She reports she couldn't keep her Tubigrip up and it was hurting. Manual Lymphatic Drainage:  MLD was performed today for central clearing and for appropriate extremity drainage. Appropriate regions were elevated during the manual lymphatic drainage. Therapeutic Exercise:  Therapeutic exercises were instructed today. Cues were given for technique, safety, recruitment, and rationale. Cues were verbal and/or tactile. Performed muscle pumping activities to circulate fluids and stimulate the lymphatic system. Compression: No compression at arrival Donned Elisa lite Jobst 15-20 mmHg    ADL/Self-Care Training: This patient was instructed in strategies to help improve self management of edema and to improve circulation. Training today included recommendations and discussion regarding purposeful elevation, sleep positioning, and general movements to pump muscles for circulatory benefit. Additionally, education was provided regarding compression garments and/or compression bandaging, including wear schedule, donning, removal, and laundering.         Objective Findings:      UE Right 3/24/22 R   Web A 21.3 21.3   Web B 22.8 23.0   Styloid 19.0 19.0   10cm 31.7 29.6   20cm 35.5 35.5   30cm 42.0 41.5   40cm 46.3 44.5   50cm      axilla             Assessment: Pt demonstrated GOOD response to MLD with increased lymph fluid circulation. Noted some minimal decrease in measurements. Donned new sleeve. She verbalized it feeling comfortable. HO given with more pictures to understand self-MLD. Tolerance for treatment:  good  Adverse reactions to treatment: none      Plan:           PLAN  After eval, patient will be seen for 2 times per week for 5 weeks. Measure for garments and order garments by the end of treatment week number 4. Treatment will include pt ed, ther ex, self care/ADL training, manual therapy: manual lymphatic drainage, compression garment measuring and ordering.       GOALS  Due by 4/29/22.  1 [place] with HEP.  2 Compliance with elevation daily. 3 Independent with self massage principles.   4 Acquire and wear correctly fitting compression garment for RUE.  5 Consistent reduction of edema as evidenced by regular girth measures.         [x] Continue per plan of care   [] Alter current plan   [] Hold pending MD visit   [] Discharge    Plan for Next Session:  MLD, Compression  Progress Note Due:    Communication with other providers:      Time In: 0802  Time Out: 0849    If [employer] Please Indicate Units for Rx this date and running total for each and overall total for Rx to date:  CPT Code Units today Running Total Units Total approved    TE [id number]          MAN [id number]  3 4      Gait 83096         NR [id number]         TA  [id number]         Estim Unatt [id number]           [id number]         [facility] [address]         [facility] [address]          ADL/Self care 01740  1     DNT 1-2 18112         DNT 3-4 20561         Other:     1                 Total for episode of care    6 160 UNITS  FROM 3/17-6/17/22        Timed Code Treatment Minutes: 52' 3 MT  Total Treatment Minutes: 52'    Electronically signed by: Zach Olivares PTA, CLT 3/24/2022, 7:54 AM

## 2022-03-29 ENCOUNTER — HOSPITAL ENCOUNTER (OUTPATIENT)
Dept: PHYSICAL THERAPY | Age: 57
Setting detail: THERAPIES SERIES
Discharge: HOME | End: 2022-03-29
Payer: COMMERCIAL

## 2022-03-29 PROCEDURE — 97140 MANUAL THERAPY 1/> REGIONS: CPT

## 2022-03-29 NOTE — FLOWSHEET NOTE
Physical Therapy Treatment Note   Date:  3/29/2022    Patient Name: Johana Nix Diagnosis: R UE Lymphedema    :  1965 Treatment Diagnosis:  Same   MRN: [id number]        Restrictions/Precautions: Insurance/Certification information:    [place] 160 UNITS  FROM 3/17-22       Visit# / total visits: 3 /10  Missed visits:   0   POC expiration date:   Plan of care signed (Y/N):  n       Initial Pain level: 5/10 upper arm ache with arm down 3/10 with arm resting on abdomen   Post Tx Pain Rating:  same   Changes/updates to ambulatory history sheet? Subjective: Pt stated she wore the sleeve thru Thursday and Friday. Saturday it stated to her around 4 pm. She had c/o of the wrist and upper arm pain with constriction from the sleeve. Manual Lymphatic Drainage:  MLD was performed today for central clearing and for appropriate extremity drainage. Appropriate regions were elevated during the manual lymphatic drainage. Therapeutic Exercise:  Therapeutic exercises were instructed today. Cues were given for technique, safety, recruitment, and rationale. Cues were verbal and/or tactile. Performed muscle pumping activities to circulate fluids and stimulate the lymphatic system. Compression: No compression at arrival Donned Elisa lite Jobst 15-20 mmHg trial again. Ordering regular vs long that she has now. ADL/Self-Care Training: This patient was instructed in strategies to help improve self management of edema and to improve circulation. Training today included recommendations and discussion regarding purposeful elevation, sleep positioning, and general movements to pump muscles for circulatory benefit. Additionally, education was provided regarding compression garments and/or compression bandaging, including wear schedule, donning, removal, and laundering.         Objective Findings:      UE Right 3/24/22 R 3/24/22 R   Web A 21.3 21.3 21.4   Web B 22.8 23.0 23.0   Styloid 19.0 19.0 19.1   10cm 31.7 29.6 29.8   20cm 35.5 35.5 35.0   30cm 42.0 41.5 41.5   40cm 46.3 44.5 45.4   50cm       axilla              Assessment: Pt demonstrated GOOD response to MLD with increased lymph fluid circulation. Noted no notable changes in measurements. Donned sleeve at departure and recommended evenly donning the sleeve. Therapist will order a regular length sleeve vs the long she has. She verbalized it feeling comfortable. Tolerance for treatment:  good  Adverse reactions to treatment: none      Plan:           PLAN  After eval, patient will be seen for 2 times per week for 5 weeks. Measure for garments and order garments by the end of treatment week number 4. Treatment will include pt ed, ther ex, self care/ADL training, manual therapy: manual lymphatic drainage, compression garment measuring and ordering.       GOALS  Due by 4/29/22.  1 [place] with HEP.  2 Compliance with elevation daily. 3 Independent with self massage principles.   4 Acquire and wear correctly fitting compression garment for RUE.  5 Consistent reduction of edema as evidenced by regular girth measures.         [x] Continue per plan of care   [] Alter current plan   [] Hold pending MD visit   [] Discharge    Plan for Next Session:  MLD, Compression  Progress Note Due:    Communication with other providers:      Time In: 0945  Time Out: 1025    If [employer] Please Indicate Units for Rx this date and running total for each and overall total for Rx to date:  CPT Code Units today Running Total Units Total approved    TE [id number]          MAN 13055  3 7     Gait 58082         NR 69775         TA  [id number]         Estim Unatt [id number]           [id number]         [facility] [address]         [facility] [address]          ADL/Self care 20222  1     DNT 1-2 20560         DNT 3-4 20561         Other:     1                 Total for episode of care    9 160 UNITS  FROM 3/17-6/17/22        Timed Code Treatment Minutes: 40' 3 MT  Total Treatment Minutes: 36'    Electronically signed by: Isabel Miller PTA, CLT 3/29/2022, 9:45 AM

## 2022-03-31 ENCOUNTER — HOSPITAL ENCOUNTER (OUTPATIENT)
Dept: PHYSICAL THERAPY | Age: 57
Setting detail: THERAPIES SERIES
Discharge: HOME | End: 2022-03-31
Payer: COMMERCIAL

## 2022-03-31 PROCEDURE — 97140 MANUAL THERAPY 1/> REGIONS: CPT

## 2022-03-31 NOTE — FLOWSHEET NOTE
Physical Therapy Treatment Note   Date:  3/31/2022    Patient Name: Denver Haggis Diagnosis: R UE Lymphedema    :  1965 Treatment Diagnosis:  Same   MRN: [id number]        Restrictions/Precautions: Insurance/Certification information:    [place] 160 UNITS  FROM 3/17-22       Visit# / total visits:  4/10  Missed visits:   0   POC expiration date:   Plan of care signed (Y/N):  n       Initial Pain level: 2-3/10     Post Tx Pain Rating:  same   Changes/updates to ambulatory history sheet? Subjective: wears the sleeve for about 8hrs, then takes the sleeve off, due to discomfort. Manual Lymphatic Drainage:  MLD was performed today for central clearing and for appropriate extremity drainage. Appropriate regions were elevated during the manual lymphatic drainage. Therapeutic Exercise:  Therapeutic exercises were instructed today. Cues were given for technique, safety, recruitment, and rationale. Cues were verbal and/or tactile. Performed muscle pumping activities to circulate fluids and stimulate the lymphatic system. Compression: No compression at arrival Donned Elisa lite Jobst 15-20 mmHg trial again. Ordering regular vs long that she has now. ADL/Self-Care Training: This patient was instructed in strategies to help improve self management of edema and to improve circulation. Training today included recommendations and discussion regarding purposeful elevation, sleep positioning, and general movements to pump muscles for circulatory benefit. Additionally, education was provided regarding compression garments and/or compression bandaging, including wear schedule, donning, removal, and laundering.         Objective Findings:      UE Right 3/24/22 R 3/24/22 R 3/31/22   Web A 21.3 21.3 21.4 21.5   Web B 22.8 23.0 23.0 23.2   Styloid 19.0 19.0 19.1 19.1   10cm 31.7 29.6 29.8 29.8   20cm 35.5 35.5 35.0 35.6   30cm 42.0 41.5 41.5 43.2   40cm 46.3 44.5 45.4 45.4   50cm   axilla               Assessment: Pt demonstrated GOOD response to MLD with increased lymph fluid circulation. Donned sleeve at departure and recommended evenly donning the sleeve. ordered a regular length sleeve. Tolerance for treatment:  good  Adverse reactions to treatment: none      Plan: Continue with MLD, CDT          PLAN  After eval, patient will be seen for 2 times per week for 5 weeks. Measure for garments and order garments by the end of treatment week number 4. Treatment will include pt ed, ther ex, self care/ADL training, manual therapy: manual lymphatic drainage, compression garment measuring and ordering.       GOALS  Due by 4/29/22.  1 [place] with HEP.  2 Compliance with elevation daily. 3 Independent with self massage principles.   4 Acquire and wear correctly fitting compression garment for RUE.  5 Consistent reduction of edema as evidenced by regular girth measures.         [x] Continue per plan of care   [] Alter current plan   [] Hold pending MD visit   [] Discharge    Plan for Next Session:  MLD, Compression  Progress Note Due:    Communication with other providers:      Time In: 0902  Time Out: 0940    If [employer] Please Indicate Units for Rx this date and running total for each and overall total for Rx to date:  CPT Code Units today Running Total Units Total approved    TE [id number]          MAN 68817  3 10     Gait 55645         NR 90457         TA  18425         Estim Unatt 87439           24302         [facility] [address]         [facility] [address]          ADL/Self care 80143  1     DNT 1-2 48448         DNT 3-4 20561         Other:     0 1                Total for episode of care    12 160 UNITS  FROM 3/17-6/17/22        Timed Code Treatment Minutes: 38  Total Treatment Minutes: 38    Electronically signed by: Erin Butt PT, CLT 3/31/2022, 9:07 AM

## 2022-04-05 ENCOUNTER — HOSPITAL ENCOUNTER (OUTPATIENT)
Dept: PHYSICAL THERAPY | Age: 57
Discharge: HOME | End: 2022-04-05
Payer: COMMERCIAL

## 2022-04-05 PROCEDURE — 97140 MANUAL THERAPY 1/> REGIONS: CPT

## 2022-04-05 NOTE — FLOWSHEET NOTE
Physical Therapy Treatment Note   Date:  2022    Patient Name: Paz Hager Diagnosis: R UE Lymphedema    :  1965 Treatment Diagnosis:  Same   MRN: [id number]        Restrictions/Precautions: Insurance/Certification information:    [place] 160 UNITS  FROM 3/17-22       Visit# / total visits:  5/10  Missed visits:   0   POC expiration date:   Plan of care signed (Y/N):  n       Initial Pain level: 5/10 mid forearm and upper arm     Post Tx Pain Rating:  same   Changes/updates to ambulatory history sheet? Subjective: Pt reports she tries to were it most of the day but it hurts. Manual Lymphatic Drainage:  MLD was performed today for central clearing and for appropriate extremity drainage. Appropriate regions were elevated during the manual lymphatic drainage. Therapeutic Exercise:  Therapeutic exercises were instructed today. Cues were given for technique, safety, recruitment, and rationale. Cues were verbal and/or tactile. Performed muscle pumping activities to circulate fluids and stimulate the lymphatic system. Compression: At arrival: Donned Elisa lite Jobst 15-20 mmHg . At departure: Donned Tubigrip size D lower arm and F upper arm. Verbalized it feeling better. Ordering regular vs long that she has now. ADL/Self-Care Training: This patient was instructed in strategies to help improve self management of edema and to improve circulation. Training today included recommendations and discussion regarding purposeful elevation, sleep positioning, and general movements to pump muscles for circulatory benefit. Additionally, education was provided regarding compression garments and/or compression bandaging, including wear schedule, donning, removal, and laundering.         Objective Findings:      UE Right 3/24/22 R 3/24/22 R 3/31/22 4/5/22 R   Web A 21.3 21.3 21.4 21.5 21.0   Web B 22.8 23.0 23.0 23.2 22.5   Styloid 19.0 19.0 19.1 19.1 19.0   10cm 31.7 29.6 29.8 29.8 29.0   20cm 35.5 35.5 35.0 35.6 34.5   30cm 42.0 41.5 41.5 43.2 40.7   40cm 46.3 44.5 45.4 45.4 42.5   50cm         axilla                Assessment: Pt demonstrated GOOD response to MLD with increased lymph fluid circulation. Decreased measurements today. Donned Tubigrip 2 different sizes to trial.     Tolerance for treatment:  good  Adverse reactions to treatment: none      Plan: Continue with MLD, CDT          PLAN  After eval, patient will be seen for 2 times per week for 5 weeks. Measure for garments and order garments by the end of treatment week number 4. Treatment will include pt ed, ther ex, self care/ADL training, manual therapy: manual lymphatic drainage, compression garment measuring and ordering.       GOALS  Due by 4/29/22.  1 [place] with HEP.  2 Compliance with elevation daily. 3 Independent with self massage principles.   4 Acquire and wear correctly fitting compression garment for RUE.  5 Consistent reduction of edema as evidenced by regular girth measures.         [x] Continue per plan of care   [] Alter current plan   [] Hold pending MD visit   [] Discharge    Plan for Next Session:  MLD, Compression  Progress Note Due:    Communication with other providers:      Time In: 1410  Time Out: 3360    If [employer] Please Indicate Units for Rx this date and running total for each and overall total for Rx to date:  CPT Code Units today Running Total Units Total approved    TE [id number]          MAN 69605  3 13     Gait 43073         NR 75189         TA  50913         Estim Unatt 15423           [id number]         [facility] [address]         [facility] [address]          ADL/Self care 69227  1     DNT 1-2 20560         DNT 3-4 20561         Other:     0 1                Total for episode of care    15 160 UNITS  FROM 3/17-6/17/22        Timed Code Treatment Minutes: 39 MT  Total Treatment Minutes: 39    Electronically signed by: Lakshmi Ayala PTA, CLT 4/5/2022, 2:10 PM

## 2022-04-07 ENCOUNTER — HOSPITAL ENCOUNTER (OUTPATIENT)
Dept: PHYSICAL THERAPY | Age: 57
Discharge: HOME | End: 2022-04-07
Payer: COMMERCIAL

## 2022-04-07 PROCEDURE — 97140 MANUAL THERAPY 1/> REGIONS: CPT

## 2022-04-07 NOTE — FLOWSHEET NOTE
Physical Therapy Treatment Note   Date:  2022    Patient Name: Brittney Hannah Diagnosis: R UE Lymphedema    :  1965 Treatment Diagnosis:  Same   MRN: [id number]        Restrictions/Precautions: Insurance/Certification information:    [place] 160 UNITS  FROM 3/17-22       Visit# / total visits:  6/10  Missed visits:   0   POC expiration date:   Plan of care signed (Y/N):  n       Initial Pain level: 5/10 mid forearm and upper arm     Post Tx Pain Rating:  same   Changes/updates to ambulatory history sheet? Subjective: Pt reports the tubigrip still rolled down and the sleeve hurts. 3/10 currently and /10 with compression on located at arm pit, elbow and wrist with c/o forearm swelling. .      Manual Lymphatic Drainage:  MLD was performed today for central clearing and for appropriate extremity drainage. Appropriate regions were elevated during the manual lymphatic drainage. Therapeutic Exercise:  Therapeutic exercises were instructed today. Cues were given for technique, safety, recruitment, and rationale. Cues were verbal and/or tactile. Performed muscle pumping activities to circulate fluids and stimulate the lymphatic system. Compression: At arrival:No compression . At departure: Donned Tubigrip size D lower arm only. Verbalized good tolerance. Ordering custom Elvarex sleeve    ADL/Self-Care Training: This patient was instructed in strategies to help improve self management of edema and to improve circulation. Training today included recommendations and discussion regarding purposeful elevation, sleep positioning, and general movements to pump muscles for circulatory benefit. Additionally, education was provided regarding compression garments and/or compression bandaging, including wear schedule, donning, removal, and laundering.         Objective Findings:      UE Right 3/24/22 R 3/24/22 R 3/31/22 4/5/22 R 22   Web A 21.3 21.3 21.4 21.5 21.0 21.5   Web B 22.8 23.0 23.0 23.2 22.5 23.4   Styloid 19.0 19.0 19.1 19.1 19.0 19.0   10cm 31.7 29.6 29.8 29.8 29.0 29.5   20cm 35.5 35.5 35.0 35.6 34.5 34.2   30cm 42.0 41.5 41.5 43.2 40.7 41.5   40cm 46.3 44.5 45.4 45.4 42.5 42.8   50cm       42.0   axilla                 Assessment: Pt demonstrated GOOD response to MLD with increased lymph fluid circulation. No notable increased localized swelling today as reported at home from patient. No compression donned since yesterday and demonstrated increased measurements today. Pt unable to tolerate both Tubigripand sleeve. Patient agreed to order Elvarex flat knit garment. Tolerance for treatment:  good  Adverse reactions to treatment: none      Plan: Continue with MLD, CDT          PLAN  After eval, patient will be seen for 2 times per week for 5 weeks. Measure for garments and order garments by the end of treatment week number 4. Treatment will include pt ed, ther ex, self care/ADL training, manual therapy: manual lymphatic drainage, compression garment measuring and ordering.       GOALS  Due by 4/29/22.  1 [place] with HEP.  2 Compliance with elevation daily. 3 Independent with self massage principles.   4 Acquire and wear correctly fitting compression garment for RUE.  5 Consistent reduction of edema as evidenced by regular girth measures.         [x] Continue per plan of care   [] Alter current plan   [] Hold pending MD visit   [] Discharge    Plan for Next Session:  MLD, Compression  Progress Note Due:    Communication with other providers:      Time In: 0845  Time Out: 0930    If [employer] Please Indicate Units for Rx this date and running total for each and overall total for Rx to date:  CPT Code Units today Running Total Units Total approved    TE [id number]          MAN 82220  3 16     Gait 31199         NR 54767         TA  46203         Estim Unatt 95353           25[address]         [facility] [place] [address]         VASO [address]19          ADL/Self care 38268  1     DNT 1-2 35151         DNT 3-4 20561         Other:     0 1                Total for episode of care    18 160 UNITS  FROM 3/17-6/17/22        Timed Code Treatment Minutes: 39 MT  Total Treatment Minutes: 45    Electronically signed by: Tate Leone PTA, CLT 4/7/2022, 8:43 AM

## 2022-04-12 ENCOUNTER — HOSPITAL ENCOUNTER (OUTPATIENT)
Dept: PHYSICAL THERAPY | Age: 57
Discharge: HOME | End: 2022-04-12
Payer: COMMERCIAL

## 2022-04-12 PROCEDURE — 97140 MANUAL THERAPY 1/> REGIONS: CPT

## 2022-04-12 NOTE — FLOWSHEET NOTE
22.0   Web B 22.8 23.0 23.0 23.2 22.5 23.4 22.6   Styloid 19.0 19.0 19.1 19.1 19.0 19.0 20.4   10cm 31.7 29.6 29.8 29.8 29.0 29.5 30.0   20cm 35.5 35.5 35.0 35.6 34.5 34.2 35.5   30cm 42.0 41.5 41.5 43.2 40.7 41.5 41.5   40cm 46.3 44.5 45.4 45.4 42.5 42.8 44.2   50cm       42.0 44.5   axilla                  Assessment: Pt demonstrated GOOD response to MLD with increased lymph fluid circulation. No compliant with compression but want to try Tubigrip again. Quote requested for Elvarex flat knit garment. Tolerance for treatment:  good  Adverse reactions to treatment: none      Plan: Continue with MLD, CDT          PLAN  After eval, patient will be seen for 2 times per week for 5 weeks. Measure for garments and order garments by the end of treatment week number 4. Treatment will include pt ed, ther ex, self care/ADL training, manual therapy: manual lymphatic drainage, compression garment measuring and ordering.       GOALS  Due by 4/29/22.  1 [place] with HEP.  2 Compliance with elevation daily. 3 Independent with self massage principles.   4 Acquire and wear correctly fitting compression garment for RUE.  5 Consistent reduction of edema as evidenced by regular girth measures.         [x] Continue per plan of care   [] Alter current plan   [] Hold pending MD visit   [] Discharge    Plan for Next Session:  MLD, Compression  Progress Note Due:    Communication with other providers:      Time In: 0930  Time Out:1015    If [employer] Please Indicate Units for Rx this date and running total for each and overall total for Rx to date:  CPT Code Units today Running Total Units Total approved    TE [id number]          MAN 65986  3 19     Gait 87973         NR 41662         TA  72101         Estim Unatt 57762           [address]         [facility] [address]         [facility] [address]          ADL/Self care 26277  1     DNT 1-2 92540         DNT 3-4 11982         Other:     0 1                Total for episode of care    21 160 UNITS  FROM 3/17-6/17/22        Timed Code Treatment Minutes: 39 MT  Total Treatment Minutes: 45    Electronically signed by: Lisa Holley PTA, CLT 4/12/2022, 9:34 AM

## 2022-04-14 ENCOUNTER — HOSPITAL ENCOUNTER (OUTPATIENT)
Dept: PHYSICAL THERAPY | Age: 57
Discharge: HOME | End: 2022-04-14
Payer: COMMERCIAL

## 2022-04-14 PROCEDURE — 97140 MANUAL THERAPY 1/> REGIONS: CPT

## 2022-04-14 NOTE — FLOWSHEET NOTE
Physical Therapy Treatment Note   Date:  2022    Patient Name: Munir Leyva Diagnosis: R UE Lymphedema    :  1965 Treatment Diagnosis:  Same   MRN: [id number]        Restrictions/Precautions: Insurance/Certification information:    [place] 160 UNITS  FROM 3/17-22       Visit# / total visits:  8/10  Missed visits:   0   POC expiration date:   Plan of care signed (Y/N):  n       Initial Pain level: 5/10 mid forearm and upper arm     Post Tx Pain Rating:  same   Changes/updates to ambulatory history sheet? Subjective: Had increased pain and swelling at thenar part of thumb. Felt some swelling up in arm and shoulder area. Manual Lymphatic Drainage:  MLD was performed today for central clearing and for appropriate extremity drainage. Appropriate regions were elevated during the manual lymphatic drainage. Therapeutic Exercise:  Therapeutic exercises were instructed today. Cues were given for technique, safety, recruitment, and rationale. Cues were verbal and/or tactile. Performed muscle pumping activities to circulate fluids and stimulate the lymphatic system. Compression: At arrival: No compression . At departure: Donned Tubigrip size D lower arm only, size medium isotoner open finger glove. Verbalized good tolerance. Ordering custom Elvarex sleeve Waiting for quote - remeasured today    ADL/Self-Care Training: This patient was instructed in strategies to help improve self management of edema and to improve circulation. Training today included recommendations and discussion regarding purposeful elevation, sleep positioning, and general movements to pump muscles for circulatory benefit. Additionally, education was provided regarding compression garments and/or compression bandaging, including wear schedule, donning, removal, and laundering.         Objective Findings:      UE Right 3/24/22 R 3/24/22 R 3/31/22 4/5/22 R 22    Web A 21.3 21.3 21.4 21.5 21.0 21.5 22.0   Web B 22.8 23.0 23.0 23.2 22.5 23.4 22.6   Styloid 19.0 19.0 19.1 19.1 19.0 19.0 20.4   10cm 31.7 29.6 29.8 29.8 29.0 29.5 30.0   20cm 35.5 35.5 35.0 35.6 34.5 34.2 35.5   30cm 42.0 41.5 41.5 43.2 40.7 41.5 41.5   40cm 46.3 44.5 45.4 45.4 42.5 42.8 44.2   50cm       42.0 44.5   axilla                  Assessment: Pt demonstrated GOOD response to MLD with increased lymph fluid circulation. No compliant with compression. Quote requested for Elvarex flat knit garment. Tolerance for treatment:  good  Adverse reactions to treatment: none      Plan: Continue with MLD, CDT          PLAN  After eval, patient will be seen for 2 times per week for 5 weeks. Measure for garments and order garments by the end of treatment week number 4. Treatment will include pt ed, ther ex, self care/ADL training, manual therapy: manual lymphatic drainage, compression garment measuring and ordering.       GOALS  Due by 4/29/22.  1 [place] with HEP.  2 Compliance with elevation daily. 3 Independent with self massage principles.   4 Acquire and wear correctly fitting compression garment for RUE.  5 Consistent reduction of edema as evidenced by regular girth measures.         [x] Continue per plan of care   [] Alter current plan   [] Hold pending MD visit   [] Discharge    Plan for Next Session:  MLD, Compression  Progress Note Due:    Communication with other providers:      Time In: 1007  Time Out:1045    If [employer] Please Indicate Units for Rx this date and running total for each and overall total for Rx to date:  CPT Code Units today Running Total Units Total approved    TE [id number]          MAN 86968  3 22     Gait 01959         NR 04009         TA  88891         Estim Unatt 03998           [address]         [facility] [place] [address]         [facility] [address]          ADL/Self care 14670  1     DNT 1-2 53979         DNT 3-4 52067         Other:     0 1                Total for episode of care    24 160 UNITS  FROM 3/17-6/17/22  Timed Code Treatment Minutes: 38  Total Treatment Minutes: 38    Electronically signed by: Rissa Smith PT, CLT 4/14/2022, 10:26 AM

## 2022-04-19 ENCOUNTER — HOSPITAL ENCOUNTER (OUTPATIENT)
Dept: PHYSICAL THERAPY | Age: 57
Discharge: HOME | End: 2022-04-19
Payer: COMMERCIAL

## 2022-04-19 PROCEDURE — 97140 MANUAL THERAPY 1/> REGIONS: CPT

## 2022-04-19 NOTE — FLOWSHEET NOTE
Physical Therapy Treatment Note   Date:  2022    Patient Name: Nancy Mike Diagnosis: R UE Lymphedema    :  1965 Treatment Diagnosis:  Same   MRN: [id number]        Restrictions/Precautions: Insurance/Certification information:    [place] 160 UNITS  FROM 3/17-22       Visit# / total visits:  9/10  Missed visits:   0   POC expiration date:   Plan of care signed (Y/N):  n       Initial Pain level: 4/10 mid forearm and upper arm     Post Tx Pain Rating:  same   Changes/updates to ambulatory history sheet? Subjective:   Pt reports pain 4/10 forearm and still have some poofiness around the thumb. Manual Lymphatic Drainage:  MLD was performed today for central clearing and for appropriate extremity drainage. Appropriate regions were elevated during the manual lymphatic drainage. Therapeutic Exercise:  Therapeutic exercises were instructed today. Cues were given for technique, safety, recruitment, and rationale. Cues were verbal and/or tactile. Performed muscle pumping activities to circulate fluids and stimulate the lymphatic system. Compression: At arrival: Donned Tubigrip size D lower arm only, size medium isotoner open finger glove. At departure: Donned Tubigrip size D lower arm only, size medium isotoner open finger glove. Verbalized good tolerance. Ordering custom Elvarex sleeve Waiting for quote - remeasured today    ADL/Self-Care Training: This patient was instructed in strategies to help improve self management of edema and to improve circulation. Training today included recommendations and discussion regarding purposeful elevation, sleep positioning, and general movements to pump muscles for circulatory benefit. Additionally, education was provided regarding compression garments and/or compression bandaging, including wear schedule, donning, removal, and laundering.         Objective Findings:      UE Right 3/24/22 R 3/24/22 R 3/31/22 4/5/22 R 22 4/12/22 4/19/22    Web A 21.3 21.3 21.4 21.5 21.0 21.5 22.0 21.7   Web B 22.8 23.0 23.0 23.2 22.5 23.4 22.6 22.5   Styloid 19.0 19.0 19.1 19.1 19.0 19.0 20.4 19.0   10cm 31.7 29.6 29.8 29.8 29.0 29.5 30.0 29.5   20cm 35.5 35.5 35.0 35.6 34.5 34.2 35.5 34.2   30cm 42.0 41.5 41.5 43.2 40.7 41.5 41.5 41.0   40cm 46.3 44.5 45.4 45.4 42.5 42.8 44.2 42.8   50cm       42.0 44.5 44.7   axilla                   Assessment: Pt demonstrated GOOD response to MLD with increased lymph fluid circulation. Patent compression tolerance to 6 hours yesterday. Some decrease in measurements today. Quote requested for Elvarex flat knit garment. Quote HO to pt. Requesting if she would get a credit toward EElvarex sleeve from the [facility] sleeve that didn't work for her. Tolerance for treatment:  good  Adverse reactions to treatment: none      Plan: Continue with MLD, CDT          PLAN  After eval, patient will be seen for 2 times per week for 5 weeks. Measure for garments and order garments by the end of treatment week number 4. Treatment will include pt ed, ther ex, self care/ADL training, manual therapy: manual lymphatic drainage, compression garment measuring and ordering.       GOALS  Due by 4/29/22.  1 [place] with HEP.  2 Compliance with elevation daily. 3 Independent with self massage principles.   4 Acquire and wear correctly fitting compression garment for RUE.  5 Consistent reduction of edema as evidenced by regular girth measures.         [x] Continue per plan of care   [] Alter current plan   [] Hold pending MD visit   [] Discharge    Plan for Next Session:  MLD, Compression  Progress Note Due:    Communication with other providers:      Time In: 1516  Time Out:1554    If [employer] Please Indicate Units for Rx this date and running total for each and overall total for Rx to date:  CPT Code Units today Running Total Units Total approved    TE [id number]          MAN 18265  3 25     Gait 68671         NR 14722         TA  74759         [facility] [address]           [address]         [facility] [address]         [facility] [address]          ADL/Self care 99930  1     DNT 1-2 81313         DNT 3-4 20561         Other:     0 1                Total for episode of care    27 160 UNITS  FROM 3/17-6/17/22        Timed Code Treatment Minutes: 38 3 MT  Total Treatment Minutes: 38    Electronically signed by: Jim Francis PTA, CLT 4/19/2022, 3:24 PM

## 2022-04-21 ENCOUNTER — HOSPITAL ENCOUNTER (OUTPATIENT)
Dept: PHYSICAL THERAPY | Age: 57
Discharge: HOME | End: 2022-04-21
Payer: COMMERCIAL

## 2022-04-21 PROCEDURE — 97140 MANUAL THERAPY 1/> REGIONS: CPT

## 2022-04-21 NOTE — FLOWSHEET NOTE
Physical Therapy Treatment Note   Date:  2022    Patient Name: Raman Santa Diagnosis: R UE Lymphedema    :  1965 Treatment Diagnosis:  Same   MRN: [id number]        Restrictions/Precautions: Insurance/Certification information:    [place] 160 UNITS  FROM 3/17-22       Visit# / total visits:  10/10  Missed visits:   0   POC expiration date:   Plan of care signed (Y/N):  n       Initial Pain level: 3/10 mid forearm and upper arm     Post Tx Pain Rating:  same   Changes/updates to ambulatory history sheet?  n      Subjective:  Arm and forearm hurt at different times. Tries to wear compression 8hrs. Manual Lymphatic Drainage:  MLD was performed today for central clearing and for appropriate extremity drainage. Appropriate regions were elevated during the manual lymphatic drainage. Therapeutic Exercise:  Therapeutic exercises were instructed today. Cues were given for technique, safety, recruitment, and rationale. Cues were verbal and/or tactile. Performed muscle pumping activities to circulate fluids and stimulate the lymphatic system. Compression: At arrival: Donned Tubigrip size D lower arm only, size medium isotoner open finger glove. At departure: Donned Tubigrip size D lower arm only, size medium isotoner open finger glove. Verbalized good tolerance. Ordering custom Elvarex sleeve Waiting for quote     ADL/Self-Care Training: This patient was instructed in strategies to help improve self management of edema and to improve circulation. Training today included recommendations and discussion regarding purposeful elevation, sleep positioning, and general movements to pump muscles for circulatory benefit. Additionally, education was provided regarding compression garments and/or compression bandaging, including wear schedule, donning, removal, and laundering.         Objective Findings:      UE Right 3/24/22 R 3/24/22 R 3/31/22 4/5/22 R 22          [id number]           [id number]         [facility] [address]           [address]         [facility] [address]         [facility] [address]          ADL/Self care 89961  1     DNT 1-2 20560         DNT 3-4 20561         Other:     0 1                Total for episode of care    30 160 UNITS  FROM 3/17-6/17/22        Timed Code Treatment Minutes: 39  Total Treatment Minutes: 39    Electronically signed by: Saúl Oshea PT, CLT 4/21/2022, 8:54 AM wife

## 2022-04-28 ENCOUNTER — HOSPITAL ENCOUNTER (OUTPATIENT)
Dept: PHYSICAL THERAPY | Age: 57
Discharge: HOME | End: 2022-04-28
Payer: COMMERCIAL

## 2022-04-28 PROCEDURE — 97140 MANUAL THERAPY 1/> REGIONS: CPT

## 2022-04-28 NOTE — PROGRESS NOTES
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 Tries to wear compression 8hrs. Plan of Care/Treatment to date:  [x] Therapeutic Exercise    [] Modalities:  [] Therapeutic Activity     [] Ultrasound  [] Electrical Stimulation  [] Gait Training      [] Cervical Traction   [] Lumbar Traction  [] Neuromuscular Re-education  [] Cold/hotpack [] Iontophoresis  [x] Instruction in HEP      Other:  [x] Manual Therapy       []  Vasopneumatic  [x] self care      []   Dry Needle Therapy                      Objective/Significant Findings At Last Visit/Comments:    Custom garments ordered for patient. Difficulty with OTC garments rolling due to arm size and shape. Limited tolerance to compression. Currently tolerating compression at forearm and hand. Some decreases noted at forearm since starting therapy. SHowed good decreases when wearing compression at arm, but now relates difficulty and pain with OTC compression.      Goal Status:  [] Achieved [x] Partially Achieved  [] Not Achieved         Rehab Potential: [] Excellent [x] Good [] Fair  [] Poor         Patient Status: [] Continue per initial plan of Care     [] Patient now discharged     [x] Additional visits requested, Please re-certify for additional visits:      Requested frequency/duration:  1/week for 5weeks    If we are requesting more visits, we fully anticipate the patient's condition is expected to improve within the treatment timeframe we are requesting. Electronically signed by:  Mehul Su PT,  4/28/2022, 7:55 AM    If you have any questions or concerns, please don't hesitate to call.   Thank you for your referral.    Physician Signature:______________________ Date:______ Time: ________  By signing above, therapists plan is approved by physician

## 2022-04-28 NOTE — FLOWSHEET NOTE
Physical Therapy Treatment Note   Date:  2022    Patient Name: Jer Sanchez Diagnosis: R UE Lymphedema    :  1965 Treatment Diagnosis:  Same   MRN: [id number]        Restrictions/Precautions: Insurance/Certification information:    [place] 160 UNITS  FROM 3/17-22       Visit# / total visits:  11/15  Missed visits:   0   POC expiration date:   Plan of care signed (Y/N):  n       Initial Pain level: 3-4/10 mid forearm and upper arm     Post Tx Pain Rating:  same   Changes/updates to ambulatory history sheet?  n      Subjective:  Pt reports same sx with arm. Wondering is the sleeve has come in. Manual Lymphatic Drainage:  MLD was performed today for central clearing and for appropriate extremity drainage. Appropriate regions were elevated during the manual lymphatic drainage. Therapeutic Exercise:  Therapeutic exercises were instructed today. Cues were given for technique, safety, recruitment, and rationale. Cues were verbal and/or tactile. Performed muscle pumping activities to circulate fluids and stimulate the lymphatic system. Compression: At arrival: Donned Tubigrip size D lower arm only, size medium isotoner open finger glove. At departure: Donned Tubigrip size D lower arm only, size medium isotoner open finger glove. Verbalized good tolerance. Ordering custom Elvarex sleeve Waiting for quote     ADL/Self-Care Training: This patient was instructed in strategies to help improve self management of edema and to improve circulation. Training today included recommendations and discussion regarding purposeful elevation, sleep positioning, and general movements to pump muscles for circulatory benefit. Additionally, education was provided regarding compression garments and/or compression bandaging, including wear schedule, donning, removal, and laundering.         Objective Findings:      UE Right 3/24/22 R 3/24/22 R 3/31/22 4/5/22 R 22 4/21/22 4/28/22   Web A 21.3 21.3 21.4 21.5 21.0 21.5 22.0 21.7 21.5 21.7   Web B 22.8 23.0 23.0 23.2 22.5 23.4 22.6 22.5 23.0 22.5   Styloid 19.0 19.0 19.1 19.1 19.0 19.0 20.4 19.0 19.1 19.4   10cm 31.7 29.6 29.8 29.8 29.0 29.5 30.0 29.5 28.3 29.8   20cm 35.5 35.5 35.0 35.6 34.5 34.2 35.5 34.2 34.0 34.7   30cm 42.0 41.5 41.5 43.2 40.7 41.5 41.5 41.0 41.2 41.1   40cm 46.3 44.5 45.4 45.4 42.5 42.8 44.2 42.8 45.4 46.5   50cm       42.0 44.5 44.7     axilla                     Assessment: Pt demonstrated GOOD response to MLD with increased lymph fluid circulation. Patient compression tolerance to 6-8 hours a day. Order placed for Elvarex flat knit garment. Tolerance for treatment:  good  Adverse reactions to treatment: none      Plan: Continue with MLD, CDT 1x/wk, TE           PLAN  After eval, patient will be seen for 2 times per week for 5 weeks. Measure for garments and order garments by the end of treatment week number 4. Treatment will include pt ed, ther ex, self care/ADL training, manual therapy: manual lymphatic drainage, compression garment measuring and ordering.       GOALS  Due by 4/29/22.  1 [place] with HEP.  2 Compliance with elevation daily. 3 Independent with self massage principles.   4 Acquire and wear correctly fitting compression garment for RUE.  5 Consistent reduction of edema as evidenced by regular girth measures.         [x] Continue per plan of care   [] Alter current plan   [] Hold pending MD visit   [] Discharge    Plan for Next Session:  MLD, Compression  Progress Note Due:    Communication with other providers:      Time In: 0944  Time Out:1023    If [employer] Please Indicate Units for Rx this date and running total for each and overall total for Rx to date:  CPT Code Units today Running Total Units Total approved    TE [id number]          MAN 42515  3 31     Gait 93462         NR 61687         TA  32814         Estim [place] [address]           [address]         [facility] [place] [address]         VASO [address]39          ADL/Self care 02684  1     DNT 1-2 20560         DNT 3-4 20561         Other:     0 1                Total for episode of care    33 160 UNITS  FROM 3/17-6/17/22        Timed Code Treatment Minutes: 44 MT  Total Treatment Minutes: 39    Electronically signed by: Carlos Reed PTA, CLT 4/28/2022, 9:42 AM

## 2022-05-04 ENCOUNTER — HOSPITAL ENCOUNTER (OUTPATIENT)
Dept: PHYSICAL THERAPY | Age: 57
Discharge: HOME | End: 2022-05-04
Payer: COMMERCIAL

## 2022-05-04 PROCEDURE — 97140 MANUAL THERAPY 1/> REGIONS: CPT

## 2022-05-04 NOTE — FLOWSHEET NOTE
Physical Therapy Treatment Note   Date:  2022    Patient Name: Brittney Hannah Diagnosis: R UE Lymphedema    :  1965 Treatment Diagnosis:  Same   MRN: [id number]        Restrictions/Precautions: Insurance/Certification information:    [place] 160 UNITS  FROM 3/17-22       Visit# / total visits:  12/15  Missed visits:   0   POC expiration date:   Plan of care signed (Y/N):  n       Initial Pain level: 3-4/10 mid forearm and upper arm     Post Tx Pain Rating:  same   Changes/updates to ambulatory history sheet?  n      Subjective:  Pt reports same sx with arm. Still feels tight and painful at the forearm sometimes. Manual Lymphatic Drainage:  MLD was performed today for central clearing and for appropriate extremity drainage. Appropriate regions were elevated during the manual lymphatic drainage. Therapeutic Exercise:  Therapeutic exercises were instructed today. Cues were given for technique, safety, recruitment, and rationale. Cues were verbal and/or tactile. Performed muscle pumping activities to circulate fluids and stimulate the lymphatic system. Compression: At arrival: Donned Tubigrip size D lower arm only, size medium isotoner open finger glove. At departure: Donned Tubigrip size D lower arm only, size medium isotoner open finger glove. Verbalized good tolerance. Ordering custom Elvarex sleeve     ADL/Self-Care Training: This patient was instructed in strategies to help improve self management of edema and to improve circulation. Training today included recommendations and discussion regarding purposeful elevation, sleep positioning, and general movements to pump muscles for circulatory benefit. Additionally, education was provided regarding compression garments and/or compression bandaging, including wear schedule, donning, removal, and laundering.         Objective Findings:      UE Right 3/24/22 R 3/24/22 R 3/31/22 4/5/22 R 22 4/28/22 5/4/22    Web A 21.3 21.3 21.4 21.5 21.0 21.5 22.0 21.7 21.5 21.7 21.2   Web B 22.8 23.0 23.0 23.2 22.5 23.4 22.6 22.5 23.0 22.5 22.5   Styloid 19.0 19.0 19.1 19.1 19.0 19.0 20.4 19.0 19.1 19.4 19.0   10cm 31.7 29.6 29.8 29.8 29.0 29.5 30.0 29.5 28.3 29.8 29.6   20cm 35.5 35.5 35.0 35.6 34.5 34.2 35.5 34.2 34.0 34.7 34.0   30cm 42.0 41.5 41.5 43.2 40.7 41.5 41.5 41.0 41.2 41.1 41.4   40cm 46.3 44.5 45.4 45.4 42.5 42.8 44.2 42.8 45.4 46.5 44.5   50cm       42.0 44.5 44.7      axilla                      Assessment: Pt demonstrated GOOD response to MLD with increased lymph fluid circulation. Patient compression tolerance to 6-8 hours a day. Order placed for Elvarex flat knit garment. Tolerance for treatment:  good  Adverse reactions to treatment: none      Plan: Continue with MLD, CDT 1x/wk,            PLAN  After eval, patient will be seen for 2 times per week for 5 weeks. Measure for garments and order garments by the end of treatment week number 4. Treatment will include pt ed, ther ex, self care/ADL training, manual therapy: manual lymphatic drainage, compression garment measuring and ordering.       GOALS  Due by 4/29/22.  1 [place] with HEP.  2 Compliance with elevation daily. 3 Independent with self massage principles.   4 Acquire and wear correctly fitting compression garment for RUE.  5 Consistent reduction of edema as evidenced by regular girth measures.         [x] Continue per plan of care   [] Alter current plan   [] Hold pending MD visit   [] Discharge    Plan for Next Session:  MLD, Compression  Progress Note Due:    Communication with other providers:      Time In: 1358  Time Out:1339    If [employer] Please Indicate Units for Rx this date and running total for each and overall total for Rx to date:  CPT Code Units today Running Total Units Total approved    TE [id number]          MAN 59215  3 34     Gait 96942         NR 34123         TA  55425         Estim [place]att 78783           543[address]         [facility] [address]         [facility] [address]          ADL/Self care 41933  1     DNT 1-2 20560         DNT 3-4 20561         Other:     0 1                Total for episode of care    36 160 UNITS  FROM 3/17-6/17/22        Timed Code Treatment Minutes: 41 MT  Total Treatment Minutes: 41    Electronically signed by: Myla Runner, PT, CLT 5/4/2022, 1:04 PM

## 2022-05-11 ENCOUNTER — HOSPITAL ENCOUNTER (OUTPATIENT)
Dept: PHYSICAL THERAPY | Age: 57
Discharge: HOME | End: 2022-05-11
Payer: COMMERCIAL

## 2022-05-11 PROCEDURE — 97140 MANUAL THERAPY 1/> REGIONS: CPT

## 2022-05-11 NOTE — FLOWSHEET NOTE
Physical Therapy Treatment Note   Date:  2022    Patient Name: Aleks Giron Diagnosis: R UE Lymphedema    :  1965 Treatment Diagnosis:  Same   MRN: [id number]        Restrictions/Precautions: Insurance/Certification information:    [place] 160 UNITS  FROM 3/17-22       Visit# / total visits:  13/15  Missed visits:   0   POC expiration date:   Plan of care signed (Y/N):  n       Initial Pain level: 3-4/10 mid forearm, wrist, and upper arm     Post Tx Pain Rating:  same   Changes/updates to ambulatory history sheet?  n      Subjective:  Some tightness at forearm, some pain and tightness at wrist.      Manual Lymphatic Drainage:  MLD was performed today for central clearing and for appropriate extremity drainage. Appropriate regions were elevated during the manual lymphatic drainage. Therapeutic Exercise:  Therapeutic exercises were instructed today. Cues were given for technique, safety, recruitment, and rationale. Cues were verbal and/or tactile. Performed muscle pumping activities to circulate fluids and stimulate the lymphatic system. Compression: At arrival: Donned Tubigrip size D lower arm only, size medium isotoner open finger glove. At departure: Donned Tubigrip size D lower arm only, size medium isotoner open finger glove. Ordering custom Elvarex sleeve     ADL/Self-Care Training: This patient was instructed in strategies to help improve self management of edema and to improve circulation. Training today included recommendations and discussion regarding purposeful elevation, sleep positioning, and general movements to pump muscles for circulatory benefit. Additionally, education was provided regarding compression garments and/or compression bandaging, including wear schedule, donning, removal, and laundering.         Objective Findings:      UE Right 3/24/22 R 3/24/22 R 3/31/22 4/5/22 R 22   Web A 21.3 21.3 21.4 21.5 21.0 21.5 22.0 21.7 21.5 21.7 21.2 22.0   Web B 22.8 23.0 23.0 23.2 22.5 23.4 22.6 22.5 23.0 22.5 22.5 23.0   Styloid 19.0 19.0 19.1 19.1 19.0 19.0 20.4 19.0 19.1 19.4 19.0 19.4   10cm 31.7 29.6 29.8 29.8 29.0 29.5 30.0 29.5 28.3 29.8 29.6 30.2   20cm 35.5 35.5 35.0 35.6 34.5 34.2 35.5 34.2 34.0 34.7 34.0 34.4   30cm 42.0 41.5 41.5 43.2 40.7 41.5 41.5 41.0 41.2 41.1 41.4 41.2   40cm 46.3 44.5 45.4 45.4 42.5 42.8 44.2 42.8 45.4 46.5 44.5 44.5   50cm       42.0 44.5 44.7       axilla                       Assessment: Pt demonstrated GOOD response to MLD with increased lymph fluid circulation. Patient compression tolerance to 6-8 hours a day. Order placed for Elvarex flat knit garment. Tolerance for treatment:  good  Adverse reactions to treatment: none      Plan: Continue with MLD, CDT 1x/wk,            PLAN  After eval, patient will be seen for 2 times per week for 5 weeks. Measure for garments and order garments by the end of treatment week number 4. Treatment will include pt ed, ther ex, self care/ADL training, manual therapy: manual lymphatic drainage, compression garment measuring and ordering.       GOALS  Due by 4/29/22.  1 [place] with HEP.  2 Compliance with elevation daily. 3 Independent with self massage principles.   4 Acquire and wear correctly fitting compression garment for RUE.  5 Consistent reduction of edema as evidenced by regular girth measures.         [x] Continue per plan of care   [] Alter current plan   [] Hold pending MD visit   [] Discharge    Plan for Next Session:  MLD, Compression  Progress Note Due:    Communication with other providers:      Time In: 1115  Time Out:1155    If [employer] Please Indicate Units for Rx this date and running total for each and overall total for Rx to date:  CPT Code Units today Running Total Units Total approved    TE [id number]          MAN 03451  3 37     Gait 12540         NR 41487         TA  62372         [facility] [place] [address]           [address]         [facility] [address]         VASO [address]          ADL/Self care 59186  1     DNT 1-2 20560         DNT 3-4 20561         Other:     0 1                Total for episode of care    39 160 UNITS  FROM 3/17-6/17/22        Timed Code Treatment Minutes: 40 MT  Total Treatment Minutes: 40    Electronically signed by: Taj Trujillo PT, CLT 5/11/2022, 11:22 AM

## 2022-05-18 ENCOUNTER — HOSPITAL ENCOUNTER (OUTPATIENT)
Dept: PHYSICAL THERAPY | Age: 57
Discharge: HOME | End: 2022-05-18
Payer: COMMERCIAL

## 2022-05-18 PROCEDURE — 97140 MANUAL THERAPY 1/> REGIONS: CPT

## 2022-05-18 NOTE — FLOWSHEET NOTE
Physical Therapy Treatment Note   Date:  2022    Patient Name: Veronique Swain Diagnosis: R UE Lymphedema    :  1965 Treatment Diagnosis:  Same   MRN: [id number]        Restrictions/Precautions: Insurance/Certification information:    [place] 160 UNITS  FROM 3/17-22       Visit# / total visits:  14/15  Missed visits:   0   POC expiration date:   Plan of care signed (Y/N):  n       Initial Pain level: 3-4/10 mid forearm, wrist, and upper arm     Post Tx Pain Rating:  same   Changes/updates to ambulatory history sheet?  n      Subjective:  C/o pain at arm, forearm. Manual Lymphatic Drainage:  MLD was performed today for central clearing and for appropriate extremity drainage. Appropriate regions were elevated during the manual lymphatic drainage. Therapeutic Exercise:  Therapeutic exercises were instructed today. Cues were given for technique, safety, recruitment, and rationale. Cues were verbal and/or tactile. Performed muscle pumping activities to circulate fluids and stimulate the lymphatic system. Compression: At arrival: Donned Tubigrip size D lower arm only, size medium isotoner open finger glove. At departure: Donned Tubigrip size D lower arm only, size medium isotoner open finger glove. Ordering custom Elvarex sleeve     ADL/Self-Care Training: This patient was instructed in strategies to help improve self management of edema and to improve circulation. Training today included recommendations and discussion regarding purposeful elevation, sleep positioning, and general movements to pump muscles for circulatory benefit. Additionally, education was provided regarding compression garments and/or compression bandaging, including wear schedule, donning, removal, and laundering.         Objective Findings:      UE Right 3/24/22 R 3/24/22 R 3/31/22 4/5/22 R 22   Web A 21.3 21.3 21.4 21.5 21.0 21.5 22.0 21.7 21.5 21.7 21.2 22.0 21.5   Web B 22.8 23.0 23.0 23.2 22.5 23.4 22.6 22.5 23.0 22.5 22.5 23.0 22.6   Styloid 19.0 19.0 19.1 19.1 19.0 19.0 20.4 19.0 19.1 19.4 19.0 19.4 19.2   10cm 31.7 29.6 29.8 29.8 29.0 29.5 30.0 29.5 28.3 29.8 29.6 30.2 29.0   20cm 35.5 35.5 35.0 35.6 34.5 34.2 35.5 34.2 34.0 34.7 34.0 34.4 35.0   30cm 42.0 41.5 41.5 43.2 40.7 41.5 41.5 41.0 41.2 41.1 41.4 41.2 43.0   40cm 46.3 44.5 45.4 45.4 42.5 42.8 44.2 42.8 45.4 46.5 44.5 44.5 46.0   50cm       42.0 44.5 44.7        axilla                        Assessment: Pt demonstrated GOOD response to MLD with increased lymph fluid circulation. Patient compression tolerance to 6-8 hours a day. Order placed for Elvarex flat knit garment. Tolerance for treatment:  good  Adverse reactions to treatment: none      Plan: Hold pending custom garment.            PLAN  After eval, patient will be seen for 2 times per week for 5 weeks. Measure for garments and order garments by the end of treatment week number 4. Treatment will include pt ed, ther ex, self care/ADL training, manual therapy: manual lymphatic drainage, compression garment measuring and ordering.       GOALS  Due by 4/29/22.  1 [place] with HEP.  2 Compliance with elevation daily. 3 Independent with self massage principles.   4 Acquire and wear correctly fitting compression garment for RUE.  5 Consistent reduction of edema as evidenced by regular girth measures.         [x] Continue per plan of care   [] Alter current plan   [] Hold pending MD visit   [] Discharge    Plan for Next Session:  MLD, Compression  Progress Note Due:    Communication with other providers:      Time In: 1115  Time Out:1155    If [employer] Please Indicate Units for Rx this date and running total for each and overall total for Rx to date:  CPT Code Units today Running Total Units Total approved    TE [id number]          MAN 03590  3 40     Gait 69033         NR 65636         TA  34027         Estim Unatt [address]09[address]           947[address]         [facility] [address]         [facility] [address]          ADL/Self care 28532  1     DNT 1-2 20560         DNT 3-4 20561         Other:     0 1                Total for episode of care    41 160 UNITS  FROM 3/17-6/17/22        Timed Code Treatment Minutes: 40 MT  Total Treatment Minutes: 40    Electronically signed by: Tam Colon PT, CLEMMETT 5/18/2022, 11:17 AM

## 2022-06-08 NOTE — PROGRESS NOTES
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   3-4/10 mid forearm, wrist, and upper arm    Plan of Care/Treatment to date:  [x] Therapeutic Exercise    [] Modalities:  [] Therapeutic Activity     [] Ultrasound  [] Electrical Stimulation  [] Gait Training      [] Cervical Traction   [] Lumbar Traction  [] Neuromuscular Re-education  [] Cold/hotpack [] Iontophoresis  [x] Instruction in HEP      Other:  [x] Manual Therapy       []  Vasopneumatic  [x] self care      []   Dry Needle Therapy                      Objective/Significant Findings At Last Visit/Comments:    Custom garments ordered for patient. Pt has custom garment. Has not called with any problems or difficulty. Pt is to continue with her home program and compliance with garment wear. Goal Status:  [] Achieved [x] Partially Achieved  [] Not Achieved         Rehab Potential: [] Excellent [x] Good [] Fair  [] Poor         Patient Status: [] Continue per initial plan of Care     [x] Patient now discharged     [] Additional visits requested, Please re-certify for additional visits:        Electronically signed by:  Meli Albert PT,  2022, 1:33 PM    If you have any questions or concerns, please don't hesitate to call.   Thank you for your referral.

## 2022-06-13 RX ORDER — MONTELUKAST SODIUM 10 MG/1
10 TABLET ORAL DAILY
Qty: 30 TABLET | Refills: 5 | Status: SHIPPED | OUTPATIENT
Start: 2022-06-13

## 2022-06-29 ENCOUNTER — HOSPITAL ENCOUNTER (OUTPATIENT)
Dept: INFUSION THERAPY | Age: 57
Discharge: HOME | End: 2022-06-29

## 2022-06-29 ENCOUNTER — HOSPITAL ENCOUNTER (OUTPATIENT)
Dept: ULTRASOUND IMAGING | Age: 57
Discharge: HOME | End: 2022-06-29
Payer: COMMERCIAL

## 2022-06-29 ENCOUNTER — OFFICE VISIT (OUTPATIENT)
Dept: ONCOLOGY | Age: 57
End: 2022-06-29
Payer: COMMERCIAL

## 2022-06-29 VITALS
WEIGHT: 267 LBS | DIASTOLIC BLOOD PRESSURE: 88 MMHG | HEART RATE: 110 BPM | OXYGEN SATURATION: 96 % | TEMPERATURE: 98.1 F | BODY MASS INDEX: 41.91 KG/M2 | HEIGHT: 67 IN | SYSTOLIC BLOOD PRESSURE: 156 MMHG | RESPIRATION RATE: 20 BRPM

## 2022-06-29 DIAGNOSIS — M79.601 PAIN AND SWELLING OF RIGHT UPPER EXTREMITY: ICD-10-CM

## 2022-06-29 DIAGNOSIS — M79.601 PAIN AND SWELLING OF RIGHT UPPER EXTREMITY: Primary | ICD-10-CM

## 2022-06-29 DIAGNOSIS — M79.89 PAIN AND SWELLING OF RIGHT UPPER EXTREMITY: Primary | ICD-10-CM

## 2022-06-29 DIAGNOSIS — M79.89 PAIN AND SWELLING OF RIGHT UPPER EXTREMITY: ICD-10-CM

## 2022-06-29 PROCEDURE — G8417 CALC BMI ABV UP PARAM F/U: HCPCS | Performed by: INTERNAL MEDICINE

## 2022-06-29 PROCEDURE — 93971 EXTREMITY STUDY: CPT

## 2022-06-29 PROCEDURE — 99214 OFFICE O/P EST MOD 30 MIN: CPT | Performed by: INTERNAL MEDICINE

## 2022-06-29 PROCEDURE — G8427 DOCREV CUR MEDS BY ELIG CLIN: HCPCS | Performed by: INTERNAL MEDICINE

## 2022-06-29 PROCEDURE — 1036F TOBACCO NON-USER: CPT | Performed by: INTERNAL MEDICINE

## 2022-06-29 PROCEDURE — 3017F COLORECTAL CA SCREEN DOC REV: CPT | Performed by: INTERNAL MEDICINE

## 2022-06-29 RX ORDER — QUETIAPINE FUMARATE 50 MG/1
TABLET, FILM COATED ORAL
COMMUNITY
Start: 2022-06-11

## 2022-06-29 NOTE — PROGRESS NOTES
Patient Name: Sebastien Becker  Patient : 1965  Patient MRN: [id number]     Primary Oncologist: Steve Bridges MD  Referring Provider: REGINALD Oliveira     Date of Service: 2022      Chief Complaint:   Chief Complaint   Patient presents with    Follow-up     Patient Active Problem List:     Malignant neoplasm of upper-outer quadrant of right female breast       HPI:   Ms. Nikhil Meyers is a very pleasant 59-year-old patient with medical history significant for hypertension, diabetes mellitus, initially referred to me on 2015, for evaluation of right breast infiltrating ductal carcinoma. She stated that she felt a knot in her right breast and she went to the primary care physician for that. She had mammogram followed by sonogram on 2015, and it showed spiculated mass in the anterior 12 o'clock position in the right breast, most likely representing malignancy. She underwent sonoguided biopsy of the lesion on 2015, and the final pathology was consistent with infiltrating ductal carcinoma. Estrogen receptors positive (95 percent strong), progesterone receptor positive (75 percent), and HER-2/jossy by immunohistochemical stain is equivocal and HER-2/jossy by FISH study was not amplified. She was evaluated by Dr. Nelli Gates and he requested core biopsy of the right breast axillary lymph node and it was done on 2015. Final pathology showed metastatic carcinoma consistent with breast primary involving all cores (10 mm in the largest dimension). Since she wanted to preserve her breast if that is possible, Dr. Nelli Gates discussed her case in our Tumor Board. Consensus was to give neoadjuvant chemotherapy followed by surgery. Neoadjuvant chemotherapy with doxorubicin, cyclophosphamide followed by Paclitaxel was started on 2015 and she has completed it on 2015. Ms. Paz underwent right breast lumpectomy on 2015, and the final pathology was consistent with stage IIB right breast infiltrating ductal carcinoma. There were two tumor sites less than 2 cm apart and the tumor is located at the 12 o'clock position. One tumor is 2.1 cm in size and another one is 0.4 cm in size. It is low-grade tumor and DCIS present. Estrogen receptor strongly positive (95 percent), progesterone receptor positive (75 percent), and HER-2 by FISH is not amplified. All the surgical margins were negative and the lymphovascular invasion present in the axillary dissection. One out of 19 lymph nodes were positive for macrometastases. Adjuvant hormonal therapy with letrozole was started on September 1, 2015. She was started with adjuvant radiation therapy by Dr. Becca Rosado and she completed it successfully on November 11, 2015. Ms. Paz had diagnostic right breast digital tomosynthesis mammograph on March 10, 2016, and it showed stable skin thickening and trabecular thickening. In the absence of clinical infectious signs and symptoms this is likely related to post-treatment change. Slight decrease in size of right breast hematoma. No evidence of malignancy. Recommend clinical follow up as necessary. She also had an abnormal MRI of the right breast in April 2017 and we believe it was due to surgical changes at that time. Because of her concern and symptoms of nipple inversion, we requested a targeted sonogram of the right breast.  It was done on August 15, 2017, and it showed 8 mm mass at the 9 o'clock in the right breast, likely corresponding to an enhancing mass seen on the MRI. Radiologist recommended sonoguided biopsy of the lesion. She underwent the sonoguided biopsy of the lesion on August 21, 2017, and final pathology was negative for invasive carcinoma. I discussed with Dr. Ondina Elizondo since she was found to have some atypical epithelial cells on the previous biopsy.   We both believed that it is due to changes from the prior surgery and the radiation treatment. We will recommend to continue with close observation. MRI of the breast done on 12/18/18 showed 1.7 cm ill-defined area of abnormal enhancement along the ventral margin of the patient's scar upper-outer quadrant posterior third right breast. Targeted right breast ultrasound and diagnostic right mammogram is warranted for complete assessment. She had incisional biopsy of the right breast suspicious site on 1/25/19 and final pathology only revealed changes related to surgery and radiation. She had EGD on 3/14 and colonoscopy on 3/20/19 by Dr. Blair Bailey. Colonoscopy showed 8 mm polyp in cecum, 4 mm polyp in descending colon, and internal hemorrhoids. DEXA scan done on August 21, 2019 showed normal bone mineral density by WHO criteria. She required to have exploratory laparotomy, removal of previously placed prosthesis mesh, small bowel resection and placement of Vicryl mesh for closure of abdominal wall defects on 10/18/20, for her recurrent hernia with pneumatosis of the small bowel and free air. Mammogram done on January 11, 2021 showed probably benign findings in the bilateral breasts and radiologist recommend to have repeat mammogram in 6 months. Mammogram done on 07/29/2021 showed stable, probably benign changes of the right lumpectomy bed. Recommend continued clinical follow-up of this area. Also recommend short-term follow-up in 6 months with a right breast diagnostic tomosynthesis. Mammogram done on 3/17/2022 showed stable probably benign changes of the right lumpectomy bed. Radiologist recommend right breast diagnostic exam in 1 year to complete evaluation for 2 years of stability. Stable probably benign left breast calcifications. On June 29, 2022, she presented to me since she has been having increasing swelling and pain in right upper extremity. I have been following Ms. Paz for right breast infiltrating ductal carcinoma and she is status post neoadjuvant chemotherapy followed by right breast lumpectomy and sentinel lymph node biopsy. She is also status post adjuvant radiation therapy and she is currently on adjuvant endocrine therapy with letrozole. She is tolerating letrozole very well and she does not encounter any major side effects from it. She did have hot flashes due to letrozole. However it is getting better lately and that she does not get hot flashes that often. She is not on any medication for hot flashes at this moment. I recommend her to continue with letrozole on a regular basis. I will continue to follow her closely during adjuvant endocrine therapy with letrozole. She does not have any signs or symptoms suggestive of recurrent or metastatic breast cancer at today's visit. I believe she is in complete clinical remission. She is more than 5 years on letrozole and we will continue with that for now. Since she has stage IIB disease, we decided to give ~ total 7 years of adjuvant endocrine therapy with letrozole. Right upper extremity increasing lymph edema with pain - I will re refer her to lymph edema therapy for reassessment . Right upper extremity doppler done on 2/27/22 showed no evidence of DVT. I will repeat doppler since her swelling is worsening and associated with pan. Repeat mammogram on 3/17/2022 showed probably benign findings and radiologist recommend her to have repeat mammogram in 12 months. Health maintenance - I commend her to have age-appropriate cancer screening, exercise, low-fat and low-sodium diet. She does not have any significant symptoms at today's visit. PAST MEDICAL HISTORY:  Significant for:  1. Hypertension. 2.         Diabetes mellitus. PAST SURGICAL HISTORY:  Significant for:  1. Total abdominal hysterectomy and bilateral salpingo-oophorectomy in 2006. 2.         Cholecystectomy. 3.         Hernia surgery.      FAMILY HISTORY:  Significant for ovarian cancer in her sister and breast cancer in a couple of her aunts. No other family history of cancer disease. SOCIAL HISTORY:  She denies smoking, alcohol drinking, and illicit drug abuse. She is single and has no children. She is not working right now. ALLERGIES:  She claims to have allergies to Codeine, penicillin, and Darvocet. Review of Systems: \"Per interval history; otherwise 10 point ROS is negative. \"  Her energy level is stable and her sleep is good. She denies fever, chills, night sweats, cough, shortness of breath, chest pain, hemoptysis or palpitations. Her bowel and bladder functions are normal. She doesn't have nausea, vomiting, abdominal pain, diarrhea, constipation, dysuria, loss of appetite or weight loss. She denies neuropathy and she doesn't have bleeding or clotting issues. She has RUE pain. No anxiety or depression. The rest of the systems are unremarkable.     Vital Signs:  BP (!) 156/88 (Site: Left Lower Arm, Position: Sitting, Cuff Size: Large Adult)   Pulse (!) 110   Temp 98.1 °F (36.7 °C) (Temporal)   Resp 20   Ht 5' 7\" (1.702 m)   Wt 267 lb (121.1 kg)   SpO2 96%   BMI 41.82 kg/m²     Physical Exam:  CONSTITUTIONAL: awake, no apparent distress, alert, cooperative,    EYES: pupils equal, round and reactive to light, sclera clear and conjunctiva normal  ENT: Normocephalic, atraumatic, without obvious abnormality,   NECK: supple, symmetrical, no jugular venous distension and no carotid bruits   HEMATOLOGIC/LYMPHATIC: no cervical, supraclavicular or axillary lymphadenopathy   LUNGS: VBS, no wheezes, clear to auscultation, no rhonchi, no increased work of breathing, no crackles,   CARDIOVASCULAR: regular rate and rhythm, normal S1 and S2, no murmur noted  ABDOMEN: normal bowel sounds x 4, soft, non-distended, non-tender, no masses palpated, no hepatosplenomegaly   MUSCULOSKELETAL: full range of motion noted, tone is normal  NEUROLOGIC: awake, alert, oriented to name, place and time. Motor skills grossly intact. SKIN: Normal skin color, texture, turgor and no jaundice.  appears intact   EXTREMITIES: no clubbing, no LE edema, no cyanosis, no leg swelling, RUE swelling noted,   BREASTS: post op changes noted in her right breast, no palpable suspicious mass in her right breast, no palpable mass in left breast and bilateral axilla     Labs:  Hematology:  Lab Results   Component Value Date    WBC 6.2 02/26/2022    RBC 4.18 (L) 02/26/2022    HGB 11.5 (L) 02/26/2022    HCT 36.0 (L) 02/26/2022    MCV 86.1 02/26/2022    MCH 27.5 02/26/2022    MCHC 31.9 (L) 02/26/2022    RDW 14.4 02/26/2022     02/26/2022    MPV 11.4 (H) 02/26/2022    BANDSPCT 8 06/17/2018    SEGSPCT 70.4 (H) 02/26/2022    EOSRELPCT 2.1 02/26/2022    BASOPCT 0.5 02/26/2022    LYMPHOPCT 19.4 (L) 02/26/2022    MONOPCT 7.1 (H) 02/26/2022    BANDABS 0.66 06/17/2018    SEGSABS 4.4 02/26/2022    EOSABS 0.1 02/26/2022    BASOSABS 0.0 02/26/2022    LYMPHSABS 1.2 02/26/2022    MONOSABS 0.4 02/26/2022    DIFFTYPE AUTOMATED DIFFERENTIAL 02/26/2022    POLYCHROM 1+ 06/17/2018    PLTM SEVERAL LARGE PLATELETS 81/33/6512     No results found for: ESR  Chemistry:  Lab Results   Component Value Date     02/26/2022    K 3.7 02/26/2022     02/26/2022    CO2 26 02/26/2022    BUN 7 02/26/2022    CREATININE 0.7 02/26/2022    GLUCOSE 162 (H) 02/26/2022    CALCIUM 8.6 02/26/2022    PROT 7.0 10/10/2021    LABALBU 4.2 10/10/2021    BILITOT 0.3 10/10/2021    ALKPHOS 89 10/10/2021    AST 18 10/10/2021    ALT 20 10/10/2021    LABGLOM >60 02/26/2022    GFRAA >60 02/26/2022    PHOS 2.4 (L) 10/21/2020    MG 1.9 10/21/2020    POCGLU 160 (H) 08/05/2021     No results found for: MMA, LDH, HOMOCYSTEINE  No components found for: LD  Lab Results   Component Value Date    TSHHS 3.070 05/10/2019     Immunology:  Lab Results   Component Value Date    PROT 7.0 10/10/2021     No results found for: Jodie Crooks, KLFLCR  No results found for: B2M  Coagulation Panel:  Lab Results   Component Value Date    PROTIME 12.6 02/26/2022    INR 1.02 02/26/2022    APTT 28.8 02/26/2022    DDIMER 411 (H) 02/26/2022     Anemia Panel:  No results found for: Nick Cabrera  Tumor Markers:  Lab Results   Component Value Date    LABCA2 13.6 04/16/2021     Observations:  No data recorded        Assessment:   Right breast infiltrating ductal carcinoma (estrogen receptor positive, progesterone receptor positive, and HER-2/jossy negative). Plan:  Ms. Harley Campos has been followed for her right breast cancer. She required to have exploratory laparotomy, removal of previously placed prosthesis mesh, small bowel resection and placement of Vicryl mesh for closure of abdominal wall defects on 10/18/20, for her recurrent hernia with pneumatosis of the small bowel and free air. Mammogram done on January 11, 2021 showed probably benign findings in the bilateral breasts and radiologist recommend to have repeat mammogram in 6 months. Mammogram done on 07/29/2021 showed stable, probably benign changes of the right lumpectomy bed. Recommend continued clinical follow-up of this area. Also recommend short-term follow-up in 6 months with a right breast diagnostic tomosynthesis. Mammogram done on 07/29/2021 showed stable, probably benign changes of the right lumpectomy bed. Recommend continued clinical follow-up of this area. Also recommend short-term follow-up in 6 months with a right breast diagnostic tomosynthesis. Mammogram done on 3/17/2022 showed stable probably benign changes of the right lumpectomy bed. Radiologist recommend right breast diagnostic exam in 1 year to complete evaluation for 2 years of stability. Stable probably benign left breast calcifications. On June 29, 2022, she presented to me since she has been having increasing swelling and pain in right upper extremity. I have been following Ms. Paz for right breast infiltrating ductal carcinoma and she is status post neoadjuvant chemotherapy followed by right breast lumpectomy and sentinel lymph node biopsy. She is also status post adjuvant radiation therapy and she is currently on adjuvant endocrine therapy with letrozole. She is tolerating letrozole very well and she does not encounter any major side effects from it. She did have hot flashes due to letrozole. However it is getting better lately and that she does not get hot flashes that often. She is not on any medication for hot flashes at this moment. I recommend her to continue with letrozole on a regular basis. I will continue to follow her closely during adjuvant endocrine therapy with letrozole. She does not have any signs or symptoms suggestive of recurrent or metastatic breast cancer at today's visit. I believe she is in complete clinical remission. She is more than 5 years on letrozole and we will continue with that for now. Since she has stage IIB disease, we decided to give ~ total 7 years of adjuvant endocrine therapy with letrozole. Right upper extremity increasing lymph edema with pain - I will re refer her to lymph edema therapy for reassessment . Right upper extremity doppler done on 2/27/22 showed no evidence of DVT. I will repeat doppler since her swelling is worsening and associated with pan. Repeat mammogram on 3/17/2022 showed probably benign findings and radiologist recommend her to have repeat mammogram in 12 months. Health maintenance - I commend her to have age-appropriate cancer screening, exercise, low-fat and low-sodium diet. I answered all her questions and concerns for today. Recent imaging and labs were reviewed and discussed with the patient.

## 2022-06-29 NOTE — PROGRESS NOTES
MA Rooming Questions  Patient: Demetra Jimenez  MRN: [id number]    Date: 6/29/2022        1. Do you have any new issues? yes - Pt states she has called multiple times with no response, apologized for this. Stated she finally go [place] and we got her sched. Pt C/O right arm pain/swelling- for about three days. 2. Do you need any refills on medications?    no    3. Have you had any imaging done since your last visit?   no    4. Have you been hospitalized or seen in the emergency room since your last visit here?   no    5. Did the patient have a depression screening completed today?  No    No data recorded     PHQ-9 Given to (if applicable):               PHQ-9 Score (if applicable):                     [] Positive     []  Negative              Does question #9 need addressed (if applicable)                     [] Yes    []  No               Arnav Hoang, [facility]

## 2022-06-30 ENCOUNTER — TELEPHONE (OUTPATIENT)
Dept: ONCOLOGY | Age: 57
End: 2022-06-30

## 2022-06-30 NOTE — TELEPHONE ENCOUNTER
Spoke to patient in office at checkout regarding STAT order put in for US. Patient lives in [place] and currently in [place] [address] but because of staffing I could not get her in anywhere here. [facility] was able to take patient at 12:30pm. (currently 10:30) I printed off directions from cancer center to [facility] ER. Left number [phone] with patient for questions or concerns.

## 2022-07-07 RX ORDER — LETROZOLE 2.5 MG/1
TABLET, FILM COATED ORAL
Qty: 30 TABLET | Refills: 1 | Status: SHIPPED | OUTPATIENT
Start: 2022-07-07 | End: 2022-08-31

## 2022-07-20 ENCOUNTER — HOSPITAL ENCOUNTER (OUTPATIENT)
Dept: PHYSICAL THERAPY | Age: 57
Discharge: HOME | End: 2022-07-20
Payer: COMMERCIAL

## 2022-07-20 ENCOUNTER — HOSPITAL ENCOUNTER (OUTPATIENT)
Dept: MAMMOGRAPHY | Age: 57
Discharge: HOME | End: 2022-07-20
Payer: COMMERCIAL

## 2022-07-20 DIAGNOSIS — Z17.0 MALIGNANT NEOPLASM OF UPPER-OUTER QUADRANT OF RIGHT BREAST IN FEMALE, ESTROGEN RECEPTOR POSITIVE (HCC): ICD-10-CM

## 2022-07-20 DIAGNOSIS — C50.411 MALIGNANT NEOPLASM OF UPPER-OUTER QUADRANT OF RIGHT BREAST IN FEMALE, ESTROGEN RECEPTOR POSITIVE (HCC): ICD-10-CM

## 2022-07-20 DIAGNOSIS — Z78.0 MENOPAUSE: ICD-10-CM

## 2022-07-20 PROCEDURE — 97140 MANUAL THERAPY 1/> REGIONS: CPT

## 2022-07-20 PROCEDURE — 77080 DXA BONE DENSITY AXIAL: CPT

## 2022-07-20 PROCEDURE — 97162 PT EVAL MOD COMPLEX 30 MIN: CPT

## 2022-07-20 NOTE — PROGRESS NOTES
PHYSICAL THERAPY LYMPHEDEMA EVAL    Patient Name:  Joe Oejda Date:  2022  :  1965 MRN: [id number]  Referring Physician:  Dr. Marti Crane Diagnosis:  pain and swelling of RUE  Treatment dx:  RUE lymphedema    SUBJECTIVE    History of edema:  7yrs ago CA R breast CA with 13 lymph nodes removed. RUE started swelling about 3 weeks ago again, especially at forearm. Pain at forearm and arm. Neg for clots. Improved with:  Not sure  Worsened with:  Not sure    Functional Capacity:  Generally independent   R hand dominant  Edema Induced Limitations:  None currently    Assistance available for treatment:  Self, lives alone  Home/social/DME: [facility], retired/disabled due to health issues. Lives alone  Financial resources available for treatment:  insurance    Pain:  RUE    Patient Goal(s):  Decrease pain and swelling. OBJECTIVE    Other medical conditions/surgeries: mastectomy, DM - starting using insulin pens again. COPD, HTN, LBP, obesity,     General Observations of Functional Mobility:  Independent in transfers, amb and bed mobbility. Description of Edema:  some fibrosis at forearm. Tenderness related at anterior forearm. Observations of Skin:  Intact, warm, normal coloration. Scars:  Surgical.     Skin breakdown (current or previous):  none    Initial Basic Measures      UE Right 3/17/22 7/20/22 RUE   Web A 21.3 21.8   Web B 22.8 23.4   Styloid 19.0 18.9   10cm 31.7 30.4   20cm 35.5 35.0   30cm 42.0 42.0   40cm 46.3 45.0   50cm     axilla               ASSESSMENT         Pt presents with complaints of RUE swelling most recent onset about 3 weeks ago per patient. She states she had increased swelling and tenderness, especially at her forearm, but also had some at her arm. US was negative for clots. History of R breast CA with lumpectomy, mastectomy and 13 lymph nodes removed ~7yrs ago per patient. She does have some limitations in shoulder AROM B.      Secondary Lymphedema Elevation and AROM of RUE throughout the day. Donned pts custom sleeve with good fit. If [employer] Please Indicate Units for Rx this date and running total for each and overall total for Rx to date:  CPT Code Units today Running Total Units Total approved    [address]       [place] [address]  1     Gait [address]      [address] [place] [address]      VASO [address]       ADL/Self care 64297      DNT 1-2 22230      DNT 3-4 20561      Other:    1            Total for episode of care   2               Time In:   0931  Time Out: 1015  Timed Code Treatment Minutes:   20  Total Treatment Minutes:  44    Electronically signed by:  Ron Lafleur PT, 7/20/2022, 9:37 AM        Physical Therapy Certification Form    Please see the initial evaluation above for the initial plan of care. Electronically signed by:  Ron Lafleur PT,       If you have any questions or concerns, please don't hesitate to call. The outpatient phone and fax numbers are listed below. Thank you for your referral.      Physician Signature:________________________________Date:__________________  By signing above, therapists plan is approved by physician  Please fax back if you agree with this plan.       [facility]       [x] Phone: [phone]   Fax: [phone]   [facility]     [] Phone: [phone]   Fax: [phone]          [] Phone: [phone]   Fax: [phone]  [place] [facility]           [] Phone: [phone]   Fax: [phone]    [facility]          [] Phone: [phone]   Fax: [phone]  [facility]          [] Phone: [phone]   Fax: [phone]

## 2022-07-27 ENCOUNTER — HOSPITAL ENCOUNTER (OUTPATIENT)
Dept: INFUSION THERAPY | Age: 57
End: 2022-07-27

## 2022-07-27 ENCOUNTER — OFFICE VISIT (OUTPATIENT)
Dept: ONCOLOGY | Age: 57
End: 2022-07-27
Payer: COMMERCIAL

## 2022-07-27 VITALS
SYSTOLIC BLOOD PRESSURE: 113 MMHG | BODY MASS INDEX: 41.28 KG/M2 | TEMPERATURE: 97.6 F | WEIGHT: 263 LBS | DIASTOLIC BLOOD PRESSURE: 61 MMHG | OXYGEN SATURATION: 97 % | HEART RATE: 106 BPM | HEIGHT: 67 IN

## 2022-07-27 DIAGNOSIS — C50.411 MALIGNANT NEOPLASM OF UPPER-OUTER QUADRANT OF RIGHT BREAST IN FEMALE, ESTROGEN RECEPTOR POSITIVE (HCC): Primary | ICD-10-CM

## 2022-07-27 DIAGNOSIS — Z17.0 MALIGNANT NEOPLASM OF UPPER-OUTER QUADRANT OF RIGHT BREAST IN FEMALE, ESTROGEN RECEPTOR POSITIVE (HCC): Primary | ICD-10-CM

## 2022-07-27 PROCEDURE — G8417 CALC BMI ABV UP PARAM F/U: HCPCS | Performed by: INTERNAL MEDICINE

## 2022-07-27 PROCEDURE — 1036F TOBACCO NON-USER: CPT | Performed by: INTERNAL MEDICINE

## 2022-07-27 PROCEDURE — 3017F COLORECTAL CA SCREEN DOC REV: CPT | Performed by: INTERNAL MEDICINE

## 2022-07-27 PROCEDURE — 99213 OFFICE O/P EST LOW 20 MIN: CPT | Performed by: INTERNAL MEDICINE

## 2022-07-27 PROCEDURE — G8427 DOCREV CUR MEDS BY ELIG CLIN: HCPCS | Performed by: INTERNAL MEDICINE

## 2022-07-27 RX ORDER — INSULIN GLARGINE 100 [IU]/ML
INJECTION, SOLUTION SUBCUTANEOUS
COMMUNITY
Start: 2022-07-01

## 2022-07-27 ASSESSMENT — PATIENT HEALTH QUESTIONNAIRE - PHQ9
SUM OF ALL RESPONSES TO PHQ QUESTIONS 1-9: 0
1. LITTLE INTEREST OR PLEASURE IN DOING THINGS: 0
2. FEELING DOWN, DEPRESSED OR HOPELESS: 0
SUM OF ALL RESPONSES TO PHQ QUESTIONS 1-9: 0
SUM OF ALL RESPONSES TO PHQ9 QUESTIONS 1 & 2: 0

## 2022-07-27 NOTE — PROGRESS NOTES
Patient Name: Caryn Elizabeth  Patient : 1965  Patient MRN: [id number]     Primary Oncologist: Mina Harrington MD  Referring Provider: REGINALD Kaba     Date of Service: 2022      Chief Complaint:   Chief Complaint   Patient presents with    Follow-up     Patient Active Problem List:     Malignant neoplasm of upper-outer quadrant of right female breast       HPI:   Ms. Dante Morse is a very pleasant 78-year-old patient with medical history significant for hypertension, diabetes mellitus, initially referred to me on 2015, for evaluation of right breast infiltrating ductal carcinoma. She stated that she felt a knot in her right breast and she went to the primary care physician for that. She had mammogram followed by sonogram on 2015, and it showed spiculated mass in the anterior 12 o'clock position in the right breast, most likely representing malignancy. She underwent sonoguided biopsy of the lesion on 2015, and the final pathology was consistent with infiltrating ductal carcinoma. Estrogen receptors positive (95 percent strong), progesterone receptor positive (75 percent), and HER-2/jossy by immunohistochemical stain is equivocal and HER-2/jossy by FISH study was not amplified. She was evaluated by Dr. Dylan Nguyễn and he requested core biopsy of the right breast axillary lymph node and it was done on 2015. Final pathology showed metastatic carcinoma consistent with breast primary involving all cores (10 mm in the largest dimension). Since she wanted to preserve her breast if that is possible, Dr. Dylan Nguyễn discussed her case in our Tumor Board. Consensus was to give neoadjuvant chemotherapy followed by surgery. Neoadjuvant chemotherapy with doxorubicin, cyclophosphamide followed by Paclitaxel was started on 2015 and she has completed it on 2015. Ms. Paz underwent right breast lumpectomy on 2015, and the final pathology was consistent with stage IIB right breast infiltrating ductal carcinoma. There were two tumor sites less than 2 cm apart and the tumor is located at the 12 o'clock position. One tumor is 2.1 cm in size and another one is 0.4 cm in size. It is low-grade tumor and DCIS present. Estrogen receptor strongly positive (95 percent), progesterone receptor positive (75 percent), and HER-2 by FISH is not amplified. All the surgical margins were negative and the lymphovascular invasion present in the axillary dissection. One out of 19 lymph nodes were positive for macrometastases. Adjuvant hormonal therapy with letrozole was started on September 1, 2015. She was started with adjuvant radiation therapy by Dr. Nguyễn Lawrence and she completed it successfully on November 11, 2015. Ms. Paz had diagnostic right breast digital tomosynthesis mammograph on March 10, 2016, and it showed stable skin thickening and trabecular thickening. In the absence of clinical infectious signs and symptoms this is likely related to post-treatment change. Slight decrease in size of right breast hematoma. No evidence of malignancy. Recommend clinical follow up as necessary. She also had an abnormal MRI of the right breast in April 2017 and we believe it was due to surgical changes at that time. Because of her concern and symptoms of nipple inversion, we requested a targeted sonogram of the right breast.  It was done on August 15, 2017, and it showed 8 mm mass at the 9 o'clock in the right breast, likely corresponding to an enhancing mass seen on the MRI. Radiologist recommended sonoguided biopsy of the lesion. She underwent the sonoguided biopsy of the lesion on August 21, 2017, and final pathology was negative for invasive carcinoma. I discussed with Dr. Franky Mcrae since she was found to have some atypical epithelial cells on the previous biopsy.   We both believed that it is due to changes from the prior surgery and the radiation treatment. We will recommend to continue with close observation. MRI of the breast done on 12/18/18 showed 1.7 cm ill-defined area of abnormal enhancement along the ventral margin of the patient's scar upper-outer quadrant posterior third right breast. Targeted right breast ultrasound and diagnostic right mammogram is warranted for complete assessment. She had incisional biopsy of the right breast suspicious site on 1/25/19 and final pathology only revealed changes related to surgery and radiation. She had EGD on 3/14 and colonoscopy on 3/20/19 by Dr. Jeremy Porter. Colonoscopy showed 8 mm polyp in cecum, 4 mm polyp in descending colon, and internal hemorrhoids. DEXA scan done on August 21, 2019 showed normal bone mineral density by WHO criteria. She required to have exploratory laparotomy, removal of previously placed prosthesis mesh, small bowel resection and placement of Vicryl mesh for closure of abdominal wall defects on 10/18/20, for her recurrent hernia with pneumatosis of the small bowel and free air. Mammogram done on January 11, 2021 showed probably benign findings in the bilateral breasts and radiologist recommend to have repeat mammogram in 6 months. Mammogram done on 07/29/2021 showed stable, probably benign changes of the right lumpectomy bed. Recommend continued clinical follow-up of this area. Also recommend short-term follow-up in 6 months with a right breast diagnostic tomosynthesis. Mammogram done on 3/17/2022 showed stable probably benign changes of the right lumpectomy bed. Radiologist recommend right breast diagnostic exam in 1 year to complete evaluation for 2 years of stability. Stable probably benign left breast calcifications. DEXA scan done on 7/21/22 showed normal bone mineral density by WHO criteria. On July 27, 2022, she presented to me for follow up. I have been following Ms. Paz for right breast infiltrating ductal carcinoma and she is status post neoadjuvant chemotherapy followed by right breast lumpectomy and sentinel lymph node biopsy. She is also status post adjuvant radiation therapy and she is currently on adjuvant endocrine therapy with letrozole. She is tolerating letrozole very well and she does not encounter any major side effects from it. She did have hot flashes due to letrozole. However it is getting better lately and that she does not get hot flashes that often. She is not on any medication for hot flashes at this moment. I recommend her to continue with letrozole on a regular basis. I will continue to follow her closely during adjuvant endocrine therapy with letrozole. She does not have any signs or symptoms suggestive of recurrent or metastatic breast cancer at today's visit. I believe she is in complete clinical remission. She is more than 5 years on letrozole and we will continue with that for now. Since she has stage IIB disease, we decided longer duration of letrozole. Right upper extremity increasing lymph edema with pain - I will re refer her to lymph edema therapy for reassessment . Right upper extremity doppler done on 2/27/22 showed no evidence of DVT. I will repeat doppler since her swelling is worsening and associated with pan. Repeat mammogram on 3/17/2022 showed probably benign findings and radiologist recommend her to have repeat mammogram in 12 months. Health maintenance - I commend her to have age-appropriate cancer screening, exercise, low-fat and low-sodium diet. She does not have any significant symptoms at today's visit. PAST MEDICAL HISTORY:  Significant for:  1. Hypertension. 2.         Diabetes mellitus. PAST SURGICAL HISTORY:  Significant for:  1. Total abdominal hysterectomy and bilateral salpingo-oophorectomy in 2006. 2.         Cholecystectomy. 3.         Hernia surgery.      FAMILY HISTORY:  Significant for ovarian cancer in her sister and breast cancer in a couple of her aunts. No other family history of cancer disease. SOCIAL HISTORY:  She denies smoking, alcohol drinking, and illicit drug abuse. She is single and has no children. She is not working right now. ALLERGIES:  She claims to have allergies to Codeine, penicillin, and Darvocet. Review of Systems: \"Per interval history; otherwise 10 point ROS is negative. \"  Her energy level is fair and her sleep is good. She denies fever, chills, night sweats, cough, shortness of breath, chest pain, hemoptysis or palpitations. Her bowel and bladder functions are normal. She doesn't have nausea, vomiting, abdominal pain, diarrhea, constipation, dysuria, loss of appetite or weight loss. She denies neuropathy and she doesn't have bleeding or clotting issues. She has RUE pain. Denies anxiety or depression. The rest of the systems are unremarkable. Vital Signs:  /61 (Site: Left Upper Arm, Position: Sitting, Cuff Size: Large Adult)   Pulse (!) 106   Temp 97.6 °F (36.4 °C) (Temporal)   Ht 5' 7\" (1.702 m)   Wt 263 lb (119.3 kg)   SpO2 97%   BMI 41.19 kg/m²     Physical Exam:  CONSTITUTIONAL: awake, no apparent distress, alert, cooperative,    EYES: pupils equal, round and reactive to light, sclera clear and conjunctiva normal  ENT: Normocephalic, atraumatic, without obvious abnormality,   NECK: supple, symmetrical, no jugular venous distension and no carotid bruits   HEMATOLOGIC/LYMPHATIC: no cervical, supraclavicular or axillary lymphadenopathy   LUNGS: VBS, no wheezes, clear to auscultation, no rhonchi, no increased work of breathing, no crackles,   CARDIOVASCULAR: regular rate and rhythm, normal S1 and S2, no murmur noted  ABDOMEN: normal bowel sounds x 4, soft, non-distended, non-tender, no masses palpated, no hepatosplenomegaly   MUSCULOSKELETAL: full range of motion noted, tone is normal  NEUROLOGIC: awake, alert, oriented to name, place and time. Motor skills grossly intact. SKIN: Normal skin color, texture, turgor and no jaundice.  appears intact   EXTREMITIES: no LE edema, no cyanosis, no clubbing, no leg swelling, RUE swelling noted,   BREASTS: post op changes noted in her right breast, no palpable suspicious mass in her right breast, no palpable mass in left breast and bilateral axilla     Labs:  Hematology:  Lab Results   Component Value Date    WBC 6.2 02/26/2022    RBC 4.18 (L) 02/26/2022    HGB 11.5 (L) 02/26/2022    HCT 36.0 (L) 02/26/2022    MCV 86.1 02/26/2022    MCH 27.5 02/26/2022    MCHC 31.9 (L) 02/26/2022    RDW 14.4 02/26/2022     02/26/2022    MPV 11.4 (H) 02/26/2022    BANDSPCT 8 06/17/2018    SEGSPCT 70.4 (H) 02/26/2022    EOSRELPCT 2.1 02/26/2022    BASOPCT 0.5 02/26/2022    LYMPHOPCT 19.4 (L) 02/26/2022    MONOPCT 7.1 (H) 02/26/2022    BANDABS 0.66 06/17/2018    SEGSABS 4.4 02/26/2022    EOSABS 0.1 02/26/2022    BASOSABS 0.0 02/26/2022    LYMPHSABS 1.2 02/26/2022    MONOSABS 0.4 02/26/2022    DIFFTYPE AUTOMATED DIFFERENTIAL 02/26/2022    POLYCHROM 1+ 06/17/2018    PLTM SEVERAL LARGE PLATELETS 68/02/5345     No results found for: ESR  Chemistry:  Lab Results   Component Value Date     02/26/2022    K 3.7 02/26/2022     02/26/2022    CO2 26 02/26/2022    BUN 7 02/26/2022    CREATININE 0.7 02/26/2022    GLUCOSE 162 (H) 02/26/2022    CALCIUM 8.6 02/26/2022    PROT 7.0 10/10/2021    LABALBU 4.2 10/10/2021    BILITOT 0.3 10/10/2021    ALKPHOS 89 10/10/2021    AST 18 10/10/2021    ALT 20 10/10/2021    LABGLOM >60 02/26/2022    GFRAA >60 02/26/2022    PHOS 2.4 (L) 10/21/2020    MG 1.9 10/21/2020    POCGLU 160 (H) 08/05/2021     No results found for: MMA, LDH, HOMOCYSTEINE  No components found for: LD  Lab Results   Component Value Date    TSHHS 3.070 05/10/2019     Immunology:  Lab Results   Component Value Date    PROT 7.0 10/10/2021     No results found for: [place], KLFLCR  No results found for: B2M  Coagulation Panel:  Lab Results Component Value Date    PROTIME 12.6 02/26/2022    INR 1.02 02/26/2022    APTT 28.8 02/26/2022    DDIMER 411 (H) 02/26/2022     Anemia Panel:  No results found for: [place]  Tumor Markers:  Lab Results   Component Value Date    LABCA2 13.6 04/16/2021     Observations:  PHQ-9 Total Score: 0 (7/27/2022  9:52 AM)        Assessment:   Right breast infiltrating ductal carcinoma (estrogen receptor positive, progesterone receptor positive, and HER-2/jossy negative). Plan:  Ms. Collins Bertrand has been followed for her right breast cancer. She required to have exploratory laparotomy, removal of previously placed prosthesis mesh, small bowel resection and placement of Vicryl mesh for closure of abdominal wall defects on 10/18/20, for her recurrent hernia with pneumatosis of the small bowel and free air. Mammogram done on January 11, 2021 showed probably benign findings in the bilateral breasts and radiologist recommend to have repeat mammogram in 6 months. Mammogram done on 07/29/2021 showed stable, probably benign changes of the right lumpectomy bed. Recommend continued clinical follow-up of this area. Also recommend short-term follow-up in 6 months with a right breast diagnostic tomosynthesis. Mammogram done on 07/29/2021 showed stable, probably benign changes of the right lumpectomy bed. Recommend continued clinical follow-up of this area. Also recommend short-term follow-up in 6 months with a right breast diagnostic tomosynthesis. Mammogram done on 3/17/2022 showed stable probably benign changes of the right lumpectomy bed. Radiologist recommend right breast diagnostic exam in 1 year to complete evaluation for 2 years of stability. Stable probably benign left breast calcifications. DEXA scan done on 7/21/22 showed normal bone mineral density by WHO criteria. On July 27, 2022, she presented to me for follow up. I have been following Ms. Paz for right breast infiltrating ductal carcinoma and she is status post neoadjuvant chemotherapy followed by right breast lumpectomy and sentinel lymph node biopsy. She is also status post adjuvant radiation therapy and she is currently on adjuvant endocrine therapy with letrozole. She is tolerating letrozole very well and she does not encounter any major side effects from it. She did have hot flashes due to letrozole. However it is getting better lately and that she does not get hot flashes that often. She is not on any medication for hot flashes at this moment. I recommend her to continue with letrozole on a regular basis. I will continue to follow her closely during adjuvant endocrine therapy with letrozole. She does not have any signs or symptoms suggestive of recurrent or metastatic breast cancer at today's visit. I believe she is in complete clinical remission. She is more than 5 years on letrozole and we will continue with that for now. Since she has stage IIB disease, we decided longer duration of letrozole. Right upper extremity increasing lymph edema with pain - I will re refer her to lymph edema therapy for reassessment . Right upper extremity doppler done on 2/27/22 showed no evidence of DVT. I will repeat doppler since her swelling is worsening and associated with pan. Repeat mammogram on 3/17/2022 showed probably benign findings and radiologist recommend her to have repeat mammogram in 12 months. Health maintenance - I commend her to have age-appropriate cancer screening, exercise, low-fat and low-sodium diet. I answered all her questions and concerns for today. Recent imaging and labs were reviewed and discussed with the patient.

## 2022-07-27 NOTE — PROGRESS NOTES
MA Rooming Questions  Patient: Suzette Sandoval  MRN: [id number]    Date: 7/27/2022        1. Do you have any new issues? yes - patient states she has trouble when she is showering she feels as though she is going to pass out and she has trouble breathing. She states she also feels that way when she stands too fast and when she turns her head. She spoke with \"the diabetic lady\" and she states it was a blood pressure issue          2. Do you need any refills on medications?    no    3. Have you had any imaging done since your last visit? yes - dexa scan     4. Have you been hospitalized or seen in the emergency room since your last visit here?   no    5. Did the patient have a depression screening completed today?  Yes    PHQ-9 Total Score: 0 (7/27/2022  9:52 AM)       PHQ-9 Given to (if applicable):               PHQ-9 Score (if applicable):                     [] Positive     []  Negative              Does question #9 need addressed (if applicable)                     [] Yes    []  No               Sarina Dickinson CMA

## 2022-08-08 ENCOUNTER — HOSPITAL ENCOUNTER (OUTPATIENT)
Dept: PHYSICAL THERAPY | Age: 57
Discharge: HOME | End: 2022-08-08

## 2022-08-10 ENCOUNTER — HOSPITAL ENCOUNTER (OUTPATIENT)
Dept: PHYSICAL THERAPY | Age: 57
Discharge: HOME | End: 2022-08-10
Payer: COMMERCIAL

## 2022-08-10 PROCEDURE — 97140 MANUAL THERAPY 1/> REGIONS: CPT

## 2022-08-10 NOTE — FLOWSHEET NOTE
Physical Therapy Treatment Note   Date:  8/10/2022    Patient Name: Katelyn Walker Diagnosis: R UE Lymphedema    :  1965 Treatment Diagnosis:  Same   MRN: [id number]        Restrictions/Precautions: Insurance/Certification information:    [place] [employer] units -10/20/22       Visit# / total visits:  2/10 Missed visits:   0   POC expiration date:   Plan of care signed (Y/N):  n       Initial Pain level: 0/10 mid forearm, wrist, and upper arm     Post Tx Pain Rating:  same   Changes/updates to ambulatory history sheet?  n      Subjective:  Pt stated her arm gets to hurting sometimes. She uses ice on her forearm sometimes. Manual Lymphatic Drainage:  MLD was performed today for central clearing and for appropriate extremity drainage. Appropriate regions were elevated during the manual lymphatic drainage. Therapeutic Exercise:  Therapeutic exercises were instructed today. Cues were given for technique, safety, recruitment, and rationale. Cues were verbal and/or tactile. Performed muscle pumping activities to circulate fluids and stimulate the lymphatic system. Compression: At arrival: Elvarex sleeve. At departure: Donned Elvarex sleeve     Ordering custom Elvarex sleeve     ADL/Self-Care Training: This patient was instructed in strategies to help improve self management of edema and to improve circulation. Training today included recommendations and discussion regarding purposeful elevation, sleep positioning, and general movements to pump muscles for circulatory benefit. Additionally, education was provided regarding compression garments and/or compression bandaging, including wear schedule, donning, removal, and laundering.         Objective Findings:       UE Right 3/17/22 7/20/22 RUE 8/10/22 R   Web A 21.3 21.8 21.5   Web B 22.8 23.4 22.5   Styloid 19.0 18.9 19.0   10cm 31.7 30.4 29.0   20cm 35.5 35.0 33.5   30cm 42.0 42.0 41.0   40cm 46.3 45.0 42.0   50cm        axilla Assessment: Pt demonstrated GOOD response to MLD with increased lymph fluid circulation. Reports compliance with compression during the day. Intermittent pain reported. Decrease in measurement today and no pain. Interested in possibly getting a pump if insurance pays for it. Tolerance for treatment:  good  Adverse reactions to treatment: none      Plan:       PLAN  After eval, patient will be seen for 2 times per week for 5 weeks. Measure for garments and order garments by the end of treatment week number 4. Treatment will include pt ed, ther ex, self care/ADL training, manual therapy: manual lymphatic drainage, compression garment measuring and ordering. GOALS  Due by 8/19/22  1 [place] with HEP.  2 Compliance with elevation daily. 3 Independent with self massage principles. 4 Acquire and wear correctly fitting compression garment for RUE.  5 Consistent reduction of edema as evidenced by regular girth measures.          [x] Continue per plan of care   [] Alter current plan   [] Hold pending MD visit   [] Discharge    Plan for Next Session:  MLD, Compression  Progress Note Due:    Communication with other providers:      Time In: 1030  Time Out:1110         If [employer] Please Indicate Units for Rx this date and running total for each and overall total for Rx to date:  CPT Code Units today Running Total Units Total approved   TE [id number]          MAN [id number]  3 4      Gait 57260         NR [id number]         TA  [id number]         Estim [place]att [id number]           [address] [facility] [id number]         [facility] [id number]          ADL/Self care [phone]         DNT 1-2 80149         DNT 3-4 66308         Other:    1                 Total for episode of care    6 44 units 7/20-10/20/22             Timed Code Treatment Minutes: 40 MT  Total Treatment Minutes: 40    Electronically signed by: John Hall PTA, CLT 8/10/2022, 10:34 AM

## 2022-08-15 ENCOUNTER — HOSPITAL ENCOUNTER (OUTPATIENT)
Dept: PHYSICAL THERAPY | Age: 57
Discharge: HOME | End: 2022-08-15
Payer: COMMERCIAL

## 2022-08-15 PROCEDURE — 97140 MANUAL THERAPY 1/> REGIONS: CPT

## 2022-08-15 NOTE — FLOWSHEET NOTE
Physical Therapy Treatment Note   Date:  8/15/2022    Patient Name: Anabel Kwan Diagnosis: R UE Lymphedema    :  1965 Treatment Diagnosis:  Same   MRN: [id number]        Restrictions/Precautions: Insurance/Certification information:    [employer] [employer]ade[employer] units -10/20/22       Visit# / total visits:  2/10 Missed visits:   0   POC expiration date:   Plan of care signed (Y/N):  n       Initial Pain level: 0/10 mid forearm, wrist, and upper arm     Post Tx Pain Rating:  same   Changes/updates to ambulatory history sheet?  n      Subjective:  Pt stated it still hurts from 4/10 to 7/10. She had too take her compression off a few times. Manual Lymphatic Drainage:  MLD was performed today for central clearing and for appropriate extremity drainage. Appropriate regions were elevated during the manual lymphatic drainage. Therapeutic Exercise:  Therapeutic exercises were instructed today. Cues were given for technique, safety, recruitment, and rationale. Cues were verbal and/or tactile. Performed muscle pumping activities to circulate fluids and stimulate the lymphatic system. Compression: At arrival: Elvarex sleeve. At departure: Donned Elvarex sleeve     Ordering custom Elvarex sleeve     ADL/Self-Care Training: This patient was instructed in strategies to help improve self management of edema and to improve circulation. Training today included recommendations and discussion regarding purposeful elevation, sleep positioning, and general movements to pump muscles for circulatory benefit. Additionally, education was provided regarding compression garments and/or compression bandaging, including wear schedule, donning, removal, and laundering.         Objective Findings:       UE Right 3/17/22 7/20/22 RUE 8/10/22 R 8/15/22 R   Web A 21.3 21.8 21.5 21.5   Web B 22.8 23.4 22.5 22.5   Styloid 19.0 18.9 19.0 19.0   10cm 31.7 30.4 29.0 30.0   20cm 35.5 35.0 33.5 34.5   30cm 42.0 42.0 41.0 41.0 40cm 46.3 45.0 42.0 43.0   50cm         axilla              Assessment: Pt demonstrated GOOD response to MLD with increased lymph fluid circulation. Reports compliance with compression during the day. Intermittent pain reported still. Increase in measurement today. Request for pump to be processed by therapist and recommended farrow wrap lite for arm when she can afford it so she can have a break from the sleeve and have compression when she washes it. Tolerance for treatment:  good  Adverse reactions to treatment: none      Plan:       PLAN  After eval, patient will be seen for 2 times per week for 5 weeks. Measure for garments and order garments by the end of treatment week number 4. Treatment will include pt ed, ther ex, self care/ADL training, manual therapy: manual lymphatic drainage, compression garment measuring and ordering. GOALS  Due by 8/19/22  1 [place] with HEP.  2 Compliance with elevation daily. 3 Independent with self massage principles. 4 Acquire and wear correctly fitting compression garment for RUE.  5 Consistent reduction of edema as evidenced by regular girth measures.          [x] Continue per plan of care   [] Alter current plan   [] Hold pending MD visit   [] Discharge    Plan for Next Session:  MLD, Compression  Progress Note Due:    Communication with other providers:      Time In: 1030  Time Out:1110         If [employer] Please Indicate Units for Rx this date and running total for each and overall total for Rx to date:  CPT Code Units today Running Total Units Total approved   TE [id number]          MAN 59924  3       Gait 90302         NR 97890         TA  57643         Estim [place] [address]           [id number]         [facility] [phone]         VASO [id number]          ADL/Self care 97600         DNT 1-2 08723         DNT 3-4 20561         Other:    1                 Total for episode of care    8 44 units 7/20-10/20/22             Timed Code Treatment Minutes: 40 MT  Total Treatment Minutes: 36    Electronically signed by: Purvi Lindquist PTA, CLT 8/15/2022, 10:39 AM

## 2022-08-17 ENCOUNTER — HOSPITAL ENCOUNTER (OUTPATIENT)
Dept: PHYSICAL THERAPY | Age: 57
Discharge: HOME | End: 2022-08-17
Payer: COMMERCIAL

## 2022-08-17 PROCEDURE — 97110 THERAPEUTIC EXERCISES: CPT

## 2022-08-17 PROCEDURE — 97140 MANUAL THERAPY 1/> REGIONS: CPT

## 2022-08-17 NOTE — FLOWSHEET NOTE
Physical Therapy Treatment Note   Date:  2022    Patient Name: Corry Anderson Diagnosis: R UE Lymphedema    :  1965 Treatment Diagnosis:  Same   MRN: [id number]        Restrictions/Precautions: Insurance/Certification information:    [place] units -10/20/22       Visit# / total visits:  4/10 Missed visits:   0   POC expiration date:   Plan of care signed (Y/N):  n       Initial Pain level: 5/10    Post Tx Pain Rating:  same   Changes/updates to ambulatory history sheet?  n      Subjective:  Some pain at shoulder and posterior arm at the elbow. Manual Lymphatic Drainage:  MLD was performed today for central clearing and for appropriate extremity drainage. Appropriate regions were elevated during the manual lymphatic drainage. Therapeutic Exercise:  Therapeutic exercises were instructed today. Cues were given for technique, safety, recruitment, and rationale. Cues were verbal and/or tactile. Performed muscle pumping activities to circulate fluids and stimulate the lymphatic system. UBC 2 mins each way  4# [id number]  4# OH press 3x8  Rows w black 3x10  Wall push up 3x10        Compression: At arrival: Elvarex sleeve. At departure: Elvarex sleeve         ADL/Self-Care Training: This patient was instructed in strategies to help improve self management of edema and to improve circulation. Training today included recommendations and discussion regarding purposeful elevation, sleep positioning, and general movements to pump muscles for circulatory benefit. Additionally, education was provided regarding compression garments and/or compression bandaging, including wear schedule, donning, removal, and laundering.         Objective Findings:       UE Right 3/17/22 7/20/22 RUE 8/10/22 R 8/15/22 R   Web A 21.3 21.8 21.5 21.5   Web B 22.8 23.4 22.5 22.5   Styloid 19.0 18.9 19.0 19.0   10cm 31.7 30.4 29.0 30.0   20cm 35.5 35.0 33.5 34.5   30cm 42.0 42.0 41.0 41.0   40cm 46.3 45.0 42.0 43.0   50cm         axilla              Assessment: Pt demonstrated GOOD response to MLD with increased lymph fluid circulation. Reports compliance with compression during the day. Intermittent pain reported still. Increase in measurement today. Tolerance for treatment:  good  Adverse reactions to treatment: none      Plan:       PLAN  After eval, patient will be seen for 2 times per week for 5 weeks. Measure for garments and order garments by the end of treatment week number 4. Treatment will include pt ed, ther ex, self care/ADL training, manual therapy: manual lymphatic drainage, compression garment measuring and ordering. GOALS  Due by 8/19/22  1 [place] with HEP.  2 Compliance with elevation daily. 3 Independent with self massage principles. 4 Acquire and wear correctly fitting compression garment for RUE.  5 Consistent reduction of edema as evidenced by regular girth measures.          [x] Continue per plan of care   [] Alter current plan   [] Hold pending MD visit   [] Discharge    Plan for Next Session:  MLD, Compression  Progress Note Due:    Communication with other providers:      Time In: 6049  Time Out:1100         If [employer] Please Indicate Units for Rx this date and running total for each and overall total for Rx to date:  CPT Code Units today Running Total Units Total approved   TE 41861  1  1      MAN 90261  2 9     Gait 87063         NR 61360         TA  69197         Estim Unatt 70005           [address] [facility] [phone]         VASO [address]27[address]          ADL/Self care 88 [phone] 60         DNT 1-2 23366         DNT 3-4 39609         Other:     1                Total for episode of care    11 44 units 7/20-10/20/22             Timed Code Treatment Minutes: 45  Total Treatment Minutes: 45    Electronically signed by: Ralph Ortiz PT, CLT 8/17/2022, 10:13 AM

## 2022-08-22 ENCOUNTER — HOSPITAL ENCOUNTER (OUTPATIENT)
Dept: PHYSICAL THERAPY | Age: 57
Discharge: HOME | End: 2022-08-22
Payer: COMMERCIAL

## 2022-08-22 PROCEDURE — 97140 MANUAL THERAPY 1/> REGIONS: CPT

## 2022-08-22 PROCEDURE — 97110 THERAPEUTIC EXERCISES: CPT

## 2022-08-22 NOTE — FLOWSHEET NOTE
Physical Therapy Treatment Note   Date:  2022    Patient Name: Natty Ulloa Diagnosis: R UE Lymphedema    :  1965 Treatment Diagnosis:  Same   MRN: [id number]        Restrictions/Precautions: Insurance/Certification information:    [place]  units -10/20/22       Visit# / total visits:  5/10 Missed visits:   0   POC expiration date:   Plan of care signed (Y/N):  n       Initial Pain level: 3/10    Post Tx Pain Rating:  same   Changes/updates to ambulatory history sheet?  n      Subjective:  Having some pains in R forearm that come and go. Manual Lymphatic Drainage:  MLD was performed today for central clearing and for appropriate extremity drainage. Appropriate regions were elevated during the manual lymphatic drainage. Therapeutic Exercise:  Therapeutic exercises were instructed today. Cues were given for technique, safety, recruitment, and rationale. Cues were verbal and/or tactile. Performed muscle pumping activities to circulate fluids and stimulate the lymphatic system. UBC 2 mins each way  70 rpm  4# 4x10 curls  4# OH press 3x8  Rows CC30# 3x10  Wall push up 3x10        Compression: At arrival: Elvarex sleeve. At departure: Elvarex sleeve         ADL/Self-Care Training: This patient was instructed in strategies to help improve self management of edema and to improve circulation. Training today included recommendations and discussion regarding purposeful elevation, sleep positioning, and general movements to pump muscles for circulatory benefit. Additionally, education was provided regarding compression garments and/or compression bandaging, including wear schedule, donning, removal, and laundering.         Objective Findings:       UE Right 3/17/22 7/20/22 RUE 8/10/22 R 8/15/22 R 22 R   Web A 21.3 21.8 21.5 21.5 21.5   Web B 22.8 23.4 22.5 22.5 22.5   Styloid 19.0 18.9 19.0 19.0 18.8   10cm 31.7 30.4 29.0 30.0 29.0   20cm 35.5 35.0 33.5 34.5 34.8   30cm

## 2022-08-24 ENCOUNTER — HOSPITAL ENCOUNTER (OUTPATIENT)
Dept: PHYSICAL THERAPY | Age: 57
Discharge: HOME | End: 2022-08-24

## 2022-08-24 NOTE — FLOWSHEET NOTE
Physical Therapy  Cancellation/No-show Note  Patient Name:  Katelyn Walker  :  1965   Date:  2022  Cancelled visits to date: 2  No-shows to date: 0    For today's appointment patient:  [x]  Cancelled  []  Rescheduled appointment  []  No-show     Reason given by patient:  []  Patient ill  []  Conflicting appointment  []  No transportation    []  Conflict with work  [x]  No reason given  []  Other:     Comments:      Electronically signed by:  Rafa Dillon PT, 2022, 8:05 AM

## 2022-08-29 ENCOUNTER — HOSPITAL ENCOUNTER (OUTPATIENT)
Dept: PHYSICAL THERAPY | Age: 57
Discharge: HOME | End: 2022-08-29

## 2022-08-29 NOTE — FLOWSHEET NOTE
Physical Therapy  Cancellation/No-show Note  Patient Name:  Jessica Freed  :  1965   Date:  2022  Cancelled visits to date: 3  No-shows to date: 0    For today's appointment patient:  [x]  Cancelled  []  Rescheduled appointment  []  No-show     Reason given by patient:  [x]  Patient ill  []  Conflicting appointment  []  No transportation    []  Conflict with work  []  No reason given  []  Other:     Comments:      Electronically signed by:  Nikhil Rodriguez PTA, CLT  2022, 11:02 AM

## 2022-08-31 RX ORDER — LETROZOLE 2.5 MG/1
TABLET, FILM COATED ORAL
Qty: 30 TABLET | Refills: 1 | Status: SHIPPED | OUTPATIENT
Start: 2022-08-31

## 2022-09-06 ENCOUNTER — HOSPITAL ENCOUNTER (OUTPATIENT)
Dept: PHYSICAL THERAPY | Age: 57
Discharge: HOME | End: 2022-09-06
Payer: COMMERCIAL

## 2022-09-06 PROCEDURE — 97110 THERAPEUTIC EXERCISES: CPT

## 2022-09-06 NOTE — FLOWSHEET NOTE
35.5 35.0 33.5 34.5 34.8 35.0   30cm 42.0 42.0 41.0 41.0 42.0 41.5   40cm 46.3 45.0 42.0 43.0 44.5 44.5   50cm           axilla                Assessment: Pt demonstrated GOOD response to exercise. Reports compliance with compression during the day. Intermittent pain reported still. Consistent measurements overall with compression. Tolerance for treatment:  good  Adverse reactions to treatment: none      Plan:       PLAN       GOALS  Due by 8/19/22  1 [place] with HEP.  2 Compliance with elevation daily. 3 Independent with self massage principles. 4 Acquire and wear correctly fitting compression garment for RUE.  5 Consistent reduction of edema as evidenced by regular girth measures.          [] Continue per plan of care   [] Alter current plan   [] Hold pending MD visit   [x] Discharge    Plan for Next Session:  MLD, Compression  Progress Note Due:    Communication with other providers:      Time In: 1030  Time Out:1110         If [employer] Please Indicate Units for Rx this date and running total for each and overall total for Rx to date:  CPT Code Units today Running Total Units Total approved   TE 98047  3 5     MAN 73725   11    Gait 53398         NR 87197         TA  78077         Estim Unatt 28433           [address]         [facility] [id number]         [facility] [id number]          ADL/Self care 70643         DNT 1-2 18012         DNT 3-4 20561         Other:     1                Total for episode of care    17 44 units 7/20-10/20/22             Timed Code Treatment Minutes: 40  Total Treatment Minutes: 40    Electronically signed by: Rasheed Polk PT, CLT 9/6/2022, 10:31 AM

## 2022-09-06 NOTE — PROGRESS NOTES
Outpatient Physical Therapy        [x] Phone: [phone]   Fax: [phone]   Physician: Cristal Luis MD        From: Jose Sampson PT,     Patient: Rosemarie Sagastume                  : 1965  Diagnosis:  R UE Lymphedema      Date: 2022  Treatment Diagnosis:  R UE Lymphedema     []  Progress Note                [x]  Discharge Note    Total Visits to date:   6 Cancels/No-shows to date:  3    Subjective:  good days and bad. Feels tight at the arm.    pain 4/10    Plan of Care/Treatment to date:  [x] Therapeutic Exercise    [] Modalities:  [x] Therapeutic Activity     [] Ultrasound  [] Electric Stimulation  [] Gait Training      [] Cervical Traction    [] Lumbar Traction  [] Neuromuscular Re-education  [] Cold/hotpack [] Iontophoresis  [x] Instruction in HEP      Other:  [x] Manual Therapy       []  Vasopneumatic  [x] Self care management                           [] Dry needling trigger point/pain management                      Objective/Significant Findings At Last Visit/Comments:      1 [place] with HEP. MET  2 Compliance with elevation daily. MET  3 Independent with self massage principles. MET  4 Acquire and wear correctly fitting compression garment for RUE. MET  5 Consistent reduction of edema as evidenced by regular girth measures. met - stable with compression. Pt is to try to perform RUE exercises with her sleeve in place when feeling tight or swollen. Pt noted decreased symptoms in clinic with exercise/compression combination. Patient Status: [] Continue per initial plan of Care     [x] Patient now discharged     [] Additional visits requested, Please re-certify for additional visits:    Electronically signed by:  Jose Sampson PT, 2022, 11:10 AM    If you have any questions or concerns, please don't hesitate to call.   Thank you for your referral.

## 2022-10-13 NOTE — ED PROVIDER NOTES
Emergency Department Encounter    Patient: Nancy Mike  MRN: [id number]  : 1965  Date of Evaluation: 10/18/2020  ED Provider:  Mariaelena Woodward    Triage Chief Complaint:   Abdominal Pain (Sent from [facility] for CT for possible bowel obstruction)    [place]:  Nancy Mike is a 54 y.o. female that presents from [facility] for CT abdomen pelvis with contrast.  Darlys Buerger reported that her CT scanner was down. Plain film reportedly showed small bowel obstruction with air under the diaphragm. Lactic acid was 2.8.,  White blood cell count was 11.4. Patient reports abdominal pain x 1 day. + nausea and vomiting. Pain is described as sharp/stabbing.   Pain is currently 5-6 out of 10; 10 out of 10 at its worst.    ROS - see HPI, below listed is current ROS at time of my eval:  General:  No fevers, no chills, no weakness  Eyes:  no discharge  ENT:  No sore throat, no nasal congestion  Cardiovascular:  No chest pain, no palpitations  Respiratory:  No shortness of breath, no cough, no wheezing  Gastrointestinal:  + pain, + nausea, no vomiting, no diarrhea  Musculoskeletal:  No muscle pain, no joint pain  Skin:  No rash, no pruritis  Neurologic:  no headache  Genitourinary:  No dysuria, no hematuria  Endocrine:  No unexpected weight gain, no unexpected weight loss  Extremities:  no edema, no pain    Past Medical History:   Diagnosis Date    Abnormal ECG 2019    Acid reflux     Anxiety     Arthritis     \"Back, Legs, Knees And Feet\"    Back problem     \"Three Discs Smashed Together Lower Back\"    Bipolar disorder ([address]     Chronic back pain     Chronic cough 2020    COPD (chronic obstructive pulmonary disease) ([address]     COPD, moderate ([address] 2020    Curvature of spine     \"Wore A Brace For Three Years\"    Depression     Diabetes mellitus ([address] Dx     Diverticulitis     Esophageal dilatation 2017    Family history of sudden death 2019    Mother at age 58    Full  CHOLECYSTECTOMY, LAPAROSCOPIC  2007    COLONOSCOPY  2016    dr green 1 polyp    COLONOSCOPY  3/2019; 04/12/2017    3/2019 WNL; 4/2017 Internal hemorroids, diverticulosis    CYSTOSCOPY  2016    Kidney Stones    CYSTOSCOPY      per old chart with admission 6/2018 pt had cysto, right RGPG and attempted to insert right ureteral stent    CYSTOSCOPY  07/03/2018    cysto, right stent removal, right ureteroscopy, laser litho    DENTAL SURGERY      All Teeth Extracted In Past    ENDOSCOPY, COLON, DIAGNOSTIC  04/12/2017    EGD: Dilatation, Hiatal hernia, mild gastritis    HERNIA REPAIR  2007    Abdominal Hernia Repair With Mesh    HERNIA REPAIR  2012    Ventral Hernia Repair With Mesh    HYSTERECTOMY, TOTAL ABDOMINAL  2006    LAPAROSCOPY  2005    Laparoscopy, D & C, Hysteroscopy    LYMPH NODE DISSECTION      2015    OTHER SURGICAL HISTORY  05/22/2018    Laparoscopic-converted to open ventral hernia repair with mesh/ XU    OTHER SURGICAL HISTORY      per old chart with admission 6/20180- in IR 6/11/2018- had right perc. nephrostomy with cath placement\"took the tube out before I left the hospital\"    TONSILLECTOMY  1970's     Family History   Problem Relation Age of Onset    Heart Disease Mother         \"Open Heart\"    Diabetes Mother    [de-identified] / [place] Mother     Stroke Mother     Vision Loss Mother         \"Wore Glasses\"    High Blood Pressure Father     Vision Loss Father         \"Had Cataract Surgery\"    Cancer Father         \"Kidney Cancer\"    Heart Disease Father         \"Atrial Fib\"    Diabetes Sister     High Blood Pressure Sister     High Cholesterol Sister     Cancer Sister         \"Ovarian Cancer\"   [place] Self Vision Loss Sister         \"Glaucoma\"     Social History     Socioeconomic History    Marital status: Single     Spouse name: Not on file    Number of children: Not on file    Years of education: Not on file    Highest education level: Not on file   Occupational History  Not on file   Social Needs    Financial resource strain: Not on file    Food insecurity     Worry: Not on file     Inability: Not on file    Transportation needs     Medical: Not on file     Non-medical: Not on file   Tobacco Use    Smoking status: Former Smoker     Packs/day: 0.25     Years: 5.00     Pack years: 1.25     Types: Cigarettes     Start date:      Last attempt to quit:      Years since quittin.8    Smokeless tobacco: Never Used    Tobacco comment: \"I Only Smoked Socially When I Quit Smoking\"   Substance and Sexual Activity    Alcohol use: No     Alcohol/week: 0.0 standard drinks    Drug use: No    Sexual activity: Never   Lifestyle    Physical activity     Days per week: Not on file     Minutes per session: Not on file    Stress: Not on file   Relationships    Social connections     Talks on phone: Not on file     Gets together: Not on file     Attends Mandaeism service: Not on file     Active member of club or organization: Not on file     Attends meetings of clubs or organizations: Not on file     Relationship status: Not on file    Intimate partner violence     Fear of current or ex partner: Not on file     Emotionally abused: Not on file     Physically abused: Not on file     Forced sexual activity: Not on file   Other Topics Concern    Not on file   Social History Narrative    Not on file     Current Facility-Administered Medications   Medication Dose Route Frequency Provider Last Rate Last Dose    ondansetron (ZOFRAN) injection 4 mg  4 mg Intravenous Q6H PRN Hui Flannery MD   4 mg at 10/18/20 0434    cefepime (MAXIPIME) 2 g IVPB minibag  2 g Intravenous Once Hui Flannery MD        0.9 % sodium chloride bolus  500 mL Intravenous Once Hui Flannery  mL/hr at 10/18/20 0553 500 mL at 10/18/20 0553    sodium chloride flush 0.9 % injection 10 mL  10 mL Intravenous BID Hui Flannery MD   10 mL at 10/18/20 0548    lactated ringers infusion   Intravenous Continuous Ladarius Kirby MD        HYDROmorphone (DILAUDID) injection 1 mg  1 mg Intravenous Q3H PRN Ladarius Kirby MD        cefepime (MAXIPIME) 2 g IVPB minibag  2 g Intravenous Q12H Ladarius Kirby MD        metronidazole (FLAGYL) 500 mg in NaCl 100 mL IVPB premix  500 mg Intravenous Q8H Ladarius Kirby MD        pantoprazole (PROTONIX) injection 40 mg  40 mg Intravenous Daily Ladarius Kirby MD        Clearance Ghanshyam ON 10/19/2020] enoxaparin (LOVENOX) injection 40 mg  40 mg Subcutaneous Daily Ladarius Kirby MD        albuterol sulfate  (90 Base) MCG/ACT inhaler 2 puff  2 puff Inhalation Q6H PRN Ladarius Kirby MD        losartan (COZAAR) tablet 100 mg  100 mg Oral Daily Ladarius Kirby MD        lisinopril (PRINIVIL;ZESTRIL) tablet 5 mg  5 mg Oral Daily Ladarius Kirby MD        montelukast (SINGULAIR) tablet 10 mg  10 mg Oral Daily Ladarius Kirby MD         Current Outpatient Medications   Medication Sig Dispense Refill    fexofenadine (ALLEGRA ALLERGY) 60 MG tablet Take 60 mg by mouth daily      loratadine (CLARITIN) 10 MG tablet Take 10 mg by mouth daily      PANTOPRAZOLE SODIUM PO Take 1 tablet by mouth daily      letrozole (FEMARA) 2.5 MG tablet TAKE ONE TABLET BY MOUTH EVERY DAY (Patient taking differently: 2.5 mg ) 30 tablet 6    famotidine (PEPCID) 20 MG tablet Take 1 tablet by mouth daily      FLUoxetine (PROZAC) 10 MG capsule Take 1 capsule by mouth daily      hydroCHLOROthiazide (MICROZIDE) 12.5 MG capsule Take 1 capsule by mouth daily      hydrOXYzine (VISTARIL) 25 MG capsule Take 1 capsule by mouth daily      montelukast (SINGULAIR) 10 MG tablet Take 1 tablet by mouth daily      losartan (COZAAR) 100 MG tablet Take 1 tablet by mouth daily      ALBUTEROL SULFATE HFA IN Inhale into the lungs      Phenylephrine-DM-GG-APAP 10--650 MG PACK Take 1 tablet by mouth 4 times daily 20 each 0    lisinopril (PRINIVIL;ZESTRIL) 5 MG tablet Take 5 mg by mouth daily      Fluticasone Propionate (FLONASE NA) by Nasal route      busPIRone (BUSPAR) 7.5 MG tablet Take 10 mg by mouth 3 times daily       nystatin (MYCOSTATIN) 227392 UNIT/GM cream APPLY AS DIRECTED THREE TIMES A DAY 15 g 10    Probiotic Product (ALIGN) 4 MG CAPS Take 1 capsule by mouth daily 30 capsule 3     Allergies   Allergen Reactions    Latex      \"Redness\"    Codeine      \"I Don't Remember The Reaction , I Was A Child\"    Darvon [Propoxyphene]      \"I Don't Remember The Reaction, I Was A Child\"    Pcn [Penicillins]      \"I Don't Remember The Reaction, I Was A Child\"       Nursing Notes Reviewed    Physical Exam:  Triage VS:    ED Triage Vitals   Enc Vitals Group      BP 10/18/20 0351 (!) 128/110      Pulse 10/18/20 0421 95      Resp 10/18/20 0351 18      Temp 10/18/20 0434 99 °F (37.2 °C)      Temp Source 10/18/20 0434 Oral      SpO2 10/18/20 0421 99 %      Weight --       Height --       Head Circumference --       Peak Flow --       Pain Score --       Pain Loc --       Pain Edu? --       Excl. in [address]? --        My pulse ox interpretation is - normal    General appearance: Obese, no acute distress. Skin:  Warm. Dry. No rash. Neck:  Trachea midline. Heart:  Normal rate, normal S1 & S2.    Perfusion:  intact  Respiratory:  Lungs clear to auscultation bilaterally. Respirations nonlabored. Abdominal: Diffuse abdominal tenderness to palpation, distended and tympanic, diffuse tenderness to palpation  Back:  No CVA TTP  Extremity:    normal ROM   Neurological:  Alert and oriented times 3.       I have reviewed and interpreted all of the currently available lab results from this visit (if applicable):  Results for orders placed or performed during the hospital encounter of 10/18/20   Blood gas, venous   Result Value Ref Range    pH, Bhupendra 7.54 (H) 7.32 - 7.42    pCO2, Bhupendra 28 (L) 38 - 52 mmHG    pO2, Bhupendra 224 (H) 28 - 48 mmHG    Base Exc, Mixed 2.3 0 - 2.3    HCO3, Venous 23.9 19 - 25 MMOL/L    O2 Sat, Bhupendra 95.5 (H) 50 - 70 %    Comment VBG    Lactate, Sepsis   Result Value Ref Range    Lactic Acid, Sepsis 1.3 0.5 - 1.9 mMOL/L      Radiographs (if obtained):  Radiologist's Report Reviewed:  Xr Acute Abd Series Chest 1 Vw    Result Date: 10/18/2020  Free intraperitoneal air is noted. Findings are concerning for perforated bowel. Dilated loops of small bowel are noted with air-fluid levels. Findings are concerning for small bowel obstruction. No acute findings within the chest.     CT ABDOMEN PELVIS W IV CONTRAST Additional Contrast? None   Preliminary Result   1. Free air is seen from perforated small bowel. 2. Dilated loops of small bowel are noted with air-fluid levels. 2   transition points are seen, suggesting that this is a closed loop small bowel   obstruction. 1 transition point is seen within a ventral hernia. There is   another transition point within the right lower quadrant proximal to the   anastomoses. Ischemic changes with pneumatosis are seen involving the small   bowel loops. Findings were discussed with Kyle Avalos at 6:29 am on 10/18/2020. EKG (if obtained): (All EKG's are interpreted by myself in the absence of a cardiologist)      28-year-old female with free air under her diaphragm on abdominal plain film as well as signs for small bowel obstruction at [facility]. [facility] ED's CT scanner was down and she was transferred here for further evaluation and possible surgical intervention. CT of the abdomen and pelvis with IV contrast was ordered along with 1 L of normal saline, Zofran, cefepime and metronidazole. ED provider documented previous abx; however I did not see I orders.      Medications   ondansetron (ZOFRAN) injection 4 mg (4 mg Intravenous Given 10/18/20 3221)   cefepime (MAXIPIME) 2 g IVPB minibag (2 g Intravenous Not Given 10/18/20 1912)   0.9 % sodium chloride bolus (500 mLs Intravenous New Bag 10/18/20 1415)   sodium chloride flush 0.9 % injection 10 mL (10 mLs Intravenous Given 10/18/20 0548)   lactated ringers infusion (has no administration in time range)   HYDROmorphone (DILAUDID) injection 1 mg (has no administration in time range)   cefepime (MAXIPIME) 2 g IVPB minibag (has no administration in time range)   metronidazole (FLAGYL) 500 mg in NaCl 100 mL IVPB premix (has no administration in time range)   pantoprazole (PROTONIX) injection 40 mg (has no administration in time range)   enoxaparin (LOVENOX) injection 40 mg (has no administration in time range)   albuterol sulfate  (90 Base) MCG/ACT inhaler 2 puff (has no administration in time range)   losartan (COZAAR) tablet 100 mg (has no administration in time range)   lisinopril (PRINIVIL;ZESTRIL) tablet 5 mg (has no administration in time range)   montelukast (SINGULAIR) tablet 10 mg (has no administration in time range)   metronidazole (FLAGYL) 500 mg in NaCl 100 mL IVPB premix (500 mg Intravenous New Bag 10/18/20 0553)   iopamidol (ISOVUE-370) 76 % injection 80 mL (80 mLs Intravenous Given 10/18/20 0547)         CT showed free air from perforated small bowel with pneumatosis    General surgery was contacted and will admit to ICU for surgical intervention. General surgery did see patient in ED and placed NG tube. Admitted in fair condition with stable vitals. Clinical Impression:  1. Bowel perforation ([facility]    2. SBO (small bowel obstruction) ([facility]      Disposition referral (if applicable):  No follow-up provider specified. Disposition medications (if applicable):  New Prescriptions    No medications on file     ED Provider Disposition Time  DISPOSITION        Comment: Please note this report has been produced using speech recognition software and may contain errors related to that system including errors in grammar, punctuation, and spelling, as well as words and phrases that may be inappropriate. Efforts were made to edit the dictations.       Jaren Quinn MD  10/18/20 0745 Double O-Z Plasty Text: The defect edges were debeveled with a #15 scalpel blade.  Given the location of the defect, shape of the defect and the proximity to free margins a Double O-Z plasty (double transposition flap) was deemed most appropriate.  Using a sterile surgical marker, the appropriate transposition flaps were drawn incorporating the defect and placing the expected incisions within the relaxed skin tension lines where possible. The area thus outlined was incised deep to adipose tissue with a #15 scalpel blade.  The skin margins were undermined to an appropriate distance in all directions utilizing iris scissors.  Hemostasis was achieved with electrocautery.  The flaps were then transposed into place, one clockwise and the other counterclockwise, and anchored with interrupted buried subcutaneous sutures.

## 2022-10-27 ENCOUNTER — OFFICE VISIT (OUTPATIENT)
Dept: ONCOLOGY | Age: 57
End: 2022-10-27
Payer: COMMERCIAL

## 2022-10-27 ENCOUNTER — HOSPITAL ENCOUNTER (OUTPATIENT)
Dept: INFUSION THERAPY | Age: 57
Discharge: HOME | End: 2022-10-27
Payer: COMMERCIAL

## 2022-10-27 VITALS
BODY MASS INDEX: 42.38 KG/M2 | RESPIRATION RATE: 18 BRPM | HEIGHT: 67 IN | TEMPERATURE: 98.1 F | SYSTOLIC BLOOD PRESSURE: 186 MMHG | DIASTOLIC BLOOD PRESSURE: 87 MMHG | WEIGHT: 270 LBS | HEART RATE: 97 BPM | OXYGEN SATURATION: 98 %

## 2022-10-27 DIAGNOSIS — C50.411 MALIGNANT NEOPLASM OF UPPER-OUTER QUADRANT OF RIGHT BREAST IN FEMALE, ESTROGEN RECEPTOR POSITIVE (HCC): Primary | ICD-10-CM

## 2022-10-27 DIAGNOSIS — C50.411 MALIGNANT NEOPLASM OF UPPER-OUTER QUADRANT OF RIGHT BREAST IN FEMALE, ESTROGEN RECEPTOR POSITIVE (HCC): ICD-10-CM

## 2022-10-27 DIAGNOSIS — Z17.0 MALIGNANT NEOPLASM OF UPPER-OUTER QUADRANT OF RIGHT BREAST IN FEMALE, ESTROGEN RECEPTOR POSITIVE (HCC): ICD-10-CM

## 2022-10-27 DIAGNOSIS — Z17.0 MALIGNANT NEOPLASM OF UPPER-OUTER QUADRANT OF RIGHT BREAST IN FEMALE, ESTROGEN RECEPTOR POSITIVE (HCC): Primary | ICD-10-CM

## 2022-10-27 LAB
ALBUMIN SERPL-MCNC: 3.9 GM/DL (ref 3.4–5)
ALP BLD-CCNC: 90 IU/L (ref 40–129)
ALT SERPL-CCNC: 15 U/L (ref 10–40)
ANION GAP SERPL CALCULATED.3IONS-SCNC: 9 MMOL/L (ref 4–16)
AST SERPL-CCNC: 13 IU/L (ref 15–37)
BASOPHILS ABSOLUTE: 0 K/CU MM
BASOPHILS RELATIVE PERCENT: 0.7 % (ref 0–1)
BILIRUB SERPL-MCNC: 0.3 MG/DL (ref 0–1)
BUN BLDV-MCNC: 6 MG/DL (ref 6–23)
CALCIUM SERPL-MCNC: 8.7 MG/DL (ref 8.3–10.6)
CHLORIDE BLD-SCNC: 101 MMOL/L (ref 99–110)
CO2: 27 MMOL/L (ref 21–32)
CREAT SERPL-MCNC: 0.6 MG/DL (ref 0.6–1.1)
DIFFERENTIAL TYPE: ABNORMAL
EOSINOPHILS ABSOLUTE: 0.1 K/CU MM
EOSINOPHILS RELATIVE PERCENT: 1.8 % (ref 0–3)
GFR SERPL CREATININE-BSD FRML MDRD: >60 ML/MIN/1.73M2
GLUCOSE BLD-MCNC: 142 MG/DL (ref 70–99)
HCT VFR BLD CALC: 40.2 % (ref 37–47)
HEMOGLOBIN: 12.8 GM/DL (ref 12.5–16)
LYMPHOCYTES ABSOLUTE: 1.1 K/CU MM
LYMPHOCYTES RELATIVE PERCENT: 24.5 % (ref 24–44)
MCH RBC QN AUTO: 27.6 PG (ref 27–31)
MCHC RBC AUTO-ENTMCNC: 31.8 % (ref 32–36)
MCV RBC AUTO: 86.6 FL (ref 78–100)
MONOCYTES ABSOLUTE: 0.3 K/CU MM
MONOCYTES RELATIVE PERCENT: 7.2 % (ref 0–4)
PDW BLD-RTO: 14.6 % (ref 11.7–14.9)
PLATELET # BLD: 197 K/CU MM (ref 140–440)
PMV BLD AUTO: 9.9 FL (ref 7.5–11.1)
POTASSIUM SERPL-SCNC: 4.1 MMOL/L (ref 3.5–5.1)
RBC # BLD: 4.64 M/CU MM (ref 4.2–5.4)
SEGMENTED NEUTROPHILS ABSOLUTE COUNT: 3 K/CU MM
SEGMENTED NEUTROPHILS RELATIVE PERCENT: 65.8 % (ref 36–66)
SODIUM BLD-SCNC: 137 MMOL/L (ref 135–145)
TOTAL PROTEIN: 6.7 GM/DL (ref 6.4–8.2)
WBC # BLD: 4.6 K/CU MM (ref 4–10.5)

## 2022-10-27 PROCEDURE — 80053 COMPREHEN METABOLIC PANEL: CPT

## 2022-10-27 PROCEDURE — 86300 IMMUNOASSAY TUMOR CA 15-3: CPT

## 2022-10-27 PROCEDURE — 3017F COLORECTAL CA SCREEN DOC REV: CPT | Performed by: INTERNAL MEDICINE

## 2022-10-27 PROCEDURE — 99213 OFFICE O/P EST LOW 20 MIN: CPT | Performed by: INTERNAL MEDICINE

## 2022-10-27 PROCEDURE — 85025 COMPLETE CBC W/AUTO DIFF WBC: CPT

## 2022-10-27 PROCEDURE — 99211 OFF/OP EST MAY X REQ PHY/QHP: CPT

## 2022-10-27 PROCEDURE — G8417 CALC BMI ABV UP PARAM F/U: HCPCS | Performed by: INTERNAL MEDICINE

## 2022-10-27 PROCEDURE — G8484 FLU IMMUNIZE NO ADMIN: HCPCS | Performed by: INTERNAL MEDICINE

## 2022-10-27 PROCEDURE — G8427 DOCREV CUR MEDS BY ELIG CLIN: HCPCS | Performed by: INTERNAL MEDICINE

## 2022-10-27 PROCEDURE — 36415 COLL VENOUS BLD VENIPUNCTURE: CPT

## 2022-10-27 PROCEDURE — 1036F TOBACCO NON-USER: CPT | Performed by: INTERNAL MEDICINE

## 2022-10-27 RX ORDER — BENZONATATE 200 MG/1
CAPSULE ORAL
COMMUNITY
Start: 2022-10-18

## 2022-10-27 RX ORDER — DULAGLUTIDE 1.5 MG/.5ML
INJECTION, SOLUTION SUBCUTANEOUS
COMMUNITY
Start: 2022-10-14

## 2022-10-27 NOTE — PROGRESS NOTES
Patient Name: Lauro Stephenson  Patient : 1965  Patient MRN: [id number]     Primary Oncologist: Janice Puri MD  Referring Provider: REGINALD Singh     Date of Service: 10/27/2022      Chief Complaint:   Chief Complaint   Patient presents with    Follow-up     Patient Active Problem List:     Malignant neoplasm of upper-outer quadrant of right female breast       HPI:   Ms. Jerri Case is a very pleasant 49-year-old patient with medical history significant for hypertension, diabetes mellitus, initially referred to me on 2015, for evaluation of right breast infiltrating ductal carcinoma. She stated that she felt a knot in her right breast and she went to the primary care physician for that. She had mammogram followed by sonogram on 2015, and it showed spiculated mass in the anterior 12 o'clock position in the right breast, most likely representing malignancy. She underwent sonoguided biopsy of the lesion on 2015, and the final pathology was consistent with infiltrating ductal carcinoma. Estrogen receptors positive (95 percent strong), progesterone receptor positive (75 percent), and HER-2/jossy by immunohistochemical stain is equivocal and HER-2/jossy by FISH study was not amplified. She was evaluated by Dr. Maudine Holter and he requested core biopsy of the right breast axillary lymph node and it was done on 2015. Final pathology showed metastatic carcinoma consistent with breast primary involving all cores (10 mm in the largest dimension). Since she wanted to preserve her breast if that is possible, Dr. Maudine Holter discussed her case in our Tumor Board. Consensus was to give neoadjuvant chemotherapy followed by surgery. Neoadjuvant chemotherapy with doxorubicin, cyclophosphamide followed by Paclitaxel was started on 2015 and she has completed it on 2015. Ms. Paz underwent right breast lumpectomy on 2015, and the final pathology was consistent with stage IIB right breast infiltrating ductal carcinoma. There were two tumor sites less than 2 cm apart and the tumor is located at the 12 o'clock position. One tumor is 2.1 cm in size and another one is 0.4 cm in size. It is low-grade tumor and DCIS present. Estrogen receptor strongly positive (95 percent), progesterone receptor positive (75 percent), and HER-2 by FISH is not amplified. All the surgical margins were negative and the lymphovascular invasion present in the axillary dissection. One out of 19 lymph nodes were positive for macrometastases. Adjuvant hormonal therapy with letrozole was started on September 1, 2015. She was started with adjuvant radiation therapy by Dr. Adam Gomez and she completed it successfully on November 11, 2015. Ms. Paz had diagnostic right breast digital tomosynthesis mammograph on March 10, 2016, and it showed stable skin thickening and trabecular thickening. In the absence of clinical infectious signs and symptoms this is likely related to post-treatment change. Slight decrease in size of right breast hematoma. No evidence of malignancy. Recommend clinical follow up as necessary. She also had an abnormal MRI of the right breast in April 2017 and we believe it was due to surgical changes at that time. Because of her concern and symptoms of nipple inversion, we requested a targeted sonogram of the right breast.  It was done on August 15, 2017, and it showed 8 mm mass at the 9 o'clock in the right breast, likely corresponding to an enhancing mass seen on the MRI. Radiologist recommended sonoguided biopsy of the lesion. She underwent the sonoguided biopsy of the lesion on August 21, 2017, and final pathology was negative for invasive carcinoma. I discussed with Dr. Jocelyn Wallace since she was found to have some atypical epithelial cells on the previous biopsy.   We both believed that it is due to changes from the prior surgery and the radiation treatment. We will recommend to continue with close observation. MRI of the breast done on 12/18/18 showed 1.7 cm ill-defined area of abnormal enhancement along the ventral margin of the patient's scar upper-outer quadrant posterior third right breast. Targeted right breast ultrasound and diagnostic right mammogram is warranted for complete assessment. She had incisional biopsy of the right breast suspicious site on 1/25/19 and final pathology only revealed changes related to surgery and radiation. She had EGD on 3/14 and colonoscopy on 3/20/19 by Dr. Ebony Karimi. Colonoscopy showed 8 mm polyp in cecum, 4 mm polyp in descending colon, and internal hemorrhoids. DEXA scan done on August 21, 2019 showed normal bone mineral density by WHO criteria. She required to have exploratory laparotomy, removal of previously placed prosthesis mesh, small bowel resection and placement of Vicryl mesh for closure of abdominal wall defects on 10/18/20, for her recurrent hernia with pneumatosis of the small bowel and free air. Mammogram done on January 11, 2021 showed probably benign findings in the bilateral breasts and radiologist recommend to have repeat mammogram in 6 months. Mammogram done on 07/29/2021 showed stable, probably benign changes of the right lumpectomy bed. Recommend continued clinical follow-up of this area. Also recommend short-term follow-up in 6 months with a right breast diagnostic tomosynthesis. Mammogram done on 3/17/2022 showed stable probably benign changes of the right lumpectomy bed. Radiologist recommend right breast diagnostic exam in 1 year to complete evaluation for 2 years of stability. Stable probably benign left breast calcifications. DEXA scan done on 7/21/22 showed normal bone mineral density by WHO criteria. On October 27, 2022, she presented to me for follow up. I have been following Ms. Paz for right breast infiltrating ductal carcinoma and she is status post neoadjuvant chemotherapy followed by right breast lumpectomy and sentinel lymph node biopsy. She is also status post adjuvant radiation therapy and she is currently on adjuvant endocrine therapy with letrozole. She is tolerating letrozole very well and she does not encounter any major side effects from it. She did have hot flashes due to letrozole. However it is getting better lately and that she does not get hot flashes that often. She is not on any medication for hot flashes at this moment. I recommend her to continue with letrozole on a regular basis. I will continue to follow her closely during adjuvant endocrine therapy with letrozole. She does not have any signs or symptoms suggestive of recurrent or metastatic breast cancer at today's visit. I believe she is in complete clinical remission. She is more than 5 years on letrozole and we will continue with that for now. Since she has stage IIB disease, we decided longer duration of letrozole. Right upper extremity increasing lymph edema with pain - to continue with management as recommended by lymphedema therapist.     Repeat mammogram on 3/17/2022 showed probably benign findings and radiologist recommend her to have repeat mammogram in 12 months. Health maintenance - I commend her to have age-appropriate cancer screening, exercise, low-fat and low-sodium diet. She does not have any significant symptoms at today's visit. PAST MEDICAL HISTORY:  Significant for:  1. Hypertension. 2.         Diabetes mellitus. PAST SURGICAL HISTORY:  Significant for:  1. Total abdominal hysterectomy and bilateral salpingo-oophorectomy in 2006. 2.         Cholecystectomy. 3.         Hernia surgery. FAMILY HISTORY:  Significant for ovarian cancer in her sister and breast cancer in a couple of her aunts. No other family history of cancer disease.        SOCIAL HISTORY:  She denies smoking, alcohol drinking, and illicit drug abuse. She is single and has no children. She is not working right now. ALLERGIES:  She claims to have allergies to Codeine, penicillin, and Darvocet. Review of Systems: \"Per interval history; otherwise 10 point ROS is negative. \"  Her energy level is good and her sleep is fine. She denies fever, chills, night sweats, cough, shortness of breath, chest pain, hemoptysis or palpitations. Her bowel and bladder functions are normal. She doesn't have nausea, vomiting, abdominal pain, diarrhea, constipation, dysuria, loss of appetite or weight loss. She denies neuropathy and she doesn't have bleeding or clotting issues. She has RUE swelling. No anxiety or depression. The rest of the systems are unremarkable. Vital Signs:  BP (!) 186/87 (Site: Left Upper Arm, Position: Sitting, Cuff Size: Large Adult)   Pulse 97   Temp 98.1 °F (36.7 °C) (Temporal)   Resp 18   Ht 5' 7\" (1.702 m)   Wt 270 lb (122.5 kg)   SpO2 98%   BMI 42.29 kg/m²     Physical Exam:  CONSTITUTIONAL: awake, no apparent distress, alert, cooperative,    EYES: pupils equal, round and reactive to light, sclera clear and conjunctiva normal  ENT: Normocephalic, atraumatic, without obvious abnormality,   NECK: supple, symmetrical, no jugular venous distension and no carotid bruits   HEMATOLOGIC/LYMPHATIC: no cervical, supraclavicular or axillary lymphadenopathy   LUNGS: VBS, no wheezes, clear to auscultation, no rhonchi, no increased work of breathing, no crackles,   CARDIOVASCULAR: regular rate and rhythm, normal S1 and S2, no murmur noted  ABDOMEN: normal bowel sounds x 4, soft, non-distended, non-tender, no masses palpated, no hepatosplenomegaly   MUSCULOSKELETAL: full range of motion noted, tone is normal  NEUROLOGIC: awake, alert, oriented to name, place and time. Motor skills grossly intact. SKIN: Normal skin color, texture, turgor and no jaundice.  appears intact   EXTREMITIES: no cyanosis, no clubbing, no LE edema, no leg swelling, RUE swelling noted,   BREASTS: post op changes noted in her right breast, no palpable suspicious mass in her right breast, no palpable mass in left breast and bilateral axilla     Labs:  Hematology:  Lab Results   Component Value Date    WBC 4.6 10/27/2022    RBC 4.64 10/27/2022    HGB 12.8 10/27/2022    HCT 40.2 10/27/2022    MCV 86.6 10/27/2022    MCH 27.6 10/27/2022    MCHC 31.8 (L) 10/27/2022    RDW 14.6 10/27/2022     10/27/2022    MPV 9.9 10/27/2022    BANDSPCT 8 06/17/2018    SEGSPCT 65.8 10/27/2022    EOSRELPCT 1.8 10/27/2022    BASOPCT 0.7 10/27/2022    LYMPHOPCT 24.5 10/27/2022    MONOPCT 7.2 (H) 10/27/2022    BANDABS 0.66 06/17/2018    SEGSABS 3.0 10/27/2022    EOSABS 0.1 10/27/2022    BASOSABS 0.0 10/27/2022    LYMPHSABS 1.1 10/27/2022    MONOSABS 0.3 10/27/2022    DIFFTYPE AUTOMATED DIFFERENTIAL 10/27/2022    POLYCHROM 1+ 06/17/2018    PLTM SEVERAL LARGE PLATELETS 43/97/3368     No results found for: ESR  Chemistry:  Lab Results   Component Value Date     02/26/2022    K 3.7 02/26/2022     02/26/2022    CO2 26 02/26/2022    BUN 7 02/26/2022    CREATININE 0.7 02/26/2022    GLUCOSE 162 (H) 02/26/2022    CALCIUM 8.6 02/26/2022    PROT 7.0 10/10/2021    LABALBU 4.2 10/10/2021    BILITOT 0.3 10/10/2021    ALKPHOS 89 10/10/2021    AST 18 10/10/2021    ALT 20 10/10/2021    LABGLOM >60 02/26/2022    GFRAA >60 02/26/2022    PHOS 2.4 (L) 10/21/2020    MG 1.9 10/21/2020    POCGLU 160 (H) 08/05/2021     No results found for: MMA, LDH, HOMOCYSTEINE  No components found for: LD  Lab Results   Component Value Date    TSHHS 3.070 05/10/2019     Immunology:  Lab Results   Component Value Date    PROT 7.0 10/10/2021     No results found for: Lillette Keepers, KLFLCR  No results found for: B2M  Coagulation Panel:  Lab Results   Component Value Date    PROTIME 12.6 02/26/2022    INR 1.02 02/26/2022    APTT 28.8 02/26/2022    DDIMER 411 (H) 02/26/2022     Anemia Panel:  No results found for: Forestine Glory  Tumor Markers:  Lab Results   Component Value Date    LABCA2 13.6 04/16/2021     Observations:  No data recorded        Assessment:   Right breast infiltrating ductal carcinoma (estrogen receptor positive, progesterone receptor positive, and HER-2/jossy negative). Plan:  Ms. Deidra Baltazar has been followed for her right breast cancer. She required to have exploratory laparotomy, removal of previously placed prosthesis mesh, small bowel resection and placement of Vicryl mesh for closure of abdominal wall defects on 10/18/20, for her recurrent hernia with pneumatosis of the small bowel and free air. Mammogram done on January 11, 2021 showed probably benign findings in the bilateral breasts and radiologist recommend to have repeat mammogram in 6 months. Mammogram done on 07/29/2021 showed stable, probably benign changes of the right lumpectomy bed. Recommend continued clinical follow-up of this area. Also recommend short-term follow-up in 6 months with a right breast diagnostic tomosynthesis. Mammogram done on 07/29/2021 showed stable, probably benign changes of the right lumpectomy bed. Recommend continued clinical follow-up of this area. Also recommend short-term follow-up in 6 months with a right breast diagnostic tomosynthesis. Mammogram done on 3/17/2022 showed stable probably benign changes of the right lumpectomy bed. Radiologist recommend right breast diagnostic exam in 1 year to complete evaluation for 2 years of stability. Stable probably benign left breast calcifications. DEXA scan done on 7/21/22 showed normal bone mineral density by WHO criteria. On October 27, 2022, she presented to me for follow up. I have been following Ms. Paz for right breast infiltrating ductal carcinoma and she is status post neoadjuvant chemotherapy followed by right breast lumpectomy and sentinel lymph node biopsy.  She is also status post adjuvant radiation therapy and she is currently on adjuvant endocrine therapy with letrozole. She is tolerating letrozole very well and she does not encounter any major side effects from it. She did have hot flashes due to letrozole. However it is getting better lately and that she does not get hot flashes that often. She is not on any medication for hot flashes at this moment. I recommend her to continue with letrozole on a regular basis. I will continue to follow her closely during adjuvant endocrine therapy with letrozole. She does not have any signs or symptoms suggestive of recurrent or metastatic breast cancer at today's visit. I believe she is in complete clinical remission. She is more than 5 years on letrozole and we will continue with that for now. Since she has stage IIB disease, we decided longer duration of letrozole. Right upper extremity increasing lymph edema with pain - to continue with management as recommended by lymphedema therapist.     Repeat mammogram on 3/17/2022 showed probably benign findings and radiologist recommend her to have repeat mammogram in 12 months. Health maintenance - I commend her to have age-appropriate cancer screening, exercise, low-fat and low-sodium diet. I answered all her questions and concerns for today. Recent imaging and labs were reviewed and discussed with the patient.

## 2022-10-27 NOTE — PROGRESS NOTES
MA Rooming Questions  Patient: Brittni Khanna  MRN: [id number]    Date: 10/27/2022        1. Do you have any new issues?   no         2. Do you need any refills on medications?    no    3. Have you had any imaging done since your last visit?   no    4. Have you been hospitalized or seen in the emergency room since your last visit here?   no    5. Did the patient have a depression screening completed today?  No    No data recorded     PHQ-9 Given to (if applicable):               PHQ-9 Score (if applicable):                     [] Positive     []  Negative              Does question #9 need addressed (if applicable)                     [] Yes    []  No               Anurag Wisdom CMA

## 2022-10-29 LAB — CA 27.29: 16.2 U/ML

## 2022-11-14 RX ORDER — LETROZOLE 2.5 MG/1
TABLET, FILM COATED ORAL
Qty: 30 TABLET | Refills: 1 | Status: SHIPPED | OUTPATIENT
Start: 2022-11-14

## 2022-12-14 RX ORDER — LETROZOLE 2.5 MG/1
TABLET, FILM COATED ORAL
Qty: 30 TABLET | Refills: 1 | Status: SHIPPED | OUTPATIENT
Start: 2022-12-14

## 2022-12-14 RX ORDER — MONTELUKAST SODIUM 10 MG/1
10 TABLET ORAL DAILY
Qty: 30 TABLET | Refills: 5 | OUTPATIENT
Start: 2022-12-14

## 2023-01-12 RX ORDER — MONTELUKAST SODIUM 10 MG/1
10 TABLET ORAL DAILY
Qty: 30 TABLET | Refills: 6 | OUTPATIENT
Start: 2023-01-12

## 2023-02-06 RX ORDER — LETROZOLE 2.5 MG/1
TABLET, FILM COATED ORAL
Qty: 30 TABLET | Refills: 5 | Status: SHIPPED | OUTPATIENT
Start: 2023-02-06

## 2023-02-06 NOTE — TELEPHONE ENCOUNTER
Per Dr. Khai Story - refills of patient's Letrozole 2.5 mg one tab daily #30 with 5 refills e-scripted to [facility] in [place]. Patient has an office visit with Dr. Khai Story in April 2023.

## 2023-02-15 ENCOUNTER — HOSPITAL ENCOUNTER (OUTPATIENT)
Dept: GENERAL RADIOLOGY | Age: 58
Discharge: HOME | End: 2023-02-15
Payer: COMMERCIAL

## 2023-02-15 ENCOUNTER — HOSPITAL ENCOUNTER (OUTPATIENT)
Age: 58
Discharge: HOME | End: 2023-02-15
Payer: COMMERCIAL

## 2023-02-15 DIAGNOSIS — M79.672 LEFT FOOT PAIN: ICD-10-CM

## 2023-02-15 DIAGNOSIS — M25.572 PAIN IN JOINT, ANKLE AND FOOT, LEFT: ICD-10-CM

## 2023-02-15 PROCEDURE — 73630 X-RAY EXAM OF FOOT: CPT

## 2023-03-02 DIAGNOSIS — C50.411 MALIGNANT NEOPLASM OF UPPER-OUTER QUADRANT OF RIGHT BREAST IN FEMALE, ESTROGEN RECEPTOR POSITIVE (HCC): Primary | ICD-10-CM

## 2023-03-02 DIAGNOSIS — Z17.0 MALIGNANT NEOPLASM OF UPPER-OUTER QUADRANT OF RIGHT BREAST IN FEMALE, ESTROGEN RECEPTOR POSITIVE (HCC): Primary | ICD-10-CM

## 2023-03-02 NOTE — PROGRESS NOTES
Patient scheduled for a bilateral dx mammogram/ultrasound at [facility] on 3/20/23 - per Dr. Jose Alfredo Coleman, order placed and faxed to [facility] at [phone].

## 2023-04-06 ENCOUNTER — HOSPITAL ENCOUNTER (EMERGENCY)
Age: 58
Discharge: HOME | End: 2023-04-06
Attending: EMERGENCY MEDICINE
Payer: COMMERCIAL

## 2023-04-06 ENCOUNTER — APPOINTMENT (OUTPATIENT)
Dept: GENERAL RADIOLOGY | Age: 58
End: 2023-04-06
Payer: COMMERCIAL

## 2023-04-06 ENCOUNTER — APPOINTMENT (OUTPATIENT)
Dept: CT IMAGING | Age: 58
End: 2023-04-06
Payer: COMMERCIAL

## 2023-04-06 VITALS
HEART RATE: 94 BPM | RESPIRATION RATE: 22 BRPM | DIASTOLIC BLOOD PRESSURE: 89 MMHG | HEIGHT: 67 IN | BODY MASS INDEX: 43.47 KG/M2 | WEIGHT: 277 LBS | SYSTOLIC BLOOD PRESSURE: 163 MMHG | OXYGEN SATURATION: 100 %

## 2023-04-06 DIAGNOSIS — I10 HYPERTENSION, UNSPECIFIED TYPE: Primary | ICD-10-CM

## 2023-04-06 LAB
ALBUMIN SERPL-MCNC: 3.9 GM/DL (ref 3.4–5)
ALP BLD-CCNC: 85 IU/L (ref 40–129)
ALT SERPL-CCNC: 22 U/L (ref 10–40)
ANION GAP SERPL CALCULATED.3IONS-SCNC: 12 MMOL/L (ref 4–16)
AST SERPL-CCNC: 19 IU/L (ref 15–37)
BASOPHILS ABSOLUTE: 0 K/CU MM
BASOPHILS RELATIVE PERCENT: 0.4 % (ref 0–1)
BILIRUB SERPL-MCNC: 0.4 MG/DL (ref 0–1)
BUN SERPL-MCNC: 6 MG/DL (ref 6–23)
CALCIUM SERPL-MCNC: 9.3 MG/DL (ref 8.3–10.6)
CHLORIDE BLD-SCNC: 103 MMOL/L (ref 99–110)
CO2: 28 MMOL/L (ref 21–32)
CREAT SERPL-MCNC: 0.5 MG/DL (ref 0.6–1.1)
DIFFERENTIAL TYPE: ABNORMAL
EKG ATRIAL RATE: 76 BPM
EKG DIAGNOSIS: NORMAL
EKG P AXIS: 17 DEGREES
EKG P-R INTERVAL: 170 MS
EKG Q-T INTERVAL: 424 MS
EKG QRS DURATION: 98 MS
EKG QTC CALCULATION (BAZETT): 477 MS
EKG R AXIS: 13 DEGREES
EKG T AXIS: -1 DEGREES
EKG VENTRICULAR RATE: 76 BPM
EOSINOPHILS ABSOLUTE: 0 K/CU MM
EOSINOPHILS RELATIVE PERCENT: 0.6 % (ref 0–3)
GFR SERPL CREATININE-BSD FRML MDRD: >60 ML/MIN/1.73M2
GLUCOSE BLD-MCNC: 128 MG/DL (ref 70–99)
GLUCOSE SERPL-MCNC: 131 MG/DL (ref 70–99)
HCT VFR BLD CALC: 39.4 % (ref 37–47)
HEMOGLOBIN: 12.5 GM/DL (ref 12.5–16)
IMMATURE NEUTROPHIL %: 0.2 % (ref 0–0.43)
LYMPHOCYTES ABSOLUTE: 1.1 K/CU MM
LYMPHOCYTES RELATIVE PERCENT: 21.8 % (ref 24–44)
MCH RBC QN AUTO: 26.9 PG (ref 27–31)
MCHC RBC AUTO-ENTMCNC: 31.7 % (ref 32–36)
MCV RBC AUTO: 84.9 FL (ref 78–100)
MONOCYTES ABSOLUTE: 0.5 K/CU MM
MONOCYTES RELATIVE PERCENT: 9.6 % (ref 0–4)
PDW BLD-RTO: 13.9 % (ref 11.7–14.9)
PLATELET # BLD: 252 K/CU MM (ref 140–440)
PMV BLD AUTO: 10.1 FL (ref 7.5–11.1)
POTASSIUM SERPL-SCNC: 4 MMOL/L (ref 3.5–5.1)
PRO-BNP: 122.6 PG/ML
RBC # BLD: 4.64 M/CU MM (ref 4.2–5.4)
SEGMENTED NEUTROPHILS ABSOLUTE COUNT: 3.2 K/CU MM
SEGMENTED NEUTROPHILS RELATIVE PERCENT: 67.4 % (ref 36–66)
SODIUM BLD-SCNC: 143 MMOL/L (ref 135–145)
TOTAL IMMATURE NEUTOROPHIL: 0.01 K/CU MM
TOTAL PROTEIN: 7.2 GM/DL (ref 6.4–8.2)
TROPONIN T: <0.01 NG/ML
WBC # BLD: 4.8 K/CU MM (ref 4–10.5)

## 2023-04-06 PROCEDURE — 6360000004 HC RX CONTRAST MEDICATION: Performed by: EMERGENCY MEDICINE

## 2023-04-06 PROCEDURE — 99285 EMERGENCY DEPT VISIT HI MDM: CPT

## 2023-04-06 PROCEDURE — 84484 ASSAY OF TROPONIN QUANT: CPT

## 2023-04-06 PROCEDURE — 96374 THER/PROPH/DIAG INJ IV PUSH: CPT

## 2023-04-06 PROCEDURE — 71045 X-RAY EXAM CHEST 1 VIEW: CPT

## 2023-04-06 PROCEDURE — 93005 ELECTROCARDIOGRAM TRACING: CPT | Performed by: EMERGENCY MEDICINE

## 2023-04-06 PROCEDURE — 6370000000 HC RX 637 (ALT 250 FOR IP): Performed by: EMERGENCY MEDICINE

## 2023-04-06 PROCEDURE — 93010 ELECTROCARDIOGRAM REPORT: CPT | Performed by: INTERNAL MEDICINE

## 2023-04-06 PROCEDURE — 85025 COMPLETE CBC W/AUTO DIFF WBC: CPT

## 2023-04-06 PROCEDURE — 6360000002 HC RX W HCPCS: Performed by: EMERGENCY MEDICINE

## 2023-04-06 PROCEDURE — 80053 COMPREHEN METABOLIC PANEL: CPT

## 2023-04-06 PROCEDURE — 83880 ASSAY OF NATRIURETIC PEPTIDE: CPT

## 2023-04-06 PROCEDURE — 82962 GLUCOSE BLOOD TEST: CPT

## 2023-04-06 PROCEDURE — 71275 CT ANGIOGRAPHY CHEST: CPT

## 2023-04-06 RX ORDER — CLONIDINE HYDROCHLORIDE 0.1 MG/1
0.1 TABLET ORAL 2 TIMES DAILY PRN
Qty: 60 TABLET | Refills: 0 | Status: SHIPPED | OUTPATIENT
Start: 2023-04-06

## 2023-04-06 RX ORDER — HYDRALAZINE HYDROCHLORIDE 20 MG/ML
10 INJECTION INTRAMUSCULAR; INTRAVENOUS ONCE
Status: COMPLETED | OUTPATIENT
Start: 2023-04-06 | End: 2023-04-06

## 2023-04-06 RX ORDER — CLONIDINE HYDROCHLORIDE 0.1 MG/1
0.1 TABLET ORAL ONCE
Status: COMPLETED | OUTPATIENT
Start: 2023-04-06 | End: 2023-04-06

## 2023-04-06 RX ADMIN — IOPAMIDOL 75 ML: 755 INJECTION, SOLUTION INTRAVENOUS at 15:34

## 2023-04-06 RX ADMIN — CLONIDINE HYDROCHLORIDE 0.1 MG: 0.1 TABLET ORAL at 17:47

## 2023-04-06 RX ADMIN — HYDRALAZINE HYDROCHLORIDE 10 MG: 20 INJECTION INTRAMUSCULAR; INTRAVENOUS at 17:17

## 2023-04-06 NOTE — ED PROVIDER NOTES
extremity numbness, no extremity tingling, no extremity weakness  Psychiatric:  No anxiety  Genitourinary:  No dysuria, no hematuria  Endocrine:  No unexpected weight gain, no unexpected weight loss  Extremities:  no edema, no pain    Past Medical History:   Diagnosis Date    Abnormal ECG 01/29/2019    Acid reflux     Anxiety     Arthritis     \"Back, Legs, Knees And Feet\"    Back problem     \"Three Discs Smashed Together Lower Back\"    Bipolar disorder ([facility] [address]     Chronic back pain     Chronic cough 01/31/2020    Class 3 severe obesity with body mass index (BMI) of 40.0 to 44.9 in adult [facility])     COPD, moderate ([facility] [address] 01/31/2020    Curvature of spine     \"Wore A Brace For Three Years\"    Depression     Diabetes mellitus ([facility] [address] Dx 2010    Diverticulitis     Esophageal dilatation 04/12/2017    Family history of sudden death 01/29/2019    Mother at age 58    Full dentures     H/O 24 hour EKG monitoring 01/29/2019    Conclusion: Normal study suggesting normal sinus rhythm with no clinically significant arrhythmias. H/O cardiac catheterization 08/05/2021    normal coronaries, normal LVEDP    H/O cardiovascular stress test 02/12/2019    EF65% Normal    H/O echocardiogram 06/02/2021    Normal left ventricle structure and systolic function with an ejection 55-60% No significant valvular disease noted. Headache(784.0)     \"Occ\"    Heart murmur     No Cardiologist At This Time    History of nuclear stress test 02/12/2019    EF 65%. Normal study.     Ak [place] (hard of hearing)     Bilateral Hearing Aides    Hydronephrosis     dx with hydronephrosis with admission 6/2018- right ureteral stone/\"had kidney stone stone about 2-3 yrs ago and passed it\"    Hyperlipidemia     Hypertension     Macular degeneration     Right Eye    Mild persistent asthma without complication 28/89/6285    MRSA (methicillin resistant Staphylococcus aureus)     After PRICILLA In 2006 (Abdomen)    Near syncope 01/29/2019 1/28/19 in ED [facility]    Panic attacks

## 2023-04-06 NOTE — DISCHARGE INSTRUCTIONS
Return to the emergency department for any problems or concerns. See your primary care provider tomorrow as scheduled.

## 2023-04-27 ENCOUNTER — HOSPITAL ENCOUNTER (OUTPATIENT)
Dept: INFUSION THERAPY | Age: 58
Discharge: HOME | End: 2023-04-27
Payer: COMMERCIAL

## 2023-04-27 ENCOUNTER — OFFICE VISIT (OUTPATIENT)
Dept: ONCOLOGY | Age: 58
End: 2023-04-27
Payer: COMMERCIAL

## 2023-04-27 VITALS
TEMPERATURE: 97.8 F | WEIGHT: 271 LBS | SYSTOLIC BLOOD PRESSURE: 141 MMHG | OXYGEN SATURATION: 98 % | HEIGHT: 67 IN | HEART RATE: 83 BPM | DIASTOLIC BLOOD PRESSURE: 67 MMHG | BODY MASS INDEX: 42.53 KG/M2

## 2023-04-27 DIAGNOSIS — Z17.0 MALIGNANT NEOPLASM OF UPPER-OUTER QUADRANT OF RIGHT BREAST IN FEMALE, ESTROGEN RECEPTOR POSITIVE (HCC): Primary | ICD-10-CM

## 2023-04-27 DIAGNOSIS — C50.411 MALIGNANT NEOPLASM OF UPPER-OUTER QUADRANT OF RIGHT BREAST IN FEMALE, ESTROGEN RECEPTOR POSITIVE (HCC): Primary | ICD-10-CM

## 2023-04-27 PROCEDURE — 99213 OFFICE O/P EST LOW 20 MIN: CPT | Performed by: INTERNAL MEDICINE

## 2023-04-27 PROCEDURE — 99211 OFF/OP EST MAY X REQ PHY/QHP: CPT

## 2023-04-27 PROCEDURE — G8417 CALC BMI ABV UP PARAM F/U: HCPCS | Performed by: INTERNAL MEDICINE

## 2023-04-27 PROCEDURE — G8427 DOCREV CUR MEDS BY ELIG CLIN: HCPCS | Performed by: INTERNAL MEDICINE

## 2023-04-27 PROCEDURE — 3017F COLORECTAL CA SCREEN DOC REV: CPT | Performed by: INTERNAL MEDICINE

## 2023-04-27 PROCEDURE — 1036F TOBACCO NON-USER: CPT | Performed by: INTERNAL MEDICINE

## 2023-04-27 RX ORDER — FLUTICASONE PROPIONATE 50 MCG
SPRAY, SUSPENSION (ML) NASAL
COMMUNITY
Start: 2023-04-25

## 2023-04-27 RX ORDER — DULAGLUTIDE 3 MG/.5ML
INJECTION, SOLUTION SUBCUTANEOUS
COMMUNITY
Start: 2023-04-21

## 2023-04-27 RX ORDER — LOSARTAN POTASSIUM 25 MG/1
TABLET ORAL
COMMUNITY
Start: 2023-04-01

## 2023-04-27 RX ORDER — QUETIAPINE FUMARATE 100 MG/1
TABLET, FILM COATED ORAL
COMMUNITY
Start: 2023-04-01

## 2023-04-27 RX ORDER — AMLODIPINE BESYLATE 5 MG/1
TABLET ORAL
COMMUNITY
Start: 2023-04-17

## 2023-04-27 RX ORDER — LOSARTAN POTASSIUM 50 MG/1
TABLET ORAL
COMMUNITY
Start: 2023-04-01

## 2023-04-27 RX ORDER — LORATADINE 10 MG/1
TABLET ORAL
COMMUNITY
Start: 2023-04-01

## 2023-04-27 RX ORDER — HYDROXYZINE PAMOATE 50 MG/1
CAPSULE ORAL
COMMUNITY
Start: 2023-04-01

## 2023-04-27 ASSESSMENT — PATIENT HEALTH QUESTIONNAIRE - PHQ9
SUM OF ALL RESPONSES TO PHQ QUESTIONS 1-9: 0
1. LITTLE INTEREST OR PLEASURE IN DOING THINGS: 0
SUM OF ALL RESPONSES TO PHQ QUESTIONS 1-9: 0
2. FEELING DOWN, DEPRESSED OR HOPELESS: 0
SUM OF ALL RESPONSES TO PHQ QUESTIONS 1-9: 0
SUM OF ALL RESPONSES TO PHQ QUESTIONS 1-9: 0
SUM OF ALL RESPONSES TO PHQ9 QUESTIONS 1 & 2: 0

## 2023-04-27 NOTE — PROGRESS NOTES
MA Rooming Questions  Patient: Laureano Gonzalez  MRN: [id number]    Date: 4/27/2023        1. Do you have any new issues?   no         2. Do you need any refills on medications?    no    3. Have you had any imaging done since your last visit? yes - cta and chest xray 4/16    4. Have you been hospitalized or seen in the emergency room since your last visit here?   yes - 4/6    5. Did the patient have a depression screening completed today?  Yes    PHQ-9 Total Score: 0 (4/27/2023  9:35 AM)       PHQ-9 Given to (if applicable):               PHQ-9 Score (if applicable):                     [] Positive     []  Negative              Does question #9 need addressed (if applicable)                     [] Yes    []  No               Kaleb Baker CMA

## 2023-07-20 ENCOUNTER — HOSPITAL ENCOUNTER (OUTPATIENT)
Dept: DIABETES SERVICES | Age: 58
Setting detail: THERAPIES SERIES
Discharge: HOME | End: 2023-06-20

## 2023-07-26 RX ORDER — LETROZOLE 2.5 MG/1
TABLET, FILM COATED ORAL
Qty: 30 TABLET | Refills: 5 | Status: SHIPPED | OUTPATIENT
Start: 2023-07-26

## 2023-10-22 NOTE — PROGRESS NOTES
Patient Name: Ginger Napoles  Patient : 1965  Patient MRN: [id number]     Primary Oncologist: Nicolas De Leon MD  Referring Provider: REGINALD Bang     Date of Service: 10/26/2023      Chief Complaint:   Chief Complaint   Patient presents with    Follow-up     Patient Active Problem List:     Malignant neoplasm of upper-outer quadrant of right female breast       HPI:   Ms. Isaac Larson is a very pleasant 55-year-old patient with medical history significant for hypertension, diabetes mellitus, initially referred to me on 2015, for evaluation of right breast infiltrating ductal carcinoma. She stated that she felt a knot in her right breast and she went to the primary care physician for that. She had mammogram followed by sonogram on 2015, and it showed spiculated mass in the anterior 12 o'clock position in the right breast, most likely representing malignancy. She underwent sonoguided biopsy of the lesion on 2015, and the final pathology was consistent with infiltrating ductal carcinoma. Estrogen receptors positive (95 percent strong), progesterone receptor positive (75 percent), and HER-2/jossy by immunohistochemical stain is equivocal and HER-2/jossy by FISH study was not amplified. She was evaluated by Dr. Vita Cabrales and he requested core biopsy of the right breast axillary lymph node and it was done on 2015. Final pathology showed metastatic carcinoma consistent with breast primary involving all cores (10 mm in the largest dimension). Since she wanted to preserve her breast if that is possible, Dr. Vita Cabrales discussed her case in our Tumor Board. Consensus was to give neoadjuvant chemotherapy followed by surgery. Neoadjuvant chemotherapy with doxorubicin, cyclophosphamide followed by Paclitaxel was started on 2015 and she has completed it on 2015. Ms. Paz underwent right breast lumpectomy on 2015, and the final

## 2023-10-26 ENCOUNTER — OFFICE VISIT (OUTPATIENT)
Dept: ONCOLOGY | Age: 58
End: 2023-10-26
Payer: COMMERCIAL

## 2023-10-26 ENCOUNTER — HOSPITAL ENCOUNTER (OUTPATIENT)
Dept: INFUSION THERAPY | Age: 58
Discharge: HOME | End: 2023-10-26
Payer: COMMERCIAL

## 2023-10-26 VITALS
WEIGHT: 277.2 LBS | TEMPERATURE: 98.1 F | DIASTOLIC BLOOD PRESSURE: 82 MMHG | BODY MASS INDEX: 43.51 KG/M2 | OXYGEN SATURATION: 98 % | SYSTOLIC BLOOD PRESSURE: 171 MMHG | RESPIRATION RATE: 18 BRPM | HEART RATE: 91 BPM | HEIGHT: 67 IN

## 2023-10-26 DIAGNOSIS — Z17.0 MALIGNANT NEOPLASM OF UPPER-OUTER QUADRANT OF RIGHT BREAST IN FEMALE, ESTROGEN RECEPTOR POSITIVE (HCC): Primary | ICD-10-CM

## 2023-10-26 DIAGNOSIS — C50.411 MALIGNANT NEOPLASM OF UPPER-OUTER QUADRANT OF RIGHT BREAST IN FEMALE, ESTROGEN RECEPTOR POSITIVE (HCC): Primary | ICD-10-CM

## 2023-10-26 PROCEDURE — G8427 DOCREV CUR MEDS BY ELIG CLIN: HCPCS | Performed by: INTERNAL MEDICINE

## 2023-10-26 PROCEDURE — G8484 FLU IMMUNIZE NO ADMIN: HCPCS | Performed by: INTERNAL MEDICINE

## 2023-10-26 PROCEDURE — 3017F COLORECTAL CA SCREEN DOC REV: CPT | Performed by: INTERNAL MEDICINE

## 2023-10-26 PROCEDURE — 99213 OFFICE O/P EST LOW 20 MIN: CPT | Performed by: INTERNAL MEDICINE

## 2023-10-26 PROCEDURE — 1036F TOBACCO NON-USER: CPT | Performed by: INTERNAL MEDICINE

## 2023-10-26 PROCEDURE — 99211 OFF/OP EST MAY X REQ PHY/QHP: CPT

## 2023-10-26 PROCEDURE — G8417 CALC BMI ABV UP PARAM F/U: HCPCS | Performed by: INTERNAL MEDICINE

## 2023-10-26 ASSESSMENT — PATIENT HEALTH QUESTIONNAIRE - PHQ9
2. FEELING DOWN, DEPRESSED OR HOPELESS: 0
SUM OF ALL RESPONSES TO PHQ9 QUESTIONS 1 & 2: 1
SUM OF ALL RESPONSES TO PHQ QUESTIONS 1-9: 1
1. LITTLE INTEREST OR PLEASURE IN DOING THINGS: 1

## 2023-10-26 NOTE — PROGRESS NOTES
MA Rooming Questions  Patient: Edmundo Fitzpatrick  MRN: [id number]    Date: 10/26/2023        1. Do you have any new issues?   no         2. Do you need any refills on medications?    no    3. Have you had any imaging done since your last visit?   no    4. Have you been hospitalized or seen in the emergency room since your last visit here?   no    5. Did the patient have a depression screening completed today?  Yes    No data recorded     PHQ-9 Given to (if applicable):               PHQ-9 Score (if applicable):                     [] Positive     []  Negative              Does question #9 need addressed (if applicable)                     [] Yes    []  No               Lottie Garcia CMA

## 2023-12-26 RX ORDER — LETROZOLE 2.5 MG/1
TABLET, FILM COATED ORAL
Qty: 30 TABLET | Refills: 5 | Status: SHIPPED | OUTPATIENT
Start: 2023-12-26

## 2024-01-22 RX ORDER — LETROZOLE 2.5 MG/1
TABLET, FILM COATED ORAL
Qty: 30 TABLET | Refills: 6 | Status: SHIPPED | OUTPATIENT
Start: 2024-01-22

## 2024-03-27 ENCOUNTER — HOSPITAL ENCOUNTER (OUTPATIENT)
Dept: CARDIAC REHAB | Age: 59
Discharge: HOME | End: 2024-03-27
Payer: COMMERCIAL

## 2024-03-27 PROCEDURE — 94060 EVALUATION OF WHEEZING: CPT

## 2024-03-27 PROCEDURE — 94729 DIFFUSING CAPACITY: CPT

## 2024-04-11 ENCOUNTER — OFFICE VISIT (OUTPATIENT)
Dept: PULMONOLOGY | Age: 59
End: 2024-04-11
Payer: COMMERCIAL

## 2024-04-11 ENCOUNTER — TELEPHONE (OUTPATIENT)
Dept: PULMONOLOGY | Age: 59
End: 2024-04-11

## 2024-04-11 VITALS — BODY MASS INDEX: 43.16 KG/M2 | WEIGHT: 275 LBS | HEIGHT: 67 IN | OXYGEN SATURATION: 98 % | HEART RATE: 95 BPM

## 2024-04-11 DIAGNOSIS — R06.09 DYSPNEA ON EXERTION: Primary | ICD-10-CM

## 2024-04-11 DIAGNOSIS — J44.9 STAGE 3 SEVERE COPD BY GOLD CLASSIFICATION (HCC): ICD-10-CM

## 2024-04-11 DIAGNOSIS — J44.89 COPD WITH ASTHMA (HCC): ICD-10-CM

## 2024-04-11 PROCEDURE — 1036F TOBACCO NON-USER: CPT | Performed by: NURSE PRACTITIONER

## 2024-04-11 PROCEDURE — G8427 DOCREV CUR MEDS BY ELIG CLIN: HCPCS | Performed by: NURSE PRACTITIONER

## 2024-04-11 PROCEDURE — G8417 CALC BMI ABV UP PARAM F/U: HCPCS | Performed by: NURSE PRACTITIONER

## 2024-04-11 PROCEDURE — 99214 OFFICE O/P EST MOD 30 MIN: CPT | Performed by: NURSE PRACTITIONER

## 2024-04-11 PROCEDURE — 3017F COLORECTAL CA SCREEN DOC REV: CPT | Performed by: NURSE PRACTITIONER

## 2024-04-11 PROCEDURE — 3023F SPIROM DOC REV: CPT | Performed by: NURSE PRACTITIONER

## 2024-04-11 RX ORDER — TIRZEPATIDE 5 MG/.5ML
5 INJECTION, SOLUTION SUBCUTANEOUS WEEKLY
COMMUNITY
Start: 2024-03-26

## 2024-04-11 NOTE — TELEPHONE ENCOUNTER
Called patient to let her know that [facility] was able to be rescheduled for tomorrow, 4/12 at 2 pm. Gave instruction to arrive at 1:30 and use hospital main entrance, bring insurance and ID . Told patient to call back if any questions

## 2024-04-11 NOTE — PROGRESS NOTES
Leyla Paz (:  1965) is a 58 y.o. female,Established patient, here for evaluation of the following chief complaint(s):  Shortness of Breath    Subjective   SUBJECTIVE/OBJECTIVE:  Leyla 57 yo established patient has returned to clinic with a chief complaint of shortness of breath. She states that she has a cleaned out but could not be removed. She was told that she has a weak diaphragm because of the surgeries she has had. She has a PMH of Asthma with COPD and moderate obstructive sleep apnea. She is not currently using the device. She reports being a former social smoker. Oxygen saturation is 98% on room air.     She takes Dulera 50 mcg  and uses Albuterol as needed. No recent ED visits or hospital inpatient stays for SOB.     Review of Systems   Constitutional:  Positive for fatigue.   Respiratory:  Positive for shortness of breath and wheezing.    Psychiatric/Behavioral:  Positive for sleep disturbance.         Refuses to use CPAP           Objective   Physical Exam  Vitals and nursing note reviewed.   Constitutional:       General: She is awake.      Appearance: Normal appearance. She is well-developed and well-groomed. She is morbidly obese.   HENT:      Mouth/Throat:      Mouth: Mucous membranes are moist.      Pharynx: Oropharynx is clear.   Eyes:      Extraocular Movements: Extraocular movements intact.      Conjunctiva/sclera: Conjunctivae normal.      Pupils: Pupils are equal, round, and reactive to light.   Cardiovascular:      Rate and Rhythm: Normal rate and regular rhythm.      Pulses: Normal pulses.      Heart sounds: Normal heart sounds.   Pulmonary:      Breath sounds: Wheezing present.   Musculoskeletal:         General: Normal range of motion.      Cervical back: Normal range of motion and neck supple.   Skin:     General: Skin is warm and dry.      Capillary Refill: Capillary refill takes less than 2 seconds.   Neurological:      General: No focal deficit present.      Mental

## 2024-04-12 ENCOUNTER — HOSPITAL ENCOUNTER (OUTPATIENT)
Dept: PULMONOLOGY | Age: 59
Discharge: HOME | End: 2024-04-12
Payer: COMMERCIAL

## 2024-04-12 DIAGNOSIS — J44.89 COPD WITH ASTHMA (HCC): ICD-10-CM

## 2024-04-12 DIAGNOSIS — J44.9 STAGE 3 SEVERE COPD BY GOLD CLASSIFICATION (HCC): ICD-10-CM

## 2024-04-12 DIAGNOSIS — R06.09 DYSPNEA ON EXERTION: ICD-10-CM

## 2024-04-12 LAB
DISTANCE WALKED: 570 FT
SPO2: 97 %

## 2024-04-12 PROCEDURE — 94618 PULMONARY STRESS TESTING: CPT

## 2024-04-12 NOTE — PROGRESS NOTES
Pt's BP was 182/84 after 6 minute walk.  The pt sat for a while and her BP dropped to 145/79.  Pt felt better and this RT walked the pt out.

## 2024-04-12 NOTE — PROGRESS NOTES
[facility] Pulmonary Function Lab - Six Minute Walk  Test Performed on: Room Air__X___ Oxygen at ___ lpm via N/C  Assist Device Used During Test:    None__X__ Cane____ Walker___   Shortness of Breath - Merced's Scale  0 Nothing at all 5 Severe    0.5 Very very slight 6    1 Very slight 7 Very severe   2 Slight 8     3 Moderate 9 Very very severe   4 Somewhat severe 10 Maximal      Time SpO2 Heart Rate Respiratory Rate Modified Merced’s Scale Other      Baseline   98              %  88 18                1 minute               97              % 111   2           2 minutes         97              %  116  3           3 minutes         97               %  120     3           4 minutes       97               %  118      3 Brief pause          5 minutes   97               %  120      4           6 minutes      97               %  121     4        Recovery   x 1 Min             98             %  117 32             Recovery   x 2 Min              97             %  114                    Number of Laps_3_ X 170 feet _510_+ _60_ additional feet = Total _570_feet  Stopped or paused before 6 minutes? No____ Yes _X____   Pre Blood Pressure: __176 / _81__    Post Blood Pressure:_182 _/_84__  Interpretation:

## 2024-04-13 ENCOUNTER — HOSPITAL ENCOUNTER (EMERGENCY)
Age: 59
Discharge: HOME | End: 2024-04-13
Attending: EMERGENCY MEDICINE
Payer: COMMERCIAL

## 2024-04-13 ENCOUNTER — APPOINTMENT (OUTPATIENT)
Dept: GENERAL RADIOLOGY | Age: 59
End: 2024-04-13
Attending: EMERGENCY MEDICINE
Payer: COMMERCIAL

## 2024-04-13 VITALS
TEMPERATURE: 98.2 F | WEIGHT: 275 LBS | RESPIRATION RATE: 16 BRPM | HEART RATE: 80 BPM | OXYGEN SATURATION: 98 % | DIASTOLIC BLOOD PRESSURE: 107 MMHG | SYSTOLIC BLOOD PRESSURE: 132 MMHG | HEIGHT: 67 IN | BODY MASS INDEX: 43.16 KG/M2

## 2024-04-13 DIAGNOSIS — S83.91XA SPRAIN OF RIGHT KNEE, UNSPECIFIED LIGAMENT, INITIAL ENCOUNTER: Primary | ICD-10-CM

## 2024-04-13 DIAGNOSIS — M17.11 OSTEOARTHRITIS OF RIGHT KNEE, UNSPECIFIED OSTEOARTHRITIS TYPE: ICD-10-CM

## 2024-04-13 PROCEDURE — 73590 X-RAY EXAM OF LOWER LEG: CPT

## 2024-04-13 PROCEDURE — 73562 X-RAY EXAM OF KNEE 3: CPT

## 2024-04-13 PROCEDURE — 6370000000 HC RX 637 (ALT 250 FOR IP): Performed by: EMERGENCY MEDICINE

## 2024-04-13 PROCEDURE — 99283 EMERGENCY DEPT VISIT LOW MDM: CPT

## 2024-04-13 RX ORDER — IBUPROFEN 600 MG/1
600 TABLET ORAL EVERY 6 HOURS PRN
Qty: 20 TABLET | Refills: 0 | Status: SHIPPED | OUTPATIENT
Start: 2024-04-13 | End: 2024-05-13

## 2024-04-13 RX ORDER — IBUPROFEN 600 MG/1
600 TABLET ORAL
Status: COMPLETED | OUTPATIENT
Start: 2024-04-13 | End: 2024-04-13

## 2024-04-13 RX ORDER — HYDROCODONE BITARTRATE AND ACETAMINOPHEN 5; 325 MG/1; MG/1
1 TABLET ORAL
Status: COMPLETED | OUTPATIENT
Start: 2024-04-13 | End: 2024-04-13

## 2024-04-13 RX ADMIN — IBUPROFEN 600 MG: 600 TABLET, FILM COATED ORAL at 10:25

## 2024-04-13 RX ADMIN — HYDROCODONE BITARTRATE AND ACETAMINOPHEN 1 TABLET: 5; 325 TABLET ORAL at 10:25

## 2024-04-13 ASSESSMENT — PAIN SCALES - GENERAL: PAINLEVEL_OUTOF10: 5

## 2024-04-13 ASSESSMENT — PAIN DESCRIPTION - DESCRIPTORS: DESCRIPTORS: ACHING

## 2024-04-13 ASSESSMENT — PAIN DESCRIPTION - FREQUENCY: FREQUENCY: INTERMITTENT

## 2024-04-13 ASSESSMENT — PAIN DESCRIPTION - ONSET: ONSET: ON-GOING

## 2024-04-13 ASSESSMENT — PAIN - FUNCTIONAL ASSESSMENT
PAIN_FUNCTIONAL_ASSESSMENT: 0-10
PAIN_FUNCTIONAL_ASSESSMENT: PREVENTS OR INTERFERES SOME ACTIVE ACTIVITIES AND ADLS

## 2024-04-13 ASSESSMENT — PAIN DESCRIPTION - ORIENTATION: ORIENTATION: RIGHT

## 2024-04-13 ASSESSMENT — PAIN DESCRIPTION - PAIN TYPE: TYPE: ACUTE PAIN

## 2024-04-13 ASSESSMENT — PAIN DESCRIPTION - LOCATION: LOCATION: KNEE

## 2024-04-13 NOTE — ED NOTES
The client is delivered to [facility] ED, room 7-2, by EMS for the complaint of right knee pain from twisting motion from the previous day.  She has No IV access that was initiated by the EMS personnel.  Vital Signs & Transportation interventions have been reported as being performed by the EMS personnel.  Upon arrival the client is placed onto an ED bed. She is alert, her breathing is unlabored, chest expansion symmetrical, skin is warm and dry to touch. Her extremities are relaxed and her facial expressions exhibit no facial grimacing. She has no redness, discoloration, swelling, deformity, or open wounds at the site of complaint. She does verbalize appropriate to questions or comments, is able to maintain eye contact, and follows commands. Vital signs are obtained and the client is updated on the plan of care.

## 2024-04-13 NOTE — ED PROVIDER NOTES
Triage Chief Complaint:    Knee Pain (Right, twisted knee yesterday, no known fall per patient)    HPI   Leyla Paz is a 58 y.o. female that presents for evaluation of right knee pain.  Patient twisted her knee when she was getting out of her truck.  She has pain when she puts pressure on it but still able to put pressure on it.  She has not noticed any other areas of discomfort.  No numbness or weakness.  Does feel as though the pain radiates down her shin.  She has not noticed any ankle or hip pain.  She has had an arthroscopy in that knee with that was years ago and nothing acute was done for it.  She has not tried thing it for symptom management.    History from : Patient    Limitations to history : None    ROS:  10 systems reviewed and negative except as above.     Past Medical History:   Diagnosis Date    Abnormal ECG 01/29/2019    Acid reflux     Anxiety     Arthritis     \"Back, Legs, Knees And Feet\"    Back problem     \"Three Discs Smashed Together Lower Back\"    Bipolar disorder ([facility])     Chronic back pain     Chronic cough 01/31/2020    Class 3 severe obesity with body mass index (BMI) of 40.0 to 44.9 in adult ([facility])     COPD, moderate ([facility]) 01/31/2020    Curvature of spine     \"Wore A Brace For Three Years\"    Depression     Diabetes mellitus ([facility]) Dx 2010    Diverticulitis     Esophageal dilatation 04/12/2017    Family history of sudden death 01/29/2019    Mother at age 62    Full dentures     H/O 24 hour EKG monitoring 01/29/2019    Conclusion: Normal study suggesting normal sinus rhythm with no clinically significant arrhythmias.    H/O cardiac catheterization 08/05/2021    normal coronaries, normal LVEDP    H/O cardiovascular stress test 02/12/2019    EF65% Normal    H/O echocardiogram 06/02/2021    Normal left ventricle structure and systolic function with an ejection 55-60% No significant valvular disease noted.    Headache(784.0)     \"Occ\"    Heart murmur     No Cardiologist At This Time       HYDROcodone-acetaminophen (NORCO) 5-325 MG per tablet 1 tablet (1 tablet Oral Given 4/13/24 1025)   ibuprofen (ADVIL;MOTRIN) tablet 600 mg (600 mg Oral Given 4/13/24 1025)       Disposition Discussion:  I suspect a sprain.  Patient also has significant arthritis in the knee.  Does not require any further emergent work-up at this point.  Plan is to discharge home with expectation to follow close with primary care doctor/orthpedist.  Recommend RICE therapy.  Also recommend supportive care measures at home.  Return precaution provided.  All questions and concerns answered at this time.    Is this patient to be included in the SEP-1 core measure due to severe sepsis or septic shock? No Exclusion criteria - the patient is NOT to be included for SEP-1 Core Measure due to: Infection is not suspected     Disposition: Discharged     I am the primary physician of record    Clinical Impression:  1. Sprain of right knee, unspecified ligament, initial encounter    2. Osteoarthritis of right knee, unspecified osteoarthritis type      Disposition referral (if applicable):  Shavon Tang PA  [address]  [facility] [address]  [facility] [address]  [phone]    Schedule an appointment as soon as possible for a visit in 1 week      [facility] Emergency Department  [address] [facility] [address]  [phone]    If symptoms worsen    Ganesh Thompson DO  [address]  [facility] [address] [phone]    Schedule an appointment as soon as possible for a visit       Zack Osborne MD  [address], [address]  [facility] [address] [phone]    Schedule an appointment as soon as possible for a visit       Disposition medications (if applicable):  Discharge Medication List as of 4/13/2024 11:04 AM        START taking these medications    Details   ibuprofen (IBU) 600 MG tablet Take 1 tablet by mouth every 6 hours as needed for Pain, Disp-20 tablet, R-0Normal             Comment: Please

## 2024-04-15 PROBLEM — J44.89 COPD WITH ASTHMA (HCC): Status: ACTIVE | Noted: 2024-04-15

## 2024-04-15 ASSESSMENT — ENCOUNTER SYMPTOMS
WHEEZING: 1
SHORTNESS OF BREATH: 1

## 2024-04-17 ENCOUNTER — TELEPHONE (OUTPATIENT)
Dept: PULMONOLOGY | Age: 59
End: 2024-04-17

## 2024-04-18 ENCOUNTER — SCHEDULED TELEPHONE ENCOUNTER (OUTPATIENT)
Dept: PULMONOLOGY | Age: 59
End: 2024-04-18
Payer: COMMERCIAL

## 2024-04-18 DIAGNOSIS — J44.9 STAGE 3 SEVERE COPD BY GOLD CLASSIFICATION (HCC): Primary | ICD-10-CM

## 2024-04-18 DIAGNOSIS — G47.33 OSA (OBSTRUCTIVE SLEEP APNEA): ICD-10-CM

## 2024-04-18 PROCEDURE — 99214 OFFICE O/P EST MOD 30 MIN: CPT | Performed by: NURSE PRACTITIONER

## 2024-04-18 ASSESSMENT — ENCOUNTER SYMPTOMS
WHEEZING: 1
SHORTNESS OF BREATH: 1

## 2024-04-18 NOTE — PROGRESS NOTES
Total Time: minutes: 21-30 minutes     Leyla Paz was evaluated through a synchronous (real-time) audio encounter. Patient identification was verified at the start of the visit. She (or guardian if applicable) is aware that this is a billable service, which includes applicable co-pays. This visit was conducted with the patient's (and/or legal guardian's) verbal consent. She has not had a related appointment within my department in the past 7 days or scheduled within the next 24 hours.   The patient was located at Home: [address] [place] [address] [address] [place] [id number].  The provider was located at Facility (Appt Dept): [address]  [place] [address].    Note: not billable if this call serves to triage the patient into an appointment for the relevant concern  Yes, I confirm.   Leyla Paz is a 58 y.o. female evaluated via telephone on 4/18/2024 for No chief complaint on file.  .      Assessment & Plan   1. Stage 3 severe COPD by GOLD classification (HCC)  2. DILLON (obstructive sleep apnea)    Advised performing breathing exercises with IS breathing.   No changes made to current bronchodilator therapy.   Advised to follow up with cardiology for enlarged heart.   Follow up in 6 months.     No follow-ups on file.      Subjective     I spoke to via telephone to discuss the results of PFT and 6 minute walk. Leyla 59 yo endorses having trouble breathing and having back pain. She was prescribed Dulera 100 mcg two puffs daily twice a day and Albuterol rescue inhaler as needed by her PCP. She takes Claritin 10 mg as needed. Flonase as needed. It has been helping her out. She quit smoking 20 years. It was social and not an everyday habit. She had no oxygen desaturation during the six minute walk test. HR increased and breathing became difficult and she had to take a brief pause during the test once or twice.      During the test her HR increased to 185 beats per minute and testing was stopped. She was

## 2024-04-21 PROBLEM — M79.601 PAIN AND SWELLING OF RIGHT UPPER EXTREMITY: Status: ACTIVE | Noted: 2024-04-21

## 2024-04-21 PROBLEM — M79.89 PAIN AND SWELLING OF RIGHT UPPER EXTREMITY: Status: ACTIVE | Noted: 2024-04-21

## 2024-04-21 NOTE — PROGRESS NOTES
Patient Name: Leyla Paz  Patient : 1965  Patient MRN: [id number]     Primary Oncologist: Shon Mauro MD  Referring Provider: Shavon Tang PA     Date of Service: 2024      Chief Complaint:   Chief Complaint   Patient presents with    Follow-up     Patient Active Problem List:     Malignant neoplasm of upper-outer quadrant of right female breast       HPI:   Ms. Paz is a very pleasant 58-year-old patient with medical history significant for hypertension, diabetes mellitus, initially referred to me on 2015, for evaluation of right breast infiltrating ductal carcinoma.     She stated that she felt a knot in her right breast and she went to the primary care physician for that.  She had mammogram followed by sonogram on 2015, and it showed spiculated mass in the anterior 12 o'clock position in the right breast, most likely representing malignancy. She underwent sonoguided biopsy of the lesion on 2015, and the final pathology was consistent with infiltrating ductal carcinoma.  Estrogen receptors positive (95 percent strong), progesterone receptor positive (75 percent), and HER-2/jossy by immunohistochemical stain is equivocal and HER-2/jossy by FISH study was not amplified.  She was evaluated by Dr. Barrientos and he requested core biopsy of the right breast axillary lymph node and it was done on 2015.  Final pathology showed metastatic carcinoma consistent with breast primary involving all cores (10 mm in the largest dimension).  Since she wanted to preserve her breast if that is possible, Dr. Barrientos discussed her case in our Tumor Board.  Consensus was to give neoadjuvant chemotherapy followed by surgery.       Neoadjuvant chemotherapy with doxorubicin, cyclophosphamide followed by Paclitaxel was started on 2015 and she has completed it on 2015.       Ms. Paz underwent right breast lumpectomy on 2015, and the final

## 2024-04-24 ENCOUNTER — HOSPITAL ENCOUNTER (OUTPATIENT)
Dept: INFUSION THERAPY | Age: 59
Discharge: HOME | End: 2024-04-24
Payer: COMMERCIAL

## 2024-04-24 ENCOUNTER — OFFICE VISIT (OUTPATIENT)
Dept: ONCOLOGY | Age: 59
End: 2024-04-24
Payer: COMMERCIAL

## 2024-04-24 VITALS
OXYGEN SATURATION: 96 % | TEMPERATURE: 98.6 F | WEIGHT: 275 LBS | HEIGHT: 67 IN | SYSTOLIC BLOOD PRESSURE: 176 MMHG | HEART RATE: 91 BPM | BODY MASS INDEX: 43.16 KG/M2 | RESPIRATION RATE: 18 BRPM | DIASTOLIC BLOOD PRESSURE: 86 MMHG

## 2024-04-24 DIAGNOSIS — M79.89 PAIN AND SWELLING OF RIGHT UPPER EXTREMITY: ICD-10-CM

## 2024-04-24 DIAGNOSIS — M79.601 PAIN AND SWELLING OF RIGHT UPPER EXTREMITY: ICD-10-CM

## 2024-04-24 DIAGNOSIS — C50.411 MALIGNANT NEOPLASM OF UPPER-OUTER QUADRANT OF RIGHT BREAST IN FEMALE, ESTROGEN RECEPTOR POSITIVE (HCC): Primary | ICD-10-CM

## 2024-04-24 DIAGNOSIS — Z17.0 MALIGNANT NEOPLASM OF UPPER-OUTER QUADRANT OF RIGHT BREAST IN FEMALE, ESTROGEN RECEPTOR POSITIVE (HCC): Primary | ICD-10-CM

## 2024-04-24 PROCEDURE — G8427 DOCREV CUR MEDS BY ELIG CLIN: HCPCS | Performed by: INTERNAL MEDICINE

## 2024-04-24 PROCEDURE — 1036F TOBACCO NON-USER: CPT | Performed by: INTERNAL MEDICINE

## 2024-04-24 PROCEDURE — 99213 OFFICE O/P EST LOW 20 MIN: CPT | Performed by: INTERNAL MEDICINE

## 2024-04-24 PROCEDURE — 99211 OFF/OP EST MAY X REQ PHY/QHP: CPT

## 2024-04-24 PROCEDURE — G8417 CALC BMI ABV UP PARAM F/U: HCPCS | Performed by: INTERNAL MEDICINE

## 2024-04-24 PROCEDURE — 3017F COLORECTAL CA SCREEN DOC REV: CPT | Performed by: INTERNAL MEDICINE

## 2024-04-24 RX ORDER — LOSARTAN POTASSIUM 100 MG/1
TABLET ORAL
COMMUNITY
Start: 2024-04-18

## 2024-04-24 RX ORDER — ALBUTEROL SULFATE 90 UG/1
AEROSOL, METERED RESPIRATORY (INHALATION)
COMMUNITY
Start: 2024-03-22

## 2024-04-24 RX ORDER — MOMETASONE FUROATE AND FORMOTEROL FUMARATE DIHYDRATE 100; 5 UG/1; UG/1
2 AEROSOL RESPIRATORY (INHALATION) EVERY 12 HOURS
COMMUNITY
Start: 2024-04-12

## 2024-04-24 ASSESSMENT — PATIENT HEALTH QUESTIONNAIRE - PHQ9
SUM OF ALL RESPONSES TO PHQ QUESTIONS 1-9: 0
SUM OF ALL RESPONSES TO PHQ9 QUESTIONS 1 & 2: 0
2. FEELING DOWN, DEPRESSED OR HOPELESS: NOT AT ALL
1. LITTLE INTEREST OR PLEASURE IN DOING THINGS: NOT AT ALL
SUM OF ALL RESPONSES TO PHQ QUESTIONS 1-9: 0

## 2024-04-24 NOTE — PROGRESS NOTES
MA Rooming Questions  Patient: Leyla Paz  MRN: [id number]    Date: 4/24/2024        1. Do you have any new issues?   Yes-states she has an enlarged heart, COPD has increased        2. Do you need any refills on medications?    no    3. Have you had any imaging done since your last visit?   yes - XR    4. Have you been hospitalized or seen in the emergency room since your last visit here?   yes -     5. Did the patient have a depression screening completed today? Yes    No data recorded     PHQ-9 Given to (if applicable):               PHQ-9 Score (if applicable):                     [] Positive     []  Negative              Does question #9 need addressed (if applicable)                     [] Yes    []  No               Mai Jurado CMA

## 2024-04-29 ENCOUNTER — OFFICE VISIT (OUTPATIENT)
Dept: CARDIOLOGY CLINIC | Age: 59
End: 2024-04-29
Payer: COMMERCIAL

## 2024-04-29 ENCOUNTER — TELEPHONE (OUTPATIENT)
Dept: CARDIOLOGY CLINIC | Age: 59
End: 2024-04-29

## 2024-04-29 VITALS
BODY MASS INDEX: 43.16 KG/M2 | HEIGHT: 67 IN | SYSTOLIC BLOOD PRESSURE: 134 MMHG | HEART RATE: 85 BPM | WEIGHT: 275 LBS | DIASTOLIC BLOOD PRESSURE: 80 MMHG

## 2024-04-29 DIAGNOSIS — R06.02 SOB (SHORTNESS OF BREATH): ICD-10-CM

## 2024-04-29 DIAGNOSIS — E11.9 TYPE 2 DIABETES MELLITUS WITHOUT COMPLICATION, WITHOUT LONG-TERM CURRENT USE OF INSULIN (HCC): ICD-10-CM

## 2024-04-29 DIAGNOSIS — I10 PRIMARY HYPERTENSION: ICD-10-CM

## 2024-04-29 DIAGNOSIS — G47.19 EXCESSIVE DAYTIME SLEEPINESS: ICD-10-CM

## 2024-04-29 DIAGNOSIS — E66.01 MORBID OBESITY WITH BMI OF 45.0-49.9, ADULT (HCC): ICD-10-CM

## 2024-04-29 DIAGNOSIS — R06.09 DYSPNEA ON EXERTION: Primary | ICD-10-CM

## 2024-04-29 PROCEDURE — 3075F SYST BP GE 130 - 139MM HG: CPT | Performed by: INTERNAL MEDICINE

## 2024-04-29 PROCEDURE — G8417 CALC BMI ABV UP PARAM F/U: HCPCS | Performed by: INTERNAL MEDICINE

## 2024-04-29 PROCEDURE — 93000 ELECTROCARDIOGRAM COMPLETE: CPT | Performed by: INTERNAL MEDICINE

## 2024-04-29 PROCEDURE — 1036F TOBACCO NON-USER: CPT | Performed by: INTERNAL MEDICINE

## 2024-04-29 PROCEDURE — 3046F HEMOGLOBIN A1C LEVEL >9.0%: CPT | Performed by: INTERNAL MEDICINE

## 2024-04-29 PROCEDURE — 3017F COLORECTAL CA SCREEN DOC REV: CPT | Performed by: INTERNAL MEDICINE

## 2024-04-29 PROCEDURE — G8427 DOCREV CUR MEDS BY ELIG CLIN: HCPCS | Performed by: INTERNAL MEDICINE

## 2024-04-29 PROCEDURE — 99214 OFFICE O/P EST MOD 30 MIN: CPT | Performed by: INTERNAL MEDICINE

## 2024-04-29 PROCEDURE — 2022F DILAT RTA XM EVC RTNOPTHY: CPT | Performed by: INTERNAL MEDICINE

## 2024-04-29 PROCEDURE — 3079F DIAST BP 80-89 MM HG: CPT | Performed by: INTERNAL MEDICINE

## 2024-04-29 NOTE — PATIENT INSTRUCTIONS
Continue current cardiovascular medications which have been reviewed and discussed individually with you.  Counseled for calorie counting, reduction in serving size and exercise and lifestyle modification for weight loss.  Echocardiogram to assess PALAFOX.  Appropriate prescriptions if needed on this visit are addressed. After visit summery is provided.   Questions answered and patient verbalizes understanding. Follow up needed.

## 2024-04-29 NOTE — PROGRESS NOTES
Leyla Paz  1965  Shavon Tang PA      Chief Complaint   Patient presents with    Shortness of Breath    Hypertension    Hyperlipidemia     OV for SOB.Pt denies any some chest pain,SOB with any activity, some dizziness,some swelling to ankles.     Chief complaint and HPI:  Leyla Paz  is a 58 y.o. female following up because she has been told she has cardiomegaly and she is anxious and nervous and wants to be checked out and she reports increasing shortness of breath on activity which she states is chronic.  She had a cardiac cath in 2021 which was negative for any significant cardiovascular disease and she was asked to follow-up with PCP.  She denies any chest pains today but gets palpitations when she does any activities or take the trash out.  Denies syncope or near syncope.  Medications are reviewed and PCP encounters are reviewed and most recent chest x-ray and CAT scan of chest was last year.  She did not bring her medications.  She reports her  Rest of the Cardiovascular system review is otherwise unchanged from prior encounter.  Past medical history:  has a past medical history of Abnormal ECG, Acid reflux, Anxiety, Arthritis, Back problem, Bipolar disorder (HCC), Chronic back pain, Chronic cough, Class 3 severe obesity with body mass index (BMI) of 40.0 to 44.9 in adult (HCC), COPD, moderate (HCC), Curvature of spine, Depression, Diabetes mellitus (HCC), Diverticulitis, Esophageal dilatation, Family history of sudden death, Full dentures, H/O 24 hour EKG monitoring, H/O cardiac catheterization, H/O cardiovascular stress test, H/O echocardiogram, Headache(784.0), Heart murmur, History of nuclear stress test, [place] (hard of hearing), Hydronephrosis, Hyperlipidemia, Hypertension, Macular degeneration, Mild persistent asthma without complication, MRSA (methicillin resistant Staphylococcus aureus), Near syncope, Panic attacks, PONV (postoperative nausea and vomiting), Right Breast Cancer, S/P

## 2024-05-14 ENCOUNTER — TELEPHONE (OUTPATIENT)
Dept: CARDIOLOGY CLINIC | Age: 59
End: 2024-05-14

## 2024-05-14 NOTE — TELEPHONE ENCOUNTER
Informed patient of her echo results - EF of 50 - 55%. Aortic Valve: Not well visualized. Mild regurgitation. Mitral Valve: Mild annular calcification at the posterior leaflet of the mitral valve. Tricuspid Valve: Normal RVSP.   Patient verbalized understanding of all information given.

## 2024-09-06 RX ORDER — LETROZOLE 2.5 MG/1
TABLET, FILM COATED ORAL
Qty: 30 TABLET | Refills: 11 | Status: SHIPPED | OUTPATIENT
Start: 2024-09-06

## 2024-09-06 NOTE — TELEPHONE ENCOUNTER
Per Dr. Mauro - refills of patient's Letrozole 2.5 mg one tab daily #30 with 11 refills e-scripted to [facility] in [place].

## 2024-10-19 NOTE — PROGRESS NOTES
Patient Name: Leyla Paz  Patient : 1965  Patient MRN: [id number]     Primary Oncologist: Shon Mauro MD  Referring Provider: Shavon Tang PA     Date of Service: 10/24/2024      Chief Complaint:   Chief Complaint   Patient presents with    Follow-up     Patient Active Problem List:     Malignant neoplasm of upper-outer quadrant of right female breast       HPI:   Ms. Paz is a very pleasant 58-year-old patient with medical history significant for hypertension, diabetes mellitus, initially referred to me on 2015, for evaluation of right breast infiltrating ductal carcinoma.     She stated that she felt a knot in her right breast and she went to the primary care physician for that.  She had mammogram followed by sonogram on 2015, and it showed spiculated mass in the anterior 12 o'clock position in the right breast, most likely representing malignancy. She underwent sonoguided biopsy of the lesion on 2015, and the final pathology was consistent with infiltrating ductal carcinoma.  Estrogen receptors positive (95 percent strong), progesterone receptor positive (75 percent), and HER-2/jossy by immunohistochemical stain is equivocal and HER-2/jossy by FISH study was not amplified.  She was evaluated by Dr. Barrientos and he requested core biopsy of the right breast axillary lymph node and it was done on 2015.  Final pathology showed metastatic carcinoma consistent with breast primary involving all cores (10 mm in the largest dimension).  Since she wanted to preserve her breast if that is possible, Dr. Barrientos discussed her case in our Tumor Board.  Consensus was to give neoadjuvant chemotherapy followed by surgery.       Neoadjuvant chemotherapy with doxorubicin, cyclophosphamide followed by Paclitaxel was started on 2015 and she has completed it on 2015.       Ms. Paz underwent right breast lumpectomy on 2015, and the final

## 2024-10-24 ENCOUNTER — OFFICE VISIT (OUTPATIENT)
Dept: ONCOLOGY | Age: 59
End: 2024-10-24
Payer: COMMERCIAL

## 2024-10-24 ENCOUNTER — HOSPITAL ENCOUNTER (OUTPATIENT)
Dept: INFUSION THERAPY | Age: 59
Discharge: HOME | End: 2024-10-24
Payer: COMMERCIAL

## 2024-10-24 VITALS
BODY MASS INDEX: 40.46 KG/M2 | TEMPERATURE: 97.2 F | SYSTOLIC BLOOD PRESSURE: 116 MMHG | HEIGHT: 67 IN | OXYGEN SATURATION: 97 % | WEIGHT: 257.8 LBS | DIASTOLIC BLOOD PRESSURE: 73 MMHG | HEART RATE: 98 BPM

## 2024-10-24 DIAGNOSIS — Z17.0 MALIGNANT NEOPLASM OF UPPER-OUTER QUADRANT OF RIGHT BREAST IN FEMALE, ESTROGEN RECEPTOR POSITIVE (HCC): Primary | ICD-10-CM

## 2024-10-24 DIAGNOSIS — C50.411 MALIGNANT NEOPLASM OF UPPER-OUTER QUADRANT OF RIGHT BREAST IN FEMALE, ESTROGEN RECEPTOR POSITIVE (HCC): Primary | ICD-10-CM

## 2024-10-24 PROCEDURE — 99211 OFF/OP EST MAY X REQ PHY/QHP: CPT

## 2024-10-24 PROCEDURE — 3078F DIAST BP <80 MM HG: CPT | Performed by: INTERNAL MEDICINE

## 2024-10-24 PROCEDURE — G8417 CALC BMI ABV UP PARAM F/U: HCPCS | Performed by: INTERNAL MEDICINE

## 2024-10-24 PROCEDURE — 3074F SYST BP LT 130 MM HG: CPT | Performed by: INTERNAL MEDICINE

## 2024-10-24 PROCEDURE — G8484 FLU IMMUNIZE NO ADMIN: HCPCS | Performed by: INTERNAL MEDICINE

## 2024-10-24 PROCEDURE — 1036F TOBACCO NON-USER: CPT | Performed by: INTERNAL MEDICINE

## 2024-10-24 PROCEDURE — 3017F COLORECTAL CA SCREEN DOC REV: CPT | Performed by: INTERNAL MEDICINE

## 2024-10-24 PROCEDURE — G8427 DOCREV CUR MEDS BY ELIG CLIN: HCPCS | Performed by: INTERNAL MEDICINE

## 2024-10-24 PROCEDURE — 99214 OFFICE O/P EST MOD 30 MIN: CPT | Performed by: INTERNAL MEDICINE

## 2024-10-24 RX ORDER — QUETIAPINE FUMARATE 25 MG/1
25 TABLET, FILM COATED ORAL NIGHTLY
COMMUNITY

## 2024-10-24 ASSESSMENT — PATIENT HEALTH QUESTIONNAIRE - PHQ9
SUM OF ALL RESPONSES TO PHQ QUESTIONS 1-9: 0
SUM OF ALL RESPONSES TO PHQ QUESTIONS 1-9: 0
SUM OF ALL RESPONSES TO PHQ9 QUESTIONS 1 & 2: 0
SUM OF ALL RESPONSES TO PHQ QUESTIONS 1-9: 0
SUM OF ALL RESPONSES TO PHQ QUESTIONS 1-9: 0
2. FEELING DOWN, DEPRESSED OR HOPELESS: NOT AT ALL
1. LITTLE INTEREST OR PLEASURE IN DOING THINGS: NOT AT ALL

## 2024-10-24 NOTE — PROGRESS NOTES
MA Rooming Questions  Patient: Leyla Paz  MRN: [id number]    Date: 10/24/2024        1. Do you have any new issues?   no         2. Do you need any refills on medications?    no    3. Have you had any imaging done since your last visit?   no    4. Have you been hospitalized or seen in the emergency room since your last visit here?   no    5. Did the patient have a depression screening completed today? Yes    PHQ-9 Total Score: 0 (10/24/2024  9:09 AM)       PHQ-9 Given to (if applicable):               PHQ-9 Score (if applicable):                     [] Positive     [x]  Negative              Does question #9 need addressed (if applicable)                     [] Yes    []  No               Deanne Greco CMA

## 2025-02-09 NOTE — H&P
History and Physical    Patient's Name/Date of Birth: Reina Grider / 1965 (68 y.o.)    Date: October 18, 2020     Chief Complaint: Abdominal pain and free air on CAT scan    HPI:  59-year-old female I know well from the treatment of advanced right breast cancer which was treated with quadrantectomy chemotherapy radiation years ago presents with a 2-day history of abdominal pain and vomiting. This patient was initially developed abdominal pain vomiting early Saturday morning she [facility] where she was worked up. The patient had blood work and CT scan of the abdomen which showed some free air underneath the diaphragm and a questionable pneumatosis and also a recurrent hernia suggesting partial obstruction. The patient remained hemodynamically stable without evidence of sepsis. I came directly to the emergency room for evaluation of this patient. She was awake and alert complains of abdominal pain but in no acute distress. This patient has had 3 previous hernia repairs. In 2018 she underwent an attempted robotic hernia repair by Dr. Jami Cochran was complicated by the need to do an open repair with underlay Prolene mesh and bowel obstruction. This was also complicated by an infected postoperative seroma which was treated by a wound VAC. The patient had diarrhea yesterday. She presents now for evaluation for probable partial bowel obstruction with perforation she does have a history of morbid obesity depression diabetes psychiatric disorder.     Past Medical History:   Diagnosis Date    Abnormal ECG 1/29/2019    Acid reflux     Anxiety     Arthritis     \"Back, Legs, Knees And Feet\"    Back problem     \"Three Discs Smashed Together Lower Back\"    Bipolar disorder ([address]     Chronic back pain     Chronic cough 1/31/2020    COPD (chronic obstructive pulmonary disease) (HCC)     COPD, moderate ([address] exploratory abd wound with drainage of infected seroma with wound vac    BREAST BIOPSY Right 2015    Right Breast Cancer , \"I Have A Chip In The Breast\"    BREAST BIOPSY Right 2017    Benign    BREAST LUMPECTOMY Right 08/2015    Right Breast Cancer    CHOLECYSTECTOMY, LAPAROSCOPIC  2007    COLONOSCOPY  2016    dr green 1 polyp    COLONOSCOPY  3/2019; 04/12/2017    3/2019 WNL; 4/2017 Internal hemorroids, diverticulosis    CYSTOSCOPY  2016    Kidney Stones    CYSTOSCOPY      per old chart with admission 6/2018 pt had cysto, right RGPG and attempted to insert right ureteral stent    CYSTOSCOPY  07/03/2018    cysto, right stent removal, right ureteroscopy, laser litho    DENTAL SURGERY      All Teeth Extracted In Past    ENDOSCOPY, COLON, DIAGNOSTIC  04/12/2017    EGD: Dilatation, Hiatal hernia, mild gastritis    HERNIA REPAIR  2007    Abdominal Hernia Repair With Mesh    HERNIA REPAIR  2012    Ventral Hernia Repair With Mesh    HYSTERECTOMY, TOTAL ABDOMINAL  2006    LAPAROSCOPY  2005    Laparoscopy, D & C, Hysteroscopy    LYMPH NODE DISSECTION      2015    OTHER SURGICAL HISTORY  05/22/2018    Laparoscopic-converted to open ventral hernia repair with mesh/ XU    OTHER SURGICAL HISTORY      per old chart with admission 6/20180- in IR 6/11/2018- had right perc. nephrostomy with cath placement\"took the tube out before I left the hospital\"    TONSILLECTOMY  1970's       Current Facility-Administered Medications   Medication Dose Route Frequency Provider Last Rate Last Dose    ondansetron (ZOFRAN) injection 4 mg  4 mg Intravenous Q6H PRN Elaine Rodrigeus MD   4 mg at 10/18/20 0434    cefepime (MAXIPIME) 2 g IVPB minibag  2 g Intravenous Once Elaine Rodrigues MD        0.9 % sodium chloride bolus  500 mL Intravenous Once Elaine Rodrigues  mL/hr at 10/18/20 0553 500 mL at 10/18/20 0553    sodium chloride flush 0.9 % injection 10 mL  10 mL Intravenous BID Elaine Rodrigues MD   10 mL at 10/18/20 0895  lactated ringers infusion   Intravenous Continuous Paige Davis MD        HYDROmorphone (DILAUDID) injection 1 mg  1 mg Intravenous Q3H PRN Paige Davis MD        cefepime (MAXIPIME) 2 g IVPB minibag  2 g Intravenous Q12H Paige Davis MD        metronidazole (FLAGYL) 500 mg in NaCl 100 mL IVPB premix  500 mg Intravenous Q8H Paige Davis MD        pantoprazole (PROTONIX) injection 40 mg  40 mg Intravenous Daily MD Garry Cedeno Dys ON 10/19/2020] enoxaparin (LOVENOX) injection 40 mg  40 mg Subcutaneous Daily Paige Davis MD        albuterol sulfate  (90 Base) MCG/ACT inhaler 2 puff  2 puff Inhalation Q6H PRN Paige Davis MD        losartan (COZAAR) tablet 100 mg  100 mg Oral Daily Paige Davis MD        lisinopril (PRINIVIL;ZESTRIL) tablet 5 mg  5 mg Oral Daily Paige Davis MD        montelukast (SINGULAIR) tablet 10 mg  10 mg Oral Daily Paige Davis MD         Current Outpatient Medications   Medication Sig Dispense Refill    fexofenadine (ALLEGRA ALLERGY) 60 MG tablet Take 60 mg by mouth daily      loratadine (CLARITIN) 10 MG tablet Take 10 mg by mouth daily      PANTOPRAZOLE SODIUM PO Take 1 tablet by mouth daily      letrozole (FEMARA) 2.5 MG tablet TAKE ONE TABLET BY MOUTH EVERY DAY (Patient taking differently: 2.5 mg ) 30 tablet 6    famotidine (PEPCID) 20 MG tablet Take 1 tablet by mouth daily      FLUoxetine (PROZAC) 10 MG capsule Take 1 capsule by mouth daily      hydroCHLOROthiazide (MICROZIDE) 12.5 MG capsule Take 1 capsule by mouth daily      hydrOXYzine (VISTARIL) 25 MG capsule Take 1 capsule by mouth daily      montelukast (SINGULAIR) 10 MG tablet Take 1 tablet by mouth daily      losartan (COZAAR) 100 MG tablet Take 1 tablet by mouth daily      ALBUTEROL SULFATE HFA IN Inhale into the lungs      Phenylephrine-DM-GG-APAP 10--650 MG PACK Take 1 tablet by mouth 4 times daily 20 each 0    lisinopril (PRINIVIL;ZESTRIL) 5 MG tablet Take 5 mg by mouth daily      Fluticasone Propionate (FLONASE NA) by Nasal route      busPIRone (BUSPAR) 7.5 MG tablet Take 10 mg by mouth 3 times daily       nystatin (MYCOSTATIN) 709006 UNIT/GM cream APPLY AS DIRECTED THREE TIMES A DAY 15 g 10    Probiotic Product (ALIGN) 4 MG CAPS Take 1 capsule by mouth daily 30 capsule 3       Allergies   Allergen Reactions    Latex      \"Redness\"    Codeine      \"I Don't Remember The Reaction , I Was A Child\"    Darvon [Propoxyphene]      \"I Don't Remember The Reaction, I Was A Child\"    Pcn [Penicillins]      \"I Don't Remember The Reaction, I Was A Child\"       Family History   Problem Relation Age of Onset    Heart Disease Mother         \"Open Heart\"    Diabetes Mother    [de-identified] / [place] Mother     Stroke Mother     Vision Loss Mother         \"Wore Glasses\"    High Blood Pressure Father     Vision Loss Father         \"Had Cataract Surgery\"    Cancer Father         \"Kidney Cancer\"    Heart Disease Father         \"Atrial Fib\"    Diabetes Sister     High Blood Pressure Sister     High Cholesterol Sister     Cancer Sister         \"Ovarian Cancer\"   [place] Vision Loss Sister         \"Glaucoma\"       Social History     Socioeconomic History    Marital status: Single     Spouse name: Not on file    Number of children: Not on file    Years of education: Not on file    Highest education level: Not on file   Occupational History    Not on file   Social Needs    Financial resource strain: Not on file    Food insecurity     Worry: Not on file     Inability: Not on file    Transportation needs     Medical: Not on file     Non-medical: Not on file   Tobacco Use    Smoking status: Former Smoker     Packs/day: 0.25     Years: 5.00     Pack years: 1.25     Types: Cigarettes     Start date:      Last attempt to quit:      Years since quittin.8    Smokeless tobacco: Never Used    Tobacco comment: \"I Only Smoked Socially When I Quit Smoking\"   Substance and Sexual Activity    Alcohol use: No     Alcohol/week: 0.0 standard drinks    Drug use: No    Sexual activity: Never   Lifestyle    Physical activity     Days per week: Not on file     Minutes per session: Not on file    Stress: Not on file   Relationships    Social connections     Talks on phone: Not on file     Gets together: Not on file     Attends [employer] service: Not on file     Active member of club or organization: Not on file     Attends meetings of clubs or organizations: Not on file     Relationship status: Not on file    Intimate partner violence     Fear of current or ex partner: Not on file     Emotionally abused: Not on file     Physically abused: Not on file     Forced sexual activity: Not on file   Other Topics Concern    Not on file   Social History Narrative    Not on file       ROS: Non-contributory    Physical Exam:  Vitals:    10/18/20 0434   BP:    Pulse:    Resp:    Temp: 99 °F (37.2 °C)   SpO2:        Mental Status: Alert and Oriented up in bed in no acute distress    Chest: Breath sounds were clear and equal with no rales, wheezes, or rhonchi. Respiratory effort was normal with no retractions or use of accessory muscles. Right lateral breast scar with contracture from previous radiation    Cardiovascular: Heart sounds were normal with a regular rate and rhythm. There were no murmurs or gallops. Abdomen: Obese bowel sounds were normal.  The abdomen was soft and mildly distended. There was mild tenderness, no guarding, rebound, or rigidity. Panniculus. Lower midline scar and scars from previous attempted robotic surgery there was no masses, hepatosplenomegaly, or hernias. Genitourinary: No inguinal hernias were noted on coughing and straining.     Labs   Bmp:    Lab Results   Component Value Date     10/18/2020    K 3.5 10/18/2020    CL 99 10/18/2020    CO2 27 10/18/2020    BUN 10 10/18/2020    CREATININE 0.7 Principal Discharge DX:	Atrial fibrillation  Secondary Diagnosis:	Abdominal pain   1

## 2025-03-31 NOTE — PROGRESS NOTES
Differential Diagnosis -urinary infection with hypothermia is a clear front running cause.  Worsening dementia is another possibility as well.  Small possibility of thyroid being a cause.  Dehydration can also be a potential etiology as well.  Rule out hypoglycemia.  Baseline Mental Status -there has been some milder more chronic decline over the last few months but recently since beginning in March there is been significant worsening including lethargy, forgetfulness, increased difficulty ambulating  Medication Review (potential causes / contributors) -   None of the patient's home medications are typically associated with altered mental status.  The beta-blocker, carvedilol theoretically has some potential risk.  Abnormal Testing Thus Far -   Urinalysis very concerning for infection.  TSH is abnormal but not bad when corrected for age.  Negative Testing Thus Far -   CT head shows no acute pathology.  Notably, there are chronic microangiopathic changes with stable right basal ganglier lacunar infarct.  This was compared with 3/9/2025.  WBC, lactic acid, and procalcitonin normal.  Creatinine is at baseline.  Sodium and calcium are normal.  COVID testing negative.  Additional Testing Being Requested -   None just yet as I would like to see the patient's response to treating the urinary infection first.  If mental status is not improving after initiating treatment for urinary infection and also hydrating, then additional workup may be considered.  Consultations - Geriatric medicine consultation due to the underlying cognitive decline present before the last few days.  Reviewed geriatric medicine recommendations.  Patient safety -   Not currently requiring any restraints.  Redirection and reorientation when needed.  Fall precautions and bed alarm.  PT/OT evaluations.  Other -   Assure adequate sleep hygiene.  Minimize interruptions to sleep.  Avoid constipation.  Avoid delirium inducing medications.  Ensure proper  MA Rooming Questions  Patient: Jairo Rangel  MRN: [id number]    Date: 12/17/2020        1. Do you have any new issues? yes - patient had emergency surgery in October on her bowels and intestine          2. Do you need any refills on medications?    no    3. Have you had any imaging done since your last visit? yes - October     4. Have you been hospitalized or seen in the emergency room since your last visit here?   yes - surgery in October     5. Did the patient have a depression screening completed today?  Yes    PHQ-9 Total Score: 0 (12/17/2020  9:28 AM)       PHQ-9 Given to (if applicable):               PHQ-9 Score (if applicable):                     [] Positive     []  Negative              Does question #9 need addressed (if applicable)                     [] Yes    []  No               Gwendolyn Mccray MA hydration and nutrition.  Serial Mental Status Exams.

## 2025-04-24 ENCOUNTER — HOSPITAL ENCOUNTER (OUTPATIENT)
Age: 60
Setting detail: INFUSION SERIES
Discharge: HOME | End: 2025-04-24
Payer: COMMERCIAL

## 2025-04-24 ENCOUNTER — OFFICE VISIT (OUTPATIENT)
Age: 60
End: 2025-04-24
Payer: COMMERCIAL

## 2025-04-24 VITALS
TEMPERATURE: 97.1 F | HEART RATE: 103 BPM | SYSTOLIC BLOOD PRESSURE: 115 MMHG | DIASTOLIC BLOOD PRESSURE: 67 MMHG | OXYGEN SATURATION: 99 % | WEIGHT: 244.6 LBS | BODY MASS INDEX: 38.39 KG/M2 | RESPIRATION RATE: 18 BRPM | HEIGHT: 67 IN

## 2025-04-24 DIAGNOSIS — Z17.0 MALIGNANT NEOPLASM OF UPPER-OUTER QUADRANT OF RIGHT BREAST IN FEMALE, ESTROGEN RECEPTOR POSITIVE (HCC): Primary | ICD-10-CM

## 2025-04-24 DIAGNOSIS — C50.411 MALIGNANT NEOPLASM OF UPPER-OUTER QUADRANT OF RIGHT BREAST IN FEMALE, ESTROGEN RECEPTOR POSITIVE (HCC): Primary | ICD-10-CM

## 2025-04-24 PROCEDURE — 99212 OFFICE O/P EST SF 10 MIN: CPT

## 2025-04-24 PROCEDURE — G8427 DOCREV CUR MEDS BY ELIG CLIN: HCPCS | Performed by: INTERNAL MEDICINE

## 2025-04-24 PROCEDURE — 3074F SYST BP LT 130 MM HG: CPT | Performed by: INTERNAL MEDICINE

## 2025-04-24 PROCEDURE — 3017F COLORECTAL CA SCREEN DOC REV: CPT | Performed by: INTERNAL MEDICINE

## 2025-04-24 PROCEDURE — G8417 CALC BMI ABV UP PARAM F/U: HCPCS | Performed by: INTERNAL MEDICINE

## 2025-04-24 PROCEDURE — 99213 OFFICE O/P EST LOW 20 MIN: CPT | Performed by: INTERNAL MEDICINE

## 2025-04-24 PROCEDURE — 1036F TOBACCO NON-USER: CPT | Performed by: INTERNAL MEDICINE

## 2025-04-24 PROCEDURE — 3078F DIAST BP <80 MM HG: CPT | Performed by: INTERNAL MEDICINE

## 2025-04-24 ASSESSMENT — PATIENT HEALTH QUESTIONNAIRE - PHQ9
SUM OF ALL RESPONSES TO PHQ QUESTIONS 1-9: 0
2. FEELING DOWN, DEPRESSED OR HOPELESS: NOT AT ALL
1. LITTLE INTEREST OR PLEASURE IN DOING THINGS: NOT AT ALL
SUM OF ALL RESPONSES TO PHQ QUESTIONS 1-9: 0

## 2025-04-24 NOTE — PROGRESS NOTES
MA Rooming Questions  Patient: Leyla Paz  MRN: [id number]    Date: 4/24/2025        1. Do you have any new issues?   yes - pt states R lumpectomy site she feels like the area is \"growing\".    Wt Readings from Last 3 Encounters:   04/24/25 110.9 kg (244 lb 9.6 oz)   10/24/24 116.9 kg (257 lb 12.8 oz)   05/08/24 124.7 kg (275 lb)             2. Do you need any refills on medications?    no    3. Have you had any imaging done since your last visit?   yes - mammo [facility]    4. Have you been hospitalized or seen in the emergency room since your last visit here?   no    5. Did the patient have a depression screening completed today? Yes    No data recorded     PHQ-9 Given to (if applicable):               PHQ-9 Score (if applicable):                     [] Positive     []  Negative              Does question #9 need addressed (if applicable)                     [] Yes    []  No               Alem Jon MA      
malignancy. I will request repeat mammogram for 4/9/26.      Health maintenance - I commend her to have age-appropriate cancer screening, exercise and low-sodium diet.    She does not have any significant symptoms at today's visit.      PAST MEDICAL HISTORY:  Significant for:  1.         Hypertension.  2.         Diabetes mellitus.     PAST SURGICAL HISTORY:  Significant for:  1.         Total abdominal hysterectomy and bilateral salpingo-oophorectomy in 2006.  2.         Cholecystectomy.  3.         Hernia surgery.     FAMILY HISTORY:  Significant for ovarian cancer in her sister and breast cancer in a couple of her aunts.  No other family history of cancer disease.       SOCIAL HISTORY:  She denies smoking, alcohol drinking, and illicit drug abuse.  She is single and has no children.    She is not working right now.     ALLERGIES:  She claims to have allergies to Codeine, penicillin, and Darvocet.    Review of Systems:  \"Per interval history; otherwise 10 point ROS is negative.\"  Her energy level is fair today and her sleep is fine. She denies fever, chills, night sweats, cough, shortness of breath, chest pain, hemoptysis or palpitations. Her bowel and bladder functions are normal. She doesn't have nausea, vomiting, abdominal pain, diarrhea, constipation, dysuria, loss of appetite or weight loss. She doesn't have neuropathy and she denies bleeding or clotting issues. She has RUE swelling. No anxiety or depression. The rest of the systems are unremarkable.    Vital Signs:  /67 (BP Site: Left Upper Arm, Patient Position: Sitting, BP Cuff Size: Large Adult)   Pulse (!) 103   Temp 97.1 °F (36.2 °C) (Infrared)   Resp 18   Ht 1.702 m (5' 7\")   Wt 110.9 kg (244 lb 9.6 oz)   SpO2 99%   BMI 38.31 kg/m²     Physical Exam:  CONSTITUTIONAL: awake, no apparent distress, alert, cooperative,    EYES: pupils equal, round and reactive to light, sclera clear and conjunctiva normal  ENT: Normocephalic, atraumatic,

## 2025-07-17 ENCOUNTER — APPOINTMENT (OUTPATIENT)
Dept: CT IMAGING | Age: 60
End: 2025-07-17
Payer: COMMERCIAL

## 2025-07-17 ENCOUNTER — HOSPITAL ENCOUNTER (EMERGENCY)
Age: 60
Discharge: HOME | End: 2025-07-18
Attending: EMERGENCY MEDICINE
Payer: COMMERCIAL

## 2025-07-17 DIAGNOSIS — R11.2 NAUSEA AND VOMITING IN ADULT PATIENT: Primary | ICD-10-CM

## 2025-07-17 LAB
ALBUMIN SERPL-MCNC: 3.9 G/DL (ref 3.4–5)
ALBUMIN/GLOB SERPL: 1.4 {RATIO}
ALP SERPL-CCNC: 81 U/L (ref 40–129)
ALT SERPL-CCNC: 15 U/L (ref 10–40)
ANION GAP SERPL CALCULATED.3IONS-SCNC: 15 MMOL/L (ref 9–17)
AST SERPL-CCNC: 18 U/L (ref 15–37)
BASOPHILS # BLD: 0.02 K/UL
BASOPHILS NFR BLD: 0 % (ref 0–1)
BILIRUB SERPL-MCNC: 0.5 MG/DL (ref 0–1)
BUN SERPL-MCNC: 7 MG/DL (ref 7–20)
CALCIUM SERPL-MCNC: 8.9 MG/DL (ref 8.3–10.6)
CHLORIDE SERPL-SCNC: 98 MMOL/L (ref 99–110)
CO2 SERPL-SCNC: 24 MMOL/L (ref 21–32)
CREAT SERPL-MCNC: 0.6 MG/DL (ref 0.6–1.1)
EOSINOPHIL # BLD: 0.02 K/UL
EOSINOPHILS RELATIVE PERCENT: 0 % (ref 0–3)
ERYTHROCYTE [DISTWIDTH] IN BLOOD BY AUTOMATED COUNT: 23 % (ref 11.7–14.9)
GFR, ESTIMATED: >90 ML/MIN/1.73M2
GLUCOSE SERPL-MCNC: 172 MG/DL (ref 74–99)
HCT VFR BLD AUTO: 29.2 % (ref 37–47)
HGB BLD-MCNC: 8.4 G/DL (ref 12.5–16)
IMM GRANULOCYTES # BLD AUTO: 0.06 K/UL
IMM GRANULOCYTES NFR BLD: 1 %
LIPASE SERPL-CCNC: 48 U/L (ref 13–60)
LYMPHOCYTES NFR BLD: 0.92 K/UL
LYMPHOCYTES RELATIVE PERCENT: 7 % (ref 24–44)
MCH RBC QN AUTO: 18.7 PG (ref 27–31)
MCHC RBC AUTO-ENTMCNC: 28.8 G/DL (ref 32–36)
MCV RBC AUTO: 64.9 FL (ref 78–100)
MONOCYTES NFR BLD: 0.75 K/UL
MONOCYTES NFR BLD: 6 % (ref 0–5)
NEUTROPHILS NFR BLD: 86 % (ref 36–66)
NEUTS SEG NFR BLD: 10.93 K/UL
PLATELET # BLD AUTO: 294 K/UL (ref 140–440)
PMV BLD AUTO: 9.9 FL (ref 7.5–11.1)
POTASSIUM SERPL-SCNC: 4.1 MMOL/L (ref 3.5–5.1)
PROT SERPL-MCNC: 6.7 G/DL (ref 6.4–8.2)
RBC # BLD AUTO: 4.5 M/UL (ref 4.2–5.4)
SODIUM SERPL-SCNC: 137 MMOL/L (ref 136–145)
WBC OTHER # BLD: 12.7 K/UL (ref 4–10.5)

## 2025-07-17 PROCEDURE — 6360000004 HC RX CONTRAST MEDICATION: Performed by: EMERGENCY MEDICINE

## 2025-07-17 PROCEDURE — 74177 CT ABD & PELVIS W/CONTRAST: CPT

## 2025-07-17 PROCEDURE — 96374 THER/PROPH/DIAG INJ IV PUSH: CPT

## 2025-07-17 PROCEDURE — 80053 COMPREHEN METABOLIC PANEL: CPT

## 2025-07-17 PROCEDURE — 6360000002 HC RX W HCPCS: Performed by: EMERGENCY MEDICINE

## 2025-07-17 PROCEDURE — 83690 ASSAY OF LIPASE: CPT

## 2025-07-17 PROCEDURE — 99285 EMERGENCY DEPT VISIT HI MDM: CPT

## 2025-07-17 PROCEDURE — 85025 COMPLETE CBC W/AUTO DIFF WBC: CPT

## 2025-07-17 RX ORDER — DIPHENHYDRAMINE HYDROCHLORIDE 50 MG/ML
50 INJECTION, SOLUTION INTRAMUSCULAR; INTRAVENOUS ONCE
Status: DISCONTINUED | OUTPATIENT
Start: 2025-07-17 | End: 2025-07-17

## 2025-07-17 RX ORDER — METOCLOPRAMIDE HYDROCHLORIDE 5 MG/ML
10 INJECTION INTRAMUSCULAR; INTRAVENOUS ONCE
Status: DISCONTINUED | OUTPATIENT
Start: 2025-07-17 | End: 2025-07-17

## 2025-07-17 RX ORDER — ONDANSETRON 2 MG/ML
4 INJECTION INTRAMUSCULAR; INTRAVENOUS ONCE
Status: COMPLETED | OUTPATIENT
Start: 2025-07-17 | End: 2025-07-17

## 2025-07-17 RX ORDER — DICYCLOMINE HYDROCHLORIDE 10 MG/1
10 CAPSULE ORAL 3 TIMES DAILY
COMMUNITY
Start: 2025-05-12

## 2025-07-17 RX ORDER — IOPAMIDOL 755 MG/ML
75 INJECTION, SOLUTION INTRAVASCULAR
Status: COMPLETED | OUTPATIENT
Start: 2025-07-17 | End: 2025-07-17

## 2025-07-17 RX ORDER — IOPAMIDOL 755 MG/ML
75 INJECTION, SOLUTION INTRAVASCULAR
Status: DISCONTINUED | OUTPATIENT
Start: 2025-07-17 | End: 2025-07-17

## 2025-07-17 RX ADMIN — ONDANSETRON 4 MG: 2 INJECTION, SOLUTION INTRAMUSCULAR; INTRAVENOUS at 21:15

## 2025-07-17 RX ADMIN — IOPAMIDOL 75 ML: 755 INJECTION, SOLUTION INTRAVENOUS at 22:58

## 2025-07-17 ASSESSMENT — PAIN DESCRIPTION - ORIENTATION: ORIENTATION: RIGHT;LEFT;UPPER

## 2025-07-17 ASSESSMENT — PAIN SCALES - GENERAL: PAINLEVEL_OUTOF10: 8

## 2025-07-17 ASSESSMENT — PAIN DESCRIPTION - ONSET: ONSET: SUDDEN

## 2025-07-17 ASSESSMENT — PAIN - FUNCTIONAL ASSESSMENT
PAIN_FUNCTIONAL_ASSESSMENT: PREVENTS OR INTERFERES SOME ACTIVE ACTIVITIES AND ADLS
PAIN_FUNCTIONAL_ASSESSMENT: 0-10

## 2025-07-17 ASSESSMENT — PAIN DESCRIPTION - PAIN TYPE: TYPE: ACUTE PAIN

## 2025-07-17 ASSESSMENT — PAIN DESCRIPTION - LOCATION: LOCATION: ABDOMEN

## 2025-07-17 ASSESSMENT — PAIN DESCRIPTION - FREQUENCY: FREQUENCY: CONTINUOUS

## 2025-07-17 ASSESSMENT — PAIN DESCRIPTION - DESCRIPTORS: DESCRIPTORS: DISCOMFORT;PRESSURE

## 2025-07-18 VITALS
HEART RATE: 76 BPM | RESPIRATION RATE: 16 BRPM | DIASTOLIC BLOOD PRESSURE: 86 MMHG | TEMPERATURE: 98.3 F | OXYGEN SATURATION: 99 % | WEIGHT: 245 LBS | BODY MASS INDEX: 38.45 KG/M2 | SYSTOLIC BLOOD PRESSURE: 142 MMHG | HEIGHT: 67 IN

## 2025-07-18 VITALS
BODY MASS INDEX: 38.45 KG/M2 | WEIGHT: 245 LBS | HEART RATE: 79 BPM | DIASTOLIC BLOOD PRESSURE: 133 MMHG | HEIGHT: 67 IN | SYSTOLIC BLOOD PRESSURE: 149 MMHG | TEMPERATURE: 99 F | OXYGEN SATURATION: 97 % | RESPIRATION RATE: 20 BRPM

## 2025-07-18 DIAGNOSIS — R10.9 ABDOMINAL PAIN, UNSPECIFIED ABDOMINAL LOCATION: Primary | ICD-10-CM

## 2025-07-18 DIAGNOSIS — R11.2 NAUSEA AND VOMITING, UNSPECIFIED VOMITING TYPE: ICD-10-CM

## 2025-07-18 LAB
BILIRUB UR QL STRIP: NEGATIVE
CLARITY UR: CLEAR
COLOR UR: YELLOW
COMMENT: ABNORMAL
GLUCOSE UR STRIP-MCNC: NEGATIVE MG/DL
HGB UR QL STRIP.AUTO: NEGATIVE
KETONES UR STRIP-MCNC: ABNORMAL MG/DL
LEUKOCYTE ESTERASE UR QL STRIP: NEGATIVE
NITRITE UR QL STRIP: NEGATIVE
PH UR STRIP: 8.5 [PH] (ref 5–8)
PLATELET CONFIRMATION: NORMAL
PROT UR STRIP-MCNC: NEGATIVE MG/DL
SP GR UR STRIP: 1.01 (ref 1–1.03)
UROBILINOGEN UR STRIP-ACNC: 0.2 EU/DL (ref 0–1)

## 2025-07-18 PROCEDURE — 6360000002 HC RX W HCPCS: Performed by: EMERGENCY MEDICINE

## 2025-07-18 PROCEDURE — 96372 THER/PROPH/DIAG INJ SC/IM: CPT

## 2025-07-18 PROCEDURE — 81003 URINALYSIS AUTO W/O SCOPE: CPT

## 2025-07-18 PROCEDURE — 99284 EMERGENCY DEPT VISIT MOD MDM: CPT

## 2025-07-18 RX ORDER — ONDANSETRON 4 MG/1
4 TABLET, ORALLY DISINTEGRATING ORAL 3 TIMES DAILY PRN
Qty: 21 TABLET | Refills: 0 | Status: SHIPPED | OUTPATIENT
Start: 2025-07-18

## 2025-07-18 RX ORDER — DIPHENHYDRAMINE HYDROCHLORIDE 50 MG/ML
25 INJECTION, SOLUTION INTRAMUSCULAR; INTRAVENOUS ONCE
Status: COMPLETED | OUTPATIENT
Start: 2025-07-18 | End: 2025-07-18

## 2025-07-18 RX ORDER — DROPERIDOL 2.5 MG/ML
1.25 INJECTION, SOLUTION INTRAMUSCULAR; INTRAVENOUS ONCE
Status: COMPLETED | OUTPATIENT
Start: 2025-07-18 | End: 2025-07-18

## 2025-07-18 RX ADMIN — DIPHENHYDRAMINE HYDROCHLORIDE 25 MG: 50 INJECTION INTRAMUSCULAR; INTRAVENOUS at 06:25

## 2025-07-18 RX ADMIN — DROPERIDOL 1.25 MG: 2.5 INJECTION, SOLUTION INTRAMUSCULAR; INTRAVENOUS at 06:24

## 2025-07-18 ASSESSMENT — ENCOUNTER SYMPTOMS
RESPIRATORY NEGATIVE: 1
DIARRHEA: 0
EYES NEGATIVE: 1
SORE THROAT: 0
EYES NEGATIVE: 1
COUGH: 0
NAUSEA: 1
RESPIRATORY NEGATIVE: 1
NAUSEA: 1
ABDOMINAL PAIN: 1

## 2025-07-18 ASSESSMENT — PAIN DESCRIPTION - DESCRIPTORS: DESCRIPTORS: DISCOMFORT

## 2025-07-18 ASSESSMENT — PAIN DESCRIPTION - PAIN TYPE: TYPE: ACUTE PAIN

## 2025-07-18 ASSESSMENT — LIFESTYLE VARIABLES
HOW OFTEN DO YOU HAVE A DRINK CONTAINING ALCOHOL: NEVER
HOW MANY STANDARD DRINKS CONTAINING ALCOHOL DO YOU HAVE ON A TYPICAL DAY: PATIENT DOES NOT DRINK

## 2025-07-18 ASSESSMENT — PAIN SCALES - GENERAL
PAINLEVEL_OUTOF10: 0
PAINLEVEL_OUTOF10: 8

## 2025-07-18 ASSESSMENT — PAIN - FUNCTIONAL ASSESSMENT
PAIN_FUNCTIONAL_ASSESSMENT: 0-10
PAIN_FUNCTIONAL_ASSESSMENT: 0-10
PAIN_FUNCTIONAL_ASSESSMENT: PREVENTS OR INTERFERES SOME ACTIVE ACTIVITIES AND ADLS

## 2025-07-18 ASSESSMENT — PAIN DESCRIPTION - FREQUENCY: FREQUENCY: CONTINUOUS

## 2025-07-18 ASSESSMENT — PAIN DESCRIPTION - LOCATION: LOCATION: ABDOMEN

## 2025-07-18 ASSESSMENT — PAIN DESCRIPTION - ONSET: ONSET: AWAKENED FROM SLEEP

## 2025-07-18 ASSESSMENT — PAIN DESCRIPTION - ORIENTATION: ORIENTATION: MID;UPPER

## 2025-07-18 NOTE — ED PROVIDER NOTES
The history is provided by the patient.   Nausea & Vomiting  Severity:  Moderate  Duration:  6 hours  Timing:  Sporadic  Number of daily episodes:  3  Quality:  Stomach contents  Progression:  Unchanged  Chronicity:  New  Recent urination:  Normal  Relieved by:  Nothing  Worsened by:  Nothing  Ineffective treatments:  None tried  Associated symptoms: abdominal pain    Associated symptoms: no arthralgias, no chills, no cough, no diarrhea, no fever, no headaches, no myalgias, no sore throat and no URI    Associated symptoms comment:  AS BEEN CONSTIPATED ABOUT A WEEK AND WAS TAKING STOOL SOFTENERS AND HAS BEEN MOVING HER BOWELS BETTER. tONIGHT HAS HAD NAUSEA AND VOMITING AND ABD PAIN.  3    Review of Systems   Constitutional: Negative.  Negative for chills and fever.   HENT: Negative.  Negative for sore throat.    Eyes: Negative.    Respiratory: Negative.  Negative for cough.    Cardiovascular: Negative.    Gastrointestinal:  Positive for abdominal pain and nausea. Negative for diarrhea.   Genitourinary: Negative.    Musculoskeletal: Negative.  Negative for arthralgias and myalgias.   Skin: Negative.    Neurological: Negative.  Negative for headaches.   All other systems reviewed and are negative.      Family History   Problem Relation Age of Onset    Heart Disease Mother         \"Open Heart\"    Diabetes Mother     Miscarriages / Stillbirths Mother     Stroke Mother     Vision Loss Mother         \"Wore Glasses\"    High Blood Pressure Father     Vision Loss Father         \"Had Cataract Surgery\"    Cancer Father         \"Kidney Cancer\"    Heart Disease Father         \"Atrial Fib\"    Diabetes Sister     High Blood Pressure Sister     High Cholesterol Sister     Cancer Sister         \"Ovarian Cancer\"    Vision Loss Sister         \"Glaucoma\"     Social History     Socioeconomic History    Marital status: Single     Spouse name: Not on file    Number of children: Not on file    Years of education: Not on file    Highest  38.37 kg/m²     Physical Exam  Vitals and nursing note reviewed.   Constitutional:       Appearance: She is well-developed.   HENT:      Head: Normocephalic and atraumatic.      Right Ear: External ear normal.      Left Ear: External ear normal.      Nose: Nose normal.   Eyes:      Conjunctiva/sclera: Conjunctivae normal.      Pupils: Pupils are equal, round, and reactive to light.   Cardiovascular:      Rate and Rhythm: Normal rate and regular rhythm.      Heart sounds: Normal heart sounds.   Pulmonary:      Effort: Pulmonary effort is normal.      Breath sounds: Normal breath sounds.   Abdominal:      General: Bowel sounds are normal.      Palpations: Abdomen is soft.      Tenderness: There is no abdominal tenderness. There is no guarding or rebound. Negative signs include Mejia's sign and Rovsing's sign.   Musculoskeletal:         General: Normal range of motion.      Cervical back: Normal range of motion and neck supple.   Skin:     General: Skin is warm and dry.   Neurological:      Mental Status: She is alert and oriented to person, place, and time.      GCS: GCS eye subscore is 4. GCS verbal subscore is 5. GCS motor subscore is 6.   Psychiatric:         Behavior: Behavior normal.         Thought Content: Thought content normal.         Judgment: Judgment normal.         MDM:    Labs Reviewed   CBC WITH AUTO DIFFERENTIAL - Abnormal; Notable for the following components:       Result Value    WBC 12.7 (*)     Hemoglobin 8.4 (*)     Hematocrit 29.2 (*)     MCV 64.9 (*)     MCH 18.7 (*)     MCHC 28.8 (*)     RDW 23.0 (*)     Neutrophils % 86 (*)     Lymphocytes % 7 (*)     Monocytes % 6 (*)     Immature Granulocytes % 1 (*)     All other components within normal limits   COMPREHENSIVE METABOLIC PANEL - Abnormal; Notable for the following components:    Chloride 98 (*)     Glucose 172 (*)     All other components within normal limits   URINALYSIS WITH REFLEX TO CULTURE - Abnormal; Notable for the following

## 2025-07-18 NOTE — ED PROVIDER NOTES
The history is provided by the patient and the EMS personnel.   Nausea & Vomiting  Patient had been seen earlier in the evening for nausea and vomiting.  She originally thought it was because of her constipation.  Patient was able to take p.o. and she was discharged to home.  However after arriving at home the patient had 1 episode of dry heaving and then she stated that she just could not \"get herself situated\" this caused her to become more uncomfortable and then she had 1 episode of dry heaving again so she called EMS.  She still has not had any vomiting since leaving the emergency department she has only had these episodes of dry heaves    Review of Systems   Constitutional: Negative.    HENT: Negative.     Eyes: Negative.    Respiratory: Negative.     Cardiovascular: Negative.    Gastrointestinal:  Positive for nausea.   Genitourinary: Negative.    Musculoskeletal: Negative.    Skin: Negative.    Neurological: Negative.    All other systems reviewed and are negative.      Family History   Problem Relation Age of Onset    Heart Disease Mother         \"Open Heart\"    Diabetes Mother     Miscarriages / Stillbirths Mother     Stroke Mother     Vision Loss Mother         \"Wore Glasses\"    High Blood Pressure Father     Vision Loss Father         \"Had Cataract Surgery\"    Cancer Father         \"Kidney Cancer\"    Heart Disease Father         \"Atrial Fib\"    Diabetes Sister     High Blood Pressure Sister     High Cholesterol Sister     Cancer Sister         \"Ovarian Cancer\"    Vision Loss Sister         \"Glaucoma\"     Social History     Socioeconomic History    Marital status: Single     Spouse name: Not on file    Number of children: Not on file    Years of education: Not on file    Highest education level: Not on file   Occupational History    Not on file   Tobacco Use    Smoking status: Former     Current packs/day: 0.00     Average packs/day: 0.3 packs/day for 5.0 years (1.3 ttl pk-yrs)     Types: Cigarettes      Start date:      Quit date:      Years since quittin.5    Smokeless tobacco: Never    Tobacco comments:     \"I Only Smoked Socially When I Quit Smoking\"   Vaping Use    Vaping status: Never Used   Substance and Sexual Activity    Alcohol use: No     Alcohol/week: 0.0 standard drinks of alcohol    Drug use: No    Sexual activity: Never   Other Topics Concern    Not on file   Social History Narrative    Not on file     Social Drivers of Health     Financial Resource Strain: Not on file   Food Insecurity: Not on file   Transportation Needs: Not on file   Physical Activity: Not on file   Stress: Not on file   Social Connections: Not on file   Intimate Partner Violence: Not on file   Housing Stability: Not on file     Past Surgical History:   Procedure Laterality Date    ABDOMEN SURGERY  2018    per old chart pt had exploratory abd wound with drainage of infected seroma with wound vac    ABDOMINAL HERNIA REPAIR  10/18/2020    BREAST BIOPSY Right     Right Breast Cancer , \"I Have A Chip In The Breast\"    BREAST BIOPSY Right 2017    Benign    BREAST LUMPECTOMY Right 2015    Right Breast Cancer    CHOLECYSTECTOMY, LAPAROSCOPIC      COLONOSCOPY      dr jules 1 polyp    COLONOSCOPY  3/2019; 2017    3/2019 WNL; 2017 Internal hemorroids, diverticulosis    CYSTOSCOPY  2016    Kidney Stones    CYSTOSCOPY      per old chart with admission 2018 pt had cysto, right RGPG and attempted to insert right ureteral stent    CYSTOSCOPY  2018    cysto, right stent removal, right ureteroscopy, laser litho    DENTAL SURGERY      All Teeth Extracted In Past    ENDOSCOPY, COLON, DIAGNOSTIC  2017    EGD: Dilatation, Hiatal hernia, mild gastritis    HERNIA REPAIR      Abdominal Hernia Repair With Mesh    HERNIA REPAIR      Ventral Hernia Repair With Mesh    HYSTERECTOMY, TOTAL ABDOMINAL (CERVIX REMOVED)  2006    LAPAROSCOPY  2005    Laparoscopy, D & C, Hysteroscopy    LAPAROTOMY N/A

## 2025-07-18 NOTE — ED NOTES
Pt able to hold water down. Pt discharged home with sister. Pt ambulatory and denies any current needs

## 2025-07-18 NOTE — ED TRIAGE NOTES
Patient states she started having abdominal pain and nausea again after leaving the ED this morning.

## 2025-07-18 NOTE — ED PROVIDER NOTES
Patient signed out to me by Dr. Hinson,    She had presented for nausea and vomiting earlier in the morning, had a CT abdomen pelvis as well as labs that were reassuring, she was tolerating fluids and felt like she could go home.  Then had some dry heaving at home and so came back in again.    She had received Inapsine and Benadryl prior to my evaluation of her. Labs were deferred as had been done just a couple hours before.   She was agreeable to symptom control and reassessment.     At 0710 she is feeling much better, the pain had gotten much better, she no longer feels nauseous, she would like to trial some fluids.  She asked how long she would need to stay at this time, that she could call her sister to come pick her up.  I discussed with her that we wanted to make sure she is tolerating oral fluids and that she is feeling better before we send her home.  She feels comfortable to be able to trial fluids now and would like to go home if possible.    I have given her water, she is tolerating oral fluids.  Nursing will help her to call her family in order to pick her up. Zofran Rx had been sent in by Dr. Hinson earlier this morning to her pharmacy.     She is given return precautions.      Kina Edward MD  07/18/25 0771        Patient waiting on her ride. Has had no increased pain. No vomiting at all. Resting.      Kina Edward MD  07/18/25 [id number]

## (undated) DEVICE — YANKAUER,FLEXIBLE HANDLE,REGLR CAPACITY: Brand: MEDLINE INDUSTRIES, INC.

## (undated) DEVICE — SYRINGE MED 20ML STD CLR PLAS LUERLOCK TIP N CTRL DISP

## (undated) DEVICE — TOTAL TRAY, DB, 100% SILI FOLEY, 16FR 10: Brand: MEDLINE

## (undated) DEVICE — SUTURE PERMAHAND SZ 2-0 L30IN 10X30IN TIE NONABSORBABLE BLK SA85H

## (undated) DEVICE — TOWEL,OR,DSP,ST,BLUE,STD,6/PK,12PK/CS: Brand: MEDLINE

## (undated) DEVICE — SUTURE ABSORBABLE BRAIDED 2-0 CT-1 27 IN UD VICRYL J259H

## (undated) DEVICE — SUTURE PERMA-HAND SZ 3-0 L18IN 17 STRND NONABSORBABLE BLK SA64H

## (undated) DEVICE — NEEDLE HYPO 20GA L1.5IN YEL POLYPR HUB S STL REG BVL STR

## (undated) DEVICE — PACK,BASIC,IX: Brand: MEDLINE

## (undated) DEVICE — CHLORAPREP 26ML ORANGE

## (undated) DEVICE — SPONGE LAP W18XL18IN WHT COT 4 PLY FLD STRUNG RADPQ DISP ST

## (undated) DEVICE — GOWN,SIRUS,POLYRNF,BRTHSLV,XLN/XL,20/CS: Brand: MEDLINE

## (undated) DEVICE — SUTURE PERMAHAND SZ 3-0 L18IN NONABSORBABLE BLK L26MM SH C013D

## (undated) DEVICE — DRAPE SHEET ULTRAGARD: Brand: MEDLINE

## (undated) DEVICE — MARKER SURG SKIN UTIL REGULAR/FINE 2 TIP W/ RUL AND 9 LBL

## (undated) DEVICE — SUTURE PERMA HND 2-0 L18IN NONABSORBABLE BLK X-1 L22IN 1/2 737G

## (undated) DEVICE — ELECTRODE ES AD CRDLSS PT RET REM POLYHESIVE

## (undated) DEVICE — PENCIL ES CRD L10FT HND SWCHING ROCK SWCH W/ EDGE COAT BLDE

## (undated) DEVICE — COUNTER NDL 30 COUNT FOAM STRP SGL MAG

## (undated) DEVICE — SUTURE PERMAHAND SZ 2-0 L18IN NONABSORBABLE BLK L26MM SH C012D

## (undated) DEVICE — TUBING, SUCTION, 9/32" X 10', STRAIGHT: Brand: MEDLINE

## (undated) DEVICE — SUTURE VCRL SZ 0 L27IN ABSRB UD L36MM CT-1 1/2 CIR J260H

## (undated) DEVICE — GAUZE,SPONGE,4"X4",16PLY,XRAY,STRL,LF: Brand: MEDLINE

## (undated) DEVICE — 35 ML SYRINGE LUER-LOCK TIP: Brand: MONOJECT

## (undated) DEVICE — GLOVE ORANGE PI 8   MSG9080

## (undated) DEVICE — SUTURE PROL SZ 1 L60IN NONABSORBABLE BLU L65MM TP-1 3/8 CIR 8824G

## (undated) DEVICE — INTENDED FOR TISSUE SEPARATION, AND OTHER PROCEDURES THAT REQUIRE A SHARP SURGICAL BLADE TO PUNCTURE OR CUT.: Brand: BARD-PARKER ® STAINLESS STEEL BLADES